# Patient Record
Sex: MALE | Race: WHITE | NOT HISPANIC OR LATINO | Employment: OTHER | URBAN - NONMETROPOLITAN AREA
[De-identification: names, ages, dates, MRNs, and addresses within clinical notes are randomized per-mention and may not be internally consistent; named-entity substitution may affect disease eponyms.]

---

## 2017-07-17 ENCOUNTER — TRANSCRIBE ORDERS (OUTPATIENT)
Dept: LAB | Facility: HOSPITAL | Age: 67
End: 2017-07-17

## 2017-07-17 ENCOUNTER — HOSPITAL ENCOUNTER (OUTPATIENT)
Dept: CT IMAGING | Facility: HOSPITAL | Age: 67
Discharge: HOME | End: 2017-07-17
Attending: INTERNAL MEDICINE | Admitting: INTERNAL MEDICINE

## 2017-07-17 DIAGNOSIS — R31.9 ESSENTIAL HEMATURIA: ICD-10-CM

## 2017-07-17 DIAGNOSIS — R31.9 HEMATURIA: Primary | ICD-10-CM

## 2017-07-17 DIAGNOSIS — R31.9 HEMATURIA: ICD-10-CM

## 2017-07-17 LAB — CREAT BLDA-MCNC: 1 MG/DL (ref 0.6–1.3)

## 2017-07-17 PROCEDURE — 74178 CT ABD&PLV WO CNTR FLWD CNTR: CPT

## 2017-07-17 PROCEDURE — 82565 ASSAY OF CREATININE: CPT

## 2017-07-17 PROCEDURE — 0 IOPAMIDOL 61 % SOLUTION: Performed by: INTERNAL MEDICINE

## 2017-07-17 RX ADMIN — IOPAMIDOL 100 ML: 612 INJECTION, SOLUTION INTRAVENOUS at 11:45

## 2017-07-20 ENCOUNTER — PREP FOR SURGERY (OUTPATIENT)
Dept: OTHER | Facility: HOSPITAL | Age: 67
End: 2017-07-20

## 2017-07-20 ENCOUNTER — OFFICE VISIT (OUTPATIENT)
Dept: UROLOGY | Facility: CLINIC | Age: 67
End: 2017-07-20

## 2017-07-20 VITALS — TEMPERATURE: 97.8 F | WEIGHT: 245 LBS | BODY MASS INDEX: 35.07 KG/M2 | HEIGHT: 70 IN

## 2017-07-20 DIAGNOSIS — D41.4 BLADDER NEOPLASM OF UNCERTAIN MALIGNANT POTENTIAL: Primary | ICD-10-CM

## 2017-07-20 DIAGNOSIS — N32.89 BLADDER MASS: Primary | ICD-10-CM

## 2017-07-20 DIAGNOSIS — N13.30 HYDRONEPHROSIS, RIGHT: ICD-10-CM

## 2017-07-20 DIAGNOSIS — R31.0 GROSS HEMATURIA: ICD-10-CM

## 2017-07-20 LAB
BILIRUB BLD-MCNC: NEGATIVE MG/DL
CLARITY, POC: CLEAR
COLOR UR: YELLOW
GLUCOSE UR STRIP-MCNC: ABNORMAL MG/DL
KETONES UR QL: NEGATIVE
LEUKOCYTE EST, POC: NEGATIVE
NITRITE UR-MCNC: NEGATIVE MG/ML
PH UR: 6 [PH] (ref 5–8)
PROT UR STRIP-MCNC: NEGATIVE MG/DL
RBC # UR STRIP: ABNORMAL /UL
SP GR UR: 1.01 (ref 1–1.03)
UROBILINOGEN UR QL: NORMAL

## 2017-07-20 PROCEDURE — 52000 CYSTOURETHROSCOPY: CPT | Performed by: UROLOGY

## 2017-07-20 PROCEDURE — 81003 URINALYSIS AUTO W/O SCOPE: CPT | Performed by: UROLOGY

## 2017-07-20 PROCEDURE — 99205 OFFICE O/P NEW HI 60 MIN: CPT | Performed by: UROLOGY

## 2017-07-20 NOTE — PROGRESS NOTES
Mr. Bradley is 66 y.o. male    CHIEF COMPLAINT: I am here because I have a bladder mass. I had a lot of blood in my urine last week.     HPI  5 days ago developed acute onset of gross hematuria.  Severity was dark red with clots.  He had no evidence of slowing of urinary stream but was bothered by urgency and frequency.  He  had improvement with observation/time.  Onset is unknown.  He underwent a CT scan for further evaluation which has revealed a bladder mass.    The following portions of the patient's history were reviewed and updated as appropriate: allergies, current medications, past family history, past medical history, past social history, past surgical history and problem list.    Review of Systems   Constitutional: Negative for appetite change and fever.   HENT: Negative for hearing loss and sore throat.    Eyes: Negative for pain and redness.   Respiratory: Negative for cough and shortness of breath.    Cardiovascular: Negative for chest pain and leg swelling.   Gastrointestinal: Negative for anal bleeding, nausea and vomiting.   Endocrine: Negative for cold intolerance and heat intolerance.   Genitourinary: Negative for dysuria, flank pain and hematuria.   Musculoskeletal: Negative for joint swelling and myalgias.   Skin: Negative for color change and rash.   Allergic/Immunologic: Negative for immunocompromised state.   Neurological: Negative for dizziness and speech difficulty.   Hematological: Negative for adenopathy. Does not bruise/bleed easily.   Psychiatric/Behavioral: Negative for dysphoric mood and suicidal ideas.         Current Outpatient Prescriptions:   •  allopurinol (ZYLOPRIM) 300 MG tablet, Take 300 mg by mouth daily., Disp: , Rfl:   •  aspirin 81 MG tablet, Take 81 mg by mouth daily., Disp: , Rfl:   •  beclomethasone (BECONASE AQ) 42 MCG/SPRAY nasal spray, 2 sprays by Nasal route 2 times daily. Dose is for each nostril., Disp: , Rfl:   •  Cholecalciferol 1000 UNITS capsule, Take  by  "mouth., Disp: , Rfl:   •  fenofibrate (TRICOR) 145 MG tablet, Take 145 mg by mouth daily., Disp: , Rfl:   •  furosemide (LASIX) 20 MG tablet, Take  by mouth., Disp: , Rfl:   •  GABAPENTIN PO, Take  by mouth., Disp: , Rfl:   •  LISINOPRIL PO, Take  by mouth., Disp: , Rfl:   •  LOVASTATIN PO, Take  by mouth., Disp: , Rfl:   •  magnesium oxide (MAG-OX) 400 MG tablet, Take 400 mg by mouth 2 times daily., Disp: , Rfl:   •  METFORMIN HCL PO, Take  by mouth., Disp: , Rfl:   •  OMEPRAZOLE PO, Take  by mouth., Disp: , Rfl:   •  vitamin B-12 (CYANOCOBALAMIN) 1000 MCG tablet, Take 1,000 mcg by mouth daily., Disp: , Rfl:   •  Ipratropium Bromide powder, by Does not apply route., Disp: , Rfl:     Past Medical History:   Diagnosis Date   • Diabetes mellitus    • GERD (gastroesophageal reflux disease)    • Hypertension    • Neuropathy        Past Surgical History:   Procedure Laterality Date   • CHOLECYSTECTOMY     • LEG SURGERY         Social History     Social History   • Marital status:      Spouse name: N/A   • Number of children: N/A   • Years of education: N/A     Social History Main Topics   • Smoking status: Former Smoker     Quit date: 1997   • Smokeless tobacco: None   • Alcohol use No   • Drug use: No   • Sexual activity: Not Asked     Other Topics Concern   • None     Social History Narrative       Family History   Problem Relation Age of Onset   • No Known Problems Father    • No Known Problems Mother        Temp 97.8 °F (36.6 °C)  Ht 70\" (177.8 cm)  Wt 245 lb (111 kg)  BMI 35.15 kg/m2    Physical Exam  Constitutional: He is oriented to person, place, and time. He appears well-developed and well-nourished. No distress.   HENT:   Head: Normocephalic and atraumatic.   Right Ear: External ear and ear canal normal.   Left Ear: External ear and ear canal normal.   Nose: No nasal deformity. No epistaxis.   Mouth/Throat: Oropharynx is clear and moist. Mucous membranes are not pale, not dry and not cyanotic. Normal " dentition. No oropharyngeal exudate.   Neck: Trachea normal. No tracheal tenderness present. No tracheal deviation present. No thyroid mass and no thyromegaly present.   Pulmonary/Chest: Effort normal. No accessory muscle usage. No respiratory distress. Chest wall is not dull to percussion (No flatness or hyperresonance). He exhibits no mass and no tenderness.   On palpation, no tactile fremitus. All movements are symmetric. No intercostal retraction noted.    Abdominal: Soft. Normal appearance. He exhibits no distension and no mass. There is no hepatosplenomegaly. There is no tenderness. No hernia.   Rectal examination or stool specimen is not indicated.    Musculoskeletal:   Normal gait and station. The spine, ribs, and pelvis are examined. No obvious misalignment or asymmetry. ROM is reasonable for age. No instability. No obvious atrophy, flaccidity or spasticity.    Lymphadenopathy:     He has no cervical adenopathy.        Right: No inguinal adenopathy present.        Left: No inguinal adenopathy present.   Neurological: He is alert and oriented to person, place, and time.   Skin: Skin is warm, dry and intact. No lesion and no rash noted. He is not diaphoretic. No cyanosis. No pallor. Nails show no clubbing.   On palpation, there were no induration, subcutaneous nodules, or tightening   Psychiatric: His speech is normal and behavior is normal. Judgment and thought content normal. His mood appears not anxious. His affect is not labile. He does not exhibit a depressed mood.   Vitals reviewed.    Results for orders placed or performed in visit on 07/20/17   POC Urinalysis Dipstick, Automated   Result Value Ref Range    Color Yellow Yellow, Straw, Dark Yellow, Nicolasa    Clarity, UA Clear Clear    Glucose,  mg/dL (A) Negative, 1000 mg/dL (3+) mg/dL    Bilirubin Negative Negative    Ketones, UA Negative Negative    Specific Gravity  1.015 1.005 - 1.030    Blood, UA Large (A) Negative    pH, Urine 6.0 5.0 - 8.0     Protein, POC Negative Negative mg/dL    Urobilinogen, UA Normal Normal    Leukocytes Negative Negative    Nitrite, UA Negative Negative     Independent review of the CT scan of abdomen and pelvis with and without contrast  The CT scan of the abdomen/pelvis done with and without contrast is available for me to review.  Treatment recommendations require an independent review.  First I scanned the liver, spleen, and bowel pattern.  The retroperitoneum including the major vessels and lymphatic packages are briefly reviewed.  This film as been reviewed by the radiologist to determine any non urologic abnormalities that are present.  The kidneys are closely inspected for size, symmetry, contour, parenchymal thickness, perinephric reaction, presence of calcifications, and intrarenal dilation of the collecting system.  The ureters are inspected for their course, caliber, and any calcifications.  The bladder is inspected for its thickness, size, and presence of any calcifications.  This scan shows right-sided hydronephrosis to level the ureterovesical junction.  There is a 2 cm mass in the bladder that is concerning for papillary lesion.  Assessment and Plan  Diagnoses and all orders for this visit:    Bladder mass  -     POC Urinalysis Dipstick, Automated    Gross hematuria    Hydronephrosis, right    Further evaluation is needed for cystoscopy.  His urine does not appear infected today. Cystoscopy as the definitive lower urinary tract study is discussed . The risks of pain and discomfort, infection, and urethral stricture are discussed with the patient including the technique used in the office setting.  All patient questions were answered.     A CXR will be needed to rule out pulmonary metastasis.     Cystoscopy procedure note  Pre- operative diagnosis:  Hematuria and suspected bladder mass on CT scan    Post operative diagnosis:  Bladder Tumor    Procedure:  The patient was prepped and draped in a normal sterile  fashion.  The urethra was anesthetized with 2% lidocaine jelly.  A flexible cystoscope was introduced per urethra.      Urethra:  No urethral stricture    Bladder:  Bladder tumor 5 cm near right ureteral orifice and No bladder neck contracture    Ureteral orifices:  Cant identify ureteral orifice    Prostate:  lateral lobe hypertrophy    Patient tolerated the procedure well    Complications: none    Blood loss: minimal    Diagnoses and all orders for this visit:    Bladder mass  -     POC Urinalysis Dipstick, Automated    Gross hematuria    Hydronephrosis, right        Follow up:    Schedule for OR  TURBT  Today cystoscopy has revealed a new diagnosis,a bladder tumor.  I explained to the patient that most tumors in the bladder are cancerous.  I also explained the outcome and ultimate treatment for bladder cancer depends upon the depth of invasion of the tumor, its size, and histologic grade of the tumor.  I then recommended a transurethral resection of the bladder tumor.  Alternative options would include watchful waiting and percutaneous biopsy but TURBT is the standard of care in this situation.  The risks of the procedure including hematuria, possible urinary retention from clots, urinary tract infection, ureteral obstruction, bladder perforation and need for open surgery and even removal of a portion of the bladder is explained.  Open surgery is described as a rare event.  The patient does understand that the TURBT serves as a diagnostic procedure to determine the depth of invasion and grade but can be adequate treatment.  This will allow us to formulate a plan for definitive therapy which may require repeat bladder biopsy, surveillance cystoscopy (described to the patient as part of long term follow-up due to the recurrence rate ranged from 20-45% based upon histologic grade), partial or radical cystectomy, intravesical chemotherapy or immunotherapy.  The patient understands this, acknowledges informed consent,  and will be scheduled.    Chris Esparza MD  07/20/17  8:41 AM    Cc: Dr. Fidel Yousif

## 2017-07-21 ENCOUNTER — TELEPHONE (OUTPATIENT)
Dept: UROLOGY | Facility: CLINIC | Age: 67
End: 2017-07-21

## 2017-07-21 ENCOUNTER — APPOINTMENT (OUTPATIENT)
Dept: PREADMISSION TESTING | Facility: HOSPITAL | Age: 67
End: 2017-07-21

## 2017-07-21 VITALS
OXYGEN SATURATION: 94 % | WEIGHT: 243 LBS | SYSTOLIC BLOOD PRESSURE: 122 MMHG | BODY MASS INDEX: 36.83 KG/M2 | RESPIRATION RATE: 18 BRPM | HEIGHT: 68 IN | DIASTOLIC BLOOD PRESSURE: 72 MMHG | HEART RATE: 66 BPM

## 2017-07-21 DIAGNOSIS — D41.4 BLADDER NEOPLASM OF UNCERTAIN MALIGNANT POTENTIAL: ICD-10-CM

## 2017-07-21 LAB
ALBUMIN SERPL-MCNC: 4.6 G/DL (ref 3.5–5)
ALBUMIN/GLOB SERPL: 1.6 G/DL (ref 1.1–2.5)
ALP SERPL-CCNC: 53 U/L (ref 24–120)
ALT SERPL W P-5'-P-CCNC: 40 U/L (ref 0–54)
ANION GAP SERPL CALCULATED.3IONS-SCNC: 11 MMOL/L (ref 4–13)
AST SERPL-CCNC: 39 U/L (ref 7–45)
BACTERIA UR QL AUTO: ABNORMAL /HPF
BILIRUB SERPL-MCNC: 0.7 MG/DL (ref 0.1–1)
BILIRUB UR QL STRIP: NEGATIVE
BUN BLD-MCNC: 23 MG/DL (ref 5–21)
BUN/CREAT SERPL: 20 (ref 7–25)
CALCIUM SPEC-SCNC: 9.7 MG/DL (ref 8.4–10.4)
CHLORIDE SERPL-SCNC: 102 MMOL/L (ref 98–110)
CLARITY UR: CLEAR
CO2 SERPL-SCNC: 29 MMOL/L (ref 24–31)
COLOR UR: YELLOW
CREAT BLD-MCNC: 1.15 MG/DL (ref 0.5–1.4)
DEPRECATED RDW RBC AUTO: 47.7 FL (ref 40–54)
ERYTHROCYTE [DISTWIDTH] IN BLOOD BY AUTOMATED COUNT: 14.9 % (ref 12–15)
GFR SERPL CREATININE-BSD FRML MDRD: 63 ML/MIN/1.73
GLOBULIN UR ELPH-MCNC: 2.9 GM/DL
GLUCOSE BLD-MCNC: 110 MG/DL (ref 70–100)
GLUCOSE UR STRIP-MCNC: NEGATIVE MG/DL
HCT VFR BLD AUTO: 39.4 % (ref 40–52)
HGB BLD-MCNC: 12.9 G/DL (ref 14–18)
HGB UR QL STRIP.AUTO: NEGATIVE
HYALINE CASTS UR QL AUTO: ABNORMAL /LPF
KETONES UR QL STRIP: NEGATIVE
LEUKOCYTE ESTERASE UR QL STRIP.AUTO: ABNORMAL
MCH RBC QN AUTO: 28.9 PG (ref 28–32)
MCHC RBC AUTO-ENTMCNC: 32.7 G/DL (ref 33–36)
MCV RBC AUTO: 88.3 FL (ref 82–95)
NITRITE UR QL STRIP: NEGATIVE
PH UR STRIP.AUTO: <=5 [PH] (ref 5–8)
PLATELET # BLD AUTO: 197 10*3/MM3 (ref 130–400)
PMV BLD AUTO: 9.5 FL (ref 6–12)
POTASSIUM BLD-SCNC: 3.6 MMOL/L (ref 3.5–5.3)
PROT SERPL-MCNC: 7.5 G/DL (ref 6.3–8.7)
PROT UR QL STRIP: NEGATIVE
RBC # BLD AUTO: 4.46 10*6/MM3 (ref 4.8–5.9)
RBC # UR: ABNORMAL /HPF
REF LAB TEST METHOD: ABNORMAL
SODIUM BLD-SCNC: 142 MMOL/L (ref 135–145)
SP GR UR STRIP: 1.01 (ref 1–1.03)
SQUAMOUS #/AREA URNS HPF: ABNORMAL /HPF
UROBILINOGEN UR QL STRIP: ABNORMAL
WBC NRBC COR # BLD: 7.53 10*3/MM3 (ref 4.8–10.8)
WBC UR QL AUTO: ABNORMAL /HPF

## 2017-07-21 PROCEDURE — 87081 CULTURE SCREEN ONLY: CPT | Performed by: UROLOGY

## 2017-07-21 PROCEDURE — 87086 URINE CULTURE/COLONY COUNT: CPT | Performed by: UROLOGY

## 2017-07-21 PROCEDURE — 81001 URINALYSIS AUTO W/SCOPE: CPT | Performed by: UROLOGY

## 2017-07-21 PROCEDURE — 80053 COMPREHEN METABOLIC PANEL: CPT | Performed by: UROLOGY

## 2017-07-21 PROCEDURE — 36415 COLL VENOUS BLD VENIPUNCTURE: CPT

## 2017-07-21 PROCEDURE — 93010 ELECTROCARDIOGRAM REPORT: CPT | Performed by: INTERNAL MEDICINE

## 2017-07-21 PROCEDURE — 85027 COMPLETE CBC AUTOMATED: CPT | Performed by: UROLOGY

## 2017-07-21 PROCEDURE — 93005 ELECTROCARDIOGRAM TRACING: CPT

## 2017-07-21 RX ORDER — FUROSEMIDE 40 MG/1
40 TABLET ORAL 2 TIMES DAILY
COMMUNITY
End: 2019-02-02 | Stop reason: HOSPADM

## 2017-07-21 NOTE — DISCHARGE INSTRUCTIONS
DAY OF SURGERY INSTRUCTIONS        YOUR SURGEON: BOBBY TOVAR    PROCEDURE: CYSTOSCOPY TRANSURETHERAL RESECTION OF BLADDER TUMOR    DATE OF SURGERY: July 25TH 2017    ARRIVAL TIME: AS DIRECTED BY OFFICE    DAY OF SURGERY TAKE ONLY THESE MEDICATIONS: 0            BEFORE YOU COME TO THE HOSPITAL  (Pre-op instructions)  • Do not eat, drink, smoke or chew gum after midnight the night before surgery.  This also includes no mints.  • Morning of surgery take only the medicines you have been instructed with a sip of water unless otherwise instructed  by your physician.  • Do not shave, wear makeup or dark nail polish.  • Remove all jewelry including rings.  • Leave anything you consider valuable at home.  • Leave your suitcase in the car until after your surgery.  • Bring the following with you if applicable:  o Picture ID and insurance, Medicare or Medicaid cards  o Co-pay/deductible required by insurance (cash, check, credit card)  o Copy of advance directive, living will or power-of- documents if not brought to PAT  o CPAP or BIPAP mask and tubing  o Relaxation aids (MP3 player, book, magazine)  • On the day of surgery check in at registration located at the main entrance of the hospital.       Outpatient Surgery Guidelines, Adult  Outpatient procedures are those for which the person having the procedure is allowed to go home the same day as the procedure. Various procedures are done on an outpatient basis. You should follow some general guidelines if you will be having an outpatient procedure.  LET YOUR HEALTH CARE PROVIDER KNOW ABOUT:  · Any allergies you have.  · All medicines you are taking, including vitamins, herbs, eye drops, creams, and over-the-counter medicines.  · Previous problems you or members of your family have had with the use of anesthetics.  · Any blood disorders you have.  · Previous surgeries you have had.  · Medical conditions you have.  RISKS AND COMPLICATIONS  Your health care provider  will discuss possible risks and complications with you before surgery. Common risks and complications include:    · Problems due to the use of anesthetics.  · Blood loss and replacement (does not apply to minor surgical procedures).  · Temporary increase in pain due to surgery.  · Uncorrected pain or problems that the surgery was meant to correct.  · Infection.  · New damage.  BEFORE THE PROCEDURE  · Ask your health care provider about changing or stopping your regular medicines. You may need to stop taking certain medicines in the days or weeks before the procedure.  · Stop smoking at least 2 weeks before surgery. This lowers your risk for complications during and after surgery. Ask your health care provider for help with this if needed.  · Eat your usual meals and a light supper the day before surgery. Continue fluid intake. Do not drink alcohol.  · Do not eat or drink after midnight the night before your surgery.   · Arrange for someone to take you home and to stay with you for 24 hours after the procedure. Medicine given for your procedure may affect your ability to drive or to care for yourself.  · Call your health care provider's office if you develop an illness or problem that may prevent you from safely having your procedure.  AFTER THE PROCEDURE  After surgery, you will be taken to a recovery area, where your progress will be monitored. If there are no complications, you will be allowed to go home when you are awake, stable, and taking fluids well. You may have numbness around the surgical site. Healing will take some time. You will have tenderness at the surgical site and may have some swelling and bruising. You may also have some nausea.  HOME CARE INSTRUCTIONS  · Do not drive for 24 hours, or as directed by your health care provider. Do not drive while taking prescription pain medicines.  · Do not drink alcohol for 24 hours.  · Do not make important decisions or sign legal documents for 24 hours.  · You  may resume a normal diet and activities as directed.  · Do not lift anything heavier than 10 pounds (4.5 kg) or play contact sports until your health care provider says it is okay.  · Change your bandages (dressings) as directed.  · Only take over-the-counter or prescription medicines as directed by your health care provider.  · Follow up with your health care provider as directed.  SEEK MEDICAL CARE IF:  · You have increased bleeding (more than a small spot) from the surgical site.  · You have redness, swelling, or increasing pain in the wound.  · You see pus coming from the wound.  · You have a fever.  · You notice a bad smell coming from the wound or dressing.  · You feel lightheaded or faint.  · You develop a rash.  · You have trouble breathing.  · You develop allergies.  MAKE SURE YOU:  · Understand these instructions.  · Will watch your condition.  · Will get help right away if you are not doing well or get worse.     This information is not intended to replace advice given to you by your health care provider. Make sure you discuss any questions you have with your health care provider.     Document Released: 09/12/2002 Document Revised: 05/03/2016 Document Reviewed: 05/22/2014  [employer] Interactive Patient Education ©2016 [employer]       Fall Prevention in Hospitals, Adult  As a hospital patient, your condition and the treatments you receive can increase your risk for falls. Some additional risk factors for falls in a hospital include:  · Being in an unfamiliar environment.  · Being on bed rest.  · Your surgery.  · Taking certain medicines.  · Your tubing requirements, such as intravenous (IV) therapy or catheters.  It is important that you learn how to decrease fall risks while at the hospital. Below are important tips that can help prevent falls.  SAFETY TIPS FOR PREVENTING FALLS  Talk about your risk of falling.  · Ask your health care provider why you are at risk for falling. Is it your medicine,  illness, tubing placement, or something else?  · Make a plan with your health care provider to keep you safe from falls.  · Ask your health care provider or pharmacist about side effects of your medicines. Some medicines can make you dizzy or affect your coordination.  Ask for help.  · Ask for help before getting out of bed. You may need to press your call button.  · Ask for assistance in getting safely to the toilet.  · Ask for a walker or cane to be put at your bedside. Ask that most of the side rails on your bed be placed up before your health care provider leaves the room.  · Ask family or friends to sit with you.  · Ask for things that are out of your reach, such as your glasses, hearing aids, telephone, bedside table, or call button.  Follow these tips to avoid falling:  · Stay lying or seated, rather than standing, while waiting for help.  · Wear rubber-soled slippers or shoes whenever you walk in the hospital.  · Avoid quick, sudden movements.  ¨ Change positions slowly.  ¨ Sit on the side of your bed before standing.  ¨ Stand up slowly and wait before you start to walk.  · Let your health care provider know if there is a spill on the floor.  · Pay careful attention to the medical equipment, electrical cords, and tubes around you.  · When you need help, use your call button by your bed or in the bathroom. Wait for one of your health care providers to help you.  · If you feel dizzy or unsure of your footing, return to bed and wait for assistance.  · Avoid being distracted by the TV, telephone, or another person in your room.  · Do not lean or support yourself on rolling objects, such as IV poles or bedside tables.     This information is not intended to replace advice given to you by your health care provider. Make sure you discuss any questions you have with your health care provider.     Document Released: 12/15/2001 Document Revised: 01/08/2016 Document Reviewed: 08/25/2013  [employer] Patient  Education ©2016 [employer]       Surgical Site Infections FAQs  What is a Surgical Site Infection (SSI)?  A surgical site infection is an infection that occurs after surgery in the part of the body where the surgery took place. Most patients who have surgery do not develop an infection. However, infections develop in about 1 to 3 out of every 100 patients who have surgery.  Some of the common symptoms of a surgical site infection are:  · Redness and pain around the area where you had surgery  · Drainage of cloudy fluid from your surgical wound  · Fever  Can SSIs be treated?  Yes. Most surgical site infections can be treated with antibiotics. The antibiotic given to you depends on the bacteria (germs) causing the infection. Sometimes patients with SSIs also need another surgery to treat the infection.  What are some of the things that hospitals are doing to prevent SSIs?  To prevent SSIs, doctors, nurses, and other healthcare providers:  · Clean their hands and arms up to their elbows with an antiseptic agent just before the surgery.  · Clean their hands with soap and water or an alcohol-based hand rub before and after caring for each patient.  · May remove some of your hair immediately before your surgery using electric clippers if the hair is in the same area where the procedure will occur. They should not shave you with a razor.  · Wear special hair covers, masks, gowns, and gloves during surgery to keep the surgery area clean.  · Give you antibiotics before your surgery starts. In most cases, you should get antibiotics within 60 minutes before the surgery starts and the antibiotics should be stopped within 24 hours after surgery.  · Clean the skin at the site of your surgery with a special soap that kills germs.  What can I do to help prevent SSIs?  Before your surgery:  · Tell your doctor about other medical problems you may have. Health problems such as allergies, diabetes, and obesity could affect your  surgery and your treatment.  · Quit smoking. Patients who smoke get more infections. Talk to your doctor about how you can quit before your surgery.  · Do not shave near where you will have surgery. Shaving with a razor can irritate your skin and make it easier to develop an infection.  At the time of your surgery:  · Speak up if someone tries to shave you with a razor before surgery. Ask why you need to be shaved and talk with your surgeon if you have any concerns.  · Ask if you will get antibiotics before surgery.  After your surgery:  · Make sure that your healthcare providers clean their hands before examining you, either with soap and water or an alcohol-based hand rub.  · If you do not see your providers clean their hands, please ask them to do so.  · Family and friends who visit you should not touch the surgical wound or dressings.  · Family and friends should clean their hands with soap and water or an alcohol-based hand rub before and after visiting you. If you do not see them clean their hands, ask them to clean their hands.  What do I need to do when I go home from the hospital?  · Before you go home, your doctor or nurse should explain everything you need to know about taking care of your wound. Make sure you understand how to care for your wound before you leave the hospital.  · Always clean your hands before and after caring for your wound.  · Before you go home, make sure you know who to contact if you have questions or problems after you get home.  · If you have any symptoms of an infection, such as redness and pain at the surgery site, drainage, or fever, call your doctor immediately.  If you have additional questions, please ask your doctor or nurse.  Developed and co-sponsored by The Society for Healthcare Epidemiology of [place] (SHEA); Infectious Diseases Society of [place] (IDSA); American Hospital Association; Association for Professionals in Infection Control and Epidemiology (APIC); [facility]  for Disease Control and Prevention (CDC); and The Joint Commission.     This information is not intended to replace advice given to you by your health care provider. Make sure you discuss any questions you have with your health care provider.     Document Released: 12/23/2014 Document Revised: 01/08/2016 Document Reviewed: 03/02/2016  [employer] Interactive Patient Education ©2016 [employer]     PATIENT/FAMILY/RESPONSIBLE PARTY VERBALIZES UNDERSTANDING OF ABOVE EDUCATION.  COPY OF PAIN SCALE GIVEN AND REVIEWED WITH VERBALIZED UNDERSTANDING.

## 2017-07-21 NOTE — TELEPHONE ENCOUNTER
----- Message from Rafia Meadows sent at 7/21/2017  9:33 AM CDT -----  Contact: wife  Pt is wanting to know how many days he will be in the hosp for his surgery and approx how long it takes for him to resume normal activity.  I can call her back if you know.

## 2017-07-23 LAB
BACTERIA SPEC AEROBE CULT: NORMAL
MRSA SPEC QL CULT: NORMAL

## 2017-07-24 NOTE — TELEPHONE ENCOUNTER
Spoke with pt wife regarding her questions and gave her the information from Dr. Esparza. She voiced understanding.

## 2017-07-25 ENCOUNTER — ANESTHESIA EVENT (OUTPATIENT)
Dept: PERIOP | Facility: HOSPITAL | Age: 67
End: 2017-07-25

## 2017-07-25 ENCOUNTER — HOSPITAL ENCOUNTER (OUTPATIENT)
Facility: HOSPITAL | Age: 67
Discharge: HOME | End: 2017-07-26
Attending: UROLOGY | Admitting: UROLOGY

## 2017-07-25 ENCOUNTER — ANESTHESIA (OUTPATIENT)
Dept: PERIOP | Facility: HOSPITAL | Age: 67
End: 2017-07-25

## 2017-07-25 DIAGNOSIS — D41.4 BLADDER NEOPLASM OF UNCERTAIN MALIGNANT POTENTIAL: ICD-10-CM

## 2017-07-25 LAB
GLUCOSE BLDC GLUCOMTR-MCNC: 140 MG/DL (ref 70–130)
GLUCOSE BLDC GLUCOMTR-MCNC: 160 MG/DL (ref 70–130)
GLUCOSE BLDC GLUCOMTR-MCNC: 173 MG/DL (ref 70–130)

## 2017-07-25 PROCEDURE — 88307 TISSUE EXAM BY PATHOLOGIST: CPT | Performed by: UROLOGY

## 2017-07-25 PROCEDURE — C1758 CATHETER, URETERAL: HCPCS | Performed by: UROLOGY

## 2017-07-25 PROCEDURE — 52240 CYSTOSCOPY AND TREATMENT: CPT | Performed by: UROLOGY

## 2017-07-25 PROCEDURE — 82962 GLUCOSE BLOOD TEST: CPT

## 2017-07-25 PROCEDURE — 25010000003 CEFAZOLIN PER 500 MG: Performed by: UROLOGY

## 2017-07-25 PROCEDURE — 25010000002 FENTANYL CITRATE (PF) 100 MCG/2ML SOLUTION: Performed by: NURSE ANESTHETIST, CERTIFIED REGISTERED

## 2017-07-25 PROCEDURE — 25010000002 NEOSTIGMINE PER 0.5 MG: Performed by: NURSE ANESTHETIST, CERTIFIED REGISTERED

## 2017-07-25 PROCEDURE — 25010000002 PROPOFOL 10 MG/ML EMULSION: Performed by: NURSE ANESTHETIST, CERTIFIED REGISTERED

## 2017-07-25 PROCEDURE — 25010000002 MIDAZOLAM PER 1 MG: Performed by: ANESTHESIOLOGY

## 2017-07-25 PROCEDURE — 25010000002 ONDANSETRON PER 1 MG: Performed by: NURSE ANESTHETIST, CERTIFIED REGISTERED

## 2017-07-25 PROCEDURE — 25010000002 DEXAMETHASONE PER 1 MG: Performed by: NURSE ANESTHETIST, CERTIFIED REGISTERED

## 2017-07-25 PROCEDURE — 94799 UNLISTED PULMONARY SVC/PX: CPT

## 2017-07-25 RX ORDER — ONDANSETRON 4 MG/1
4 TABLET, ORALLY DISINTEGRATING ORAL EVERY 6 HOURS PRN
Status: DISCONTINUED | OUTPATIENT
Start: 2017-07-25 | End: 2017-07-26 | Stop reason: HOSPADM

## 2017-07-25 RX ORDER — ONDANSETRON 2 MG/ML
4 INJECTION INTRAMUSCULAR; INTRAVENOUS EVERY 6 HOURS PRN
Status: DISCONTINUED | OUTPATIENT
Start: 2017-07-25 | End: 2017-07-26 | Stop reason: HOSPADM

## 2017-07-25 RX ORDER — PROPOFOL 10 MG/ML
VIAL (ML) INTRAVENOUS AS NEEDED
Status: DISCONTINUED | OUTPATIENT
Start: 2017-07-25 | End: 2017-07-25 | Stop reason: SURG

## 2017-07-25 RX ORDER — ROCURONIUM BROMIDE 10 MG/ML
INJECTION, SOLUTION INTRAVENOUS AS NEEDED
Status: DISCONTINUED | OUTPATIENT
Start: 2017-07-25 | End: 2017-07-25 | Stop reason: SURG

## 2017-07-25 RX ORDER — NALOXONE HCL 0.4 MG/ML
0.04 VIAL (ML) INJECTION AS NEEDED
Status: DISCONTINUED | OUTPATIENT
Start: 2017-07-25 | End: 2017-07-25 | Stop reason: HOSPADM

## 2017-07-25 RX ORDER — MEPERIDINE HYDROCHLORIDE 25 MG/ML
12.5 INJECTION INTRAMUSCULAR; INTRAVENOUS; SUBCUTANEOUS
Status: DISCONTINUED | OUTPATIENT
Start: 2017-07-25 | End: 2017-07-25 | Stop reason: HOSPADM

## 2017-07-25 RX ORDER — IPRATROPIUM BROMIDE AND ALBUTEROL SULFATE 2.5; .5 MG/3ML; MG/3ML
3 SOLUTION RESPIRATORY (INHALATION) ONCE
Status: COMPLETED | OUTPATIENT
Start: 2017-07-25 | End: 2017-07-25

## 2017-07-25 RX ORDER — LABETALOL HYDROCHLORIDE 5 MG/ML
5 INJECTION, SOLUTION INTRAVENOUS
Status: DISCONTINUED | OUTPATIENT
Start: 2017-07-25 | End: 2017-07-25 | Stop reason: HOSPADM

## 2017-07-25 RX ORDER — FENTANYL CITRATE 50 UG/ML
INJECTION, SOLUTION INTRAMUSCULAR; INTRAVENOUS AS NEEDED
Status: DISCONTINUED | OUTPATIENT
Start: 2017-07-25 | End: 2017-07-25 | Stop reason: SURG

## 2017-07-25 RX ORDER — ACETAMINOPHEN 325 MG/1
650 TABLET ORAL EVERY 4 HOURS PRN
Status: DISCONTINUED | OUTPATIENT
Start: 2017-07-25 | End: 2017-07-26 | Stop reason: HOSPADM

## 2017-07-25 RX ORDER — ALLOPURINOL 100 MG/1
100 TABLET ORAL DAILY
Status: DISCONTINUED | OUTPATIENT
Start: 2017-07-25 | End: 2017-07-26 | Stop reason: HOSPADM

## 2017-07-25 RX ORDER — HYDROCODONE BITARTRATE AND ACETAMINOPHEN 7.5; 325 MG/1; MG/1
2 TABLET ORAL EVERY 4 HOURS PRN
Status: DISCONTINUED | OUTPATIENT
Start: 2017-07-25 | End: 2017-07-26 | Stop reason: HOSPADM

## 2017-07-25 RX ORDER — DEXAMETHASONE SODIUM PHOSPHATE 4 MG/ML
INJECTION, SOLUTION INTRA-ARTICULAR; INTRALESIONAL; INTRAMUSCULAR; INTRAVENOUS; SOFT TISSUE AS NEEDED
Status: DISCONTINUED | OUTPATIENT
Start: 2017-07-25 | End: 2017-07-25 | Stop reason: SURG

## 2017-07-25 RX ORDER — FAMOTIDINE 10 MG/ML
20 INJECTION, SOLUTION INTRAVENOUS
Status: DISCONTINUED | OUTPATIENT
Start: 2017-07-25 | End: 2017-07-25 | Stop reason: HOSPADM

## 2017-07-25 RX ORDER — ONDANSETRON 4 MG/1
4 TABLET, FILM COATED ORAL EVERY 6 HOURS PRN
Status: DISCONTINUED | OUTPATIENT
Start: 2017-07-25 | End: 2017-07-26 | Stop reason: HOSPADM

## 2017-07-25 RX ORDER — LISINOPRIL 5 MG/1
5 TABLET ORAL
Status: DISCONTINUED | OUTPATIENT
Start: 2017-07-25 | End: 2017-07-26 | Stop reason: HOSPADM

## 2017-07-25 RX ORDER — MIDAZOLAM HYDROCHLORIDE 1 MG/ML
1 INJECTION INTRAMUSCULAR; INTRAVENOUS
Status: DISCONTINUED | OUTPATIENT
Start: 2017-07-25 | End: 2017-07-25 | Stop reason: HOSPADM

## 2017-07-25 RX ORDER — NALOXONE HCL 0.4 MG/ML
0.4 VIAL (ML) INJECTION
Status: DISCONTINUED | OUTPATIENT
Start: 2017-07-25 | End: 2017-07-26 | Stop reason: HOSPADM

## 2017-07-25 RX ORDER — GABAPENTIN 300 MG/1
300 CAPSULE ORAL EVERY 12 HOURS SCHEDULED
Status: DISCONTINUED | OUTPATIENT
Start: 2017-07-25 | End: 2017-07-26 | Stop reason: HOSPADM

## 2017-07-25 RX ORDER — GLYCOPYRROLATE 0.2 MG/ML
INJECTION INTRAMUSCULAR; INTRAVENOUS AS NEEDED
Status: DISCONTINUED | OUTPATIENT
Start: 2017-07-25 | End: 2017-07-25 | Stop reason: SURG

## 2017-07-25 RX ORDER — ONDANSETRON 2 MG/ML
4 INJECTION INTRAMUSCULAR; INTRAVENOUS AS NEEDED
Status: DISCONTINUED | OUTPATIENT
Start: 2017-07-25 | End: 2017-07-25 | Stop reason: HOSPADM

## 2017-07-25 RX ORDER — MAGNESIUM HYDROXIDE 1200 MG/15ML
LIQUID ORAL AS NEEDED
Status: DISCONTINUED | OUTPATIENT
Start: 2017-07-25 | End: 2017-07-25 | Stop reason: HOSPADM

## 2017-07-25 RX ORDER — HYDROCODONE BITARTRATE AND ACETAMINOPHEN 7.5; 325 MG/1; MG/1
1 TABLET ORAL EVERY 4 HOURS PRN
Status: DISCONTINUED | OUTPATIENT
Start: 2017-07-25 | End: 2017-07-26 | Stop reason: HOSPADM

## 2017-07-25 RX ORDER — FUROSEMIDE 40 MG/1
40 TABLET ORAL 2 TIMES DAILY
Status: DISCONTINUED | OUTPATIENT
Start: 2017-07-25 | End: 2017-07-26 | Stop reason: HOSPADM

## 2017-07-25 RX ORDER — ONDANSETRON 2 MG/ML
INJECTION INTRAMUSCULAR; INTRAVENOUS AS NEEDED
Status: DISCONTINUED | OUTPATIENT
Start: 2017-07-25 | End: 2017-07-25 | Stop reason: SURG

## 2017-07-25 RX ORDER — FLUMAZENIL 0.1 MG/ML
0.2 INJECTION INTRAVENOUS AS NEEDED
Status: DISCONTINUED | OUTPATIENT
Start: 2017-07-25 | End: 2017-07-25 | Stop reason: HOSPADM

## 2017-07-25 RX ORDER — SODIUM CHLORIDE 0.9 % (FLUSH) 0.9 %
1-10 SYRINGE (ML) INJECTION AS NEEDED
Status: DISCONTINUED | OUTPATIENT
Start: 2017-07-25 | End: 2017-07-25 | Stop reason: HOSPADM

## 2017-07-25 RX ORDER — METOCLOPRAMIDE HYDROCHLORIDE 5 MG/ML
5 INJECTION INTRAMUSCULAR; INTRAVENOUS
Status: DISCONTINUED | OUTPATIENT
Start: 2017-07-25 | End: 2017-07-25 | Stop reason: HOSPADM

## 2017-07-25 RX ORDER — BISACODYL 10 MG
10 SUPPOSITORY, RECTAL RECTAL DAILY PRN
Status: DISCONTINUED | OUTPATIENT
Start: 2017-07-25 | End: 2017-07-26 | Stop reason: HOSPADM

## 2017-07-25 RX ORDER — HYDRALAZINE HYDROCHLORIDE 20 MG/ML
5 INJECTION INTRAMUSCULAR; INTRAVENOUS
Status: DISCONTINUED | OUTPATIENT
Start: 2017-07-25 | End: 2017-07-25 | Stop reason: HOSPADM

## 2017-07-25 RX ORDER — SODIUM CHLORIDE 0.9 % (FLUSH) 0.9 %
3 SYRINGE (ML) INJECTION AS NEEDED
Status: DISCONTINUED | OUTPATIENT
Start: 2017-07-25 | End: 2017-07-25 | Stop reason: HOSPADM

## 2017-07-25 RX ORDER — SODIUM CHLORIDE, SODIUM LACTATE, POTASSIUM CHLORIDE, CALCIUM CHLORIDE 600; 310; 30; 20 MG/100ML; MG/100ML; MG/100ML; MG/100ML
100 INJECTION, SOLUTION INTRAVENOUS CONTINUOUS
Status: DISCONTINUED | OUTPATIENT
Start: 2017-07-25 | End: 2017-07-25

## 2017-07-25 RX ORDER — MORPHINE SULFATE 4 MG/ML
4 INJECTION, SOLUTION INTRAMUSCULAR; INTRAVENOUS
Status: DISCONTINUED | OUTPATIENT
Start: 2017-07-25 | End: 2017-07-26 | Stop reason: HOSPADM

## 2017-07-25 RX ORDER — SODIUM CHLORIDE 9 MG/ML
25 INJECTION, SOLUTION INTRAVENOUS CONTINUOUS
Status: DISCONTINUED | OUTPATIENT
Start: 2017-07-25 | End: 2017-07-26 | Stop reason: HOSPADM

## 2017-07-25 RX ORDER — CHLORAL HYDRATE 500 MG
1000 CAPSULE ORAL
COMMUNITY
End: 2020-10-26

## 2017-07-25 RX ORDER — IPRATROPIUM BROMIDE AND ALBUTEROL SULFATE 2.5; .5 MG/3ML; MG/3ML
3 SOLUTION RESPIRATORY (INHALATION) ONCE AS NEEDED
Status: DISCONTINUED | OUTPATIENT
Start: 2017-07-25 | End: 2017-07-25 | Stop reason: HOSPADM

## 2017-07-25 RX ORDER — LIDOCAINE HYDROCHLORIDE 10 MG/ML
0.5 INJECTION, SOLUTION INFILTRATION; PERINEURAL ONCE AS NEEDED
Status: COMPLETED | OUTPATIENT
Start: 2017-07-25 | End: 2017-07-25

## 2017-07-25 RX ORDER — ATORVASTATIN CALCIUM 10 MG/1
10 TABLET, FILM COATED ORAL NIGHTLY
Status: DISCONTINUED | OUTPATIENT
Start: 2017-07-25 | End: 2017-07-26 | Stop reason: HOSPADM

## 2017-07-25 RX ORDER — FLUTICASONE PROPIONATE 50 MCG
1 SPRAY, SUSPENSION (ML) NASAL DAILY
Status: DISCONTINUED | OUTPATIENT
Start: 2017-07-25 | End: 2017-07-26 | Stop reason: HOSPADM

## 2017-07-25 RX ORDER — MORPHINE SULFATE 2 MG/ML
2 INJECTION, SOLUTION INTRAMUSCULAR; INTRAVENOUS AS NEEDED
Status: DISCONTINUED | OUTPATIENT
Start: 2017-07-25 | End: 2017-07-25 | Stop reason: HOSPADM

## 2017-07-25 RX ORDER — SODIUM CHLORIDE, SODIUM LACTATE, POTASSIUM CHLORIDE, CALCIUM CHLORIDE 600; 310; 30; 20 MG/100ML; MG/100ML; MG/100ML; MG/100ML
1000 INJECTION, SOLUTION INTRAVENOUS CONTINUOUS PRN
Status: DISCONTINUED | OUTPATIENT
Start: 2017-07-25 | End: 2017-07-25 | Stop reason: HOSPADM

## 2017-07-25 RX ORDER — MIDAZOLAM HYDROCHLORIDE 1 MG/ML
2 INJECTION INTRAMUSCULAR; INTRAVENOUS
Status: DISCONTINUED | OUTPATIENT
Start: 2017-07-25 | End: 2017-07-25 | Stop reason: HOSPADM

## 2017-07-25 RX ORDER — DOCUSATE SODIUM 100 MG/1
100 CAPSULE, LIQUID FILLED ORAL 2 TIMES DAILY PRN
Status: DISCONTINUED | OUTPATIENT
Start: 2017-07-25 | End: 2017-07-26 | Stop reason: HOSPADM

## 2017-07-25 RX ORDER — SORBITOL 30 G/1000ML
IRRIGANT IRRIGATION AS NEEDED
Status: DISCONTINUED | OUTPATIENT
Start: 2017-07-25 | End: 2017-07-25 | Stop reason: HOSPADM

## 2017-07-25 RX ADMIN — SODIUM CHLORIDE, POTASSIUM CHLORIDE, SODIUM LACTATE AND CALCIUM CHLORIDE: 600; 310; 30; 20 INJECTION, SOLUTION INTRAVENOUS at 12:30

## 2017-07-25 RX ADMIN — FENTANYL CITRATE 100 MCG: 50 INJECTION, SOLUTION INTRAMUSCULAR; INTRAVENOUS at 11:50

## 2017-07-25 RX ADMIN — FUROSEMIDE 40 MG: 40 TABLET ORAL at 17:38

## 2017-07-25 RX ADMIN — FENTANYL CITRATE 100 MCG: 50 INJECTION, SOLUTION INTRAMUSCULAR; INTRAVENOUS at 12:05

## 2017-07-25 RX ADMIN — HYDROCODONE BITARTRATE AND ACETAMINOPHEN 1 TABLET: 7.5; 325 TABLET ORAL at 15:45

## 2017-07-25 RX ADMIN — SODIUM CHLORIDE, POTASSIUM CHLORIDE, SODIUM LACTATE AND CALCIUM CHLORIDE 1000 ML: 600; 310; 30; 20 INJECTION, SOLUTION INTRAVENOUS at 09:57

## 2017-07-25 RX ADMIN — SODIUM CHLORIDE 25 ML/HR: 9 INJECTION, SOLUTION INTRAVENOUS at 15:20

## 2017-07-25 RX ADMIN — DEXAMETHASONE SODIUM PHOSPHATE 4 MG: 4 INJECTION, SOLUTION INTRAMUSCULAR; INTRAVENOUS at 12:31

## 2017-07-25 RX ADMIN — CEFAZOLIN SODIUM 2 G: 2 SOLUTION INTRAVENOUS at 11:50

## 2017-07-25 RX ADMIN — Medication 3 MG: at 12:31

## 2017-07-25 RX ADMIN — FAMOTIDINE 20 MG: 10 INJECTION, SOLUTION INTRAVENOUS at 10:51

## 2017-07-25 RX ADMIN — HYDROCODONE BITARTRATE AND ACETAMINOPHEN 1 TABLET: 7.5; 325 TABLET ORAL at 22:12

## 2017-07-25 RX ADMIN — IPRATROPIUM BROMIDE AND ALBUTEROL SULFATE 3 ML: .5; 3 SOLUTION RESPIRATORY (INHALATION) at 10:51

## 2017-07-25 RX ADMIN — GLYCOPYRROLATE 0.4 MG: 0.2 INJECTION, SOLUTION INTRAMUSCULAR; INTRAVENOUS at 12:31

## 2017-07-25 RX ADMIN — LISINOPRIL 5 MG: 5 TABLET ORAL at 17:38

## 2017-07-25 RX ADMIN — CEFAZOLIN SODIUM 2 G: 2 SOLUTION INTRAVENOUS at 20:44

## 2017-07-25 RX ADMIN — GABAPENTIN 300 MG: 300 CAPSULE ORAL at 15:46

## 2017-07-25 RX ADMIN — PROPOFOL 150 MG: 10 INJECTION, EMULSION INTRAVENOUS at 11:50

## 2017-07-25 RX ADMIN — ROCURONIUM BROMIDE 30 MG: 10 INJECTION INTRAVENOUS at 12:08

## 2017-07-25 RX ADMIN — LIDOCAINE HYDROCHLORIDE 0.5 ML: 10 INJECTION, SOLUTION INFILTRATION; PERINEURAL at 09:58

## 2017-07-25 RX ADMIN — ONDANSETRON HYDROCHLORIDE 4 MG: 2 SOLUTION INTRAMUSCULAR; INTRAVENOUS at 12:31

## 2017-07-25 RX ADMIN — SODIUM CHLORIDE, POTASSIUM CHLORIDE, SODIUM LACTATE AND CALCIUM CHLORIDE: 600; 310; 30; 20 INJECTION, SOLUTION INTRAVENOUS at 11:48

## 2017-07-25 RX ADMIN — MIDAZOLAM HYDROCHLORIDE 2 MG: 1 INJECTION, SOLUTION INTRAMUSCULAR; INTRAVENOUS at 10:51

## 2017-07-25 RX ADMIN — ROCURONIUM BROMIDE 5 MG: 10 INJECTION INTRAVENOUS at 11:50

## 2017-07-25 RX ADMIN — ATORVASTATIN CALCIUM 10 MG: 10 TABLET, FILM COATED ORAL at 22:13

## 2017-07-25 RX ADMIN — SODIUM CHLORIDE 25 ML/HR: 9 INJECTION, SOLUTION INTRAVENOUS at 22:21

## 2017-07-25 RX ADMIN — ALLOPURINOL 100 MG: 100 TABLET ORAL at 15:44

## 2017-07-25 NOTE — ANESTHESIA PREPROCEDURE EVALUATION
Anesthesia Evaluation     Patient summary reviewed and Nursing notes reviewed   no history of anesthetic complications:  NPO Solid Status: > 8 hours  NPO Liquid Status: > 8 hours     Airway   Mallampati: II  TM distance: >3 FB  Neck ROM: full  possible difficult intubation and no difficulty expected  Dental      Pulmonary     breath sounds clear to auscultation  (+) sleep apnea on CPAP,   Cardiovascular   Exercise tolerance: good (4-7 METS)    ECG reviewed  Rhythm: regular  Rate: normal    (+) hypertension,       Neuro/Psych- negative ROS  (-) seizures, TIA, CVA  GI/Hepatic/Renal/Endo    (+) obesity,  GERD, renal disease (bladder tumor), diabetes mellitus,     Musculoskeletal (-) negative ROS    Abdominal    Substance History - negative use     OB/GYN negative ob/gyn ROS         Other      history of cancer active                                    Anesthesia Plan    ASA 3     general     intravenous induction   Anesthetic plan and risks discussed with patient.

## 2017-07-25 NOTE — ANESTHESIA POSTPROCEDURE EVALUATION
Patient: Chung Bradley    Procedure Summary     Date Anesthesia Start Anesthesia Stop Room / Location    07/25/17 1148 1245  [facility] OR 01 / [facility] OR       Procedure Diagnosis Surgeon Provider    CYSTOSCOPY TRANSURETHRAL RESECTION OF BLADDER TUMOR & POSSIBLE BILATERAL RETROGRADE URETEROPYELOGRAMS (Bilateral Bladder) Bladder neoplasm of uncertain malignant potential  (Bladder neoplasm of uncertain malignant potential [D41.4]) MD Daquan Ball CRNA          Anesthesia Type: general  Last vitals  BP   151/73 (07/25/17 1413)    Temp   98 °F (36.7 °C) (07/25/17 1413)    Pulse   71 (07/25/17 1413)   Resp   14 (07/25/17 1413)    SpO2   93 % (07/25/17 1413)      Post Anesthesia Care and Evaluation    Patient location during evaluation: PACU  Patient participation: complete - patient participated  Level of consciousness: awake and alert  Pain management: adequate  Airway patency: patent  Anesthetic complications: No anesthetic complications    Cardiovascular status: acceptable and hemodynamically stable  Respiratory status: acceptable  Hydration status: acceptable

## 2017-07-25 NOTE — ANESTHESIA PROCEDURE NOTES
Airway  Urgency: elective    Airway not difficult    General Information and Staff    Patient location during procedure: OR  CRNA: MANJINDER LUNA    Indications and Patient Condition  Indications for airway management: airway protection    Preoxygenated: yes  Mask difficulty assessment: 1 - vent by mask    Final Airway Details  Final airway type: endotracheal airway      Successful airway: ETT    Successful intubation technique: direct laryngoscopy  Endotracheal tube insertion site: oral  Blade: Pavan  Blade size: 3.5.  ETT size: 7.5 mm  Cormack-Lehane Classification: grade IIa - partial view of glottis  Placement verified by: chest auscultation and capnometry   Measured from: lips  ETT to lips (cm): 22

## 2017-07-26 VITALS
SYSTOLIC BLOOD PRESSURE: 134 MMHG | HEART RATE: 62 BPM | HEIGHT: 68 IN | BODY MASS INDEX: 36.83 KG/M2 | RESPIRATION RATE: 16 BRPM | OXYGEN SATURATION: 94 % | TEMPERATURE: 98.5 F | WEIGHT: 243 LBS | DIASTOLIC BLOOD PRESSURE: 77 MMHG

## 2017-07-26 LAB
ANION GAP SERPL CALCULATED.3IONS-SCNC: 11 MMOL/L (ref 4–13)
BUN BLD-MCNC: 21 MG/DL (ref 5–21)
BUN/CREAT SERPL: 14.7 (ref 7–25)
CALCIUM SPEC-SCNC: 8.8 MG/DL (ref 8.4–10.4)
CHLORIDE SERPL-SCNC: 104 MMOL/L (ref 98–110)
CO2 SERPL-SCNC: 26 MMOL/L (ref 24–31)
CREAT BLD-MCNC: 1.43 MG/DL (ref 0.5–1.4)
DEPRECATED RDW RBC AUTO: 46.6 FL (ref 40–54)
ERYTHROCYTE [DISTWIDTH] IN BLOOD BY AUTOMATED COUNT: 14.4 % (ref 12–15)
GFR SERPL CREATININE-BSD FRML MDRD: 49 ML/MIN/1.73
GLUCOSE BLD-MCNC: 138 MG/DL (ref 70–100)
HCT VFR BLD AUTO: 36.5 % (ref 40–52)
HGB BLD-MCNC: 11.8 G/DL (ref 14–18)
MCH RBC QN AUTO: 28.6 PG (ref 28–32)
MCHC RBC AUTO-ENTMCNC: 32.3 G/DL (ref 33–36)
MCV RBC AUTO: 88.4 FL (ref 82–95)
PLATELET # BLD AUTO: 172 10*3/MM3 (ref 130–400)
PMV BLD AUTO: 9.8 FL (ref 6–12)
POTASSIUM BLD-SCNC: 4.1 MMOL/L (ref 3.5–5.3)
RBC # BLD AUTO: 4.13 10*6/MM3 (ref 4.8–5.9)
SODIUM BLD-SCNC: 141 MMOL/L (ref 135–145)
WBC NRBC COR # BLD: 8.34 10*3/MM3 (ref 4.8–10.8)

## 2017-07-26 PROCEDURE — 85027 COMPLETE CBC AUTOMATED: CPT | Performed by: UROLOGY

## 2017-07-26 PROCEDURE — 80048 BASIC METABOLIC PNL TOTAL CA: CPT | Performed by: UROLOGY

## 2017-07-26 PROCEDURE — 99217 PR OBSERVATION CARE DISCHARGE MANAGEMENT: CPT | Performed by: UROLOGY

## 2017-07-26 PROCEDURE — 25010000003 CEFAZOLIN PER 500 MG: Performed by: UROLOGY

## 2017-07-26 RX ORDER — HYDROCODONE BITARTRATE AND ACETAMINOPHEN 5; 325 MG/1; MG/1
1 TABLET ORAL EVERY 8 HOURS PRN
Qty: 12 TABLET | Refills: 0 | Status: SHIPPED | OUTPATIENT
Start: 2017-07-26 | End: 2017-07-31

## 2017-07-26 RX ORDER — CEPHALEXIN 500 MG/1
500 CAPSULE ORAL ONCE
Qty: 1 CAPSULE | Refills: 0 | Status: SHIPPED | OUTPATIENT
Start: 2017-07-26 | End: 2017-07-26

## 2017-07-26 RX ADMIN — GABAPENTIN 300 MG: 300 CAPSULE ORAL at 08:48

## 2017-07-26 RX ADMIN — LISINOPRIL 5 MG: 5 TABLET ORAL at 08:46

## 2017-07-26 RX ADMIN — HYDROCODONE BITARTRATE AND ACETAMINOPHEN 1 TABLET: 7.5; 325 TABLET ORAL at 08:46

## 2017-07-26 RX ADMIN — HYDROCODONE BITARTRATE AND ACETAMINOPHEN 1 TABLET: 7.5; 325 TABLET ORAL at 04:46

## 2017-07-26 RX ADMIN — ALLOPURINOL 100 MG: 100 TABLET ORAL at 08:46

## 2017-07-26 RX ADMIN — CEFAZOLIN SODIUM 2 G: 2 SOLUTION INTRAVENOUS at 04:39

## 2017-07-26 RX ADMIN — FUROSEMIDE 40 MG: 40 TABLET ORAL at 08:46

## 2017-07-28 LAB
CYTO UR: NORMAL
LAB AP CASE REPORT: NORMAL
LAB AP INTRADEPARTMENTAL CONSULT: NORMAL
LAB AP SYNOPTIC CHECKLIST: NORMAL
Lab: NORMAL
PATH REPORT.FINAL DX SPEC: NORMAL
PATH REPORT.GROSS SPEC: NORMAL

## 2017-07-31 ENCOUNTER — OFFICE VISIT (OUTPATIENT)
Dept: UROLOGY | Facility: CLINIC | Age: 67
End: 2017-07-31

## 2017-07-31 VITALS — TEMPERATURE: 96.8 F | HEIGHT: 69 IN | WEIGHT: 237 LBS | BODY MASS INDEX: 35.1 KG/M2

## 2017-07-31 DIAGNOSIS — C67.8 MALIGNANT NEOPLASM OF OVERLAPPING SITES OF BLADDER (HCC): Primary | ICD-10-CM

## 2017-07-31 PROCEDURE — 99213 OFFICE O/P EST LOW 20 MIN: CPT | Performed by: UROLOGY

## 2017-07-31 NOTE — PROGRESS NOTES
Mr. Bradley is 66 y.o. male    CHIEF COMPLAINT: Follow up TURBT    HPI  Bladder Cancer  The patient presents today with urothelial cancer of the bladder. This is a new diagnois diagnosis.. The patient was initially diagnosed 2 week(s) ago. Severity is best is explained as low grade superficial disease. Previous management includes TURBT. Symptoms include gross hematuria.  Last upper tract imaging was retrograde ureteropyelogram done approximately  1  week(s) ago.       The following portions of the patient's history were reviewed and updated as appropriate: allergies, current medications, past family history, past medical history, past social history, past surgical history and problem list.    Review of Systems   Constitutional: Negative for chills and fever.   Gastrointestinal: Negative for abdominal pain, anal bleeding and blood in stool.   Genitourinary: Positive for hematuria. Negative for flank pain.         Current Outpatient Prescriptions:   •  allopurinol (ZYLOPRIM) 300 MG tablet, Take 300 mg by mouth daily., Disp: , Rfl:   •  aspirin 81 MG tablet, Take 81 mg by mouth daily., Disp: , Rfl:   •  beclomethasone (BECONASE AQ) 42 MCG/SPRAY nasal spray, 2 sprays by Nasal route 2 times daily. Dose is for each nostril., Disp: , Rfl:   •  Cholecalciferol 1000 UNITS capsule, Take  by mouth., Disp: , Rfl:   •  fenofibrate (TRICOR) 145 MG tablet, Take 145 mg by mouth daily., Disp: , Rfl:   •  furosemide (LASIX) 40 MG tablet, Take 40 mg by mouth 2 (Two) Times a Day., Disp: , Rfl:   •  GABAPENTIN PO, 400mg 6 times a day, Disp: , Rfl:   •  Ipratropium Bromide powder, by Does not apply route., Disp: , Rfl:   •  LISINOPRIL PO, 40mg 1 bid, Disp: , Rfl:   •  LOVASTATIN PO, Take  by mouth., Disp: , Rfl:   •  magnesium oxide (MAG-OX) 400 MG tablet, Take 400 mg 3 tabs 2 times daily, Disp: , Rfl:   •  METFORMIN HCL PO, Take  by mouth.1000mg bid, Disp: , Rfl:   •  Omega-3 Fatty Acids (FISH OIL) 1000 MG capsule capsule, Take 1,000  "mg by mouth Daily With Breakfast., Disp: , Rfl:   •  OMEPRAZOLE PO, Take  by mouth., Disp: , Rfl:   •  vitamin B-12 (CYANOCOBALAMIN) 1000 MCG tablet, Take 1,000 mcg by mouth daily., Disp: , Rfl:     Past Medical History:   Diagnosis Date   • Bladder neoplasm of uncertain malignant potential 7/20/2017   • Diabetes mellitus    • GERD (gastroesophageal reflux disease)    • Hearing aid worn    • Hypertension    • Impaired hearing     without hearing aids can barely hear anything   • Neuropathy        Past Surgical History:   Procedure Laterality Date   • CATARACT EXTRACTION W/ INTRAOCULAR LENS IMPLANT     • CHOLECYSTECTOMY     • EXPLORATORY LAPAROTOMY     • LEG SURGERY     • TRANSURETHRAL RESECTION OF BLADDER TUMOR Bilateral 7/25/2017    Procedure: CYSTOSCOPY TRANSURETHRAL RESECTION OF BLADDER TUMOR & POSSIBLE BILATERAL RETROGRADE URETEROPYELOGRAMS;  Surgeon: Chris Esparza MD;  Location: [facility] OR;  Service:        Social History     Social History   • Marital status:      Spouse name: N/A   • Number of children: N/A   • Years of education: N/A     Social History Main Topics   • Smoking status: Former Smoker     Quit date: 1997   • Smokeless tobacco: Not on file   • Alcohol use No   • Drug use: No   • Sexual activity: Not on file     Other Topics Concern   • Not on file     Social History Narrative       Family History   Problem Relation Age of Onset   • No Known Problems Father    • No Known Problems Mother        Temp 96.8 °F (36 °C)  Ht 69\" (175.3 cm)  Wt 237 lb (108 kg)  BMI 35 kg/m2    Physical Exam   Constitutional: He is oriented to person, place, and time. He appears well-developed and well-nourished. No distress.   Pulmonary/Chest: Effort normal.   Abdominal: Soft. He exhibits no distension and no mass. There is no tenderness. There is no rebound and no guarding. No hernia.   Neurological: He is alert and oriented to person, place, and time.   Skin: Skin is warm and dry. He is not diaphoretic. "   Psychiatric: He has a normal mood and affect.   Vitals reviewed.        Results for orders placed or performed during the hospital encounter of 07/25/17   CBC (No Diff)   Result Value Ref Range    WBC 8.34 4.80 - 10.80 10*3/mm3    RBC 4.13 (L) 4.80 - 5.90 10*6/mm3    Hemoglobin 11.8 (L) 14.0 - 18.0 g/dL    Hematocrit 36.5 (L) 40.0 - 52.0 %    MCV 88.4 82.0 - 95.0 fL    MCH 28.6 28.0 - 32.0 pg    MCHC 32.3 (L) 33.0 - 36.0 g/dL    RDW 14.4 12.0 - 15.0 %    RDW-SD 46.6 40.0 - 54.0 fl    MPV 9.8 6.0 - 12.0 fL    Platelets 172 130 - 400 10*3/mm3   Basic Metabolic Panel   Result Value Ref Range    Glucose 138 (H) 70 - 100 mg/dL    BUN 21 5 - 21 mg/dL    Creatinine 1.43 (H) 0.50 - 1.40 mg/dL    Sodium 141 135 - 145 mmol/L    Potassium 4.1 3.5 - 5.3 mmol/L    Chloride 104 98 - 110 mmol/L    CO2 26.0 24.0 - 31.0 mmol/L    Calcium 8.8 8.4 - 10.4 mg/dL    eGFR Non African Amer 49 (L) >60 mL/min/1.73    BUN/Creatinine Ratio 14.7 7.0 - 25.0    Anion Gap 11.0 4.0 - 13.0 mmol/L   POC Glucose Fingerstick   Result Value Ref Range    Glucose 160 (H) 70 - 130 mg/dL   POC Glucose Fingerstick   Result Value Ref Range    Glucose 140 (H) 70 - 130 mg/dL   POC Glucose Fingerstick   Result Value Ref Range    Glucose 173 (H) 70 - 130 mg/dL   Tissue Pathology Exam   Result Value Ref Range    Case Report       Surgical Pathology Report                         Case: [id number]                                  Authorizing Provider:  Chris Esparza MD        Collected:           07/25/2017 12:20 PM          Ordering Location:     [facility] OR  Received:            07/25/2017 03:55 PM          Pathologist:           Olga Dahl MD                                                           Specimens:   1) - Urinary Bladder, BLADDER TUMOR NEAR URTERAL ORRIFICE                                           2) - Urinary Bladder, BLADDER TUMOR TISSUE                                                 Final Diagnosis       1.  Urinary  "bladder, tumor near ureteral orifice, transurethral resection:   Noninvasive, low-grade papillary urothelial carcinoma  Focal muscularis propria (detrusor muscle) present    2.  Urinary bladder, tumor, transurethral resection  Noninvasive, high-grade papillary urothelial carcinoma  Muscularis propria (detrusor muscle) present      Synoptic Checklist       URINARY BLADDER: Biopsy and Transurethral Resection of Bladder Tumor (TURBT)  (Bladder Bx - All Specimens)            Specimen Site:    Urinary bladder structure      Tumor Site:    Urinary bladder structure      SPECIMEN      Procedure:    TURBT      TUMOR      Tumor Type:    Non-invasive (papillary) urothelial carcinoma        Non-invasive Histologic Type:    Non-invasive urothelial (transitional cell) carcinoma      Histologic Grade  (select appropriate histologic category and grade):    Urothelial carcinoma        Urothelial Carcinoma Grade:    High-grade      EXTENT      Microscopic Tumor Extension:    Noninvasive papillary carcinoma      ACCESSORY FINDINGS      Adequacy of Material for Determining Muscularis Propria Invasion:    Muscularis propria (detrusor muscle) present      Lymph-Vascular Invasion:    Not identified      ADDITIONAL NON-TUMOR      Associated Epithelial Lesions:    None identified      Intradepartmental Consult       Dr. Brian Castellon has reviewed this case and agrees with the above diagnosis.      Gross Description       1.  Urinary bladder, tumor near ureteral orifice:  CD2 a formalin filled container labeled patient's name, date of birth and further identified as urinary bladder, bladder tumor near ureteral orifice are multiple tan papillary tissue fragments (2.5 x 1.9 x 0.6 cm in aggregate), entirely submitted in block 1A through 1C.    2.  Urinary bladder, bladder tumor tissue:  Received in a formalin filled container labeled patient's name, date of birth and further identified as \"urinary bladder, bladder tumor tissue\" are multiple " fragments of tan brown tissue (3 x 3 x 0.7 cm in aggregate), entirely submitted in block 2A through 2H.         Microscopic Description       1.  Histologic sections demonstrate papillary urothelium having low-grade cytologic atypia.  Negative for invasion.  Muscularis propria is present    2.  Histologic sections demonstrate papillary urothelium having extensive high-grade cytologic atypia including atypical mitotic figures.  Negative for invasion.  Muscularis propria is present.  Multiple step sections are examined.    EMR Dragon/transcription disclaimer: All of this report is electronic transcription/translation of spoken language to printed text. The electronic translation of spoken language may be erroneous, or at times, nonsensical words or phrases may be inadvertently transcribed. Although I have reviewed the report for such errors, some may still exist.      Embedded Images         Imaging Results (last 7 days)     ** No results found for the last 168 hours. **          Assessment and Plan  Diagnoses and all orders for this visit:    Malignant neoplasm of overlapping sites of bladder    Gutierrez catheter removed. Path showed low grade superficial disease. Discussed path report, follow up requirements including surveillance cystoscopy, Dietary recommendations, work limitations for follow-up, and importance of keeping follow-up.    Chris Esparza MD  07/31/17  10:03 AM      Cc:

## 2017-08-13 ENCOUNTER — HOSPITAL ENCOUNTER (EMERGENCY)
Facility: HOSPITAL | Age: 67
Discharge: HOME | End: 2017-08-13
Attending: FAMILY MEDICINE | Admitting: FAMILY MEDICINE

## 2017-08-13 ENCOUNTER — APPOINTMENT (OUTPATIENT)
Dept: GENERAL RADIOLOGY | Facility: HOSPITAL | Age: 67
End: 2017-08-13

## 2017-08-13 VITALS
SYSTOLIC BLOOD PRESSURE: 131 MMHG | RESPIRATION RATE: 16 BRPM | OXYGEN SATURATION: 99 % | HEART RATE: 74 BPM | HEIGHT: 68 IN | DIASTOLIC BLOOD PRESSURE: 85 MMHG | TEMPERATURE: 97.8 F | BODY MASS INDEX: 34.86 KG/M2 | WEIGHT: 230 LBS

## 2017-08-13 DIAGNOSIS — M54.12 CERVICAL RADICULAR PAIN: Primary | ICD-10-CM

## 2017-08-13 PROCEDURE — 96372 THER/PROPH/DIAG INJ SC/IM: CPT

## 2017-08-13 PROCEDURE — 99284 EMERGENCY DEPT VISIT MOD MDM: CPT

## 2017-08-13 PROCEDURE — 25010000002 ORPHENADRINE CITRATE PER 60 MG: Performed by: FAMILY MEDICINE

## 2017-08-13 PROCEDURE — 25010000002 KETOROLAC TROMETHAMINE PER 15 MG: Performed by: FAMILY MEDICINE

## 2017-08-13 PROCEDURE — 72050 X-RAY EXAM NECK SPINE 4/5VWS: CPT

## 2017-08-13 RX ORDER — HYDROCODONE BITARTRATE AND ACETAMINOPHEN 10; 325 MG/1; MG/1
1 TABLET ORAL ONCE
Status: COMPLETED | OUTPATIENT
Start: 2017-08-13 | End: 2017-08-13

## 2017-08-13 RX ORDER — CYCLOBENZAPRINE HCL 10 MG
10 TABLET ORAL 3 TIMES DAILY PRN
Qty: 25 TABLET | Refills: 0 | Status: ON HOLD | OUTPATIENT
Start: 2017-08-13 | End: 2017-11-03

## 2017-08-13 RX ORDER — KETOROLAC TROMETHAMINE 30 MG/ML
30 INJECTION, SOLUTION INTRAMUSCULAR; INTRAVENOUS ONCE
Status: COMPLETED | OUTPATIENT
Start: 2017-08-13 | End: 2017-08-13

## 2017-08-13 RX ORDER — ORPHENADRINE CITRATE 30 MG/ML
60 INJECTION INTRAMUSCULAR; INTRAVENOUS ONCE
Status: COMPLETED | OUTPATIENT
Start: 2017-08-13 | End: 2017-08-13

## 2017-08-13 RX ADMIN — HYDROCODONE BITARTRATE AND ACETAMINOPHEN 1 TABLET: 10; 325 TABLET ORAL at 07:34

## 2017-08-13 RX ADMIN — KETOROLAC TROMETHAMINE 30 MG: 30 INJECTION, SOLUTION INTRAMUSCULAR at 07:35

## 2017-08-13 RX ADMIN — ORPHENADRINE CITRATE 60 MG: 30 INJECTION INTRAMUSCULAR; INTRAVENOUS at 07:38

## 2017-08-13 NOTE — ED PROVIDER NOTES
Subjective   Patient is a 67 y.o. male presenting with neck pain.   Neck Pain   Pain location:  R side  Quality:  Aching  Pain radiates to:  R hand  Pain severity:  Moderate  Pain is:  Same all the time  Onset quality:  Gradual  Duration:  4 days  Timing:  Constant  Progression:  Waxing and waning  Chronicity:  Chronic  Context: not fall, not jumping from heights, not lifting a heavy object, not MCA, not MVC, not pedestrian accident and not recent injury    Relieved by:  Nothing  Worsened by:  Nothing  Ineffective treatments:  None tried  Associated symptoms: tingling    Associated symptoms: no bladder incontinence, no bowel incontinence, no chest pain, no fever, no headaches, no leg pain, no photophobia, no syncope, no visual change, no weakness and no weight loss  Numbness: rt hand radiculopathy.    Risk factors: no hx of head and neck radiation, no hx of osteoporosis, no hx of spinal trauma, no recent epidural, no recent head injury and no recurrent falls    Risk factors comment:  Previous radiculopathy with cervical spinal spurs      Review of Systems   Constitutional: Negative for activity change, appetite change, chills, diaphoresis, fatigue, fever, unexpected weight change and weight loss.   HENT: Negative for congestion, dental problem, ear pain, facial swelling, hearing loss, mouth sores, nosebleeds, sneezing, sore throat and tinnitus.    Eyes: Negative for photophobia, discharge, redness and itching.   Respiratory: Negative for apnea, cough, choking, chest tightness, shortness of breath and wheezing.    Cardiovascular: Negative for chest pain, palpitations, leg swelling and syncope.   Gastrointestinal: Negative for abdominal pain, anal bleeding, bowel incontinence, diarrhea, nausea, rectal pain and vomiting.   Endocrine: Negative for cold intolerance, heat intolerance, polydipsia, polyphagia and polyuria.   Genitourinary: Negative.  Negative for bladder incontinence, decreased urine volume, difficulty  urinating, dysuria, enuresis, hematuria and urgency.   Musculoskeletal: Positive for neck pain. Back pain: neck pain.        Pain to rt elbow from neck pain   Skin: Negative for color change, pallor, rash and wound.   Neurological: Positive for tingling. Negative for seizures, syncope, speech difficulty, weakness, light-headedness and headaches. Numbness: rt hand radiculopathy.   Hematological: Negative.    Psychiatric/Behavioral: Negative.        Past Medical History:   Diagnosis Date   • Bladder neoplasm of uncertain malignant potential 7/20/2017   • Diabetes mellitus    • GERD (gastroesophageal reflux disease)    • Hearing aid worn    • Hypertension    • Impaired hearing     without hearing aids can barely hear anything   • Neuropathy        Allergies   Allergen Reactions   • Penicillins Rash       Past Surgical History:   Procedure Laterality Date   • CATARACT EXTRACTION W/ INTRAOCULAR LENS IMPLANT     • CHOLECYSTECTOMY     • EXPLORATORY LAPAROTOMY     • LEG SURGERY     • TRANSURETHRAL RESECTION OF BLADDER TUMOR Bilateral 7/25/2017    Procedure: CYSTOSCOPY TRANSURETHRAL RESECTION OF BLADDER TUMOR & POSSIBLE BILATERAL RETROGRADE URETEROPYELOGRAMS;  Surgeon: Chris Esparza MD;  Location: [facility] OR;  Service:        Family History   Problem Relation Age of Onset   • No Known Problems Father    • No Known Problems Mother        Social History     Social History   • Marital status:      Spouse name: N/A   • Number of children: N/A   • Years of education: N/A     Social History Main Topics   • Smoking status: Former Smoker     Quit date: 1997   • Smokeless tobacco: None   • Alcohol use No   • Drug use: No   • Sexual activity: Not Asked     Other Topics Concern   • None     Social History Narrative           Objective   Physical Exam   Constitutional: He is oriented to person, place, and time. He appears well-developed and well-nourished. No distress.   HENT:   Head: Normocephalic and atraumatic.   Right Ear:  External ear normal.   Left Ear: External ear normal.   Nose: Nose normal.   Mouth/Throat: Oropharynx is clear and moist.   Eyes: Conjunctivae and EOM are normal. Pupils are equal, round, and reactive to light. No scleral icterus.   Neck: Normal range of motion. Neck supple.   Cardiovascular: Normal rate, regular rhythm, normal heart sounds and intact distal pulses.  Exam reveals no gallop and no friction rub.    No murmur heard.  Pulmonary/Chest: Effort normal and breath sounds normal. No respiratory distress. He has no wheezes. He has no rales. He exhibits no tenderness.   Abdominal: Soft. Bowel sounds are normal. He exhibits no distension. There is no tenderness.   Musculoskeletal: Normal range of motion.   Trapezius muscle spasm b/l r>L   Neurological: He is alert and oriented to person, place, and time. He has normal reflexes. He displays normal reflexes. He exhibits normal muscle tone. Coordination normal.   NO GROSS MOTOR OR SENSORY DEFICITS    Skin: Skin is warm and dry. He is not diaphoretic.   Psychiatric: He has a normal mood and affect. His behavior is normal. Judgment and thought content normal.   Nursing note and vitals reviewed.      Procedures         ED Course  ED Course   Value Comment By Time   XR Spine Cervical Complete 4 or 5 View (Reviewed) Naila Chi MD 08/13 0873   XR Spine Cervical Complete 4 or 5 View (Reviewed) Naila Chi MD 08/13 0805    Pt feels better and ready to go home Naila Chi MD 08/13 0820                  MDM  Number of Diagnoses or Management Options  Cervical radicular pain:   Diagnosis management comments: radiculopathy       Amount and/or Complexity of Data Reviewed  Tests in the radiology section of CPT®: ordered    Risk of Complications, Morbidity, and/or Mortality  Presenting problems: minimal  Diagnostic procedures: minimal  Management options: minimal    Patient Progress  Patient progress: improved      Final diagnoses:   Cervical radicular pain             Naila Chi MD  08/13/17 0867

## 2017-08-16 NOTE — ED NOTES
"ED Call Back Questions    1. How are you doing since leaving the Emergency Department?    Doing ok  2. Do you have any questions about your discharge instructions? No     3. Have you filled your new prescriptions yet? N/A  a. Do you have any questions about those medications? N/A    4. Were you able to make a follow-up appointment with the physician? Yes   5. With dr. Tolentino you have a primary care physician? Yes   a. If No, would you like for me to set you up with one? No   i. If Yes, “I will have our ED  give you a call right back at this number to work with you on the best time for an appointment.”    6. We are always looking to get better at what we do. Do you have any suggestions for what we can do to be even better? No   a. If Yes, \"Thank you for sharing your concerns. I apologize. I will follow up with our manager and patient . Would you like someone to call you back?\" No     7. Is there anything else I can do for you? No   Visit heather Rouse  08/16/17 1054    "

## 2017-08-18 ENCOUNTER — TRANSCRIBE ORDERS (OUTPATIENT)
Dept: ADMINISTRATIVE | Facility: HOSPITAL | Age: 67
End: 2017-08-18

## 2017-08-18 DIAGNOSIS — M54.12 CERVICAL RADICULOPATHY: Primary | ICD-10-CM

## 2017-08-21 ENCOUNTER — HOSPITAL ENCOUNTER (OUTPATIENT)
Dept: MRI IMAGING | Facility: HOSPITAL | Age: 67
Discharge: HOME | End: 2017-08-21
Attending: INTERNAL MEDICINE | Admitting: INTERNAL MEDICINE

## 2017-08-21 DIAGNOSIS — M54.12 CERVICAL RADICULOPATHY: ICD-10-CM

## 2017-08-21 PROCEDURE — 72141 MRI NECK SPINE W/O DYE: CPT

## 2017-08-22 ENCOUNTER — OFFICE VISIT (OUTPATIENT)
Dept: NEUROSURGERY | Facility: CLINIC | Age: 67
End: 2017-08-22

## 2017-08-22 VITALS
BODY MASS INDEX: 35.31 KG/M2 | HEIGHT: 68 IN | SYSTOLIC BLOOD PRESSURE: 158 MMHG | WEIGHT: 233 LBS | DIASTOLIC BLOOD PRESSURE: 80 MMHG

## 2017-08-22 DIAGNOSIS — Z87.891 FORMER SMOKER: ICD-10-CM

## 2017-08-22 DIAGNOSIS — M48.02 SPINAL STENOSIS IN CERVICAL REGION: Primary | ICD-10-CM

## 2017-08-22 PROCEDURE — 99214 OFFICE O/P EST MOD 30 MIN: CPT | Performed by: NURSE PRACTITIONER

## 2017-08-22 RX ORDER — CYCLOBENZAPRINE HCL 10 MG
10 TABLET ORAL 3 TIMES DAILY PRN
Qty: 90 TABLET | Refills: 2 | Status: SHIPPED | OUTPATIENT
Start: 2017-08-22 | End: 2017-11-02 | Stop reason: SDUPTHER

## 2017-08-22 NOTE — PROGRESS NOTES
Chief complaint:   Chief Complaint   Patient presents with   • Neck Pain     Chung returns today with an increase in his neck pain, he was seen last in 2015, but he is having severe right shoulder pain and runs down the right arm down into his hand,  He has not had any physical therapy in a couple of years, he has tried massage therapy. He does not do pain management.        Subjective     HPI: This is a 67-year-old male gentleman who we have seen in the past for neck pain and right arm pain.  He said for reevaluation today.  The last was here we did some the patient to a dedicated course of physical therapy and he did have an improvement in his pain.  However he says that over the last week he has been having significant amount of pain in his neck and right arm    Review of Systems   Constitutional:        Night sweats   HENT: Positive for hearing loss.    Eyes:        Corrective lenses   Musculoskeletal: Positive for arthralgias and neck pain.   Neurological: Positive for numbness.   All other systems reviewed and are negative.       Past Medical History:   Diagnosis Date   • Bladder neoplasm of uncertain malignant potential 7/20/2017   • Diabetes mellitus    • GERD (gastroesophageal reflux disease)    • Hearing aid worn    • Hypertension    • Impaired hearing     without hearing aids can barely hear anything   • Neuropathy      Past Surgical History:   Procedure Laterality Date   • CATARACT EXTRACTION W/ INTRAOCULAR LENS IMPLANT     • CHOLECYSTECTOMY     • EXPLORATORY LAPAROTOMY     • LEG SURGERY     • TRANSURETHRAL RESECTION OF BLADDER TUMOR Bilateral 7/25/2017    Procedure: CYSTOSCOPY TRANSURETHRAL RESECTION OF BLADDER TUMOR & POSSIBLE BILATERAL RETROGRADE URETEROPYELOGRAMS;  Surgeon: Chris Esparza MD;  Location: [facility];  Service:      Family History   Problem Relation Age of Onset   • Cancer Father    • Hypertension Father    • Heart disease Mother    • Arthritis Mother    • Hypertension Mother    •  "Cancer Sister    • Cancer Brother    • Heart disease Sister      Social History   Substance Use Topics   • Smoking status: Former Smoker     Quit date: 1997   • Smokeless tobacco: None   • Alcohol use No       (Not in a hospital admission)  Allergies:  Penicillins    Objective      Vital Signs  /80 (BP Location: Right arm, Patient Position: Sitting)  Ht 68\" (172.7 cm)  Wt 233 lb (106 kg)  BMI 35.43 kg/m2    Physical Exam   Constitutional: He is oriented to person, place, and time. He appears well-developed and well-nourished.   HENT:   Head: Normocephalic.   Eyes: Conjunctivae, EOM and lids are normal. Pupils are equal, round, and reactive to light.   Neck: Normal range of motion.   Cardiovascular: Normal rate, regular rhythm and normal heart sounds.    Pulmonary/Chest: Effort normal and breath sounds normal.   Abdominal: Normal appearance.   Musculoskeletal: Normal range of motion.        Cervical back: He exhibits pain.   Neurological: He is alert and oriented to person, place, and time. He has normal strength and normal reflexes. He displays normal reflexes. No cranial nerve deficit or sensory deficit. GCS eye subscore is 4. GCS verbal subscore is 5. GCS motor subscore is 6.   Skin: Skin is warm.   Psychiatric: He has a normal mood and affect. His speech is normal and behavior is normal. Thought content normal. Cognition and memory are normal.       Results Review: MRI of cervical spine shows the patient does have cervical stenosis present from C4 to C7.  The worst level is at C4-5 and which there is severe cord compression and cord signal change.  There is bilateral neural foraminal narrowing at this level as well.  No foraminal stenosis present from C5 to C7.  There is also a disc herniation present at C7-T1.  It appears centrally located however.  No fracture visualized.  No malalignment visualized.          Assessment/Plan: Dr. Scales did review the imaging of the patient.  At this point we are going " to send the patient for a dedicated course of physical therapy consisting of 2-3 times a week for 4-6 weeks with cervical traction.  Should the patient not have any relief from that we will look into some other options for him.  We will follow-up again with him in 6-8 weeks.  BMI shows that he is overweight.  BMI chart was given the patient.  He is a nonsmoker.      Chung was seen today for neck pain.    Diagnoses and all orders for this visit:    Spinal stenosis in cervical region  -     Ambulatory Referral to Physical Therapy Evaluate and treat    BMI 35.0-35.9,adult    Former smoker    Other orders  -     SCANNED - IMAGING  -     SCANNED - IMAGING        I discussed the patients findings and my recommendations with patient    Héctor Yanez, HANNAH  08/22/17  1:38 PM

## 2017-10-03 ENCOUNTER — OFFICE VISIT (OUTPATIENT)
Dept: NEUROSURGERY | Facility: CLINIC | Age: 67
End: 2017-10-03

## 2017-10-03 VITALS
SYSTOLIC BLOOD PRESSURE: 128 MMHG | BODY MASS INDEX: 35.31 KG/M2 | WEIGHT: 233 LBS | HEIGHT: 68 IN | DIASTOLIC BLOOD PRESSURE: 78 MMHG

## 2017-10-03 DIAGNOSIS — Z87.891 FORMER SMOKER: ICD-10-CM

## 2017-10-03 DIAGNOSIS — M48.02 SPINAL STENOSIS IN CERVICAL REGION: Primary | ICD-10-CM

## 2017-10-03 DIAGNOSIS — M54.12 CERVICAL RADICULOPATHY: ICD-10-CM

## 2017-10-03 PROCEDURE — 99213 OFFICE O/P EST LOW 20 MIN: CPT | Performed by: NEUROLOGICAL SURGERY

## 2017-10-03 RX ORDER — OXYCODONE AND ACETAMINOPHEN 7.5; 325 MG/1; MG/1
TABLET ORAL
COMMUNITY
Start: 2017-08-23 | End: 2017-11-02

## 2017-10-03 NOTE — PATIENT INSTRUCTIONS

## 2017-10-03 NOTE — PROGRESS NOTES
SUBJECTIVE:  Patient ID: Chung Bradley is a 67 y.o. male is here today for follow-up.    Chief Complaint: Neck pain  Chief Complaint   Patient presents with   • neck & arm pain     patient has participated in PT (unsure of how many) but says he did get some improvement.  Is willing to do some more but wants to go somewhere else.  He states he is 75% improved.       HPI  67-year-old gentleman we have been following for neck pain and right upper extremity radicular pain.  He did a dedicated course of physical therapy for 8 sessions.  With manual traction he is 75% better.  He had a very similar episode 2 years ago and improved with conservative care.  He says the neck and the arm pain are both significantly improved he is very satisfied with the results of this treatment right now.    The following portions of the patient's history were reviewed and updated as appropriate: allergies, current medications, past family history, past medical history, past social history, past surgical history and problem list.    OBJECTIVE:    Review of Systems   Musculoskeletal: Positive for neck pain.   All other systems reviewed and are negative.         Physical Exam   Constitutional: He is oriented to person, place, and time. He appears well-developed and well-nourished.   HENT:   Head: Normocephalic and atraumatic.   Right Ear: Hearing normal.   Left Ear: Hearing normal.   Eyes: EOM are normal. Pupils are equal, round, and reactive to light.   Neck: Normal range of motion.   Neurological: He is alert and oriented to person, place, and time. He has normal strength and normal reflexes. No cranial nerve deficit or sensory deficit. He displays a negative Romberg sign. GCS eye subscore is 4. GCS verbal subscore is 5. GCS motor subscore is 6. He displays no Babinski's sign on the right side. He displays no Babinski's sign on the left side.   Psychiatric: His speech is normal. Judgment normal. Cognition and memory are normal.        Neurologic Exam     Mental Status   Oriented to person, place, and time.   Speech: speech is normal     Cranial Nerves     CN III, IV, VI   Pupils are equal, round, and reactive to light.  Extraocular motions are normal.     Motor Exam     Strength   Strength 5/5 throughout.       Independent Review of Radiographic Studies:       ASSESSMENT/PLAN:  The patient is done very well with conservative care.  He does have significant degenerative disc disease at C5 6 off to the right and C4 5 bilaterally.  There is also some cord signal change at those levels.  However he is not myelopathic on exam and he is improving with conservative care.  We will reorder his physical therapy and see him in follow-up in about 2 months      1. Spinal stenosis in cervical region    2. Cervical radiculopathy    3. BMI 35.0-35.9,adult    4. Former smoker            Return in about 2 months (around 12/3/2017) for follow up w/HERACLIO.      Nicanor Scales MD

## 2017-11-02 ENCOUNTER — PROCEDURE VISIT (OUTPATIENT)
Dept: UROLOGY | Facility: CLINIC | Age: 67
End: 2017-11-02

## 2017-11-02 ENCOUNTER — APPOINTMENT (OUTPATIENT)
Dept: PREADMISSION TESTING | Facility: HOSPITAL | Age: 67
End: 2017-11-02

## 2017-11-02 VITALS
HEART RATE: 66 BPM | HEIGHT: 69 IN | RESPIRATION RATE: 20 BRPM | WEIGHT: 246 LBS | BODY MASS INDEX: 36.43 KG/M2 | OXYGEN SATURATION: 96 % | DIASTOLIC BLOOD PRESSURE: 69 MMHG | SYSTOLIC BLOOD PRESSURE: 115 MMHG

## 2017-11-02 DIAGNOSIS — C67.8 MALIGNANT NEOPLASM OF OVERLAPPING SITES OF BLADDER (HCC): Primary | ICD-10-CM

## 2017-11-02 LAB
ANION GAP SERPL CALCULATED.3IONS-SCNC: 16 MMOL/L (ref 4–13)
BACTERIA UR QL AUTO: ABNORMAL /HPF
BILIRUB BLD-MCNC: NEGATIVE MG/DL
BILIRUB UR QL STRIP: NEGATIVE
BUN BLD-MCNC: 24 MG/DL (ref 5–21)
BUN/CREAT SERPL: 21.1 (ref 7–25)
CALCIUM SPEC-SCNC: 9.6 MG/DL (ref 8.4–10.4)
CHLORIDE SERPL-SCNC: 104 MMOL/L (ref 98–110)
CLARITY UR: CLEAR
CLARITY, POC: CLEAR
CO2 SERPL-SCNC: 24 MMOL/L (ref 24–31)
COLOR UR: YELLOW
COLOR UR: YELLOW
CREAT BLD-MCNC: 1.14 MG/DL (ref 0.5–1.4)
DEPRECATED RDW RBC AUTO: 51.4 FL (ref 40–54)
ERYTHROCYTE [DISTWIDTH] IN BLOOD BY AUTOMATED COUNT: 16 % (ref 12–15)
GFR SERPL CREATININE-BSD FRML MDRD: 64 ML/MIN/1.73
GLUCOSE BLD-MCNC: 142 MG/DL (ref 70–100)
GLUCOSE UR STRIP-MCNC: NEGATIVE MG/DL
GLUCOSE UR STRIP-MCNC: NEGATIVE MG/DL
HCT VFR BLD AUTO: 39.1 % (ref 40–52)
HGB BLD-MCNC: 12.8 G/DL (ref 14–18)
HGB UR QL STRIP.AUTO: NEGATIVE
HYALINE CASTS UR QL AUTO: ABNORMAL /LPF
KETONES UR QL STRIP: NEGATIVE
KETONES UR QL: NEGATIVE
LEUKOCYTE EST, POC: NEGATIVE
LEUKOCYTE ESTERASE UR QL STRIP.AUTO: ABNORMAL
MCH RBC QN AUTO: 29 PG (ref 28–32)
MCHC RBC AUTO-ENTMCNC: 32.7 G/DL (ref 33–36)
MCV RBC AUTO: 88.5 FL (ref 82–95)
NITRITE UR QL STRIP: NEGATIVE
NITRITE UR-MCNC: NEGATIVE MG/ML
PH UR STRIP.AUTO: 8 [PH] (ref 5–8)
PH UR: 6.5 [PH] (ref 5–8)
PLATELET # BLD AUTO: 170 10*3/MM3 (ref 130–400)
PMV BLD AUTO: 10.2 FL (ref 6–12)
POTASSIUM BLD-SCNC: 4.2 MMOL/L (ref 3.5–5.3)
PROT UR QL STRIP: NEGATIVE
PROT UR STRIP-MCNC: NEGATIVE MG/DL
RBC # BLD AUTO: 4.42 10*6/MM3 (ref 4.8–5.9)
RBC # UR STRIP: ABNORMAL /UL
RBC # UR: ABNORMAL /HPF
REF LAB TEST METHOD: ABNORMAL
SODIUM BLD-SCNC: 144 MMOL/L (ref 135–145)
SP GR UR STRIP: 1.02 (ref 1–1.03)
SP GR UR: 1.02 (ref 1–1.03)
SQUAMOUS #/AREA URNS HPF: ABNORMAL /HPF
UROBILINOGEN UR QL STRIP: ABNORMAL
UROBILINOGEN UR QL: NORMAL
WBC NRBC COR # BLD: 5.39 10*3/MM3 (ref 4.8–10.8)
WBC UR QL AUTO: ABNORMAL /HPF

## 2017-11-02 PROCEDURE — 52000 CYSTOURETHROSCOPY: CPT | Performed by: UROLOGY

## 2017-11-02 PROCEDURE — 80048 BASIC METABOLIC PNL TOTAL CA: CPT | Performed by: UROLOGY

## 2017-11-02 PROCEDURE — 99214 OFFICE O/P EST MOD 30 MIN: CPT | Performed by: UROLOGY

## 2017-11-02 PROCEDURE — 81003 URINALYSIS AUTO W/O SCOPE: CPT | Performed by: UROLOGY

## 2017-11-02 PROCEDURE — 85027 COMPLETE CBC AUTOMATED: CPT | Performed by: UROLOGY

## 2017-11-02 PROCEDURE — 87086 URINE CULTURE/COLONY COUNT: CPT | Performed by: UROLOGY

## 2017-11-02 PROCEDURE — 81001 URINALYSIS AUTO W/SCOPE: CPT | Performed by: UROLOGY

## 2017-11-02 NOTE — PROGRESS NOTES
CC: I am here for the doctor to look at my bladder    Cystoscopy procedure note  Pre- operative diagnosis:  Bladder cancer surveillance    Post operative diagnosis:  Bladder Tumor    Procedure:  The patient was prepped and draped in a normal sterile fashion.  The urethra was anesthetized with 2% lidocaine jelly.  A flexible cystoscope was introduced per urethra.      Urethra:  No urethral stricture    Bladder:  Bladder tumor 2 x 2 centimeter near the right ureteral orifice and mild trabeculation    Ureteral orifices:  Normal position bilaterally and Clear efflux bilaterally    Prostate:  lateral lobe hypertrophy    Patient tolerated the procedure well    Complications: none    Blood loss: minimal    Diagnoses and all orders for this visit:    Malignant neoplasm of overlapping sites of bladder  -     POC Urinalysis Dipstick, Automated  -     Case Request; Standing  -     ceFAZolin (ANCEF) 2 g in sodium chloride 0.9 % 100 mL IVPB; Infuse 2 g into a venous catheter 1 (One) Time.  -     Case Request    Other orders  -     Follow Anesthesia Guidelines / Standing Orders; Future  -     Follow Anesthesia Guidelines / Standing Orders; Standing  -     Verify NPO Status; Standing  -     Obtain Informed Consent; Standing        Follow up:  Given these findings, I had to evaluate the patient to assess fitness for surgery, formulate and discussa plan, that included alternatives, risks and benefits of management.. This went well beyond the typical description of procedural findings and therefore an additional evaluation and management was required.    HPI  He was initially diagnosed bladder cancer 3 months ago.  At that time he was found to have 2 lesions one of which was low-grade and the other high-grade but both for superficial disease with regard to severity.  He has no gross hematuria, dysuria, urgency, or frequency of significance.  The onset of this was sudden.  Today's masses found in in the context of surveillance  cystoscopy    The following portions of the patient's history were reviewed and updated as appropriate: allergies, current medications, past family history, past medical history, past social history, past surgical history and problem list.    Review of Systems  Constitutional: Negative for chills and fever.   Gastrointestinal: Negative for abdominal pain, anal bleeding and blood in stool.   Genitourinary: Negative for flank pain and hematuria.      Current Outpatient Prescriptions:   •  allopurinol (ZYLOPRIM) 300 MG tablet, Take 300 mg by mouth daily., Disp: , Rfl:   •  aspirin 81 MG tablet, Take 81 mg by mouth daily., Disp: , Rfl:   •  beclomethasone (BECONASE AQ) 42 MCG/SPRAY nasal spray, 2 sprays by Nasal route 2 times daily. Dose is for each nostril., Disp: , Rfl:   •  Cholecalciferol 1000 UNITS capsule, Take  by mouth., Disp: , Rfl:   •  cyclobenzaprine (FLEXERIL) 10 MG tablet, Take 1 tablet by mouth 3 (Three) Times a Day As Needed for Muscle Spasms., Disp: 25 tablet, Rfl: 0  •  cyclobenzaprine (FLEXERIL) 10 MG tablet, Take 1 tablet by mouth 3 (Three) Times a Day As Needed for Muscle Spasms., Disp: 90 tablet, Rfl: 2  •  fenofibrate (TRICOR) 145 MG tablet, Take 145 mg by mouth daily., Disp: , Rfl:   •  furosemide (LASIX) 40 MG tablet, Take 40 mg by mouth 2 (Two) Times a Day., Disp: , Rfl:   •  GABAPENTIN PO, 400mg 6 times a day, Disp: , Rfl:   •  Ipratropium Bromide powder, by Does not apply route., Disp: , Rfl:   •  LISINOPRIL PO, 40mg 1 bid, Disp: , Rfl:   •  LOVASTATIN PO, Take  by mouth., Disp: , Rfl:   •  magnesium oxide (MAG-OX) 400 MG tablet, Take 400 mg 3 tabs 2 times daily, Disp: , Rfl:   •  METFORMIN HCL PO, Take  by mouth.1000mg bid, Disp: , Rfl:   •  Omega-3 Fatty Acids (FISH OIL) 1000 MG capsule capsule, Take 1,000 mg by mouth Daily With Breakfast., Disp: , Rfl:   •  OMEPRAZOLE PO, Take  by mouth., Disp: , Rfl:   •  oxyCODONE-acetaminophen (PERCOCET) 7.5-325 MG per tablet, , Disp: , Rfl:   •   vitamin B-12 (CYANOCOBALAMIN) 1000 MCG tablet, Take 1,000 mcg by mouth daily., Disp: , Rfl:     Past Medical History:   Diagnosis Date   • Bladder neoplasm of uncertain malignant potential 7/20/2017   • Diabetes mellitus    • GERD (gastroesophageal reflux disease)    • Hearing aid worn    • Hypertension    • Impaired hearing     without hearing aids can barely hear anything   • Neuropathy        Past Surgical History:   Procedure Laterality Date   • CATARACT EXTRACTION W/ INTRAOCULAR LENS IMPLANT     • CHOLECYSTECTOMY     • EXPLORATORY LAPAROTOMY     • LEG SURGERY     • TRANSURETHRAL RESECTION OF BLADDER TUMOR Bilateral 7/25/2017    Procedure: CYSTOSCOPY TRANSURETHRAL RESECTION OF BLADDER TUMOR & POSSIBLE BILATERAL RETROGRADE URETEROPYELOGRAMS;  Surgeon: Chris Esparza MD;  Location: [facility];  Service:        Social History     Social History   • Marital status:      Spouse name: N/A   • Number of children: N/A   • Years of education: N/A     Social History Main Topics   • Smoking status: Former Smoker     Quit date: 1997   • Smokeless tobacco: Not on file   • Alcohol use No   • Drug use: No   • Sexual activity: Not on file     Other Topics Concern   • Not on file     Social History Narrative       Family History   Problem Relation Age of Onset   • Cancer Father    • Hypertension Father    • Heart disease Mother    • Arthritis Mother    • Hypertension Mother    • Cancer Sister    • Cancer Brother    • Heart disease Sister        There were no vitals taken for this visit.    Physical Exam  Alert and oriented ×3  Not agitated or distressed  No obvious deformities  No respiratory distress  Skin without pallor or diaphoresis      Results for orders placed or performed in visit on 11/02/17   POC Urinalysis Dipstick, Automated   Result Value Ref Range    Color Yellow Yellow, Straw, Dark Yellow, Nicolasa    Clarity, UA Clear Clear    Glucose, UA Negative Negative, 1000 mg/dL (3+) mg/dL    Bilirubin Negative  Negative    Ketones, UA Negative Negative    Specific Gravity  1.020 1.005 - 1.030    Blood, UA Trace (A) Negative    pH, Urine 6.5 5.0 - 8.0    Protein, POC Negative Negative mg/dL    Urobilinogen, UA Normal Normal    Leukocytes Negative Negative    Nitrite, UA Negative Negative       Imaging Results (last 7 days)     ** No results found for the last 168 hours. **          Assessment and Plan  Diagnoses and all orders for this visit:    Malignant neoplasm of overlapping sites of bladder  -     POC Urinalysis Dipstick, Automated  -     Case Request; Standing  -     ceFAZolin (ANCEF) 2 g in sodium chloride 0.9 % 100 mL IVPB; Infuse 2 g into a venous catheter 1 (One) Time.  -     Case Request    Other orders  -     Follow Anesthesia Guidelines / Standing Orders; Future  -     Follow Anesthesia Guidelines / Standing Orders; Standing  -     Verify NPO Status; Standing  -     Obtain Informed Consent; Standing    He has recurrent disease.  At last visit he had multifocal disease and we originally were not going to give him BCG but I think that we will following this transurethral resection.  His for that reason I am not going to give him mitomycin.  We discussed the used to this but he was concerned about postoperative irritative symptoms.  I would not want to delay induction BCG therapy.  Risk of perforation and recurrence are discussed.        Chris Esparza MD  11/02/17  8:58 AM      Cc: Dr. Yousif

## 2017-11-02 NOTE — DISCHARGE INSTRUCTIONS
DAY OF SURGERY INSTRUCTIONS        YOUR SURGEON: ***    PROCEDURE: ***    DATE OF SURGERY: ***    ARRIVAL TIME: AS DIRECTED BY OFFICE    DAY OF SURGERY TAKE ONLY THESE MEDICATIONS: ***            BEFORE YOU COME TO THE HOSPITAL  (Pre-op instructions)  • Do not eat, drink, smoke or chew gum after midnight the night before surgery.  This also includes no mints.  • Morning of surgery take only the medicines you have been instructed with a sip of water unless otherwise instructed  by your physician.  • Do not shave, wear makeup or dark nail polish.  • Remove all jewelry including rings.  • Leave anything you consider valuable at home.  • Leave your suitcase in the car until after your surgery.  • Bring the following with you if applicable:  o Picture ID and insurance, Medicare or Medicaid cards  o Co-pay/deductible required by insurance (cash, check, credit card)  o Copy of advance directive, living will or power-of- documents if not brought to PAT  o CPAP or BIPAP mask and tubing  o Relaxation aids (MP3 player, book, magazine)  • On the day of surgery check in at registration located at the main entrance of the hospital.       Outpatient Surgery Guidelines, Adult  Outpatient procedures are those for which the person having the procedure is allowed to go home the same day as the procedure. Various procedures are done on an outpatient basis. You should follow some general guidelines if you will be having an outpatient procedure.  LET YOUR HEALTH CARE PROVIDER KNOW ABOUT:  · Any allergies you have.  · All medicines you are taking, including vitamins, herbs, eye drops, creams, and over-the-counter medicines.  · Previous problems you or members of your family have had with the use of anesthetics.  · Any blood disorders you have.  · Previous surgeries you have had.  · Medical conditions you have.  RISKS AND COMPLICATIONS  Your health care provider will discuss possible risks and complications with you before surgery.  Common risks and complications include:    · Problems due to the use of anesthetics.  · Blood loss and replacement (does not apply to minor surgical procedures).  · Temporary increase in pain due to surgery.  · Uncorrected pain or problems that the surgery was meant to correct.  · Infection.  · New damage.  BEFORE THE PROCEDURE  · Ask your health care provider about changing or stopping your regular medicines. You may need to stop taking certain medicines in the days or weeks before the procedure.  · Stop smoking at least 2 weeks before surgery. This lowers your risk for complications during and after surgery. Ask your health care provider for help with this if needed.  · Eat your usual meals and a light supper the day before surgery. Continue fluid intake. Do not drink alcohol.  · Do not eat or drink after midnight the night before your surgery.   · Arrange for someone to take you home and to stay with you for 24 hours after the procedure. Medicine given for your procedure may affect your ability to drive or to care for yourself.  · Call your health care provider's office if you develop an illness or problem that may prevent you from safely having your procedure.  AFTER THE PROCEDURE  After surgery, you will be taken to a recovery area, where your progress will be monitored. If there are no complications, you will be allowed to go home when you are awake, stable, and taking fluids well. You may have numbness around the surgical site. Healing will take some time. You will have tenderness at the surgical site and may have some swelling and bruising. You may also have some nausea.  HOME CARE INSTRUCTIONS  · Do not drive for 24 hours, or as directed by your health care provider. Do not drive while taking prescription pain medicines.  · Do not drink alcohol for 24 hours.  · Do not make important decisions or sign legal documents for 24 hours.  · You may resume a normal diet and activities as directed.  · Do not lift  anything heavier than 10 pounds (4.5 kg) or play contact sports until your health care provider says it is okay.  · Change your bandages (dressings) as directed.  · Only take over-the-counter or prescription medicines as directed by your health care provider.  · Follow up with your health care provider as directed.  SEEK MEDICAL CARE IF:  · You have increased bleeding (more than a small spot) from the surgical site.  · You have redness, swelling, or increasing pain in the wound.  · You see pus coming from the wound.  · You have a fever.  · You notice a bad smell coming from the wound or dressing.  · You feel lightheaded or faint.  · You develop a rash.  · You have trouble breathing.  · You develop allergies.  MAKE SURE YOU:  · Understand these instructions.  · Will watch your condition.  · Will get help right away if you are not doing well or get worse.     This information is not intended to replace advice given to you by your health care provider. Make sure you discuss any questions you have with your health care provider.     Document Released: 09/12/2002 Document Revised: 05/03/2016 Document Reviewed: 05/22/2014  [employer] Interactive Patient Education ©2016 [employer]       Fall Prevention in Hospitals, Adult  As a hospital patient, your condition and the treatments you receive can increase your risk for falls. Some additional risk factors for falls in a hospital include:  · Being in an unfamiliar environment.  · Being on bed rest.  · Your surgery.  · Taking certain medicines.  · Your tubing requirements, such as intravenous (IV) therapy or catheters.  It is important that you learn how to decrease fall risks while at the hospital. Below are important tips that can help prevent falls.  SAFETY TIPS FOR PREVENTING FALLS  Talk about your risk of falling.  · Ask your health care provider why you are at risk for falling. Is it your medicine, illness, tubing placement, or something else?  · Make a plan with your  health care provider to keep you safe from falls.  · Ask your health care provider or pharmacist about side effects of your medicines. Some medicines can make you dizzy or affect your coordination.  Ask for help.  · Ask for help before getting out of bed. You may need to press your call button.  · Ask for assistance in getting safely to the toilet.  · Ask for a walker or cane to be put at your bedside. Ask that most of the side rails on your bed be placed up before your health care provider leaves the room.  · Ask family or friends to sit with you.  · Ask for things that are out of your reach, such as your glasses, hearing aids, telephone, bedside table, or call button.  Follow these tips to avoid falling:  · Stay lying or seated, rather than standing, while waiting for help.  · Wear rubber-soled slippers or shoes whenever you walk in the hospital.  · Avoid quick, sudden movements.  ¨ Change positions slowly.  ¨ Sit on the side of your bed before standing.  ¨ Stand up slowly and wait before you start to walk.  · Let your health care provider know if there is a spill on the floor.  · Pay careful attention to the medical equipment, electrical cords, and tubes around you.  · When you need help, use your call button by your bed or in the bathroom. Wait for one of your health care providers to help you.  · If you feel dizzy or unsure of your footing, return to bed and wait for assistance.  · Avoid being distracted by the TV, telephone, or another person in your room.  · Do not lean or support yourself on rolling objects, such as IV poles or bedside tables.     This information is not intended to replace advice given to you by your health care provider. Make sure you discuss any questions you have with your health care provider.     Document Released: 12/15/2001 Document Revised: 01/08/2016 Document Reviewed: 08/25/2013  [employer] Interactive Patient Education ©2016 [employer]       Surgical Site Infections FAQs  What  is a Surgical Site Infection (SSI)?  A surgical site infection is an infection that occurs after surgery in the part of the body where the surgery took place. Most patients who have surgery do not develop an infection. However, infections develop in about 1 to 3 out of every 100 patients who have surgery.  Some of the common symptoms of a surgical site infection are:  · Redness and pain around the area where you had surgery  · Drainage of cloudy fluid from your surgical wound  · Fever  Can SSIs be treated?  Yes. Most surgical site infections can be treated with antibiotics. The antibiotic given to you depends on the bacteria (germs) causing the infection. Sometimes patients with SSIs also need another surgery to treat the infection.  What are some of the things that hospitals are doing to prevent SSIs?  To prevent SSIs, doctors, nurses, and other healthcare providers:  · Clean their hands and arms up to their elbows with an antiseptic agent just before the surgery.  · Clean their hands with soap and water or an alcohol-based hand rub before and after caring for each patient.  · May remove some of your hair immediately before your surgery using electric clippers if the hair is in the same area where the procedure will occur. They should not shave you with a razor.  · Wear special hair covers, masks, gowns, and gloves during surgery to keep the surgery area clean.  · Give you antibiotics before your surgery starts. In most cases, you should get antibiotics within 60 minutes before the surgery starts and the antibiotics should be stopped within 24 hours after surgery.  · Clean the skin at the site of your surgery with a special soap that kills germs.  What can I do to help prevent SSIs?  Before your surgery:  · Tell your doctor about other medical problems you may have. Health problems such as allergies, diabetes, and obesity could affect your surgery and your treatment.  · Quit smoking. Patients who smoke get more  infections. Talk to your doctor about how you can quit before your surgery.  · Do not shave near where you will have surgery. Shaving with a razor can irritate your skin and make it easier to develop an infection.  At the time of your surgery:  · Speak up if someone tries to shave you with a razor before surgery. Ask why you need to be shaved and talk with your surgeon if you have any concerns.  · Ask if you will get antibiotics before surgery.  After your surgery:  · Make sure that your healthcare providers clean their hands before examining you, either with soap and water or an alcohol-based hand rub.  · If you do not see your providers clean their hands, please ask them to do so.  · Family and friends who visit you should not touch the surgical wound or dressings.  · Family and friends should clean their hands with soap and water or an alcohol-based hand rub before and after visiting you. If you do not see them clean their hands, ask them to clean their hands.  What do I need to do when I go home from the hospital?  · Before you go home, your doctor or nurse should explain everything you need to know about taking care of your wound. Make sure you understand how to care for your wound before you leave the hospital.  · Always clean your hands before and after caring for your wound.  · Before you go home, make sure you know who to contact if you have questions or problems after you get home.  · If you have any symptoms of an infection, such as redness and pain at the surgery site, drainage, or fever, call your doctor immediately.  If you have additional questions, please ask your doctor or nurse.  Developed and co-sponsored by The Society for Healthcare Epidemiology of [place] (SHEA); Infectious Diseases Society of [place] (IDSA); American Hospital Association; Association for Professionals in Infection Control and Epidemiology (APIC); Centers for Disease Control and Prevention (CDC); and The Joint  Commission.     This information is not intended to replace advice given to you by your health care provider. Make sure you discuss any questions you have with your health care provider.     Document Released: 12/23/2014 Document Revised: 01/08/2016 Document Reviewed: 03/02/2016  [employer] Interactive Patient Education ©2016 [employer]     PATIENT/FAMILY/RESPONSIBLE PARTY VERBALIZES UNDERSTANDING OF ABOVE EDUCATION.  COPY OF PAIN SCALE GIVEN AND REVIEWED WITH VERBALIZED UNDERSTANDING.

## 2017-11-03 ENCOUNTER — HOSPITAL ENCOUNTER (OUTPATIENT)
Facility: HOSPITAL | Age: 67
Setting detail: HOSPITAL OUTPATIENT SURGERY
Discharge: HOME | End: 2017-11-03
Attending: UROLOGY | Admitting: UROLOGY

## 2017-11-03 ENCOUNTER — ANESTHESIA (OUTPATIENT)
Dept: PERIOP | Facility: HOSPITAL | Age: 67
End: 2017-11-03

## 2017-11-03 ENCOUNTER — ANESTHESIA EVENT (OUTPATIENT)
Dept: PERIOP | Facility: HOSPITAL | Age: 67
End: 2017-11-03

## 2017-11-03 VITALS
TEMPERATURE: 98 F | DIASTOLIC BLOOD PRESSURE: 76 MMHG | OXYGEN SATURATION: 96 % | SYSTOLIC BLOOD PRESSURE: 159 MMHG | RESPIRATION RATE: 18 BRPM | HEART RATE: 62 BPM

## 2017-11-03 DIAGNOSIS — C67.8 MALIGNANT NEOPLASM OF OVERLAPPING SITES OF BLADDER (HCC): ICD-10-CM

## 2017-11-03 LAB
GLUCOSE BLDC GLUCOMTR-MCNC: 135 MG/DL (ref 70–130)
GLUCOSE BLDC GLUCOMTR-MCNC: 140 MG/DL (ref 70–130)

## 2017-11-03 PROCEDURE — 25010000002 SUCCINYLCHOLINE PER 20 MG: Performed by: NURSE ANESTHETIST, CERTIFIED REGISTERED

## 2017-11-03 PROCEDURE — 52235 CYSTOSCOPY AND TREATMENT: CPT | Performed by: UROLOGY

## 2017-11-03 PROCEDURE — 25010000003 CEFAZOLIN PER 500 MG: Performed by: UROLOGY

## 2017-11-03 PROCEDURE — 25010000002 PROPOFOL 10 MG/ML EMULSION: Performed by: NURSE ANESTHETIST, CERTIFIED REGISTERED

## 2017-11-03 PROCEDURE — 25010000002 MIDAZOLAM PER 1 MG: Performed by: ANESTHESIOLOGY

## 2017-11-03 PROCEDURE — 88307 TISSUE EXAM BY PATHOLOGIST: CPT | Performed by: UROLOGY

## 2017-11-03 PROCEDURE — 25010000002 ONDANSETRON PER 1 MG: Performed by: NURSE ANESTHETIST, CERTIFIED REGISTERED

## 2017-11-03 PROCEDURE — 82962 GLUCOSE BLOOD TEST: CPT

## 2017-11-03 PROCEDURE — 25010000002 FENTANYL CITRATE (PF) 250 MCG/5ML SOLUTION: Performed by: NURSE ANESTHETIST, CERTIFIED REGISTERED

## 2017-11-03 RX ORDER — SODIUM CHLORIDE 0.9 % (FLUSH) 0.9 %
3 SYRINGE (ML) INJECTION AS NEEDED
Status: DISCONTINUED | OUTPATIENT
Start: 2017-11-03 | End: 2017-11-03 | Stop reason: HOSPADM

## 2017-11-03 RX ORDER — ROCURONIUM BROMIDE 10 MG/ML
INJECTION, SOLUTION INTRAVENOUS AS NEEDED
Status: DISCONTINUED | OUTPATIENT
Start: 2017-11-03 | End: 2017-11-03 | Stop reason: SURG

## 2017-11-03 RX ORDER — FLUMAZENIL 0.1 MG/ML
0.2 INJECTION INTRAVENOUS AS NEEDED
Status: DISCONTINUED | OUTPATIENT
Start: 2017-11-03 | End: 2017-11-03 | Stop reason: HOSPADM

## 2017-11-03 RX ORDER — ONDANSETRON 2 MG/ML
INJECTION INTRAMUSCULAR; INTRAVENOUS AS NEEDED
Status: DISCONTINUED | OUTPATIENT
Start: 2017-11-03 | End: 2017-11-03 | Stop reason: SURG

## 2017-11-03 RX ORDER — PROPOFOL 10 MG/ML
VIAL (ML) INTRAVENOUS AS NEEDED
Status: DISCONTINUED | OUTPATIENT
Start: 2017-11-03 | End: 2017-11-03 | Stop reason: SURG

## 2017-11-03 RX ORDER — MIDAZOLAM HYDROCHLORIDE 1 MG/ML
2 INJECTION INTRAMUSCULAR; INTRAVENOUS
Status: DISCONTINUED | OUTPATIENT
Start: 2017-11-03 | End: 2017-11-03 | Stop reason: HOSPADM

## 2017-11-03 RX ORDER — MIDAZOLAM HYDROCHLORIDE 1 MG/ML
1 INJECTION INTRAMUSCULAR; INTRAVENOUS
Status: DISCONTINUED | OUTPATIENT
Start: 2017-11-03 | End: 2017-11-03 | Stop reason: HOSPADM

## 2017-11-03 RX ORDER — HYDRALAZINE HYDROCHLORIDE 20 MG/ML
5 INJECTION INTRAMUSCULAR; INTRAVENOUS
Status: DISCONTINUED | OUTPATIENT
Start: 2017-11-03 | End: 2017-11-03 | Stop reason: HOSPADM

## 2017-11-03 RX ORDER — METOCLOPRAMIDE HYDROCHLORIDE 5 MG/ML
5 INJECTION INTRAMUSCULAR; INTRAVENOUS
Status: DISCONTINUED | OUTPATIENT
Start: 2017-11-03 | End: 2017-11-03 | Stop reason: HOSPADM

## 2017-11-03 RX ORDER — FAMOTIDINE 10 MG/ML
20 INJECTION, SOLUTION INTRAVENOUS
Status: DISCONTINUED | OUTPATIENT
Start: 2017-11-03 | End: 2017-11-03 | Stop reason: HOSPADM

## 2017-11-03 RX ORDER — SUCCINYLCHOLINE CHLORIDE 20 MG/ML
INJECTION INTRAMUSCULAR; INTRAVENOUS AS NEEDED
Status: DISCONTINUED | OUTPATIENT
Start: 2017-11-03 | End: 2017-11-03 | Stop reason: SURG

## 2017-11-03 RX ORDER — NALOXONE HCL 0.4 MG/ML
0.04 VIAL (ML) INJECTION AS NEEDED
Status: DISCONTINUED | OUTPATIENT
Start: 2017-11-03 | End: 2017-11-03 | Stop reason: HOSPADM

## 2017-11-03 RX ORDER — ONDANSETRON 2 MG/ML
4 INJECTION INTRAMUSCULAR; INTRAVENOUS AS NEEDED
Status: DISCONTINUED | OUTPATIENT
Start: 2017-11-03 | End: 2017-11-03 | Stop reason: HOSPADM

## 2017-11-03 RX ORDER — SODIUM CHLORIDE, SODIUM LACTATE, POTASSIUM CHLORIDE, CALCIUM CHLORIDE 600; 310; 30; 20 MG/100ML; MG/100ML; MG/100ML; MG/100ML
1000 INJECTION, SOLUTION INTRAVENOUS CONTINUOUS
Status: DISCONTINUED | OUTPATIENT
Start: 2017-11-03 | End: 2017-11-03 | Stop reason: HOSPADM

## 2017-11-03 RX ORDER — FENTANYL CITRATE 50 UG/ML
INJECTION, SOLUTION INTRAMUSCULAR; INTRAVENOUS AS NEEDED
Status: DISCONTINUED | OUTPATIENT
Start: 2017-11-03 | End: 2017-11-03 | Stop reason: SURG

## 2017-11-03 RX ORDER — IPRATROPIUM BROMIDE AND ALBUTEROL SULFATE 2.5; .5 MG/3ML; MG/3ML
3 SOLUTION RESPIRATORY (INHALATION) ONCE AS NEEDED
Status: DISCONTINUED | OUTPATIENT
Start: 2017-11-03 | End: 2017-11-03 | Stop reason: HOSPADM

## 2017-11-03 RX ORDER — MEPERIDINE HYDROCHLORIDE 25 MG/ML
12.5 INJECTION INTRAMUSCULAR; INTRAVENOUS; SUBCUTANEOUS
Status: DISCONTINUED | OUTPATIENT
Start: 2017-11-03 | End: 2017-11-03 | Stop reason: HOSPADM

## 2017-11-03 RX ORDER — SORBITOL 30 G/1000ML
IRRIGANT IRRIGATION AS NEEDED
Status: DISCONTINUED | OUTPATIENT
Start: 2017-11-03 | End: 2017-11-03 | Stop reason: HOSPADM

## 2017-11-03 RX ORDER — SODIUM CHLORIDE, SODIUM LACTATE, POTASSIUM CHLORIDE, CALCIUM CHLORIDE 600; 310; 30; 20 MG/100ML; MG/100ML; MG/100ML; MG/100ML
100 INJECTION, SOLUTION INTRAVENOUS CONTINUOUS
Status: DISCONTINUED | OUTPATIENT
Start: 2017-11-03 | End: 2017-11-03 | Stop reason: HOSPADM

## 2017-11-03 RX ORDER — MORPHINE SULFATE 2 MG/ML
2 INJECTION, SOLUTION INTRAMUSCULAR; INTRAVENOUS AS NEEDED
Status: DISCONTINUED | OUTPATIENT
Start: 2017-11-03 | End: 2017-11-03 | Stop reason: HOSPADM

## 2017-11-03 RX ORDER — LABETALOL HYDROCHLORIDE 5 MG/ML
5 INJECTION, SOLUTION INTRAVENOUS
Status: DISCONTINUED | OUTPATIENT
Start: 2017-11-03 | End: 2017-11-03 | Stop reason: HOSPADM

## 2017-11-03 RX ORDER — LIDOCAINE HYDROCHLORIDE 20 MG/ML
INJECTION, SOLUTION INFILTRATION; PERINEURAL AS NEEDED
Status: DISCONTINUED | OUTPATIENT
Start: 2017-11-03 | End: 2017-11-03 | Stop reason: SURG

## 2017-11-03 RX ORDER — SODIUM CHLORIDE 0.9 % (FLUSH) 0.9 %
1-10 SYRINGE (ML) INJECTION AS NEEDED
Status: DISCONTINUED | OUTPATIENT
Start: 2017-11-03 | End: 2017-11-03 | Stop reason: HOSPADM

## 2017-11-03 RX ADMIN — ONDANSETRON HYDROCHLORIDE 4 MG: 2 SOLUTION INTRAMUSCULAR; INTRAVENOUS at 15:37

## 2017-11-03 RX ADMIN — CEFAZOLIN SODIUM 2 G: 2 SOLUTION INTRAVENOUS at 15:18

## 2017-11-03 RX ADMIN — MIDAZOLAM HYDROCHLORIDE 2 MG: 1 INJECTION, SOLUTION INTRAMUSCULAR; INTRAVENOUS at 14:44

## 2017-11-03 RX ADMIN — LIDOCAINE HYDROCHLORIDE 0.5 ML: 10 INJECTION, SOLUTION EPIDURAL; INFILTRATION; INTRACAUDAL; PERINEURAL at 13:26

## 2017-11-03 RX ADMIN — SUCCINYLCHOLINE CHLORIDE 100 MG: 20 INJECTION, SOLUTION INTRAMUSCULAR; INTRAVENOUS at 15:15

## 2017-11-03 RX ADMIN — FENTANYL CITRATE 100 MCG: 50 INJECTION INTRAMUSCULAR; INTRAVENOUS at 15:15

## 2017-11-03 RX ADMIN — LIDOCAINE HYDROCHLORIDE 80 MG: 20 INJECTION, SOLUTION INFILTRATION; PERINEURAL at 15:15

## 2017-11-03 RX ADMIN — ROCURONIUM BROMIDE 5 MG: 10 INJECTION INTRAVENOUS at 15:15

## 2017-11-03 RX ADMIN — FENTANYL CITRATE 50 MCG: 50 INJECTION INTRAMUSCULAR; INTRAVENOUS at 15:30

## 2017-11-03 RX ADMIN — FAMOTIDINE 20 MG: 10 INJECTION, SOLUTION INTRAVENOUS at 14:44

## 2017-11-03 RX ADMIN — PROPOFOL 200 MG: 10 INJECTION, EMULSION INTRAVENOUS at 15:15

## 2017-11-03 RX ADMIN — SODIUM CHLORIDE, POTASSIUM CHLORIDE, SODIUM LACTATE AND CALCIUM CHLORIDE 1000 ML: 600; 310; 30; 20 INJECTION, SOLUTION INTRAVENOUS at 13:26

## 2017-11-03 NOTE — ANESTHESIA PROCEDURE NOTES
Airway  Urgency: elective    Airway not difficult    General Information and Staff    Patient location during procedure: OR  CRNA: MANJINDER LUNA    Indications and Patient Condition  Indications for airway management: airway protection    Preoxygenated: yes  Mask difficulty assessment: 1 - vent by mask    Final Airway Details  Final airway type: endotracheal airway      Successful airway: ETT    Successful intubation technique: direct laryngoscopy  Endotracheal tube insertion site: oral  Blade: Pavan  Blade size: 3.5.  ETT size: 7.5 mm  Cormack-Lehane Classification: grade IIb - view of arytenoids or posterior of glottis only  Placement verified by: chest auscultation and capnometry   Measured from: gums  ETT to gums (cm): 22

## 2017-11-03 NOTE — H&P (VIEW-ONLY)
CC: I am here for the doctor to look at my bladder    Cystoscopy procedure note  Pre- operative diagnosis:  Bladder cancer surveillance    Post operative diagnosis:  Bladder Tumor    Procedure:  The patient was prepped and draped in a normal sterile fashion.  The urethra was anesthetized with 2% lidocaine jelly.  A flexible cystoscope was introduced per urethra.      Urethra:  No urethral stricture    Bladder:  Bladder tumor 2 x 2 centimeter near the right ureteral orifice and mild trabeculation    Ureteral orifices:  Normal position bilaterally and Clear efflux bilaterally    Prostate:  lateral lobe hypertrophy    Patient tolerated the procedure well    Complications: none    Blood loss: minimal    Diagnoses and all orders for this visit:    Malignant neoplasm of overlapping sites of bladder  -     POC Urinalysis Dipstick, Automated  -     Case Request; Standing  -     ceFAZolin (ANCEF) 2 g in sodium chloride 0.9 % 100 mL IVPB; Infuse 2 g into a venous catheter 1 (One) Time.  -     Case Request    Other orders  -     Follow Anesthesia Guidelines / Standing Orders; Future  -     Follow Anesthesia Guidelines / Standing Orders; Standing  -     Verify NPO Status; Standing  -     Obtain Informed Consent; Standing        Follow up:  Given these findings, I had to evaluate the patient to assess fitness for surgery, formulate and discussa plan, that included alternatives, risks and benefits of management.. This went well beyond the typical description of procedural findings and therefore an additional evaluation and management was required.    HPI  He was initially diagnosed bladder cancer 3 months ago.  At that time he was found to have 2 lesions one of which was low-grade and the other high-grade but both for superficial disease with regard to severity.  He has no gross hematuria, dysuria, urgency, or frequency of significance.  The onset of this was sudden.  Today's masses found in in the context of surveillance  cystoscopy    The following portions of the patient's history were reviewed and updated as appropriate: allergies, current medications, past family history, past medical history, past social history, past surgical history and problem list.    Review of Systems  Constitutional: Negative for chills and fever.   Gastrointestinal: Negative for abdominal pain, anal bleeding and blood in stool.   Genitourinary: Negative for flank pain and hematuria.      Current Outpatient Prescriptions:   •  allopurinol (ZYLOPRIM) 300 MG tablet, Take 300 mg by mouth daily., Disp: , Rfl:   •  aspirin 81 MG tablet, Take 81 mg by mouth daily., Disp: , Rfl:   •  beclomethasone (BECONASE AQ) 42 MCG/SPRAY nasal spray, 2 sprays by Nasal route 2 times daily. Dose is for each nostril., Disp: , Rfl:   •  Cholecalciferol 1000 UNITS capsule, Take  by mouth., Disp: , Rfl:   •  cyclobenzaprine (FLEXERIL) 10 MG tablet, Take 1 tablet by mouth 3 (Three) Times a Day As Needed for Muscle Spasms., Disp: 25 tablet, Rfl: 0  •  cyclobenzaprine (FLEXERIL) 10 MG tablet, Take 1 tablet by mouth 3 (Three) Times a Day As Needed for Muscle Spasms., Disp: 90 tablet, Rfl: 2  •  fenofibrate (TRICOR) 145 MG tablet, Take 145 mg by mouth daily., Disp: , Rfl:   •  furosemide (LASIX) 40 MG tablet, Take 40 mg by mouth 2 (Two) Times a Day., Disp: , Rfl:   •  GABAPENTIN PO, 400mg 6 times a day, Disp: , Rfl:   •  Ipratropium Bromide powder, by Does not apply route., Disp: , Rfl:   •  LISINOPRIL PO, 40mg 1 bid, Disp: , Rfl:   •  LOVASTATIN PO, Take  by mouth., Disp: , Rfl:   •  magnesium oxide (MAG-OX) 400 MG tablet, Take 400 mg 3 tabs 2 times daily, Disp: , Rfl:   •  METFORMIN HCL PO, Take  by mouth.1000mg bid, Disp: , Rfl:   •  Omega-3 Fatty Acids (FISH OIL) 1000 MG capsule capsule, Take 1,000 mg by mouth Daily With Breakfast., Disp: , Rfl:   •  OMEPRAZOLE PO, Take  by mouth., Disp: , Rfl:   •  oxyCODONE-acetaminophen (PERCOCET) 7.5-325 MG per tablet, , Disp: , Rfl:   •   vitamin B-12 (CYANOCOBALAMIN) 1000 MCG tablet, Take 1,000 mcg by mouth daily., Disp: , Rfl:     Past Medical History:   Diagnosis Date   • Bladder neoplasm of uncertain malignant potential 7/20/2017   • Diabetes mellitus    • GERD (gastroesophageal reflux disease)    • Hearing aid worn    • Hypertension    • Impaired hearing     without hearing aids can barely hear anything   • Neuropathy        Past Surgical History:   Procedure Laterality Date   • CATARACT EXTRACTION W/ INTRAOCULAR LENS IMPLANT     • CHOLECYSTECTOMY     • EXPLORATORY LAPAROTOMY     • LEG SURGERY     • TRANSURETHRAL RESECTION OF BLADDER TUMOR Bilateral 7/25/2017    Procedure: CYSTOSCOPY TRANSURETHRAL RESECTION OF BLADDER TUMOR & POSSIBLE BILATERAL RETROGRADE URETEROPYELOGRAMS;  Surgeon: Chris Esparza MD;  Location: [facility];  Service:        Social History     Social History   • Marital status:      Spouse name: N/A   • Number of children: N/A   • Years of education: N/A     Social History Main Topics   • Smoking status: Former Smoker     Quit date: 1997   • Smokeless tobacco: Not on file   • Alcohol use No   • Drug use: No   • Sexual activity: Not on file     Other Topics Concern   • Not on file     Social History Narrative       Family History   Problem Relation Age of Onset   • Cancer Father    • Hypertension Father    • Heart disease Mother    • Arthritis Mother    • Hypertension Mother    • Cancer Sister    • Cancer Brother    • Heart disease Sister        There were no vitals taken for this visit.    Physical Exam  Alert and oriented ×3  Not agitated or distressed  No obvious deformities  No respiratory distress  Skin without pallor or diaphoresis      Results for orders placed or performed in visit on 11/02/17   POC Urinalysis Dipstick, Automated   Result Value Ref Range    Color Yellow Yellow, Straw, Dark Yellow, Nicolasa    Clarity, UA Clear Clear    Glucose, UA Negative Negative, 1000 mg/dL (3+) mg/dL    Bilirubin Negative  Negative    Ketones, UA Negative Negative    Specific Gravity  1.020 1.005 - 1.030    Blood, UA Trace (A) Negative    pH, Urine 6.5 5.0 - 8.0    Protein, POC Negative Negative mg/dL    Urobilinogen, UA Normal Normal    Leukocytes Negative Negative    Nitrite, UA Negative Negative       Imaging Results (last 7 days)     ** No results found for the last 168 hours. **          Assessment and Plan  Diagnoses and all orders for this visit:    Malignant neoplasm of overlapping sites of bladder  -     POC Urinalysis Dipstick, Automated  -     Case Request; Standing  -     ceFAZolin (ANCEF) 2 g in sodium chloride 0.9 % 100 mL IVPB; Infuse 2 g into a venous catheter 1 (One) Time.  -     Case Request    Other orders  -     Follow Anesthesia Guidelines / Standing Orders; Future  -     Follow Anesthesia Guidelines / Standing Orders; Standing  -     Verify NPO Status; Standing  -     Obtain Informed Consent; Standing    He has recurrent disease.  At last visit he had multifocal disease and we originally were not going to give him BCG but I think that we will following this transurethral resection.  His for that reason I am not going to give him mitomycin.  We discussed the used to this but he was concerned about postoperative irritative symptoms.  I would not want to delay induction BCG therapy.  Risk of perforation and recurrence are discussed.        Chris Esparza MD  11/02/17  8:58 AM      Cc: Dr. Yousif

## 2017-11-03 NOTE — PLAN OF CARE
Problem: Patient Care Overview (Adult)  Goal: Plan of Care Review  Outcome: Ongoing (interventions implemented as appropriate)    11/03/17 1433   Coping/Psychosocial Response Interventions   Plan Of Care Reviewed With patient   Patient Care Overview   Progress no change         Problem: Perioperative Period (Adult)  Goal: Signs and Symptoms of Listed Potential Problems Will be Absent or Manageable (Perioperative Period)  Outcome: Ongoing (interventions implemented as appropriate)

## 2017-11-03 NOTE — ANESTHESIA PREPROCEDURE EVALUATION
Anesthesia Evaluation     Patient summary reviewed and Nursing notes reviewed   no history of anesthetic complications:  NPO Solid Status: > 8 hours  NPO Liquid Status: > 8 hours     Airway   Mallampati: I  TM distance: >3 FB  Neck ROM: full  no difficulty expected  Dental      Pulmonary    (+) sleep apnea on CPAP,   (-) COPD, asthma, not a smoker  Cardiovascular   Exercise tolerance: good (4-7 METS)    ECG reviewed  Rhythm: regular  Rate: normal    (+) hypertension,   (-) past MI, CAD, cardiac stents      Neuro/Psych  (-) seizures, TIA, CVA  GI/Hepatic/Renal/Endo    (+)  GERD, diabetes mellitus type 2,   (-) liver disease, no renal disease    Musculoskeletal     Abdominal    Substance History      OB/GYN          Other   (+) arthritis   history of cancer (bladder tumor)        Phys Exam Other: Permanent bridge upper front teeth                                Anesthesia Plan    ASA 2     general     intravenous induction   Anesthetic plan and risks discussed with patient.

## 2017-11-03 NOTE — DISCHARGE INSTRUCTIONS

## 2017-11-03 NOTE — OP NOTE
Operative Summary    Chung Bradley  Date of Procedure: 11/3/2017    Pre-op Diagnosis:   Malignant neoplasm of overlapping sites of bladder [C67.8]    Post-op Diagnosis:     Post-Op Diagnosis Codes:     * Malignant neoplasm of overlapping sites of bladder [C67.8]    Procedure/CPT® Codes:      Procedure(s):  CYSTOSCOPY TRANSURETHRAL RESECTION OF BLADDER TUMOR     Surgeon(s):  Chris Esparza MD    Anesthesia: General    Staff:   Circulator: Becky Chavarria RN  Scrub Person: Jonathan Yousif; Gem Gruber    Indications for procedure:  Patient's recurrent mass near the right ureteral orifice.  Has history of urothelial carcinoma bladder.    Findings:   Bladder tumor near right ureteral orifice is resected without difficulty.  The tumor measured 2.2 x 2 cm.    Procedure details:  Patient is identified in preoperative holding area.  He voiced no additional questions.  He is taken operating room suite where effective general anesthesia is given.    [place] sounds were used to dilate the urethra to 28 Tajik.  A 26.5 Tajik sec scope sheath with the operators inserted into the bladder.  Using the cutting loop I was able to resect the mass at the right orifice using a cephalad to caudad swipe moving medial to laterally on the tumor.  Care was taken to resect the orifice completely but I was able to see urine coming through the orifice nicely the end of the procedure.    After hemostasis is achieved with electrocautery, I placed a Tajik three-way Gutierrez catheter is placed for continuous bladder irrigation in the recovery room.  This will be removed prior to discharge.    Estimated Blood Loss: <30 mL    Specimens:                  ID Type Source Tests Collected by Time Destination   A : bladder tumor right trigone Tissue Urinary Bladder TISSUE EXAM Chris Esparza MD 11/3/2017 1538          Drains:   Urethral Catheter 11/03/17 1536 100% silicone 18 5 5 (Active)   Daily Indications < 24 hr post op 11/3/2017  4:02 PM    Irrigation Return clear 11/3/2017  4:02 PM           Complications: none    Plan: Office follow-up in one week to discuss pathology report and plan surveillance.    Chris Esparza MD     Date: 11/3/2017  Time: 4:49 PM

## 2017-11-03 NOTE — PLAN OF CARE
Problem: Patient Care Overview (Adult)  Goal: Plan of Care Review  Outcome: Outcome(s) achieved Date Met:  11/03/17 11/03/17 6176   Coping/Psychosocial Response Interventions   Plan Of Care Reviewed With patient;spouse   Patient Care Overview   Progress improving   Outcome Evaluation   Outcome Summary/Follow up Plan Patient meets discharge criteria. Patient was complaining of pain after catheter was removed , but pain became tolerbale after urinating. Patient and spouse verbalize understanding of discharge instructions.          Problem: Perioperative Period (Adult)  Goal: Signs and Symptoms of Listed Potential Problems Will be Absent or Manageable (Perioperative Period)  Outcome: Outcome(s) achieved Date Met:  11/03/17

## 2017-11-04 LAB — BACTERIA SPEC AEROBE CULT: NORMAL

## 2017-11-04 NOTE — ANESTHESIA POSTPROCEDURE EVALUATION
Patient: Chung Bradley    Procedure Summary     Date Anesthesia Start Anesthesia Stop Room / Location    11/03/17 1518 1544  [facility] OR 01 / [facility]       Procedure Diagnosis Surgeon Provider    CYSTOSCOPY TRANSURETHRAL RESECTION OF BLADDER TUMOR  (N/A Bladder) Malignant neoplasm of overlapping sites of bladder  (Malignant neoplasm of overlapping sites of bladder [C67.8]) MD Daquan Ball CRNA          Anesthesia Type: general  Last vitals  BP   159/76 (11/03/17 1715)   Temp   98 °F (36.7 °C) (11/03/17 1637)   Pulse   62 (11/03/17 1724)   Resp   18 (11/03/17 1724)     SpO2   96 % (11/03/17 1724)     Post Anesthesia Care and Evaluation    Patient location during evaluation: PACU  Patient participation: complete - patient participated  Level of consciousness: awake and alert  Pain management: adequate  Airway patency: patent  Anesthetic complications: No anesthetic complications  PONV Status: none  Cardiovascular status: acceptable and hemodynamically stable  Respiratory status: acceptable  Hydration status: acceptable    Comments: Blood pressure 159/76, pulse 62, temperature 98 °F (36.7 °C), temperature source Temporal Artery , resp. rate 18, SpO2 96 %.    Patient discharged from PACU based upon Janeth score. Please see RN notes for further details

## 2017-11-07 LAB
CYTO UR: NORMAL
LAB AP CASE REPORT: NORMAL
LAB AP SYNOPTIC CHECKLIST: NORMAL
Lab: NORMAL
PATH REPORT.FINAL DX SPEC: NORMAL
PATH REPORT.GROSS SPEC: NORMAL

## 2017-11-13 ENCOUNTER — OFFICE VISIT (OUTPATIENT)
Dept: UROLOGY | Facility: CLINIC | Age: 67
End: 2017-11-13

## 2017-11-13 VITALS — TEMPERATURE: 97.8 F | HEIGHT: 69 IN | BODY MASS INDEX: 36.14 KG/M2 | WEIGHT: 244 LBS

## 2017-11-13 DIAGNOSIS — C67.8 MALIGNANT NEOPLASM OF OVERLAPPING SITES OF BLADDER (HCC): Primary | ICD-10-CM

## 2017-11-13 LAB
BILIRUB BLD-MCNC: NEGATIVE MG/DL
CLARITY, POC: CLEAR
COLOR UR: YELLOW
GLUCOSE UR STRIP-MCNC: NEGATIVE MG/DL
KETONES UR QL: NEGATIVE
LEUKOCYTE EST, POC: ABNORMAL
NITRITE UR-MCNC: NEGATIVE MG/ML
PH UR: 6 [PH] (ref 5–8)
PROT UR STRIP-MCNC: NEGATIVE MG/DL
RBC # UR STRIP: ABNORMAL /UL
SP GR UR: 1.01 (ref 1–1.03)
UROBILINOGEN UR QL: NORMAL

## 2017-11-13 PROCEDURE — 81003 URINALYSIS AUTO W/O SCOPE: CPT | Performed by: UROLOGY

## 2017-11-13 PROCEDURE — 99213 OFFICE O/P EST LOW 20 MIN: CPT | Performed by: UROLOGY

## 2017-11-13 NOTE — PATIENT INSTRUCTIONS
Patient Instructions for BCG Immunotherapy  BCG stands for Bacillus Calmette-Armando , a living but weakened tuberculosis vaccine developed by Annette Andrews in [place] in 1921.  It has been and continues to be used as a vaccine to protect against tuberculosis and has been given to over 1 billion infants.  Based on studies directed by Dr. Chris Bryan, BCG was first approved by the FDA in  to treat bladder cancer.  Is currently the most effective treatment for CIS (carcinoma in situ) and high-grade, non-muscle invasive cancer.    Risks of BCG therapy are significant, but the benefits of treatment far outweigh the risks for most patients.  Like aspirin in almost every medicine, BCG treatment can be fatal.  (Restated:  many more people have  from aspirin and BCG.)  The living bacteria can cause serious infection and allergic reaction if it is taken up in the bloodstream.  Serious reactions can include infection of the liver, lungs, kidneys, testis, bone, and blood vessels.  BCG is sensitive to antibiotics, but only certain antibiotics such as Cipro, Levaquin, isoniazid, riifampin, and ethambutol.  The most serious BCG reaction is sepsis-bloodstream infection associated with hypotension (low blood pressure or shock).  When this occurs steroid treatment may be required also.    What to expect with BCG: You should not expect to have any serious reaction-these are quite rare and can be avoided by reducing the dose of BCG in some patients.  With the first and second of the 6 initial weekly BCG installations most people have no symptoms other than the irritation associated with passing the catheter.  Beginning with the second or third installation you may have some mild burning and increase frequency of urination, and occasionally some bleeding occurs.  The symptoms often increase with later treatments, but if they are bothersome the dose of BCG can be altered.  Burning and urinary frequency generally last  "for 1-3 days and may be associated with malaise or mild \"flulike \"symptoms typical of many vaccinations.  Sometimes nausea may occur, along with low-grade fever or mild chilling sensation.  The physician may need to give you a prescription for antibiotics to take should shaking chills, temperature above 101, or severe, prolonged burning and frequency occur.  These antibiotics would be specific for treating the BCG.    The BCG Schedule generally uses my 3 week maintenance program that was found in the [place] [facility] study to markedly reduce not only tumor recurrence but also disease progression when compared to 6 week induction BCG alone.  The treatment as been modified as follows:     1.  Induction BCG is the initial 6 treatments that will be given once weekly for 6 consecutive weeks.  Sometimes the physician will need  to do a biopsy to prove the BCG has worked.    2.  Maintenance BCG (once a week for 3 weeks) is begun at 3 months.  The dose may be reduced to 1/3 if side effects were severe during the initial induction BCG treatment.  3 week maintenance cycles will be repeated at 6, 12, 18 , 24 and 36  Months.  Sometimes extended maintenance cycles are used on a yearly basis beyond 3 years but this is rare.    Call for high fever, shaking chills, flank pain, or other serious side effects.  Rarely reaction to BCG may occur many months after BCG is been given.  Night sweats, weight loss, or chronic illness can occur but is very unusual.    "

## 2017-11-13 NOTE — PROGRESS NOTES
Mr. Bradley is 67 y.o. male    CHIEF COMPLAINT: I am here to follow up on my bladder cancer.     HPI  He is here to follow-up urothelial carcinoma.  He underwent a transurethral resection of prostate 10 days ago.  This shows a high-grade noninvasive urothelial carcinoma stage Ta is the first time he has had a high-grade lesion.  He has had a little bit of dysuria but no hematuria.    The following portions of the patient's history were reviewed and updated as appropriate: allergies, current medications, past family history, past medical history, past social history, past surgical history and problem list.    Review of Systems   Constitutional: Negative for chills and fever.   Gastrointestinal: Negative for abdominal pain, anal bleeding and blood in stool.   Genitourinary: Negative for flank pain and hematuria.         Current Outpatient Prescriptions:   •  allopurinol (ZYLOPRIM) 300 MG tablet, Take 300 mg by mouth daily., Disp: , Rfl:   •  aspirin 81 MG tablet, Take 81 mg by mouth daily., Disp: , Rfl:   •  Cholecalciferol 1000 UNITS capsule, Take  by mouth. daily, Disp: , Rfl:   •  fenofibrate (TRICOR) 145 MG tablet, Take 145 mg by mouth daily., Disp: , Rfl:   •  furosemide (LASIX) 40 MG tablet, Take 40 mg by mouth 2 (Two) Times a Day., Disp: , Rfl:   •  GABAPENTIN PO, 400mg takes 6 pills a day.takes 2 tabs am noon pm, Disp: , Rfl:   •  LISINOPRIL PO, 40mg 1 bid, Disp: , Rfl:   •  LOVASTATIN PO, Take  by mouth. 40mg daily, Disp: , Rfl:   •  magnesium oxide (MAG-OX) 400 MG tablet, Take 400 mg 3 tabs 2 times daily, Disp: , Rfl:   •  METFORMIN HCL PO, Take  by mouth.1000mg bid, Disp: , Rfl:   •  Omega-3 Fatty Acids (FISH OIL) 1000 MG capsule capsule, Take 1,000 mg by mouth Daily With Breakfast., Disp: , Rfl:   •  OMEPRAZOLE PO, Take  by mouth. 20 mg daily, Disp: , Rfl:   •  vitamin B-12 (CYANOCOBALAMIN) 1000 MCG tablet, Take 1,000 mcg by mouth daily., Disp: , Rfl:     Past Medical History:   Diagnosis Date   • Bladder  "neoplasm of uncertain malignant potential 7/20/2017   • Degenerative arthritis of cervical spine with nerve compression     per patient report   • Diabetes mellitus    • GERD (gastroesophageal reflux disease)    • Hearing aid worn    • Hypertension    • Impaired hearing     without hearing aids can barely hear anything   • Neuropathy    • Sleep apnea     wears cpap       Past Surgical History:   Procedure Laterality Date   • CATARACT EXTRACTION W/ INTRAOCULAR LENS IMPLANT     • CHOLECYSTECTOMY     • EXPLORATORY LAPAROTOMY     • LEG SURGERY     • TRANSURETHRAL RESECTION OF BLADDER TUMOR Bilateral 7/25/2017    Procedure: CYSTOSCOPY TRANSURETHRAL RESECTION OF BLADDER TUMOR & POSSIBLE BILATERAL RETROGRADE URETEROPYELOGRAMS;  Surgeon: Chris Esparza MD;  Location: [facility] OR;  Service:    • TRANSURETHRAL RESECTION OF BLADDER TUMOR N/A 11/3/2017    Procedure: CYSTOSCOPY TRANSURETHRAL RESECTION OF BLADDER TUMOR ;  Surgeon: Chris Esparza MD;  Location: [facility] OR;  Service:        Social History     Social History   • Marital status:      Spouse name: N/A   • Number of children: N/A   • Years of education: N/A     Social History Main Topics   • Smoking status: Former Smoker     Quit date: 1997   • Smokeless tobacco: Never Used   • Alcohol use No   • Drug use: No   • Sexual activity: Defer     Other Topics Concern   • Not on file     Social History Narrative       Family History   Problem Relation Age of Onset   • Cancer Father    • Hypertension Father    • Heart disease Mother    • Arthritis Mother    • Hypertension Mother    • Cancer Sister    • Cancer Brother    • Heart disease Sister        Temp 97.8 °F (36.6 °C)  Ht 69\" (175.3 cm)  Wt 244 lb (111 kg)  BMI 36.03 kg/m2    Physical Exam  Alert and oriented ×3  Not agitated or distressed  No obvious deformities  No respiratory distress  Skin without pallor or diaphoresis        Results for orders placed or performed in visit on 11/13/17   POC Urinalysis " Dipstick, Automated   Result Value Ref Range    Color Yellow Yellow, Straw, Dark Yellow, Nicolasa    Clarity, UA Clear Clear    Glucose, UA Negative Negative, 1000 mg/dL (3+) mg/dL    Bilirubin Negative Negative    Ketones, UA Negative Negative    Specific Gravity  1.010 1.005 - 1.030    Blood, UA Large (A) Negative    pH, Urine 6.0 5.0 - 8.0    Protein, POC Negative Negative mg/dL    Urobilinogen, UA Normal Normal    Leukocytes Small (1+) (A) Negative    Nitrite, UA Negative Negative       Assessment and Plan  Diagnoses and all orders for this visit:    Malignant neoplasm of overlapping sites of bladder  -     POC Urinalysis Dipstick, Automated  Since he has high grade superficial disease, I would recommend BCG therapy for this. We discussed the pros and cons of BCG therapy. Risks of infection, progression, recurrence, irritative voiding symptoms that can be severe, and the schedule for BCG.       Chris Esparza MD  11/13/17  1:19 PM      Cc: Fidel Yousif MD

## 2017-12-04 ENCOUNTER — OFFICE VISIT (OUTPATIENT)
Dept: NEUROSURGERY | Facility: CLINIC | Age: 67
End: 2017-12-04

## 2017-12-04 VITALS
BODY MASS INDEX: 34.07 KG/M2 | WEIGHT: 230 LBS | SYSTOLIC BLOOD PRESSURE: 112 MMHG | HEIGHT: 69 IN | DIASTOLIC BLOOD PRESSURE: 78 MMHG

## 2017-12-04 DIAGNOSIS — M48.02 SPINAL STENOSIS IN CERVICAL REGION: Primary | ICD-10-CM

## 2017-12-04 DIAGNOSIS — Z87.891 FORMER SMOKER: ICD-10-CM

## 2017-12-04 PROCEDURE — 99213 OFFICE O/P EST LOW 20 MIN: CPT | Performed by: NURSE PRACTITIONER

## 2017-12-04 NOTE — PROGRESS NOTES
"    Chief complaint:   Chief Complaint   Patient presents with   • Neck Pain     Chung returns after some physcial therapy for his cervical pain, he states the therapy has really helped him.         Subjective     HPI: This is a 67-year-old male gentleman who been following for neck pain and right arm pain with known degenerative disc disease at C5 6 off to the right and C4 5 bilaterally.  He is here in follow-up today after doing physical therapy.  He states that since physical therapy feels like he is about 100% better at this time.  He says that he no longer has the pain in his neck.  He says it will occasionally get tight in his neck.  Denies any arm pain.  He does still have some numbness and tingling in his right pinky finger.  Denies any bowel or bladder issues.  Denies any falls.  Denies any difficulty controlling his arms or his legs.  Overall is happy and satisfied with results of physical therapy and is currently pain-free at this time.    Review of Systems   Musculoskeletal: Positive for neck pain.   Neurological: Positive for numbness.   All other systems reviewed and are negative.        Objective      Vital Signs  /78 (BP Location: Right arm, Patient Position: Sitting)  Ht 69\" (175.3 cm)  Wt 230 lb (104 kg)  BMI 33.97 kg/m2    Physical Exam   Constitutional: He is oriented to person, place, and time. He appears well-developed and well-nourished.   HENT:   Head: Normocephalic.   Eyes: EOM are normal. Pupils are equal, round, and reactive to light.   Neck: Normal range of motion.   Pulmonary/Chest: Effort normal.   Musculoskeletal: Normal range of motion.        Cervical back: He exhibits pain.   Neurological: He is alert and oriented to person, place, and time. He has normal strength and normal reflexes. No cranial nerve deficit or sensory deficit. Gait normal. GCS eye subscore is 4. GCS verbal subscore is 5. GCS motor subscore is 6.   Skin: Skin is warm.   Psychiatric: He has a normal mood " and affect. His speech is normal and behavior is normal. Thought content normal.       Results Review: No new imaging          Assessment/Plan: At this point the patient is doing very well.  I am pleased to see that is done so well physical therapy.  At this time we do not need to see the patient back once he has recurrence of his symptoms.  We will follow-up following an as-needed basis.  BMI shows that is overweight.  BMI chart was given the patient.  He is a nonsmoker.         Chung was seen today for neck pain.    Diagnoses and all orders for this visit:    Spinal stenosis in cervical region    Former smoker        I discussed the patients findings and my recommendations with patient  Héctor Yanez, HANNAH  12/04/17  9:40 AM

## 2017-12-07 ENCOUNTER — OFFICE VISIT (OUTPATIENT)
Dept: UROLOGY | Facility: CLINIC | Age: 67
End: 2017-12-07

## 2017-12-07 DIAGNOSIS — C67.8 MALIGNANT NEOPLASM OF OVERLAPPING SITES OF BLADDER (HCC): Primary | ICD-10-CM

## 2017-12-07 LAB
BILIRUB BLD-MCNC: NEGATIVE MG/DL
CLARITY, POC: CLEAR
COLOR UR: YELLOW
GLUCOSE UR STRIP-MCNC: NEGATIVE MG/DL
KETONES UR QL: NEGATIVE
LEUKOCYTE EST, POC: NEGATIVE
NITRITE UR-MCNC: NEGATIVE MG/ML
PH UR: 8.5 [PH] (ref 5–8)
PROT UR STRIP-MCNC: NEGATIVE MG/DL
RBC # UR STRIP: ABNORMAL /UL
SP GR UR: 1.02 (ref 1–1.03)
UROBILINOGEN UR QL: NORMAL

## 2017-12-07 PROCEDURE — 81003 URINALYSIS AUTO W/O SCOPE: CPT | Performed by: UROLOGY

## 2017-12-07 PROCEDURE — 99212 OFFICE O/P EST SF 10 MIN: CPT | Performed by: UROLOGY

## 2017-12-07 PROCEDURE — 51720 TREATMENT OF BLADDER LESION: CPT | Performed by: UROLOGY

## 2017-12-07 NOTE — PROGRESS NOTES
CC: I am here for BCG Treatment    BCG procedure note    Indication: High-grade superficial urothelial carcinoma    Type of treatment: Induction    Treatment number: 1 / 6    Procedure note: A well lubricated 16 [place] Red rubber is placed through the external urethral meatus and eventually manipulated into the bladder.  A small amount of clear urine returned.  One vial of [place] strain BCG with a lot number [id number] and expiration date 10/4/18 is slowly instilled into the urinary bladder. The patient tolerated this well.     Post procedural instructions have been given to the patient.   This procedure was performed by Dr. Esparza.

## 2017-12-15 ENCOUNTER — PROCEDURE VISIT (OUTPATIENT)
Dept: UROLOGY | Facility: CLINIC | Age: 67
End: 2017-12-15

## 2017-12-15 VITALS — WEIGHT: 243 LBS | HEIGHT: 69 IN | TEMPERATURE: 97.6 F | BODY MASS INDEX: 35.99 KG/M2

## 2017-12-15 DIAGNOSIS — C67.8 MALIGNANT NEOPLASM OF OVERLAPPING SITES OF BLADDER (HCC): Primary | ICD-10-CM

## 2017-12-15 LAB
BILIRUB BLD-MCNC: NEGATIVE MG/DL
CLARITY, POC: CLEAR
COLOR UR: YELLOW
GLUCOSE UR STRIP-MCNC: NEGATIVE MG/DL
KETONES UR QL: NEGATIVE
LEUKOCYTE EST, POC: NEGATIVE
NITRITE UR-MCNC: NEGATIVE MG/ML
PH UR: 7 [PH] (ref 5–8)
PROT UR STRIP-MCNC: NEGATIVE MG/DL
RBC # UR STRIP: ABNORMAL /UL
SP GR UR: 1.02 (ref 1–1.03)
UROBILINOGEN UR QL: NORMAL

## 2017-12-15 PROCEDURE — 51720 TREATMENT OF BLADDER LESION: CPT | Performed by: UROLOGY

## 2017-12-15 PROCEDURE — 99212 OFFICE O/P EST SF 10 MIN: CPT | Performed by: UROLOGY

## 2017-12-15 PROCEDURE — 81003 URINALYSIS AUTO W/O SCOPE: CPT | Performed by: UROLOGY

## 2017-12-15 NOTE — PROGRESS NOTES
CC: I am here for BCG Treatment    BCG procedure note    Indication: High-grade superficial urothelial carcinoma    Type of treatment: Induction    Treatment number: 2 / 6    Procedure note: A well lubricated 16 [place] Red rubber is placed through the external urethral meatus and eventually manipulated into the bladder.  A small amount of clear urine returned.  One vial of [place] strain BCG with a lot number [id number] and expiration date 10/4/18 is slowly instilled into the urinary bladder. The patient tolerated this well.     Post procedural instructions have been given to the patient.   This procedure was performed by Dr. Esparza.

## 2017-12-20 ENCOUNTER — PROCEDURE VISIT (OUTPATIENT)
Dept: UROLOGY | Facility: CLINIC | Age: 67
End: 2017-12-20

## 2017-12-20 DIAGNOSIS — C67.8 MALIGNANT NEOPLASM OF OVERLAPPING SITES OF BLADDER (HCC): Primary | ICD-10-CM

## 2017-12-20 LAB
BILIRUB BLD-MCNC: NEGATIVE MG/DL
CLARITY, POC: CLEAR
COLOR UR: YELLOW
GLUCOSE UR STRIP-MCNC: NEGATIVE MG/DL
KETONES UR QL: NEGATIVE
LEUKOCYTE EST, POC: NEGATIVE
NITRITE UR-MCNC: NEGATIVE MG/ML
PH UR: 1 [PH] (ref 5–8)
PROT UR STRIP-MCNC: NEGATIVE MG/DL
RBC # UR STRIP: NEGATIVE /UL
SP GR UR: 1.02 (ref 1–1.03)
UROBILINOGEN UR QL: NORMAL

## 2017-12-20 PROCEDURE — 81003 URINALYSIS AUTO W/O SCOPE: CPT | Performed by: UROLOGY

## 2017-12-20 PROCEDURE — 51720 TREATMENT OF BLADDER LESION: CPT | Performed by: UROLOGY

## 2017-12-20 PROCEDURE — 99212 OFFICE O/P EST SF 10 MIN: CPT | Performed by: UROLOGY

## 2017-12-20 NOTE — PROGRESS NOTES
CC: I am here for BCG Treatment    BCG procedure note    Indication: High-grade superficial urothelial carcinoma    Type of treatment: Induction    Treatment number: 3 / 6    Procedure note: A well lubricated 16 [place] Red rubber is placed through the external urethral meatus and eventually manipulated into the bladder.  A small amount of clear urine returned.  One vial of [place] strain BCG with a lot number [id number] and expiration date 10/23/18 is slowly instilled into the urinary bladder. The patient tolerated this well.     Post procedural instructions have been given to the patient.   This procedure was performed by Dr. Esparza.    Tissue Pathology Exam - Tissue, Urinary Bladder   Order: [id number]   Status:  Final result   Visible to patient:  No (Not Released) Dx:  Malignant neoplasm of overlapping sit...      1mo ago     Final Diagnosis   Urinary bladder, right trigone, transurethral resection:  A.  Noninvasive papillary urothelial carcinoma, high-grade.  B.  Acute and chronic inflammation.  C.  Muscularis propria smooth muscle present, negative for malignancy.     AJCC stage:T1a pNX                                                                                     1   Electronically signed by Mariel Waterman MD on 11/7/2017 at 1830

## 2017-12-28 ENCOUNTER — PROCEDURE VISIT (OUTPATIENT)
Dept: UROLOGY | Facility: CLINIC | Age: 67
End: 2017-12-28

## 2017-12-28 DIAGNOSIS — C67.8 MALIGNANT NEOPLASM OF OVERLAPPING SITES OF BLADDER (HCC): Primary | ICD-10-CM

## 2017-12-28 LAB
BILIRUB BLD-MCNC: NEGATIVE MG/DL
CLARITY, POC: CLEAR
COLOR UR: YELLOW
GLUCOSE UR STRIP-MCNC: NEGATIVE MG/DL
KETONES UR QL: NEGATIVE
LEUKOCYTE EST, POC: ABNORMAL
NITRITE UR-MCNC: NEGATIVE MG/ML
PH UR: 7 [PH] (ref 5–8)
PROT UR STRIP-MCNC: NEGATIVE MG/DL
RBC # UR STRIP: ABNORMAL /UL
SP GR UR: 1.01 (ref 1–1.03)
UROBILINOGEN UR QL: NORMAL

## 2017-12-28 PROCEDURE — 51720 TREATMENT OF BLADDER LESION: CPT | Performed by: UROLOGY

## 2017-12-28 PROCEDURE — 99212 OFFICE O/P EST SF 10 MIN: CPT | Performed by: UROLOGY

## 2017-12-28 PROCEDURE — 81003 URINALYSIS AUTO W/O SCOPE: CPT | Performed by: UROLOGY

## 2017-12-28 NOTE — PROGRESS NOTES
CC: I am here for BCG Treatment     BCG procedure note     Indication: High-grade superficial urothelial carcinoma     Type of treatment: Induction     Treatment number: 4 / 6     Procedure note: A well lubricated 16 [place] Red rubber is placed through the external urethral meatus and eventually manipulated into the bladder.  A small amount of clear urine returned.  One vial of [place] strain BCG with a lot number [id number] and expiration date 10/23/18 is slowly instilled into the urinary bladder. The patient tolerated this well.      Post procedural instructions have been given to the patient.   This procedure was performed by Dr. Esparza.

## 2018-01-04 ENCOUNTER — PROCEDURE VISIT (OUTPATIENT)
Dept: UROLOGY | Facility: CLINIC | Age: 68
End: 2018-01-04

## 2018-01-04 DIAGNOSIS — C67.8 MALIGNANT NEOPLASM OF OVERLAPPING SITES OF BLADDER (HCC): Primary | ICD-10-CM

## 2018-01-04 LAB
BILIRUB BLD-MCNC: NEGATIVE MG/DL
CLARITY, POC: CLEAR
COLOR UR: YELLOW
GLUCOSE UR STRIP-MCNC: NEGATIVE MG/DL
KETONES UR QL: NEGATIVE
LEUKOCYTE EST, POC: NEGATIVE
NITRITE UR-MCNC: NEGATIVE MG/ML
PH UR: 5.5 [PH] (ref 5–8)
PROT UR STRIP-MCNC: NEGATIVE MG/DL
RBC # UR STRIP: ABNORMAL /UL
SP GR UR: 1.01 (ref 1–1.03)
UROBILINOGEN UR QL: NORMAL

## 2018-01-04 PROCEDURE — 81003 URINALYSIS AUTO W/O SCOPE: CPT | Performed by: UROLOGY

## 2018-01-04 PROCEDURE — 99212 OFFICE O/P EST SF 10 MIN: CPT | Performed by: UROLOGY

## 2018-01-04 PROCEDURE — 51720 TREATMENT OF BLADDER LESION: CPT | Performed by: UROLOGY

## 2018-01-04 NOTE — PROGRESS NOTES
CC: I am here for BCG Treatment      BCG procedure note      Indication: High-grade superficial urothelial carcinoma      Type of treatment: Induction      Treatment number: 5 / 6      Procedure note: A well lubricated 16 [place] Red rubber is placed through the external urethral meatus and eventually manipulated into the bladder.  A small amount of clear urine returned.  One vial of [place] strain BCG with a lot number [id number] and expiration date 10/23/18 is slowly instilled into the urinary bladder. The patient tolerated this well.       Post procedural instructions have been given to the patient.   This procedure was performed by Dr. Esparza.

## 2018-01-11 ENCOUNTER — PROCEDURE VISIT (OUTPATIENT)
Dept: UROLOGY | Facility: CLINIC | Age: 68
End: 2018-01-11

## 2018-01-11 DIAGNOSIS — C67.8 MALIGNANT NEOPLASM OF OVERLAPPING SITES OF BLADDER (HCC): Primary | ICD-10-CM

## 2018-01-11 PROCEDURE — 51720 TREATMENT OF BLADDER LESION: CPT | Performed by: UROLOGY

## 2018-01-11 PROCEDURE — 81003 URINALYSIS AUTO W/O SCOPE: CPT | Performed by: UROLOGY

## 2018-01-11 PROCEDURE — 99212 OFFICE O/P EST SF 10 MIN: CPT | Performed by: UROLOGY

## 2018-01-11 NOTE — PROGRESS NOTES
CC: I am here for BCG Treatment      BCG procedure note      Indication: High-grade superficial urothelial carcinoma      Type of treatment: Induction      Treatment number: 6 / 6      Procedure note: A well lubricated 16 [place] Red rubber is placed through the external urethral meatus and eventually manipulated into the bladder.  A small amount of clear urine returned.  One vial of [place] strain BCG with a lot number [id number] and expiration date 11/1/18 is slowly instilled into the urinary bladder. The patient tolerated this well.       Post procedural instructions have been given to the patient.   This procedure was performed by Dr. Esparza.    He will need cystoscopy in 6 weeks.  We will start maintenance BCG therapy in 2-3 months.

## 2018-01-23 ENCOUNTER — OFFICE VISIT (OUTPATIENT)
Dept: GASTROENTEROLOGY | Age: 68
End: 2018-01-23
Payer: MEDICARE

## 2018-01-23 VITALS
WEIGHT: 245.4 LBS | HEIGHT: 70 IN | DIASTOLIC BLOOD PRESSURE: 62 MMHG | BODY MASS INDEX: 35.13 KG/M2 | OXYGEN SATURATION: 98 % | HEART RATE: 97 BPM | SYSTOLIC BLOOD PRESSURE: 120 MMHG

## 2018-01-23 DIAGNOSIS — K22.70 BARRETT'S ESOPHAGUS WITHOUT DYSPLASIA: Primary | ICD-10-CM

## 2018-01-23 DIAGNOSIS — R12 CHRONIC HEARTBURN: ICD-10-CM

## 2018-01-23 PROCEDURE — G8482 FLU IMMUNIZE ORDER/ADMIN: HCPCS | Performed by: NURSE PRACTITIONER

## 2018-01-23 PROCEDURE — 99214 OFFICE O/P EST MOD 30 MIN: CPT | Performed by: NURSE PRACTITIONER

## 2018-01-23 PROCEDURE — 1036F TOBACCO NON-USER: CPT | Performed by: NURSE PRACTITIONER

## 2018-01-23 PROCEDURE — G8427 DOCREV CUR MEDS BY ELIG CLIN: HCPCS | Performed by: NURSE PRACTITIONER

## 2018-01-23 PROCEDURE — 1123F ACP DISCUSS/DSCN MKR DOCD: CPT | Performed by: NURSE PRACTITIONER

## 2018-01-23 PROCEDURE — 3017F COLORECTAL CA SCREEN DOC REV: CPT | Performed by: NURSE PRACTITIONER

## 2018-01-23 PROCEDURE — G8417 CALC BMI ABV UP PARAM F/U: HCPCS | Performed by: NURSE PRACTITIONER

## 2018-01-23 PROCEDURE — 4040F PNEUMOC VAC/ADMIN/RCVD: CPT | Performed by: NURSE PRACTITIONER

## 2018-01-23 RX ORDER — COLCHICINE 0.6 MG/1
0.6 TABLET ORAL DAILY
COMMUNITY

## 2018-01-23 ASSESSMENT — ENCOUNTER SYMPTOMS
BACK PAIN: 0
RECTAL PAIN: 0
DIARRHEA: 0
ABDOMINAL PAIN: 0
CONSTIPATION: 0
BLOOD IN STOOL: 0
VOICE CHANGE: 0
ABDOMINAL DISTENTION: 0
ANAL BLEEDING: 0
TROUBLE SWALLOWING: 0
COUGH: 0
VOMITING: 0
SHORTNESS OF BREATH: 0
SORE THROAT: 0
NAUSEA: 0

## 2018-01-23 NOTE — PROGRESS NOTES
Subjective:      Patient ID: Wang Elliott is a 69 y.o. male. Chief Complaint   Patient presents with    Endoscopy     3yr recall       HPI  PCP: Eugenio Ayon  Pt is here to schedule an EGD, is due for recall, he has Russell's esophagus. Chronic heartburn is well controlled with omeprazole BID, this is prescribed by his PCP. No further UGI complaints. Also denies any lower GI complaints.     EGD 11/2014 ([facility])- known hx of Russell's; this exam revealed the same; 3 year recall  (1) dewey negative  (2) DISTAL ESOPHAGUS, BIOPSY:      --- MODERATE CHRONIC ESOPHAGOGASTRITIS.     ---  NO INTESTINAL METAPLASIA.     ---   NO DYSPLASIA.     EGD 3/2011 ([facility])- known hx Barretts; 3 year recall  (1) dewey negative  (2) GEJ bx negative for intestinal metaplasia and dysplasia    Colonoscopy 11/2015 ([facility])- normal; 5 yr recall     Colonoscopy 2/2010 Ya [facility])- polyp; 5 year recall  (1) rectal polyp-hyperplastic     Family HX: Father had esophageal/gastric CA  Pt denies family hx of colon polyps, colon CA, & inflammatory bowel dx. Past Medical History:   Diagnosis Date    [facility] rest [facility])     Russell esophagus     Cancer ([facility] [address]     Bladder    Constipation     CVD (cardiovascular disease)     Diabetes ([facility] [address]     ED (erectile dysfunction)     Gout     Hemorrhoid     HLD (hyperlipidemia)     HTN (hypertension)     Neuropathy (HCC)     Sleep apnea     Thyroid disease     Thyroid Fever     Past Surgical History:   Procedure Laterality Date    ABDOMINAL EXPLORATION SURGERY      APPENDECTOMY      BLADDER SURGERY      2 tumors removed    CARDIAC CATHETERIZATION      CATARACT REMOVAL      Bilateral w/ lens implants.     CHOLECYSTECTOMY      COLONOSCOPY  2/2010   [facility] FEMUR SURGERY      UPPER GASTROINTESTINAL ENDOSCOPY  2/2010 3/2011    UPPER GASTROINTESTINAL ENDOSCOPY  11/13/2014    Dr. Alphonso Miner Marital status:      Spouse name: N/A    Number of

## 2018-01-25 ENCOUNTER — ANESTHESIA (OUTPATIENT)
Dept: ENDOSCOPY | Age: 68
End: 2018-01-25
Payer: MEDICARE

## 2018-01-25 ENCOUNTER — ANESTHESIA EVENT (OUTPATIENT)
Dept: ENDOSCOPY | Age: 68
End: 2018-01-25
Payer: MEDICARE

## 2018-01-25 ENCOUNTER — HOSPITAL ENCOUNTER (OUTPATIENT)
Age: 68
Setting detail: OUTPATIENT SURGERY
Discharge: HOME | End: 2018-01-25
Attending: INTERNAL MEDICINE | Admitting: INTERNAL MEDICINE
Payer: MEDICARE

## 2018-01-25 VITALS
SYSTOLIC BLOOD PRESSURE: 149 MMHG | OXYGEN SATURATION: 95 % | DIASTOLIC BLOOD PRESSURE: 79 MMHG | RESPIRATION RATE: 10 BRPM

## 2018-01-25 VITALS
HEIGHT: 70 IN | HEART RATE: 71 BPM | WEIGHT: 244 LBS | TEMPERATURE: 97.6 F | SYSTOLIC BLOOD PRESSURE: 118 MMHG | RESPIRATION RATE: 18 BRPM | DIASTOLIC BLOOD PRESSURE: 70 MMHG | BODY MASS INDEX: 34.93 KG/M2 | OXYGEN SATURATION: 96 %

## 2018-01-25 LAB
GLUCOSE BLD-MCNC: 145 MG/DL (ref 70–99)
PERFORMED ON: ABNORMAL

## 2018-01-25 PROCEDURE — 6360000002 HC RX W HCPCS: Performed by: NURSE ANESTHETIST, CERTIFIED REGISTERED

## 2018-01-25 PROCEDURE — 3700000000 HC ANESTHESIA ATTENDED CARE: Performed by: INTERNAL MEDICINE

## 2018-01-25 PROCEDURE — 88305 TISSUE EXAM BY PATHOLOGIST: CPT

## 2018-01-25 PROCEDURE — 7100000010 HC PHASE II RECOVERY - FIRST 15 MIN: Performed by: INTERNAL MEDICINE

## 2018-01-25 PROCEDURE — 2500000003 HC RX 250 WO HCPCS: Performed by: INTERNAL MEDICINE

## 2018-01-25 PROCEDURE — 82948 REAGENT STRIP/BLOOD GLUCOSE: CPT

## 2018-01-25 PROCEDURE — 3609012400 HC EGD TRANSORAL BIOPSY SINGLE/MULTIPLE: Performed by: INTERNAL MEDICINE

## 2018-01-25 PROCEDURE — 7100000011 HC PHASE II RECOVERY - ADDTL 15 MIN: Performed by: INTERNAL MEDICINE

## 2018-01-25 PROCEDURE — 43239 EGD BIOPSY SINGLE/MULTIPLE: CPT | Performed by: INTERNAL MEDICINE

## 2018-01-25 RX ORDER — SODIUM CHLORIDE, SODIUM LACTATE, POTASSIUM CHLORIDE, CALCIUM CHLORIDE 600; 310; 30; 20 MG/100ML; MG/100ML; MG/100ML; MG/100ML
INJECTION, SOLUTION INTRAVENOUS CONTINUOUS
Status: DISCONTINUED | OUTPATIENT
Start: 2018-01-25 | End: 2018-01-25 | Stop reason: HOSPADM

## 2018-01-25 RX ORDER — LIDOCAINE HYDROCHLORIDE 10 MG/ML
1 INJECTION, SOLUTION INFILTRATION; PERINEURAL ONCE
Status: COMPLETED | OUTPATIENT
Start: 2018-01-25 | End: 2018-01-25

## 2018-01-25 RX ORDER — PROPOFOL 10 MG/ML
INJECTION, EMULSION INTRAVENOUS PRN
Status: DISCONTINUED | OUTPATIENT
Start: 2018-01-25 | End: 2018-01-25 | Stop reason: SDUPTHER

## 2018-01-25 RX ADMIN — LIDOCAINE HYDROCHLORIDE 5 ML: 10 INJECTION, SOLUTION INFILTRATION; PERINEURAL at 09:38

## 2018-01-25 RX ADMIN — PROPOFOL 150 MG: 10 INJECTION, EMULSION INTRAVENOUS at 09:38

## 2018-01-25 ASSESSMENT — PAIN - FUNCTIONAL ASSESSMENT: PAIN_FUNCTIONAL_ASSESSMENT: 0-10

## 2018-01-25 NOTE — OP NOTE
Post Procedure Note    Name of surgeon / : Naomi Harris DO    Date of Service: 01/25/18    Pre-operative Diagnosis:   Active Hospital Problems    Diagnosis Date Noted    Chronic heartburn [R12] 01/23/2018    Macedo's esophagus [K22.70] 08/21/2014       Post-operative Diagnosis/Findings: macedo's    Procedure: Procedure(s):  EGD BIOPSY     Anesthesia: Monitor Anesthesia Care    Surgeons/Assistants: Naomi Harris DO    Referring Physician: Janak Dickinson DO    Procedure Note:  After informed consent was obtained, the patient was placed in the left lateral decubitus position and sedated per MAC. The endoscope was advanced down the oropharynx, down the esophagus, through the gastric lumen, into the duodenum. The small bowel appeared normal.  The gastric antrum was normal. The gastric body was normal.  Retroflexion was done which showed a normal fundus. The scope was withdrawn. The GE junction was at 44 cm. There was evidence of C1M2 macedo's. Biopsies were taken. The remaining esophagus was normal.  The scope was withdrawn. Estimated Blood Loss: Minimal    Complications: None    Specimens:   ID Type Source Tests Collected by Time Destination   A : Distal Esophagus bx, hx of Macedo's Tissue Stomach SURGICAL PATHOLOGY Naomi Harris DO 1/25/2018 2054        Discussion: The patient had a normal EGD with evidence of macedo's. If there is no dysplasia, we will likely repeat egd in 3 years.     Naomi Harris DO  01/25/18  9:46 AM

## 2018-01-25 NOTE — ANESTHESIA PRE PROCEDURE
Medical History:        Diagnosis Date    [facility]ina [facility] rest [facility])     Russell esophagus     Cancer ([facility] [address]     Bladder    Constipation     CVD (cardiovascular disease)     Diabetes ([facility] [address]     ED (erectile dysfunction)     Gout     Hemorrhoid     HLD (hyperlipidemia)     HTN (hypertension)     Neuropathy (HCC)     Sleep apnea     Thyroid disease     Thyroid Fever       Past Surgical History:        Procedure Laterality Date    ABDOMINAL EXPLORATION SURGERY      APPENDECTOMY      BLADDER SURGERY      2 tumors removed    CARDIAC CATHETERIZATION      CATARACT REMOVAL      Bilateral w/ lens implants.  CHOLECYSTECTOMY      COLONOSCOPY  2/2010   [facility] FEMUR SURGERY      UPPER GASTROINTESTINAL ENDOSCOPY  2/2010 3/2011    UPPER GASTROINTESTINAL ENDOSCOPY  11/13/2014    Dr. Lily Rodas       Social History:    Social History   Substance Use Topics    Smoking status: Former Smoker     Start date: 8/21/1996    Smokeless tobacco: Never Used    Alcohol use No                                Counseling given: Not Answered      Vital Signs (Current): There were no vitals filed for this visit.                                            BP Readings from Last 3 Encounters:   01/23/18 120/62   08/25/15 122/74   08/21/14 126/74       NPO Status:                                                                                 BMI:   Wt Readings from Last 3 Encounters:   01/23/18 245 lb 6.4 oz (111.3 kg)   08/25/15 245 lb 12.8 oz (111.5 kg)   08/21/14 252 lb (114.3 kg)     There is no height or weight on file to calculate BMI.    CBC:   Lab Results   Component Value Date    WBC 6.00 03/21/2013    RBC 4.77 03/21/2013    HGB 13.9 03/21/2013    HCT 41.3 03/21/2013    MCV 86.6 03/21/2013    RDW 15.0 03/21/2013     03/21/2013       CMP:   Lab Results   Component Value Date     03/21/2013     03/21/2013    CO2 31 03/21/2013    BUN 15 03/21/2013    CREATININE 1.0 03/21/2013    LABGLOM > 60 03/21/2013    GLUCOSE 95

## 2018-02-22 ENCOUNTER — PROCEDURE VISIT (OUTPATIENT)
Dept: UROLOGY | Facility: CLINIC | Age: 68
End: 2018-02-22

## 2018-02-22 DIAGNOSIS — C67.8 MALIGNANT NEOPLASM OF OVERLAPPING SITES OF BLADDER (HCC): Primary | ICD-10-CM

## 2018-02-22 LAB
BILIRUB BLD-MCNC: NEGATIVE MG/DL
CLARITY, POC: CLEAR
COLOR UR: YELLOW
GLUCOSE UR STRIP-MCNC: NEGATIVE MG/DL
KETONES UR QL: NEGATIVE
LEUKOCYTE EST, POC: ABNORMAL
NITRITE UR-MCNC: NEGATIVE MG/ML
PH UR: 6 [PH] (ref 5–8)
PROT UR STRIP-MCNC: NEGATIVE MG/DL
RBC # UR STRIP: ABNORMAL /UL
SP GR UR: 1.02 (ref 1–1.03)
UROBILINOGEN UR QL: NORMAL

## 2018-02-22 PROCEDURE — 81003 URINALYSIS AUTO W/O SCOPE: CPT | Performed by: UROLOGY

## 2018-02-22 PROCEDURE — 52000 CYSTOURETHROSCOPY: CPT | Performed by: UROLOGY

## 2018-02-22 NOTE — PROGRESS NOTES
CC: I am here for the doctor to look at my bladder    Cystoscopy procedure note  Pre- operative diagnosis:  High-grade superficial urothelial cancer resected in December 2017    Post operative diagnosis:  No recurrent bladder tumor    Procedure:  The patient was prepped and draped in a normal sterile fashion.  The urethra was anesthetized with 2% lidocaine jelly.  A flexible cystoscope was introduced per urethra.      Urethra:  No urethral stricture    Bladder:  There is no evidence of a stone, foreign body or mass within the bladder.  The bladder is minimally trabeculated.  The bladder neck is without contracture.    Ureteral orifices:  Normal position bilaterally and Clear efflux bilaterally    Prostate:  lateral lobe hypertrophy    Patient tolerated the procedure well    Complications: none    Blood loss: minimal    Tissue Pathology Exam - Tissue, Urinary Bladder   Order: [id number]   Status:  Final result   Visible to patient:  No (Not Released) Dx:  Malignant neoplasm of overlapping sit...      1mo ago    Final Diagnosis   Urinary bladder, right trigone, transurethral resection:  A.  Noninvasive papillary urothelial carcinoma, high-grade.  B.  Acute and chronic inflammation.  C.  Muscularis propria smooth muscle present, negative for malignancy.      AJCC stage:T1a pNX                                                                                      1   Electronically signed by Mariel Waterman MD on 11/7/2017 at 1830             Diagnoses and all orders for this visit:    Malignant neoplasm of overlapping sites of bladder  -     POC Urinalysis Dipstick, Automated        Follow up:    Needs maintenance cycle BCG in mid april

## 2018-04-17 ENCOUNTER — PROCEDURE VISIT (OUTPATIENT)
Dept: UROLOGY | Facility: CLINIC | Age: 68
End: 2018-04-17

## 2018-04-17 DIAGNOSIS — C67.8 MALIGNANT NEOPLASM OF OVERLAPPING SITES OF BLADDER (HCC): Primary | ICD-10-CM

## 2018-04-17 LAB
BILIRUB BLD-MCNC: NEGATIVE MG/DL
CLARITY, POC: CLEAR
COLOR UR: YELLOW
GLUCOSE UR STRIP-MCNC: NEGATIVE MG/DL
KETONES UR QL: NEGATIVE
LEUKOCYTE EST, POC: NEGATIVE
NITRITE UR-MCNC: NEGATIVE MG/ML
PH UR: 6.5 [PH] (ref 5–8)
PROT UR STRIP-MCNC: NEGATIVE MG/DL
RBC # UR STRIP: NEGATIVE /UL
SP GR UR: 1.03 (ref 1–1.03)
UROBILINOGEN UR QL: NORMAL

## 2018-04-17 PROCEDURE — 81003 URINALYSIS AUTO W/O SCOPE: CPT | Performed by: UROLOGY

## 2018-04-17 PROCEDURE — 51720 TREATMENT OF BLADDER LESION: CPT | Performed by: UROLOGY

## 2018-04-17 NOTE — PROGRESS NOTES
Patient of Dr. Ford states he is here for his 1 BCG treatment.     Treatment 1 of 3            Procedure note: Using sterile technique a well lubricated 16 [place] Red rubber is placed through the external urethral meatus and eventually manipulated into the bladder.  A small amount of clear urine returned.  One vial of [place] strain BCG is slowly instilled into the urinary bladder. Patient tolerated the treatment well with no complications. Patient was advised to call the office if he has any problems, questions or concerns. Patient verbalized understanding    Post procedural instructions have been given to the patient.     This procedure was performed by Douglas. Dr. Mackey was here in the office at the time of treatment.

## 2018-04-19 NOTE — PROGRESS NOTES
Subjective    Mr. Bradley is 67 y.o. male    Chief Complaint: 2nd BCG Treatment    History of Present Illness       History of Present Illness     Patient of Dr. Ford states he is here for his 2 BCG treatment for malignant neoplasm of lateral wall of urinary bladder.     Treatment 2    The patient denies any fever, chills, hematuria, N&V or suprapubic abdominal pain.        The following portions of the patient's history were reviewed and updated as appropriate: allergies, current medications, past family history, past medical history, past social history, past surgical history and problem list.    Review of Systems   Constitutional: Negative for chills and fever.   Gastrointestinal: Negative for abdominal pain, anal bleeding and blood in stool.   Genitourinary: Negative for difficulty urinating, flank pain, frequency, hematuria and urgency.         Current Outpatient Prescriptions:   •  allopurinol (ZYLOPRIM) 300 MG tablet, Take 300 mg by mouth daily., Disp: , Rfl:   •  aspirin 81 MG tablet, Take 81 mg by mouth daily., Disp: , Rfl:   •  Cholecalciferol 1000 UNITS capsule, Take  by mouth. daily, Disp: , Rfl:   •  fenofibrate (TRICOR) 145 MG tablet, Take 145 mg by mouth daily., Disp: , Rfl:   •  furosemide (LASIX) 40 MG tablet, Take 40 mg by mouth 2 (Two) Times a Day., Disp: , Rfl:   •  GABAPENTIN PO, 400mg takes 6 pills a day.takes 2 tabs am noon pm, Disp: , Rfl:   •  LISINOPRIL PO, 40mg 1 bid, Disp: , Rfl:   •  LOVASTATIN PO, Take  by mouth. 40mg daily, Disp: , Rfl:   •  magnesium oxide (MAG-OX) 400 MG tablet, Take 400 mg 3 tabs 2 times daily, Disp: , Rfl:   •  METFORMIN HCL PO, Take  by mouth.1000mg bid, Disp: , Rfl:   •  Omega-3 Fatty Acids (FISH OIL) 1000 MG capsule capsule, Take 1,000 mg by mouth Daily With Breakfast., Disp: , Rfl:   •  OMEPRAZOLE PO, Take  by mouth. 20 mg daily, Disp: , Rfl:   •  vitamin B-12 (CYANOCOBALAMIN) 1000 MCG tablet, Take 1,000 mcg by mouth daily., Disp: , Rfl:     Past Medical  History:   Diagnosis Date   • Bladder neoplasm of uncertain malignant potential 7/20/2017   • Degenerative arthritis of cervical spine with nerve compression     per patient report   • Diabetes mellitus    • GERD (gastroesophageal reflux disease)    • Hearing aid worn    • Hypertension    • Impaired hearing     without hearing aids can barely hear anything   • Neuropathy    • Sleep apnea     wears cpap       Past Surgical History:   Procedure Laterality Date   • CATARACT EXTRACTION W/ INTRAOCULAR LENS IMPLANT     • CHOLECYSTECTOMY     • EXPLORATORY LAPAROTOMY     • LEG SURGERY     • TRANSURETHRAL RESECTION OF BLADDER TUMOR Bilateral 7/25/2017    Procedure: CYSTOSCOPY TRANSURETHRAL RESECTION OF BLADDER TUMOR & POSSIBLE BILATERAL RETROGRADE URETEROPYELOGRAMS;  Surgeon: Chris Esparza MD;  Location: [facility] OR;  Service:    • TRANSURETHRAL RESECTION OF BLADDER TUMOR N/A 11/3/2017    Procedure: CYSTOSCOPY TRANSURETHRAL RESECTION OF BLADDER TUMOR ;  Surgeon: Chris Esparza MD;  Location: [facility] OR;  Service:        Social History     Social History   • Marital status:      Social History Main Topics   • Smoking status: Former Smoker     Quit date: 1997   • Smokeless tobacco: Never Used   • Alcohol use No   • Drug use: No   • Sexual activity: Defer     Other Topics Concern   • Not on file       Family History   Problem Relation Age of Onset   • Cancer Father    • Hypertension Father    • Heart disease Mother    • Arthritis Mother    • Hypertension Mother    • Cancer Sister    • Cancer Brother    • Heart disease Sister        Objective    There were no vitals taken for this visit.    Physical Exam   Constitutional: He is oriented to person, place, and time. He appears well-developed and well-nourished.   Pulmonary/Chest: Effort normal.   Abdominal: Soft. He exhibits no distension and no mass. There is no tenderness. There is no rebound and no guarding. No hernia.   Neurological: He is alert and oriented to  person, place, and time.   Skin: Skin is warm and dry.   Psychiatric: He has a normal mood and affect. His behavior is normal.   Vitals reviewed.          Results for orders placed or performed in visit on 04/23/18   POC Urinalysis Dipstick, Automated   Result Value Ref Range    Color Yellow Yellow, Straw, Dark Yellow, Nicolasa    Clarity, UA Clear Clear    Glucose, UA Negative Negative, 1000 mg/dL (3+) mg/dL    Bilirubin Negative Negative    Ketones, UA Negative Negative    Specific Gravity  1.020 1.005 - 1.030    Blood, UA Large (A) Negative    pH, Urine 7.0 5.0 - 8.0    Protein, POC Negative Negative mg/dL    Urobilinogen, UA Normal Normal    Leukocytes Small (1+) (A) Negative    Nitrite, UA Negative Negative     Assessment and Plan    Diagnoses and all orders for this visit:    Malignant neoplasm of overlapping sites of bladder  -     POC Urinalysis Dipstick, Automated        Procedure note: Using sterile technique a well lubricated 16 [place] Red rubber is placed through the external urethral meatus and eventually manipulated into the bladder.  A small amount of clear urine returned.  One vial of [place] strain BCG is slowly instilled into the urinary bladder. Patient tolerated the treatment well with no complications. Patient was advised to call the office if he has any problems, questions or concerns. Patient verbalized understanding    Post procedural instructions have been given to the patient.           Installation of BCG into the Bladder  Before Instillation    *Please be on tome for you instillation treatment.    *Do Not drink for at least four (4) hours before you receive your treatment.     *Inform you doctor/MA if you have felt feverish, tired or had chills since your last treatment or if you have been urinating any bright red blood before your instillation.    *Empty your bladder just before instillation.    During instillation    *The medication will be instilled into your bladder through the catheter.      *In most cases, the catheter will be removed from the bladder after the instillation is completed.    *The medication should be retained in your bladder for 2 hours to obtain the best results.    *You may be positioned from side to side and back to front every 15 minutes while the medication is in your bladder.     After instillation    Now that you have retained the medication in your bladder for the past two (2) hours, there are several things you must know:    *Sit down on the toilet to urinate and fully empty your bladder.    *After urinating, pour two (2) cups of household bleach (Clorox or equivalent) into the toilet.    * Let the medication and Clorox mixture stay in the toilet for 15-20 minutes before flushing.    *Repeat the above process each time you urinate for six (6) hours after each treatment.     *Wash your hands and genital areas thoroughly after you urinate.    *Drink plenty of fluids after your instillation to flush your bladder.     Until Your Next Instillation    *You may experience some burning with the first void after your treatment. If this occurs, you need to increase your fluid intake.     *If you experience continued pain or burning on urination or you experience:  *Urgency  *Frequency of Urination  *Bright red blood or blood clots in the urine  *Joint pain  *Coughing  *Skin rash    Call Your Doctor!    If you experience:  *Fever  *Chills  *Malaise (feeling of discomfort)  *Increased fatigue  *Flu-like symptoms

## 2018-04-23 ENCOUNTER — PROCEDURE VISIT (OUTPATIENT)
Dept: UROLOGY | Facility: CLINIC | Age: 68
End: 2018-04-23

## 2018-04-23 DIAGNOSIS — C67.8 MALIGNANT NEOPLASM OF OVERLAPPING SITES OF BLADDER (HCC): Primary | ICD-10-CM

## 2018-04-23 LAB
BILIRUB BLD-MCNC: NEGATIVE MG/DL
CLARITY, POC: CLEAR
COLOR UR: YELLOW
GLUCOSE UR STRIP-MCNC: NEGATIVE MG/DL
KETONES UR QL: NEGATIVE
LEUKOCYTE EST, POC: ABNORMAL
NITRITE UR-MCNC: NEGATIVE MG/ML
PH UR: 7 [PH] (ref 5–8)
PROT UR STRIP-MCNC: NEGATIVE MG/DL
RBC # UR STRIP: ABNORMAL /UL
SP GR UR: 1.02 (ref 1–1.03)
UROBILINOGEN UR QL: NORMAL

## 2018-04-23 PROCEDURE — 81003 URINALYSIS AUTO W/O SCOPE: CPT | Performed by: NURSE PRACTITIONER

## 2018-04-23 PROCEDURE — 99212 OFFICE O/P EST SF 10 MIN: CPT | Performed by: NURSE PRACTITIONER

## 2018-04-23 PROCEDURE — 51720 TREATMENT OF BLADDER LESION: CPT | Performed by: NURSE PRACTITIONER

## 2018-04-25 NOTE — PROGRESS NOTES
Mr. Bradley is 67 y.o. male    CHIEF COMPLAINT: Blood per urethra    HPI  F/u hematuria  Location: Blood per urethra  Severity: Moderate  Duration: Started four days ago  Timing: Sudden onset  Context: Occurred after BCG treatments  Associated signs or symptoms: Dysuria, Urgency    The following portions of the patient's history were reviewed and updated as appropriate: allergies, current medications, past family history, past medical history, past social history, past surgical history and problem list.      Review of Systems   Constitutional: Negative for chills and fever.   Gastrointestinal: Negative for abdominal pain, anal bleeding and blood in stool.   Genitourinary: Negative for flank pain, frequency, hematuria and urgency.           Current Outpatient Prescriptions:   •  allopurinol (ZYLOPRIM) 300 MG tablet, Take 300 mg by mouth daily., Disp: , Rfl:   •  aspirin 81 MG tablet, Take 81 mg by mouth daily., Disp: , Rfl:   •  Cholecalciferol 1000 UNITS capsule, Take  by mouth. daily, Disp: , Rfl:   •  fenofibrate (TRICOR) 145 MG tablet, Take 145 mg by mouth daily., Disp: , Rfl:   •  furosemide (LASIX) 40 MG tablet, Take 40 mg by mouth 2 (Two) Times a Day., Disp: , Rfl:   •  GABAPENTIN PO, 400mg takes 6 pills a day.takes 2 tabs am noon pm, Disp: , Rfl:   •  LISINOPRIL PO, 40mg 1 bid, Disp: , Rfl:   •  LOVASTATIN PO, Take  by mouth. 40mg daily, Disp: , Rfl:   •  magnesium oxide (MAG-OX) 400 MG tablet, Take 400 mg 3 tabs 2 times daily, Disp: , Rfl:   •  METFORMIN HCL PO, Take  by mouth.1000mg bid, Disp: , Rfl:   •  Omega-3 Fatty Acids (FISH OIL) 1000 MG capsule capsule, Take 1,000 mg by mouth Daily With Breakfast., Disp: , Rfl:   •  OMEPRAZOLE PO, Take  by mouth. 20 mg daily, Disp: , Rfl:   •  vitamin B-12 (CYANOCOBALAMIN) 1000 MCG tablet, Take 1,000 mcg by mouth daily., Disp: , Rfl:     Past Medical History:   Diagnosis Date   • Bladder neoplasm of uncertain malignant potential 7/20/2017   • Degenerative arthritis  "of cervical spine with nerve compression     per patient report   • Diabetes mellitus    • GERD (gastroesophageal reflux disease)    • Hearing aid worn    • Hypertension    • Impaired hearing     without hearing aids can barely hear anything   • Neuropathy    • Sleep apnea     wears cpap       Past Surgical History:   Procedure Laterality Date   • CATARACT EXTRACTION W/ INTRAOCULAR LENS IMPLANT     • CHOLECYSTECTOMY     • EXPLORATORY LAPAROTOMY     • LEG SURGERY     • TRANSURETHRAL RESECTION OF BLADDER TUMOR Bilateral 7/25/2017    Procedure: CYSTOSCOPY TRANSURETHRAL RESECTION OF BLADDER TUMOR & POSSIBLE BILATERAL RETROGRADE URETEROPYELOGRAMS;  Surgeon: Chris Esparza MD;  Location:  [facility];  Service:    • TRANSURETHRAL RESECTION OF BLADDER TUMOR N/A 11/3/2017    Procedure: CYSTOSCOPY TRANSURETHRAL RESECTION OF BLADDER TUMOR ;  Surgeon: Chris Esparza MD;  Location:  [facility];  Service:        Social History     Social History   • Marital status:      Social History Main Topics   • Smoking status: Former Smoker     Quit date: 1997   • Smokeless tobacco: Never Used   • Alcohol use No   • Drug use: No   • Sexual activity: Defer     Other Topics Concern   • Not on file       Family History   Problem Relation Age of Onset   • Cancer Father    • Hypertension Father    • Heart disease Mother    • Arthritis Mother    • Hypertension Mother    • Cancer Sister    • Cancer Brother    • Heart disease Sister          /78   Temp 97.1 °F (36.2 °C)   Ht 175.3 cm (69\")   Wt 109 kg (239 lb 3.2 oz)   BMI 35.32 kg/m²       Physical Exam  Constitutional: The patient  is oriented to person, place, and time. They  appear well-developed and well-nourished. No distress.   Pulmonary/Chest: Effort normal.   Abdominal: Soft. The patient exhibits no distension and no mass. There is no tenderness. There is no rebound and no guarding. No hernia.   Neurological: Patient is alert and oriented to person, place, and time. "   Skin: Skin is warm and dry. Not diaphoretic.   Psychiatric:  normal mood and affect.   Vitals reviewed.      Data  Results for orders placed or performed in visit on 04/26/18   POC Urinalysis Dipstick, Automated   Result Value Ref Range    Color Yellow Yellow, Straw, Dark Yellow, Nicolasa    Clarity, UA Clear Clear    Glucose, UA Negative Negative, 1000 mg/dL (3+) mg/dL    Bilirubin Negative Negative    Ketones, UA Negative Negative    Specific Gravity  1.015 1.005 - 1.030    Blood, UA Large (A) Negative    pH, Urine 5.0 5.0 - 8.0    Protein, POC Negative Negative mg/dL    Urobilinogen, UA Normal Normal    Leukocytes Moderate (2+) (A) Negative    Nitrite, UA Negative Negative     Microscopic Urinalysis  I inspected the urine myself based on the clinical situation including the dipstick urine. The urine is spun in a centrifuge for three minutes. The spun urine shows 7-12 rbc/hpf, none wbc/hpf, none epi/hpf, negative bacteria, negative crystals, and negative casts.       Assessment and Plan  Diagnoses and all orders for this visit:    Malignant neoplasm of overlapping sites of bladder  -     POC Urinalysis Dipstick, Automated    Gross hematuria after BCG treatment    #1. He is currently on maintance BCG with quarterly surveillance cystoscopy.   #2. Hematuria does not appear to be related to an infection. Probably from the BCG. Since it has resolved I think it is reasonable to continue his BCG treatment next week.  I will scope him next month.     Chris Esparza MD  04/27/18  1:07 PM      Cc: Fidel Yousif MD

## 2018-04-26 ENCOUNTER — OFFICE VISIT (OUTPATIENT)
Dept: UROLOGY | Facility: CLINIC | Age: 68
End: 2018-04-26

## 2018-04-26 VITALS
BODY MASS INDEX: 35.43 KG/M2 | SYSTOLIC BLOOD PRESSURE: 150 MMHG | TEMPERATURE: 97.1 F | HEIGHT: 69 IN | DIASTOLIC BLOOD PRESSURE: 78 MMHG | WEIGHT: 239.2 LBS

## 2018-04-26 DIAGNOSIS — R31.0 GROSS HEMATURIA: ICD-10-CM

## 2018-04-26 DIAGNOSIS — C67.8 MALIGNANT NEOPLASM OF OVERLAPPING SITES OF BLADDER (HCC): Primary | ICD-10-CM

## 2018-04-26 LAB
BILIRUB BLD-MCNC: NEGATIVE MG/DL
CLARITY, POC: CLEAR
COLOR UR: YELLOW
GLUCOSE UR STRIP-MCNC: NEGATIVE MG/DL
KETONES UR QL: NEGATIVE
LEUKOCYTE EST, POC: ABNORMAL
NITRITE UR-MCNC: NEGATIVE MG/ML
PH UR: 5 [PH] (ref 5–8)
PROT UR STRIP-MCNC: NEGATIVE MG/DL
RBC # UR STRIP: ABNORMAL /UL
SP GR UR: 1.01 (ref 1–1.03)
UROBILINOGEN UR QL: NORMAL

## 2018-04-26 PROCEDURE — 99213 OFFICE O/P EST LOW 20 MIN: CPT | Performed by: UROLOGY

## 2018-04-26 PROCEDURE — 81003 URINALYSIS AUTO W/O SCOPE: CPT | Performed by: UROLOGY

## 2018-04-26 NOTE — PATIENT INSTRUCTIONS

## 2018-04-30 ENCOUNTER — PROCEDURE VISIT (OUTPATIENT)
Dept: UROLOGY | Facility: CLINIC | Age: 68
End: 2018-04-30

## 2018-04-30 VITALS — BODY MASS INDEX: 34.96 KG/M2 | TEMPERATURE: 98.4 F | WEIGHT: 236 LBS | HEIGHT: 69 IN

## 2018-04-30 DIAGNOSIS — C67.8 MALIGNANT NEOPLASM OF OVERLAPPING SITES OF BLADDER (HCC): Primary | ICD-10-CM

## 2018-04-30 PROCEDURE — 51720 TREATMENT OF BLADDER LESION: CPT | Performed by: NURSE PRACTITIONER

## 2018-04-30 PROCEDURE — 99212 OFFICE O/P EST SF 10 MIN: CPT | Performed by: NURSE PRACTITIONER

## 2018-04-30 PROCEDURE — 81001 URINALYSIS AUTO W/SCOPE: CPT | Performed by: NURSE PRACTITIONER

## 2018-04-30 NOTE — PROGRESS NOTES
Subjective    Mr. Bradley is 67 y.o. male    Chief Complaint: BCG Treatment     History of Present Illness     Patient of Dr. Ford states he is here for his 3 BCG treatment for malignant neoplasm of overlapping sites urinary bladder.      Treatment 3 of 3     The patient denies any fever, chills, hematuria, N&V or suprapubic abdominal pain.       The following portions of the patient's history were reviewed and updated as appropriate: allergies, current medications, past family history, past medical history, past social history, past surgical history and problem list.    Review of Systems   Constitutional: Negative for chills and fever.   Gastrointestinal: Negative for abdominal pain, anal bleeding and blood in stool.   Genitourinary: Negative for flank pain and hematuria.         Current Outpatient Prescriptions:   •  allopurinol (ZYLOPRIM) 300 MG tablet, Take 300 mg by mouth daily., Disp: , Rfl:   •  aspirin 81 MG tablet, Take 81 mg by mouth daily., Disp: , Rfl:   •  Cholecalciferol 1000 UNITS capsule, Take  by mouth. daily, Disp: , Rfl:   •  fenofibrate (TRICOR) 145 MG tablet, Take 145 mg by mouth daily., Disp: , Rfl:   •  furosemide (LASIX) 40 MG tablet, Take 40 mg by mouth 2 (Two) Times a Day., Disp: , Rfl:   •  GABAPENTIN PO, 400mg takes 6 pills a day.takes 2 tabs am noon pm, Disp: , Rfl:   •  LISINOPRIL PO, 40mg 1 bid, Disp: , Rfl:   •  LOVASTATIN PO, Take  by mouth. 40mg daily, Disp: , Rfl:   •  magnesium oxide (MAG-OX) 400 MG tablet, Take 400 mg 3 tabs 2 times daily, Disp: , Rfl:   •  METFORMIN HCL PO, Take  by mouth.1000mg bid, Disp: , Rfl:   •  Omega-3 Fatty Acids (FISH OIL) 1000 MG capsule capsule, Take 1,000 mg by mouth Daily With Breakfast., Disp: , Rfl:   •  OMEPRAZOLE PO, Take  by mouth. 20 mg daily, Disp: , Rfl:   •  vitamin B-12 (CYANOCOBALAMIN) 1000 MCG tablet, Take 1,000 mcg by mouth daily., Disp: , Rfl:     Current Facility-Administered Medications:   •  bcg vaccine 50 mg in sodium chloride  "0.9 % 50 mL bladder instillation, 50 mg, Intravesical, Once, Eliz Silva, [facility]    Past Medical History:   Diagnosis Date   • Bladder neoplasm of uncertain malignant potential 7/20/2017   • Degenerative arthritis of cervical spine with nerve compression     per patient report   • Diabetes mellitus    • GERD (gastroesophageal reflux disease)    • Hearing aid worn    • Hypertension    • Impaired hearing     without hearing aids can barely hear anything   • Neuropathy    • Sleep apnea     wears cpap       Past Surgical History:   Procedure Laterality Date   • CATARACT EXTRACTION W/ INTRAOCULAR LENS IMPLANT     • CHOLECYSTECTOMY     • EXPLORATORY LAPAROTOMY     • LEG SURGERY     • TRANSURETHRAL RESECTION OF BLADDER TUMOR Bilateral 7/25/2017    Procedure: CYSTOSCOPY TRANSURETHRAL RESECTION OF BLADDER TUMOR & POSSIBLE BILATERAL RETROGRADE URETEROPYELOGRAMS;  Surgeon: Chris Esparza MD;  Location:  [facility];  Service:    • TRANSURETHRAL RESECTION OF BLADDER TUMOR N/A 11/3/2017    Procedure: CYSTOSCOPY TRANSURETHRAL RESECTION OF BLADDER TUMOR ;  Surgeon: Chris Esaprza MD;  Location:  [facility];  Service:        Social History     Social History   • Marital status:      Social History Main Topics   • Smoking status: Former Smoker     Quit date: 1997   • Smokeless tobacco: Never Used   • Alcohol use No   • Drug use: No   • Sexual activity: Defer     Other Topics Concern   • Not on file       Family History   Problem Relation Age of Onset   • Cancer Father    • Hypertension Father    • Heart disease Mother    • Arthritis Mother    • Hypertension Mother    • Cancer Sister    • Cancer Brother    • Heart disease Sister        Objective    Temp 98.4 °F (36.9 °C)   Ht 175.3 cm (69\")   Wt 107 kg (236 lb)   BMI 34.85 kg/m²     Physical Exam   Constitutional: He is oriented to person, place, and time. He appears well-developed and well-nourished.   Pulmonary/Chest: Effort normal.   Abdominal: Soft. He exhibits no " distension and no mass. There is no tenderness. There is no rebound and no guarding. No hernia.   Neurological: He is alert and oriented to person, place, and time.   Skin: Skin is warm and dry.   Psychiatric: He has a normal mood and affect. His behavior is normal.   Vitals reviewed.          Results for orders placed or performed in visit on 04/30/18   POC Urinalysis Dipstick, Automated   Result Value Ref Range    Color Yellow Yellow, Straw, Dark Yellow, Nicolasa    Clarity, UA Clear Clear    Glucose, UA Negative Negative, 1000 mg/dL (3+) mg/dL    Bilirubin Negative Negative    Ketones, UA Negative Negative    Specific Gravity  1.020 1.005 - 1.030    Blood, UA Trace (A) Negative    pH, Urine 7.0 5.0 - 8.0    Protein, POC Negative Negative mg/dL    Urobilinogen, UA Normal Normal    Leukocytes Trace (A) Negative    Nitrite, UA Negative Negative     Assessment and Plan    Diagnoses and all orders for this visit:    Malignant neoplasm of overlapping sites of bladder  -     POC Urinalysis Dipstick, Automated  -     bcg vaccine 50 mg in sodium chloride 0.9 % 50 mL bladder instillation; Instill 50 mg into the bladder 1 (One) Time.          Procedure note: Using sterile technique a well lubricated 16 [place] Red rubber is placed through the external urethral meatus and eventually manipulated into the bladder.  A small amount of clear urine returned.  One vial of Julianne strain BCG is slowly instilled into the urinary bladder. Patient tolerated the treatment well with no complications. Patient was advised to call the office if he has any problems, questions or concerns. Patient verbalized understanding     Post procedural instructions have been given to the patient.               Installation of BCG into the Bladder  Before Instillation     *Please be on tome for you instillation treatment.     *Do Not drink for at least four (4) hours before you receive your treatment.      *Inform you doctor/MA if you have felt feverish, tired  or had chills since your last treatment or if you have been urinating any bright red blood before your instillation.     *Empty your bladder just before instillation.     During instillation     *The medication will be instilled into your bladder through the catheter.      *In most cases, the catheter will be removed from the bladder after the instillation is completed.     *The medication should be retained in your bladder for 2 hours to obtain the best results.     *You may be positioned from side to side and back to front every 15 minutes while the medication is in your bladder.      After instillation     Now that you have retained the medication in your bladder for the past two (2) hours, there are several things you must know:     *Sit down on the toilet to urinate and fully empty your bladder.     *After urinating, pour two (2) cups of household bleach (Clorox or equivalent) into the toilet.     * Let the medication and Clorox mixture stay in the toilet for 15-20 minutes before flushing.     *Repeat the above process each time you urinate for six (6) hours after each treatment.      *Wash your hands and genital areas thoroughly after you urinate.     *Drink plenty of fluids after your instillation to flush your bladder.      Until Your Next Instillation     *You may experience some burning with the first void after your treatment. If this occurs, you need to increase your fluid intake.      *If you experience continued pain or burning on urination or you experience:  *Urgency  *Frequency of Urination  *Bright red blood or blood clots in the urine  *Joint pain  *Coughing  *Skin rash     Call Your Doctor!     If you experience:  *Fever  *Chills  *Malaise (feeling of discomfort)  *Increased fatigue  *Flu-like symptoms

## 2018-05-23 ENCOUNTER — PROCEDURE VISIT (OUTPATIENT)
Dept: UROLOGY | Facility: CLINIC | Age: 68
End: 2018-05-23

## 2018-05-23 DIAGNOSIS — C67.8 MALIGNANT NEOPLASM OF OVERLAPPING SITES OF BLADDER (HCC): Primary | ICD-10-CM

## 2018-05-23 LAB
BILIRUB BLD-MCNC: NEGATIVE MG/DL
CLARITY, POC: CLEAR
COLOR UR: YELLOW
GLUCOSE UR STRIP-MCNC: NEGATIVE MG/DL
KETONES UR QL: NEGATIVE
LEUKOCYTE EST, POC: ABNORMAL
NITRITE UR-MCNC: NEGATIVE MG/ML
PH UR: 5.5 [PH] (ref 5–8)
PROT UR STRIP-MCNC: NEGATIVE MG/DL
RBC # UR STRIP: ABNORMAL /UL
SP GR UR: 1.01 (ref 1–1.03)
UROBILINOGEN UR QL: NORMAL

## 2018-05-23 PROCEDURE — 81003 URINALYSIS AUTO W/O SCOPE: CPT | Performed by: UROLOGY

## 2018-05-23 PROCEDURE — 52000 CYSTOURETHROSCOPY: CPT | Performed by: UROLOGY

## 2018-05-23 NOTE — PROGRESS NOTES
CC: I am here for the doctor to look at my bladder    Cystoscopy procedure note  Pre- operative diagnosis:  Bladder cancer surveillance    Post operative diagnosis:  No recurrent bladder tumor    Procedure:  The patient was prepped and draped in a normal sterile fashion.  The urethra was anesthetized with 2% lidocaine jelly.  A flexible cystoscope was introduced per urethra.      Urethra:  No urethral stricture    Bladder:  There is no evidence of a stone, foreign body or mass within the bladder.  The bladder is minimally trabeculated.  The bladder neck is without contracture.    Ureteral orifices:  Normal position bilaterally and Clear efflux bilaterally    Prostate:  lateral lobe hypertrophy    Patient tolerated the procedure well    Complications: none    Blood loss: minimal    Diagnoses and all orders for this visit:    Malignant neoplasm of overlapping sites of bladder  -     POC Urinalysis Dipstick, Automated        Follow up:    He will undergo his next cycle of maintenance BCG therapy in July and then switch to every 6 months.  Quarterly surveillance cystoscopy.

## 2018-05-31 NOTE — TELEPHONE ENCOUNTER
Per Dr Esparza,  It depends as far as overnight stay. I generally try to do as an outpatient. They can't work for two weeks.    N/A

## 2018-07-16 ENCOUNTER — PROCEDURE VISIT (OUTPATIENT)
Dept: UROLOGY | Facility: CLINIC | Age: 68
End: 2018-07-16

## 2018-07-16 DIAGNOSIS — C67.8 MALIGNANT NEOPLASM OF OVERLAPPING SITES OF BLADDER (HCC): Primary | ICD-10-CM

## 2018-07-16 PROCEDURE — 81003 URINALYSIS AUTO W/O SCOPE: CPT | Performed by: UROLOGY

## 2018-07-16 PROCEDURE — 51720 TREATMENT OF BLADDER LESION: CPT | Performed by: UROLOGY

## 2018-07-16 NOTE — PROGRESS NOTES
Patient of Dr. Esparza states he is here for his 1st BCG treatments.     Treatment 1 of 3      UA was obtained and micro slide plated. Dr. Esparza was consulted and after reviewing UA and slide authorized to go ahead with the BCG treatment. The patient denies any fever, chills, hematuria, N&V or suprapubic abdominal pain.      Procedure note: Using sterile technique a well lubricated 16 [place] Red rubber is placed through the external urethral meatus and eventually manipulated into the bladder.  A small amount of clear urine returned.  One vial of Julianne strain BCG is slowly instilled into the urinary bladder. Patient tolerated the treatment well with no complications. Patient was advised to call the office if he has any problems, questions or concerns. Patient verbalized understanding    Post procedural instructions have been given to the patient.     This procedure was performed by Oscar Oswald MA. Dr. Esparza was here in the office at the time of treatment.    Medical assistant documentation is reviewed.  Agree with management as above  Chris Esparza MD  7/16/2018  6:12 PM

## 2018-07-23 ENCOUNTER — PROCEDURE VISIT (OUTPATIENT)
Dept: UROLOGY | Facility: CLINIC | Age: 68
End: 2018-07-23

## 2018-07-23 DIAGNOSIS — C67.8 MALIGNANT NEOPLASM OF OVERLAPPING SITES OF BLADDER (HCC): Primary | ICD-10-CM

## 2018-07-23 LAB
BILIRUB BLD-MCNC: NEGATIVE MG/DL
CLARITY, POC: CLEAR
COLOR UR: YELLOW
GLUCOSE UR STRIP-MCNC: NEGATIVE MG/DL
KETONES UR QL: NEGATIVE
LEUKOCYTE EST, POC: ABNORMAL
NITRITE UR-MCNC: NEGATIVE MG/ML
PH UR: 5.5 [PH] (ref 5–8)
PROT UR STRIP-MCNC: ABNORMAL MG/DL
RBC # UR STRIP: ABNORMAL /UL
SP GR UR: 1.02 (ref 1–1.03)
UROBILINOGEN UR QL: NORMAL

## 2018-07-23 PROCEDURE — 81003 URINALYSIS AUTO W/O SCOPE: CPT | Performed by: UROLOGY

## 2018-07-23 PROCEDURE — 51720 TREATMENT OF BLADDER LESION: CPT | Performed by: UROLOGY

## 2018-07-23 NOTE — PROGRESS NOTES
Patient of Dr. Esparza states he is here for his/her 2nd BCG treatment.      Treatment 2 of 3        UA was obtained.  Dr. Monte was consulted and I was authorized to go ahead with the BCG treatment. The patient denies any fever, chills, hematuria, N&V or suprapubic abdominal pain.        Procedure note: Using sterile technique a well lubricated 16fr [place]ian Red rubber is placed through the external urethral meatus and eventually manipulated into the bladder.  A small amount of clear urine returned.  One vial of [place] strain BCG is slowly instilled into the urinary bladder. Patient tolerated the treatment well with no complications. Patient was advised to call the office if he has any problems, questions or concerns. Patient verbalized understanding     Post procedural instructions have been given to the patient.      This procedure was performed by ROSS Mitchell. Dr. Monte was here in the office at the time of treatment.        Medical assistant documentation is reviewed.  Agree with management as above.

## 2018-07-31 ENCOUNTER — PROCEDURE VISIT (OUTPATIENT)
Dept: UROLOGY | Facility: CLINIC | Age: 68
End: 2018-07-31

## 2018-07-31 DIAGNOSIS — C67.8 MALIGNANT NEOPLASM OF OVERLAPPING SITES OF BLADDER (HCC): Primary | ICD-10-CM

## 2018-07-31 LAB
BILIRUB BLD-MCNC: NEGATIVE MG/DL
CLARITY, POC: CLEAR
COLOR UR: YELLOW
GLUCOSE UR STRIP-MCNC: NEGATIVE MG/DL
KETONES UR QL: NEGATIVE
LEUKOCYTE EST, POC: ABNORMAL
NITRITE UR-MCNC: NEGATIVE MG/ML
PH UR: 6 [PH] (ref 5–8)
PROT UR STRIP-MCNC: ABNORMAL MG/DL
RBC # UR STRIP: ABNORMAL /UL
SP GR UR: 1.01 (ref 1–1.03)
UROBILINOGEN UR QL: NORMAL

## 2018-07-31 PROCEDURE — 81003 URINALYSIS AUTO W/O SCOPE: CPT | Performed by: UROLOGY

## 2018-07-31 PROCEDURE — 51720 TREATMENT OF BLADDER LESION: CPT | Performed by: UROLOGY

## 2018-07-31 NOTE — PROGRESS NOTES
Patient of Isaias states he is here for his/her 3rd BCG treatment.      Treatment 3 of 3        UA was obtained.  Dr. Monte was consulted and I was authorized to go ahead with the BCG treatment. The patient denies any fever, chills, hematuria, N&V or suprapubic abdominal pain.        Procedure note: Using sterile technique a well lubricated 16fr [place] Red rubber is placed through the external urethral meatus and eventually manipulated into the bladder.  A small amount of clear urine returned.  One vial of Julianne strain BCG is slowly instilled into the urinary bladder. Patient tolerated the treatment well with no complications. Patient was advised to call the office if he has any problems, questions or concerns. Patient verbalized understanding     Post procedural instructions have been given to the patient.      This procedure was performed by Brissa Gibbons MA. Dr. Monte was here in the office at the time of treatment.        Medical assistant documentation is reviewed.  Agree with management as above    Medical assistant documentation is reviewed.  Agree with management as above  Chris Esparza MD  7/31/2018  7:10 PM

## 2018-08-13 ENCOUNTER — TELEPHONE (OUTPATIENT)
Dept: UROLOGY | Facility: CLINIC | Age: 68
End: 2018-08-13

## 2018-08-13 NOTE — TELEPHONE ENCOUNTER
Patient calling today stating he is having mild to moderate pain in the bladder area hematuria. He is sched for a cysto 8/27/18 and states he does not want to move that appt up but he would like someone to call him and talk about these issues.

## 2018-08-27 ENCOUNTER — PROCEDURE VISIT (OUTPATIENT)
Dept: UROLOGY | Facility: CLINIC | Age: 68
End: 2018-08-27

## 2018-08-27 DIAGNOSIS — C67.4 MALIGNANT NEOPLASM OF POSTERIOR WALL OF URINARY BLADDER (HCC): Primary | ICD-10-CM

## 2018-08-27 DIAGNOSIS — R10.2 SUPRAPUBIC PAIN: ICD-10-CM

## 2018-08-27 LAB
BILIRUB BLD-MCNC: NEGATIVE MG/DL
CLARITY, POC: CLEAR
COLOR UR: YELLOW
GLUCOSE UR STRIP-MCNC: NEGATIVE MG/DL
KETONES UR QL: NEGATIVE
LEUKOCYTE EST, POC: ABNORMAL
NITRITE UR-MCNC: NEGATIVE MG/ML
PH UR: 6.5 [PH] (ref 5–8)
PROT UR STRIP-MCNC: ABNORMAL MG/DL
RBC # UR STRIP: ABNORMAL /UL
SP GR UR: 1.02 (ref 1–1.03)
UROBILINOGEN UR QL: NORMAL

## 2018-08-27 PROCEDURE — 52000 CYSTOURETHROSCOPY: CPT | Performed by: UROLOGY

## 2018-08-27 PROCEDURE — 99214 OFFICE O/P EST MOD 30 MIN: CPT | Performed by: UROLOGY

## 2018-08-27 PROCEDURE — 81001 URINALYSIS AUTO W/SCOPE: CPT | Performed by: UROLOGY

## 2018-08-27 NOTE — PATIENT INSTRUCTIONS

## 2018-08-27 NOTE — PROGRESS NOTES
CC: I am here for the doctor to look at my bladder    Cystoscopy procedure note  Pre- operative diagnosis:  Bladder cancer surveillance    Post operative diagnosis:  Bladder Tumor    Procedure:  The patient was prepped and draped in a normal sterile fashion.  The urethra was anesthetized with 2% lidocaine jelly.  A flexible cystoscope was introduced per urethra.      Urethra:  No urethral stricture    Bladder:  He has a surprisingly large bladder cancer this located at the bladder neck and right posterior lateral portion.  I cannot see the right ureteral orifice.  This tumor probably measures at least 4 cm maybe larger.  Heavily calcified which is probably related to getting BCG.  I cannot really see the base of the tumor were its attached because it obstructs the bladder neck.    Ureteral orifices:  Normal position bilaterally and Clear efflux bilaterally    Prostate:  lateral lobe hypertrophy    Patient tolerated the procedure well    Complications: none    Blood loss: minimal    Given these findings, I had to evaluate the patient to assess fitness for surgery, formulate and discussa plan, that included alternatives, risks and benefits of management.. This went well beyond the typical description of procedural findings and therefore an additional evaluation and management was required.      Mr. Bradley is 68 y.o. male    HPI  Patient has a new bladder tumors been identified today severity is probably superficial disease but this is more concerning for aggressive appearing lesion by its appearance since it is quite solid.  He has not had any hematuria or dysuria other than feeling some suprapubic pressure which appears to been permit having had BCG.  This is occurred over the last 3 months since he had a normal surveillance cystoscopy earlier this year.  This is located at the posterior lateral aspect of the bladder just inside the bladder neck.        The following portions of the patient's history were reviewed  and updated as appropriate: allergies, current medications, past family history, past medical history, past social history, past surgical history and problem list.      Review of Systems   Constitutional: Negative for chills and fever.   Gastrointestinal: Positive for abdominal pain (suprapbuic discomfort). Negative for anal bleeding and blood in stool.   Genitourinary: Negative for flank pain and hematuria.           Current Outpatient Prescriptions:   •  allopurinol (ZYLOPRIM) 300 MG tablet, Take 300 mg by mouth daily., Disp: , Rfl:   •  aspirin 81 MG tablet, Take 81 mg by mouth daily., Disp: , Rfl:   •  Cholecalciferol 1000 UNITS capsule, Take  by mouth. daily, Disp: , Rfl:   •  fenofibrate (TRICOR) 145 MG tablet, Take 145 mg by mouth daily., Disp: , Rfl:   •  furosemide (LASIX) 40 MG tablet, Take 40 mg by mouth 2 (Two) Times a Day., Disp: , Rfl:   •  GABAPENTIN PO, 400mg takes 6 pills a day.takes 2 tabs am noon pm, Disp: , Rfl:   •  LISINOPRIL PO, 40mg 1 bid, Disp: , Rfl:   •  LOVASTATIN PO, Take  by mouth. 40mg daily, Disp: , Rfl:   •  magnesium oxide (MAG-OX) 400 MG tablet, Take 400 mg 3 tabs 2 times daily, Disp: , Rfl:   •  METFORMIN HCL PO, Take  by mouth.1000mg bid, Disp: , Rfl:   •  Omega-3 Fatty Acids (FISH OIL) 1000 MG capsule capsule, Take 1,000 mg by mouth Daily With Breakfast., Disp: , Rfl:   •  OMEPRAZOLE PO, Take  by mouth. 20 mg daily, Disp: , Rfl:   •  vitamin B-12 (CYANOCOBALAMIN) 1000 MCG tablet, Take 1,000 mcg by mouth daily., Disp: , Rfl:     Past Medical History:   Diagnosis Date   • Bladder neoplasm of uncertain malignant potential 7/20/2017   • Degenerative arthritis of cervical spine with nerve compression     per patient report   • Diabetes mellitus (CMS/HCC)    • GERD (gastroesophageal reflux disease)    • Hearing aid worn    • Hypertension    • Impaired hearing     without hearing aids can barely hear anything   • Neuropathy    • Sleep apnea     wears cpap       Past Surgical History:    Procedure Laterality Date   • CATARACT EXTRACTION W/ INTRAOCULAR LENS IMPLANT     • CHOLECYSTECTOMY     • EXPLORATORY LAPAROTOMY     • LEG SURGERY     • TRANSURETHRAL RESECTION OF BLADDER TUMOR Bilateral 7/25/2017    Procedure: CYSTOSCOPY TRANSURETHRAL RESECTION OF BLADDER TUMOR & POSSIBLE BILATERAL RETROGRADE URETEROPYELOGRAMS;  Surgeon: Chris Esparza MD;  Location: [facility] OR;  Service:    • TRANSURETHRAL RESECTION OF BLADDER TUMOR N/A 11/3/2017    Procedure: CYSTOSCOPY TRANSURETHRAL RESECTION OF BLADDER TUMOR ;  Surgeon: Chris Esparza MD;  Location: [facility] OR;  Service:        Social History     Social History   • Marital status:      Social History Main Topics   • Smoking status: Former Smoker     Quit date: 1997   • Smokeless tobacco: Never Used   • Alcohol use No   • Drug use: No   • Sexual activity: Defer     Other Topics Concern   • Not on file       Family History   Problem Relation Age of Onset   • Cancer Father    • Hypertension Father    • Heart disease Mother    • Arthritis Mother    • Hypertension Mother    • Cancer Sister    • Cancer Brother    • Heart disease Sister          There were no vitals taken for this visit.      Physical Exam  Constitutional: Well nourished, Well developed; No apparent distress  Psychiatric: Appropriate affect; Alert and oriented  Eyes: Unremarkable  Musculoskeletal: Normal gait and station  GI: Abdomen is soft, non-tender  Respiratory: No distress; Unlabored movement; No accessory musculature needed with symmetric movements  Skin: No pallor or diaphoresis  ; Penis and testicles are normal;         Data  Results for orders placed or performed in visit on 08/27/18   POC Urinalysis Dipstick   Result Value Ref Range    Color Yellow Yellow, Straw, Dark Yellow, Nicolasa    Clarity, UA Clear Clear    Glucose, UA Negative Negative, 1000 mg/dL (3+) mg/dL    Bilirubin Negative Negative    Ketones, UA Negative Negative    Specific Gravity  1.020 1.005 - 1.030    Blood,  UA Large (A) Negative    pH, Urine 6.5 5.0 - 8.0    Protein,  mg/dL (A) Negative mg/dL    Urobilinogen, UA Normal Normal    Leukocytes Small (1+) (A) Negative    Nitrite, UA Negative Negative         Imaging Results (last 7 days)     ** No results found for the last 168 hours. **            Assessment and Plan  Diagnoses and all orders for this visit:    Malignant neoplasm of posterior wall of urinary bladder (CMS/HCC)  -     POC Urinalysis Dipstick  -     Case Request; Standing  -     ceFAZolin (ANCEF) 2 g in sodium chloride 0.9 % 100 mL IVPB; Infuse 2 g into a venous catheter 1 (One) Time.  -     Case Request    Suprapubic pain    Other orders  -     Follow Anesthesia Guidelines / Standing Orders; Future  -     Follow Anesthesia Guidelines / Standing Orders; Standing  -     Verify NPO Status; Standing  -     Obtain Informed Consent; Standing    -he has almost certain recurrence urothelial carcinoma on BCG therapy for superficial high-grade disease.  Severity considered worsening of his established disease.  Her evaluation will be done with a retrograde ureteropyelogram.  It is concerning that he has developed this recurrence and I think we do need to do a biopsy that will involve rather deep muscular biopsies so I will not give him mitomycin.  May require induction BCG again.  Certainly there is evidence of muscle invasion we may have to talk about more aggressive therapy such as chemotherapy  with cystectomy.  -The suprapubic discomfort is new.  I doubt that this is from the bladder tumor.  I will need to do bilateral retrograde ureteropyelograms to further evaluate this new issue.    (Please note that portions of this note were completed with a voice recognition program.)  Chris Esparza MD  08/29/18  8:28 AM      Cc:

## 2018-09-10 ENCOUNTER — ANESTHESIA (OUTPATIENT)
Dept: PERIOP | Facility: HOSPITAL | Age: 68
End: 2018-09-10

## 2018-09-10 ENCOUNTER — HOSPITAL ENCOUNTER (OUTPATIENT)
Facility: HOSPITAL | Age: 68
Discharge: HOME | End: 2018-09-11
Attending: UROLOGY | Admitting: UROLOGY

## 2018-09-10 ENCOUNTER — OFFICE VISIT (OUTPATIENT)
Dept: UROLOGY | Facility: CLINIC | Age: 68
End: 2018-09-10

## 2018-09-10 ENCOUNTER — ANESTHESIA EVENT (OUTPATIENT)
Dept: PERIOP | Facility: HOSPITAL | Age: 68
End: 2018-09-10

## 2018-09-10 VITALS
WEIGHT: 227 LBS | BODY MASS INDEX: 32.5 KG/M2 | SYSTOLIC BLOOD PRESSURE: 140 MMHG | TEMPERATURE: 97.8 F | DIASTOLIC BLOOD PRESSURE: 70 MMHG | HEIGHT: 70 IN

## 2018-09-10 DIAGNOSIS — C67.4 MALIGNANT NEOPLASM OF POSTERIOR WALL OF URINARY BLADDER (HCC): Primary | ICD-10-CM

## 2018-09-10 DIAGNOSIS — C67.8 MALIGNANT NEOPLASM OF OVERLAPPING SITES OF BLADDER (HCC): ICD-10-CM

## 2018-09-10 DIAGNOSIS — R31.0 GROSS HEMATURIA: ICD-10-CM

## 2018-09-10 DIAGNOSIS — D41.4 BLADDER NEOPLASM OF UNCERTAIN MALIGNANT POTENTIAL: Primary | ICD-10-CM

## 2018-09-10 DIAGNOSIS — C67.9 MALIGNANT NEOPLASM OF URINARY BLADDER, UNSPECIFIED SITE (HCC): ICD-10-CM

## 2018-09-10 LAB
ANION GAP SERPL CALCULATED.3IONS-SCNC: 10 MMOL/L (ref 4–13)
BASOPHILS # BLD AUTO: 0.05 10*3/MM3 (ref 0–0.2)
BASOPHILS NFR BLD AUTO: 0.6 % (ref 0–2)
BILIRUB BLD-MCNC: ABNORMAL MG/DL
BUN BLD-MCNC: 26 MG/DL (ref 5–21)
BUN/CREAT SERPL: 19 (ref 7–25)
CALCIUM SPEC-SCNC: 10.2 MG/DL (ref 8.4–10.4)
CHLORIDE SERPL-SCNC: 104 MMOL/L (ref 98–110)
CLARITY, POC: ABNORMAL
CO2 SERPL-SCNC: 27 MMOL/L (ref 24–31)
COLOR UR: ABNORMAL
CREAT BLD-MCNC: 1.37 MG/DL (ref 0.5–1.4)
DEPRECATED RDW RBC AUTO: 46.7 FL (ref 40–54)
EOSINOPHIL # BLD AUTO: 0.11 10*3/MM3 (ref 0–0.7)
EOSINOPHIL NFR BLD AUTO: 1.4 % (ref 0–4)
ERYTHROCYTE [DISTWIDTH] IN BLOOD BY AUTOMATED COUNT: 15.2 % (ref 12–15)
GFR SERPL CREATININE-BSD FRML MDRD: 51 ML/MIN/1.73
GLUCOSE BLD-MCNC: 118 MG/DL (ref 70–100)
GLUCOSE BLDC GLUCOMTR-MCNC: 107 MG/DL (ref 70–130)
GLUCOSE BLDC GLUCOMTR-MCNC: 143 MG/DL (ref 70–130)
GLUCOSE BLDC GLUCOMTR-MCNC: 148 MG/DL (ref 70–130)
GLUCOSE UR STRIP-MCNC: ABNORMAL MG/DL
HCT VFR BLD AUTO: 35.8 % (ref 40–52)
HGB BLD-MCNC: 11.7 G/DL (ref 14–18)
IMM GRANULOCYTES # BLD: 0.04 10*3/MM3 (ref 0–0.03)
IMM GRANULOCYTES NFR BLD: 0.5 % (ref 0–5)
KETONES UR QL: ABNORMAL
LEUKOCYTE EST, POC: ABNORMAL
LYMPHOCYTES # BLD AUTO: 1.19 10*3/MM3 (ref 0.72–4.86)
LYMPHOCYTES NFR BLD AUTO: 15 % (ref 15–45)
MCH RBC QN AUTO: 27.9 PG (ref 28–32)
MCHC RBC AUTO-ENTMCNC: 32.7 G/DL (ref 33–36)
MCV RBC AUTO: 85.2 FL (ref 82–95)
MONOCYTES # BLD AUTO: 0.47 10*3/MM3 (ref 0.19–1.3)
MONOCYTES NFR BLD AUTO: 5.9 % (ref 4–12)
NEUTROPHILS # BLD AUTO: 6.06 10*3/MM3 (ref 1.87–8.4)
NEUTROPHILS NFR BLD AUTO: 76.6 % (ref 39–78)
NITRITE UR-MCNC: POSITIVE MG/ML
NRBC BLD MANUAL-RTO: 0 /100 WBC (ref 0–0)
PH UR: 9 [PH] (ref 5–8)
PLATELET # BLD AUTO: 204 10*3/MM3 (ref 130–400)
PMV BLD AUTO: 8.7 FL (ref 6–12)
POTASSIUM BLD-SCNC: 4.5 MMOL/L (ref 3.5–5.3)
PROT UR STRIP-MCNC: ABNORMAL MG/DL
RBC # BLD AUTO: 4.2 10*6/MM3 (ref 4.8–5.9)
RBC # UR STRIP: ABNORMAL /UL
SODIUM BLD-SCNC: 141 MMOL/L (ref 135–145)
SP GR UR: 1 (ref 1–1.03)
UROBILINOGEN UR QL: NORMAL
WBC NRBC COR # BLD: 7.92 10*3/MM3 (ref 4.8–10.8)

## 2018-09-10 PROCEDURE — 94799 UNLISTED PULMONARY SVC/PX: CPT

## 2018-09-10 PROCEDURE — G0378 HOSPITAL OBSERVATION PER HR: HCPCS

## 2018-09-10 PROCEDURE — 80048 BASIC METABOLIC PNL TOTAL CA: CPT | Performed by: UROLOGY

## 2018-09-10 PROCEDURE — 52240 CYSTOSCOPY AND TREATMENT: CPT | Performed by: UROLOGY

## 2018-09-10 PROCEDURE — 25010000002 ONDANSETRON PER 1 MG: Performed by: NURSE ANESTHETIST, CERTIFIED REGISTERED

## 2018-09-10 PROCEDURE — 25010000002 DEXAMETHASONE PER 1 MG: Performed by: ANESTHESIOLOGY

## 2018-09-10 PROCEDURE — 99024 POSTOP FOLLOW-UP VISIT: CPT | Performed by: UROLOGY

## 2018-09-10 PROCEDURE — 94760 N-INVAS EAR/PLS OXIMETRY 1: CPT

## 2018-09-10 PROCEDURE — 85025 COMPLETE CBC W/AUTO DIFF WBC: CPT | Performed by: UROLOGY

## 2018-09-10 PROCEDURE — 99219 PR INITIAL OBSERVATION CARE/DAY 50 MINUTES: CPT | Performed by: UROLOGY

## 2018-09-10 PROCEDURE — 88307 TISSUE EXAM BY PATHOLOGIST: CPT | Performed by: UROLOGY

## 2018-09-10 PROCEDURE — 52001 CYSTO W/IRRG&EVAC MLT CLOTS: CPT | Performed by: UROLOGY

## 2018-09-10 PROCEDURE — 81003 URINALYSIS AUTO W/O SCOPE: CPT | Performed by: UROLOGY

## 2018-09-10 PROCEDURE — 25010000002 FENTANYL CITRATE (PF) 250 MCG/5ML SOLUTION: Performed by: NURSE ANESTHETIST, CERTIFIED REGISTERED

## 2018-09-10 PROCEDURE — 25010000002 PROPOFOL 10 MG/ML EMULSION: Performed by: NURSE ANESTHETIST, CERTIFIED REGISTERED

## 2018-09-10 PROCEDURE — 88304 TISSUE EXAM BY PATHOLOGIST: CPT | Performed by: UROLOGY

## 2018-09-10 PROCEDURE — 82962 GLUCOSE BLOOD TEST: CPT

## 2018-09-10 RX ORDER — LANSOPRAZOLE
30 KIT
Status: DISCONTINUED | OUTPATIENT
Start: 2018-09-10 | End: 2018-09-11 | Stop reason: HOSPADM

## 2018-09-10 RX ORDER — OXYCODONE AND ACETAMINOPHEN 10; 325 MG/1; MG/1
1 TABLET ORAL ONCE AS NEEDED
Status: DISCONTINUED | OUTPATIENT
Start: 2018-09-10 | End: 2018-09-10 | Stop reason: HOSPADM

## 2018-09-10 RX ORDER — FENTANYL CITRATE 50 UG/ML
INJECTION, SOLUTION INTRAMUSCULAR; INTRAVENOUS AS NEEDED
Status: DISCONTINUED | OUTPATIENT
Start: 2018-09-10 | End: 2018-09-10 | Stop reason: SURG

## 2018-09-10 RX ORDER — HYDROCODONE BITARTRATE AND ACETAMINOPHEN 7.5; 325 MG/1; MG/1
1 TABLET ORAL EVERY 4 HOURS PRN
Status: DISCONTINUED | OUTPATIENT
Start: 2018-09-10 | End: 2018-09-11 | Stop reason: HOSPADM

## 2018-09-10 RX ORDER — SORBITOL 30 G/1000ML
IRRIGANT IRRIGATION AS NEEDED
Status: DISCONTINUED | OUTPATIENT
Start: 2018-09-10 | End: 2018-09-10 | Stop reason: HOSPADM

## 2018-09-10 RX ORDER — SODIUM CHLORIDE 9 MG/ML
25 INJECTION, SOLUTION INTRAVENOUS CONTINUOUS
Status: DISCONTINUED | OUTPATIENT
Start: 2018-09-10 | End: 2018-09-11 | Stop reason: HOSPADM

## 2018-09-10 RX ORDER — FENTANYL CITRATE 50 UG/ML
25 INJECTION, SOLUTION INTRAMUSCULAR; INTRAVENOUS AS NEEDED
Status: DISCONTINUED | OUTPATIENT
Start: 2018-09-10 | End: 2018-09-10 | Stop reason: HOSPADM

## 2018-09-10 RX ORDER — FUROSEMIDE 40 MG/1
40 TABLET ORAL DAILY
Status: DISCONTINUED | OUTPATIENT
Start: 2018-09-10 | End: 2018-09-11 | Stop reason: HOSPADM

## 2018-09-10 RX ORDER — GABAPENTIN 400 MG/1
400 CAPSULE ORAL EVERY 12 HOURS SCHEDULED
Status: DISCONTINUED | OUTPATIENT
Start: 2018-09-10 | End: 2018-09-11 | Stop reason: HOSPADM

## 2018-09-10 RX ORDER — NALOXONE HCL 0.4 MG/ML
0.4 VIAL (ML) INJECTION AS NEEDED
Status: DISCONTINUED | OUTPATIENT
Start: 2018-09-10 | End: 2018-09-10 | Stop reason: HOSPADM

## 2018-09-10 RX ORDER — FAMOTIDINE 10 MG/ML
20 INJECTION, SOLUTION INTRAVENOUS
Status: DISCONTINUED | OUTPATIENT
Start: 2018-09-10 | End: 2018-09-10 | Stop reason: HOSPADM

## 2018-09-10 RX ORDER — LIDOCAINE HYDROCHLORIDE 20 MG/ML
INJECTION, SOLUTION INFILTRATION; PERINEURAL AS NEEDED
Status: DISCONTINUED | OUTPATIENT
Start: 2018-09-10 | End: 2018-09-10 | Stop reason: SURG

## 2018-09-10 RX ORDER — ONDANSETRON 4 MG/1
4 TABLET, FILM COATED ORAL EVERY 6 HOURS PRN
Status: DISCONTINUED | OUTPATIENT
Start: 2018-09-10 | End: 2018-09-11 | Stop reason: HOSPADM

## 2018-09-10 RX ORDER — NICOTINE POLACRILEX 4 MG
15 LOZENGE BUCCAL
Status: DISCONTINUED | OUTPATIENT
Start: 2018-09-10 | End: 2018-09-11 | Stop reason: HOSPADM

## 2018-09-10 RX ORDER — ONDANSETRON 2 MG/ML
4 INJECTION INTRAMUSCULAR; INTRAVENOUS EVERY 6 HOURS PRN
Status: DISCONTINUED | OUTPATIENT
Start: 2018-09-10 | End: 2018-09-11 | Stop reason: HOSPADM

## 2018-09-10 RX ORDER — DEXTROSE MONOHYDRATE 25 G/50ML
25 INJECTION, SOLUTION INTRAVENOUS
Status: DISCONTINUED | OUTPATIENT
Start: 2018-09-10 | End: 2018-09-11 | Stop reason: HOSPADM

## 2018-09-10 RX ORDER — FENOFIBRATE 145 MG/1
145 TABLET, COATED ORAL DAILY
Status: DISCONTINUED | OUTPATIENT
Start: 2018-09-10 | End: 2018-09-11 | Stop reason: HOSPADM

## 2018-09-10 RX ORDER — ALLOPURINOL 300 MG/1
300 TABLET ORAL DAILY
Status: DISCONTINUED | OUTPATIENT
Start: 2018-09-10 | End: 2018-09-11 | Stop reason: HOSPADM

## 2018-09-10 RX ORDER — DOCUSATE SODIUM 100 MG/1
100 CAPSULE, LIQUID FILLED ORAL 2 TIMES DAILY PRN
Status: DISCONTINUED | OUTPATIENT
Start: 2018-09-10 | End: 2018-09-11 | Stop reason: HOSPADM

## 2018-09-10 RX ORDER — BISACODYL 10 MG
10 SUPPOSITORY, RECTAL RECTAL DAILY PRN
Status: DISCONTINUED | OUTPATIENT
Start: 2018-09-10 | End: 2018-09-10 | Stop reason: SDUPTHER

## 2018-09-10 RX ORDER — ROCURONIUM BROMIDE 10 MG/ML
INJECTION, SOLUTION INTRAVENOUS AS NEEDED
Status: DISCONTINUED | OUTPATIENT
Start: 2018-09-10 | End: 2018-09-10 | Stop reason: SURG

## 2018-09-10 RX ORDER — SODIUM CHLORIDE 9 MG/ML
100 INJECTION, SOLUTION INTRAVENOUS CONTINUOUS
Status: DISCONTINUED | OUTPATIENT
Start: 2018-09-10 | End: 2018-09-10 | Stop reason: SDUPTHER

## 2018-09-10 RX ORDER — LISINOPRIL 20 MG/1
40 TABLET ORAL EVERY 12 HOURS SCHEDULED
Status: DISCONTINUED | OUTPATIENT
Start: 2018-09-10 | End: 2018-09-11 | Stop reason: HOSPADM

## 2018-09-10 RX ORDER — OXYCODONE AND ACETAMINOPHEN 7.5; 325 MG/1; MG/1
2 TABLET ORAL ONCE AS NEEDED
Status: DISCONTINUED | OUTPATIENT
Start: 2018-09-10 | End: 2018-09-10 | Stop reason: HOSPADM

## 2018-09-10 RX ORDER — MEPERIDINE HYDROCHLORIDE 25 MG/ML
12.5 INJECTION INTRAMUSCULAR; INTRAVENOUS; SUBCUTANEOUS
Status: DISCONTINUED | OUTPATIENT
Start: 2018-09-10 | End: 2018-09-10 | Stop reason: HOSPADM

## 2018-09-10 RX ORDER — IPRATROPIUM BROMIDE AND ALBUTEROL SULFATE 2.5; .5 MG/3ML; MG/3ML
3 SOLUTION RESPIRATORY (INHALATION) ONCE AS NEEDED
Status: DISCONTINUED | OUTPATIENT
Start: 2018-09-10 | End: 2018-09-10 | Stop reason: HOSPADM

## 2018-09-10 RX ORDER — SODIUM CHLORIDE 0.9 % (FLUSH) 0.9 %
1-10 SYRINGE (ML) INJECTION AS NEEDED
Status: DISCONTINUED | OUTPATIENT
Start: 2018-09-10 | End: 2018-09-10 | Stop reason: HOSPADM

## 2018-09-10 RX ORDER — DOCUSATE SODIUM 100 MG/1
100 CAPSULE, LIQUID FILLED ORAL 2 TIMES DAILY
Status: DISCONTINUED | OUTPATIENT
Start: 2018-09-10 | End: 2018-09-11 | Stop reason: HOSPADM

## 2018-09-10 RX ORDER — DEXAMETHASONE SODIUM PHOSPHATE 4 MG/ML
4 INJECTION, SOLUTION INTRA-ARTICULAR; INTRALESIONAL; INTRAMUSCULAR; INTRAVENOUS; SOFT TISSUE ONCE AS NEEDED
Status: COMPLETED | OUTPATIENT
Start: 2018-09-10 | End: 2018-09-10

## 2018-09-10 RX ORDER — ONDANSETRON 4 MG/1
4 TABLET, ORALLY DISINTEGRATING ORAL EVERY 6 HOURS PRN
Status: DISCONTINUED | OUTPATIENT
Start: 2018-09-10 | End: 2018-09-11 | Stop reason: HOSPADM

## 2018-09-10 RX ORDER — METOCLOPRAMIDE HYDROCHLORIDE 5 MG/ML
5 INJECTION INTRAMUSCULAR; INTRAVENOUS
Status: DISCONTINUED | OUTPATIENT
Start: 2018-09-10 | End: 2018-09-10 | Stop reason: HOSPADM

## 2018-09-10 RX ORDER — BISACODYL 10 MG
10 SUPPOSITORY, RECTAL RECTAL DAILY PRN
Status: DISCONTINUED | OUTPATIENT
Start: 2018-09-10 | End: 2018-09-11 | Stop reason: HOSPADM

## 2018-09-10 RX ORDER — SODIUM CHLORIDE, SODIUM LACTATE, POTASSIUM CHLORIDE, CALCIUM CHLORIDE 600; 310; 30; 20 MG/100ML; MG/100ML; MG/100ML; MG/100ML
100 INJECTION, SOLUTION INTRAVENOUS CONTINUOUS
Status: DISCONTINUED | OUTPATIENT
Start: 2018-09-10 | End: 2018-09-10

## 2018-09-10 RX ORDER — LABETALOL HYDROCHLORIDE 5 MG/ML
5 INJECTION, SOLUTION INTRAVENOUS
Status: DISCONTINUED | OUTPATIENT
Start: 2018-09-10 | End: 2018-09-10 | Stop reason: HOSPADM

## 2018-09-10 RX ORDER — ATROPA BELLADONNA AND OPIUM 16.2; 6 MG/1; MG/1
60 SUPPOSITORY RECTAL EVERY 6 HOURS PRN
Status: DISCONTINUED | OUTPATIENT
Start: 2018-09-10 | End: 2018-09-11 | Stop reason: HOSPADM

## 2018-09-10 RX ORDER — ACETAMINOPHEN 500 MG
1000 TABLET ORAL ONCE
Status: COMPLETED | OUTPATIENT
Start: 2018-09-10 | End: 2018-09-10

## 2018-09-10 RX ORDER — PROPOFOL 10 MG/ML
VIAL (ML) INTRAVENOUS AS NEEDED
Status: DISCONTINUED | OUTPATIENT
Start: 2018-09-10 | End: 2018-09-10 | Stop reason: SURG

## 2018-09-10 RX ORDER — MAGNESIUM HYDROXIDE 1200 MG/15ML
LIQUID ORAL AS NEEDED
Status: DISCONTINUED | OUTPATIENT
Start: 2018-09-10 | End: 2018-09-10 | Stop reason: HOSPADM

## 2018-09-10 RX ORDER — ONDANSETRON 2 MG/ML
INJECTION INTRAMUSCULAR; INTRAVENOUS AS NEEDED
Status: DISCONTINUED | OUTPATIENT
Start: 2018-09-10 | End: 2018-09-10 | Stop reason: SURG

## 2018-09-10 RX ORDER — HYDROCODONE BITARTRATE AND ACETAMINOPHEN 7.5; 325 MG/1; MG/1
2 TABLET ORAL EVERY 4 HOURS PRN
Status: DISCONTINUED | OUTPATIENT
Start: 2018-09-10 | End: 2018-09-11 | Stop reason: HOSPADM

## 2018-09-10 RX ORDER — SODIUM CHLORIDE 0.9 % (FLUSH) 0.9 %
1-10 SYRINGE (ML) INJECTION AS NEEDED
Status: DISCONTINUED | OUTPATIENT
Start: 2018-09-10 | End: 2018-09-11 | Stop reason: HOSPADM

## 2018-09-10 RX ORDER — ACETAMINOPHEN 325 MG/1
650 TABLET ORAL EVERY 4 HOURS PRN
Status: DISCONTINUED | OUTPATIENT
Start: 2018-09-10 | End: 2018-09-11 | Stop reason: HOSPADM

## 2018-09-10 RX ORDER — IBUPROFEN 600 MG/1
600 TABLET ORAL ONCE AS NEEDED
Status: DISCONTINUED | OUTPATIENT
Start: 2018-09-10 | End: 2018-09-10 | Stop reason: HOSPADM

## 2018-09-10 RX ORDER — ONDANSETRON 2 MG/ML
4 INJECTION INTRAMUSCULAR; INTRAVENOUS ONCE AS NEEDED
Status: DISCONTINUED | OUTPATIENT
Start: 2018-09-10 | End: 2018-09-10 | Stop reason: HOSPADM

## 2018-09-10 RX ORDER — OXYCODONE AND ACETAMINOPHEN 7.5; 325 MG/1; MG/1
2 TABLET ORAL EVERY 4 HOURS PRN
Status: DISCONTINUED | OUTPATIENT
Start: 2018-09-10 | End: 2018-09-10 | Stop reason: SDUPTHER

## 2018-09-10 RX ORDER — LIDOCAINE HYDROCHLORIDE 40 MG/ML
SOLUTION TOPICAL AS NEEDED
Status: DISCONTINUED | OUTPATIENT
Start: 2018-09-10 | End: 2018-09-10 | Stop reason: SURG

## 2018-09-10 RX ORDER — ATORVASTATIN CALCIUM 10 MG/1
10 TABLET, FILM COATED ORAL NIGHTLY
Status: DISCONTINUED | OUTPATIENT
Start: 2018-09-10 | End: 2018-09-11 | Stop reason: HOSPADM

## 2018-09-10 RX ADMIN — SUGAMMADEX 200 MG: 100 INJECTION, SOLUTION INTRAVENOUS at 18:29

## 2018-09-10 RX ADMIN — ROCURONIUM BROMIDE 10 MG: 10 INJECTION INTRAVENOUS at 18:14

## 2018-09-10 RX ADMIN — LIDOCAINE HYDROCHLORIDE 0.5 ML: 10 INJECTION, SOLUTION EPIDURAL; INFILTRATION; INTRACAUDAL; PERINEURAL at 16:44

## 2018-09-10 RX ADMIN — FENTANYL CITRATE 250 MCG: 50 INJECTION INTRAMUSCULAR; INTRAVENOUS at 17:30

## 2018-09-10 RX ADMIN — FAMOTIDINE 20 MG: 10 INJECTION INTRAVENOUS at 16:56

## 2018-09-10 RX ADMIN — ONDANSETRON HYDROCHLORIDE 4 MG: 2 SOLUTION INTRAMUSCULAR; INTRAVENOUS at 18:10

## 2018-09-10 RX ADMIN — ACETAMINOPHEN 1000 MG: 500 TABLET, FILM COATED ORAL at 16:56

## 2018-09-10 RX ADMIN — LIDOCAINE HYDROCHLORIDE 80 MG: 20 INJECTION, SOLUTION INFILTRATION; PERINEURAL at 17:09

## 2018-09-10 RX ADMIN — SODIUM CHLORIDE 25 ML/HR: 9 INJECTION, SOLUTION INTRAVENOUS at 20:35

## 2018-09-10 RX ADMIN — LIDOCAINE HYDROCHLORIDE 1 EACH: 40 SOLUTION TOPICAL at 17:09

## 2018-09-10 RX ADMIN — ROCURONIUM BROMIDE 35 MG: 10 INJECTION INTRAVENOUS at 17:09

## 2018-09-10 RX ADMIN — SODIUM CHLORIDE, POTASSIUM CHLORIDE, SODIUM LACTATE AND CALCIUM CHLORIDE 100 ML/HR: 600; 310; 30; 20 INJECTION, SOLUTION INTRAVENOUS at 16:45

## 2018-09-10 RX ADMIN — LISINOPRIL 40 MG: 20 TABLET ORAL at 21:10

## 2018-09-10 RX ADMIN — PROPOFOL 200 MG: 10 INJECTION, EMULSION INTRAVENOUS at 17:09

## 2018-09-10 RX ADMIN — DEXAMETHASONE SODIUM PHOSPHATE 4 MG: 4 INJECTION, SOLUTION INTRA-ARTICULAR; INTRALESIONAL; INTRAMUSCULAR; INTRAVENOUS; SOFT TISSUE at 16:56

## 2018-09-10 RX ADMIN — DOCUSATE SODIUM 100 MG: 100 CAPSULE ORAL at 21:10

## 2018-09-10 RX ADMIN — ROCURONIUM BROMIDE 15 MG: 10 INJECTION INTRAVENOUS at 17:50

## 2018-09-10 RX ADMIN — FENTANYL CITRATE 250 MCG: 50 INJECTION INTRAMUSCULAR; INTRAVENOUS at 17:09

## 2018-09-10 RX ADMIN — GABAPENTIN 400 MG: 400 CAPSULE ORAL at 21:19

## 2018-09-10 RX ADMIN — ATORVASTATIN CALCIUM 10 MG: 10 TABLET, FILM COATED ORAL at 21:11

## 2018-09-10 RX ADMIN — CEFAZOLIN 2 G: 330 INJECTION, POWDER, FOR SOLUTION INTRAMUSCULAR; INTRAVENOUS at 17:10

## 2018-09-10 NOTE — PATIENT INSTRUCTIONS

## 2018-09-10 NOTE — OP NOTE
Operative Summary    Chung Bradley  Date of Procedure: 9/10/2018    Pre-op Diagnosis:   Bladder neoplasm of uncertain malignant potential [D41.4]    Post-op Diagnosis:     Post-Op Diagnosis Codes:     * Bladder neoplasm of uncertain malignant potential [D41.4]    Procedure/CPT® Codes:      Procedure(s):  CYSTOSCOPY TRANSURETHRAL RESECTION OF LARGE BLADDER TUMOR, CLOT EVACUATION, AND FULGURATION    Surgeon(s):  Chris Esparza MD    Anesthesia: General    Staff:   Circulator: Ricarda Guillaume RN  Scrub Person: Savannah oJnes; Belkis Banuelos; Jonathan Yousif    Indications for procedure:  This is a gentleman with urothelial carcinoma of the bladder.  Initially he was diagnosed with high-grade noninvasive papillary urothelial carcinoma.  He is undergone a course of induction BCG therapy which was followed by 2 cycles of 3 maintenance BCG treatments.  He also received cystoscopy in 3 month intervals.  He developed gross hematuria 3 weeks ago.  His last cystoscopy in May 2018 was normal.  He underwent cystoscopy which confirmed the presence of a very large bladder tumor.  This was consistent with urothelial carcinoma of the bladder and he was set up for transurethral resection of bladder tumor.    Findings:   #1.  Approximate 7 cm bladder tumor is noted at the right posterior lateral and an neck of the bladder.  This mass extended onto the lateral wall and covered the right hemitrigone as well as extending on to the left side.  I really could not identify either ureteral orifice.  This is a very difficult mass to resect and resemble specimen consistent with a very large TURP.    #2.  I was unable to do retrograde ureteropyelograms because the orifices could not be identified.  #3.  His bimanual exam was negative.  However I question how valid this would be due to the patient's obesity.  He will be difficult to feel this mass to the abdominal wall    Procedure details:  After appropriate anesthesia, positioning, prep  and drape, timeout protocol was observed.     A bimanual exam was done in his obesity would make it challenging to feel this tumor on the anterior abdominal wall.  I could not feel it.  Nor could I feel it by rectal exam.    22 Uzbek cystoscope sheath with 30° lens is inserted.  The anterior urethra and prostatic urethra without evidence of mass.  On introduction though the bladder neck shows a large tumor which is actually moderately obstructive.  This mass appears to be about 5 x 7 mm with the largest diameter being from the wall of the bladder out into the lumen.    I am unable to identify the ureteral orifices at all.  At this point I used Nita sounds to calibrate the urethra and actually dilate the meatus to 28 Uzbek so that a 26.5 Uzbek resectoscope sheath with obturator could be inserted.  I was able to do a transurethral resection on this gentleman and it was a very difficult endeavor.  Jaquez debulking the tumor were extended out into the lumen.  Continuous bladder irrigation was used to keep the bladder full and the bladder wall away.  I was able to palpate the bladder when it was very full.  Care was taken not to over distend the bladder and multiple times I had to stop irrigate out clot and specimen.  The resection is then carried out from the posterior medial aspect of the tumor out laterally from the most cephalad portion out to the bladder neck.  Clearly I was getting through the entire surface of the tumor and this appears solid lesion all the way into the bladder.  I think this is likely muscle invasive.    Once the tumor was finally completely resected I was able to get all the chips out of the lytic evacuator.  Again I inspected the ureteral orifices but could not identify them.  I then fulgurated the base of the tumor as best I could there was no arterial bleeding.    A 24 Uzbek 5 mL balloon was placed in the bladder with 15 mL of sterile water.  In the balloon.  He was then transferred  recovery room on continuous bladder irrigation using normal saline.    Estimated Blood Loss: 100 mL    Specimens:                ID Type Source Tests Collected by Time   A : BLADDER CLOT Tissue Urinary Bladder TISSUE PATHOLOGY EXAM Chris Esparza MD 9/10/2018 0265   B : BLADDER TUMOR Tissue Urinary Bladder TISSUE PATHOLOGY EXAM Chris Esparza MD 9/10/2018 1846         Drains:   Urethral Catheter Latex 24 Fr. (Active)   Daily Indications Urologist on the case and cannot remove without order 9/10/2018  6:41 PM   Collection Container Standard drainage bag 9/10/2018  6:41 PM   Securement Method Securing device 9/10/2018  6:41 PM   Irrigation/Instillation Indication patency maintenance 9/10/2018  6:41 PM   Irrigation Ease no resistance met 9/10/2018  6:41 PM       Continuous Bladder Irrigation Triple-lumen 24 Fr (Active)       Complications: none    Plan: We will await pathology.  He will likely need a cystectomy.  We will await pathology.  A static evaluation will also be needed..      (Please note that portions of this note were completed with a voice recognition program.)  Chris Esparza MD     Date: 9/10/2018  Time: 6:59 PM

## 2018-09-10 NOTE — ANESTHESIA PREPROCEDURE EVALUATION
Anesthesia Evaluation     Patient summary reviewed and Nursing notes reviewed   no history of anesthetic complications:  NPO Solid Status: > 8 hours  NPO Liquid Status: > 8 hours           Airway   Mallampati: I  TM distance: >3 FB  Neck ROM: full  No difficulty expected  Dental      Pulmonary     breath sounds clear to auscultation  (+) sleep apnea on CPAP,   (-) not a smoker  Cardiovascular   Exercise tolerance: poor (<4 METS)    ECG reviewed  Rhythm: regular  Rate: normal    (+) hypertension,       Neuro/Psych  (+) numbness,     (-) seizures, TIA, CVA    ROS Comment: Diabetic neuropathy  GI/Hepatic/Renal/Endo    (+)  GERD,  renal disease (h/o bladdder tumor resection 1 year ago, now with gross hematuria), diabetes mellitus type 2,     Musculoskeletal     Abdominal    Substance History      OB/GYN          Other   (+) arthritis   history of cancer (bladder cancer) active                    Anesthesia Plan    ASA 3     general     intravenous induction   Anesthetic plan, all risks, benefits, and alternatives have been provided, discussed and informed consent has been obtained with: patient.

## 2018-09-10 NOTE — PROGRESS NOTES
Mr. Bradley is 68 y.o. male    CHIEF COMPLAINT: Blood in the urine    HPI  Hematuria  Patient complains of gross hematuria. Onset of hematuria was about  2 days ago and was sudden in onset. He was just cystoscoped and found to have a bladder cancer recurrence. The course has been worsening. Possible contributing factors to consider include Antiplatelet therapy with ASA but he hasn't taken in one week. Management of the hematuria include(s) observation which has not  improved it. Associated symptoms include  Poor stream, urgency and frequency.      The following portions of the patient's history were reviewed and updated as appropriate: allergies, current medications, past family history, past medical history, past social history, past surgical history and problem list.      Review of Systems   Constitutional: Negative for appetite change, chills and fever.   HENT: Negative for hearing loss and sore throat.    Eyes: Negative for pain and redness.   Respiratory: Negative for cough and shortness of breath.    Cardiovascular: Negative for chest pain and leg swelling.   Gastrointestinal: Negative for abdominal pain, anal bleeding, blood in stool, nausea and vomiting.   Endocrine: Negative for cold intolerance and heat intolerance.   Genitourinary: Positive for difficulty urinating and hematuria. Negative for dysuria, flank pain, frequency and urgency.   Musculoskeletal: Negative for joint swelling and myalgias.   Skin: Negative for color change and rash.   Allergic/Immunologic: Negative for immunocompromised state.   Neurological: Negative for dizziness and speech difficulty.   Hematological: Negative for adenopathy. Does not bruise/bleed easily.   Psychiatric/Behavioral: Negative for dysphoric mood and suicidal ideas.           Current Outpatient Prescriptions:   •  allopurinol (ZYLOPRIM) 300 MG tablet, Take 300 mg by mouth daily., Disp: , Rfl:   •  Cholecalciferol 1000 UNITS capsule, Take  by mouth. daily, Disp: ,  Rfl:   •  fenofibrate (TRICOR) 145 MG tablet, Take 145 mg by mouth daily., Disp: , Rfl:   •  GABAPENTIN PO, 400mg takes 6 pills a day.takes 2 tabs am noon pm, Disp: , Rfl:   •  LISINOPRIL PO, 40mg 1 bid, Disp: , Rfl:   •  LOVASTATIN PO, Take  by mouth. 40mg daily, Disp: , Rfl:   •  magnesium oxide (MAG-OX) 400 MG tablet, Take 400 mg 3 tabs 2 times daily, Disp: , Rfl:   •  METFORMIN HCL PO, Take  by mouth.1000mg bid, Disp: , Rfl:   •  OMEPRAZOLE PO, Take  by mouth. 20 mg daily, Disp: , Rfl:   •  aspirin 81 MG tablet, Take 81 mg by mouth daily., Disp: , Rfl:   •  furosemide (LASIX) 40 MG tablet, Take 40 mg by mouth 2 (Two) Times a Day., Disp: , Rfl:   •  Omega-3 Fatty Acids (FISH OIL) 1000 MG capsule capsule, Take 1,000 mg by mouth Daily With Breakfast., Disp: , Rfl:   •  vitamin B-12 (CYANOCOBALAMIN) 1000 MCG tablet, Take 1,000 mcg by mouth daily., Disp: , Rfl:     Past Medical History:   Diagnosis Date   • Bladder neoplasm of uncertain malignant potential 7/20/2017   • Degenerative arthritis of cervical spine with nerve compression     per patient report   • Diabetes mellitus (CMS/HCC)    • GERD (gastroesophageal reflux disease)    • Hearing aid worn    • Hypertension    • Impaired hearing     without hearing aids can barely hear anything   • Neuropathy    • Sleep apnea     wears cpap       Past Surgical History:   Procedure Laterality Date   • CATARACT EXTRACTION W/ INTRAOCULAR LENS IMPLANT     • CHOLECYSTECTOMY     • EXPLORATORY LAPAROTOMY     • LEG SURGERY     • TRANSURETHRAL RESECTION OF BLADDER TUMOR Bilateral 7/25/2017    Procedure: CYSTOSCOPY TRANSURETHRAL RESECTION OF BLADDER TUMOR & POSSIBLE BILATERAL RETROGRADE URETEROPYELOGRAMS;  Surgeon: Chris Esparza MD;  Location: [facility] OR;  Service:    • TRANSURETHRAL RESECTION OF BLADDER TUMOR N/A 11/3/2017    Procedure: CYSTOSCOPY TRANSURETHRAL RESECTION OF BLADDER TUMOR ;  Surgeon: Chris Esparza MD;  Location: [facility] OR;  Service:        Social History  "    Social History   • Marital status:      Social History Main Topics   • Smoking status: Former Smoker     Quit date: 1997   • Smokeless tobacco: Never Used   • Alcohol use No   • Drug use: No   • Sexual activity: Defer     Other Topics Concern   • Not on file       Family History   Problem Relation Age of Onset   • Cancer Father    • Hypertension Father    • Heart disease Mother    • Arthritis Mother    • Hypertension Mother    • Cancer Sister    • Cancer Brother    • Heart disease Sister          /68   Temp 97.8 °F (36.6 °C)   Ht 177.8 cm (70\")   Wt 103 kg (227 lb)   BMI 32.57 kg/m²       Physical Exam  Constitutional:  They  appear well-developed and well-nourished. There are no obvious deformities. No distress.   Pulmonary/Chest: Effort normal.   GI: Soft. The patient exhibits no distension and no mass. There is no tenderness. There is no rebound and no guarding. No hernia.   Neurological: Patient is alert and oriented to person, place, and time.   Skin: Skin is warm and dry. Not diaphoretic.   Psychiatric:  normal mood and affect. Not agitated.         Data  Results for orders placed or performed in visit on 09/10/18   POC Urinalysis Dipstick, Multipro   Result Value Ref Range    Color Red (A) Yellow, Straw, Dark Yellow, Nicolasa    Clarity, UA Cloudy (A) Clear    Glucose,  mg/dL (A) Negative, 1000 mg/dL (3+) mg/dL    Bilirubin Large (3+) (A) Negative    Ketones, UA 2+ (A) Negative    Specific Gravity  1.005 1.005 - 1.030    Blood, UA Large (A) Negative    pH, Urine 9.0 (A) 5.0 - 8.0    Protein,  mg/dL (A) Negative mg/dL    Urobilinogen, UA Normal Normal    Nitrite, UA Positive (A) Negative    Leukocytes Large (3+) (A) Negative     Microscopic Urinalysis  I inspected the urine myself based on the clinical situation including the dipstick urine. The urine is spun in a centrifuge for three minutes. The spun urine shows Too numerous to count rbc/hpf, none wbc/hpf, none epi/hpf, " negative bacteria, negative crystals, and negative casts.     Assessment and Plan  Diagnoses and all orders for this visit:    Malignant neoplasm of posterior wall of urinary bladder (CMS/HCC)  -     POC Urinalysis Dipstick, Multipro    Gross hematuria      - He needs to be admitted for emergent transurethral resection of bladder tumor. Significant worsening of his gross hematuria. Risk of bladder perforation is discussed.   - I will have to admit him to the hospital today.  He will need to stay overnight for continuous bladder irrigation likely go home with Gutierrez catheter after transurethral resection.  -We will check hemoglobin and transfuse if needed.  -His clots are pretty large and I do not think we can get these out with a catheter. I am not going to place one unless he goes into retention.    (Please note that portions of this note were completed with a voice recognition program.)  Chris Esparza MD  09/10/18  9:38 AM      Cc:Dr. Yousif

## 2018-09-10 NOTE — H&P
CHIEF COMPLAINT: Blood in the urine    HPI  Hematuria  Patient complains of gross hematuria. Onset of hematuria was about  2 days ago and was sudden in onset. He was just cystoscoped and found to have a bladder cancer recurrence. The course has been worsening. Possible contributing factors to consider include Antiplatelet therapy with ASA but he hasn't taken in one week. Management of the hematuria include(s) observation which has not  improved it. Associated symptoms include  Poor stream, urgency and frequency.      The following portions of the patient's history were reviewed and updated as appropriate: allergies, current medications, past family history, past medical history, past social history, past surgical history and problem list.      Review of Systems   Constitutional: Negative for appetite change, chills and fever.   HENT: Negative for hearing loss and sore throat.    Eyes: Negative for pain and redness.   Respiratory: Negative for cough and shortness of breath.    Cardiovascular: Negative for chest pain and leg swelling.   Gastrointestinal: Negative for abdominal pain, anal bleeding, blood in stool, nausea and vomiting.   Endocrine: Negative for cold intolerance and heat intolerance.   Genitourinary: Positive for difficulty urinating and hematuria. Negative for dysuria, flank pain, frequency and urgency.   Musculoskeletal: Negative for joint swelling and myalgias.   Skin: Negative for color change and rash.   Allergic/Immunologic: Negative for immunocompromised state.   Neurological: Negative for dizziness and speech difficulty.   Hematological: Negative for adenopathy. Does not bruise/bleed easily.   Psychiatric/Behavioral: Negative for dysphoric mood and suicidal ideas.           Current Outpatient Prescriptions:   •  allopurinol (ZYLOPRIM) 300 MG tablet, Take 300 mg by mouth daily., Disp: , Rfl:   •  Cholecalciferol 1000 UNITS capsule, Take  by mouth. daily, Disp: , Rfl:   •  fenofibrate (TRICOR) 145  MG tablet, Take 145 mg by mouth daily., Disp: , Rfl:   •  GABAPENTIN PO, 400mg takes 6 pills a day.takes 2 tabs am noon pm, Disp: , Rfl:   •  LISINOPRIL PO, 40mg 1 bid, Disp: , Rfl:   •  LOVASTATIN PO, Take  by mouth. 40mg daily, Disp: , Rfl:   •  magnesium oxide (MAG-OX) 400 MG tablet, Take 400 mg 3 tabs 2 times daily, Disp: , Rfl:   •  METFORMIN HCL PO, Take  by mouth.1000mg bid, Disp: , Rfl:   •  OMEPRAZOLE PO, Take  by mouth. 20 mg daily, Disp: , Rfl:   •  aspirin 81 MG tablet, Take 81 mg by mouth daily., Disp: , Rfl:   •  furosemide (LASIX) 40 MG tablet, Take 40 mg by mouth 2 (Two) Times a Day., Disp: , Rfl:   •  Omega-3 Fatty Acids (FISH OIL) 1000 MG capsule capsule, Take 1,000 mg by mouth Daily With Breakfast., Disp: , Rfl:   •  vitamin B-12 (CYANOCOBALAMIN) 1000 MCG tablet, Take 1,000 mcg by mouth daily., Disp: , Rfl:     Past Medical History:   Diagnosis Date   • Bladder neoplasm of uncertain malignant potential 7/20/2017   • Degenerative arthritis of cervical spine with nerve compression     per patient report   • Diabetes mellitus (CMS/HCC)    • GERD (gastroesophageal reflux disease)    • Hearing aid worn    • Hypertension    • Impaired hearing     without hearing aids can barely hear anything   • Neuropathy    • Sleep apnea     wears cpap       Past Surgical History:   Procedure Laterality Date   • CATARACT EXTRACTION W/ INTRAOCULAR LENS IMPLANT     • CHOLECYSTECTOMY     • EXPLORATORY LAPAROTOMY     • LEG SURGERY     • TRANSURETHRAL RESECTION OF BLADDER TUMOR Bilateral 7/25/2017    Procedure: CYSTOSCOPY TRANSURETHRAL RESECTION OF BLADDER TUMOR & POSSIBLE BILATERAL RETROGRADE URETEROPYELOGRAMS;  Surgeon: Chris Esparza MD;  Location: [facility] OR;  Service:    • TRANSURETHRAL RESECTION OF BLADDER TUMOR N/A 11/3/2017    Procedure: CYSTOSCOPY TRANSURETHRAL RESECTION OF BLADDER TUMOR ;  Surgeon: Chris Esparza MD;  Location: [facility] OR;  Service:        Social History     Social History   • Marital status:  "     Social History Main Topics   • Smoking status: Former Smoker     Quit date: 1997   • Smokeless tobacco: Never Used   • Alcohol use No   • Drug use: No   • Sexual activity: Defer     Other Topics Concern   • Not on file       Family History   Problem Relation Age of Onset   • Cancer Father    • Hypertension Father    • Heart disease Mother    • Arthritis Mother    • Hypertension Mother    • Cancer Sister    • Cancer Brother    • Heart disease Sister          /68   Temp 97.8 °F (36.6 °C)   Ht 177.8 cm (70\")   Wt 103 kg (227 lb)   BMI 32.57 kg/m²       Physical Exam  Constitutional:  They  appear well-developed and well-nourished. There are no obvious deformities. No distress.   Pulmonary/Chest: Effort normal.   GI: Soft. The patient exhibits no distension and no mass. There is no tenderness. There is no rebound and no guarding. No hernia.   Neurological: Patient is alert and oriented to person, place, and time.   Skin: Skin is warm and dry. Not diaphoretic.   Psychiatric:  normal mood and affect. Not agitated.         Data  Results for orders placed or performed in visit on 09/10/18   POC Urinalysis Dipstick, Multipro   Result Value Ref Range    Color Red (A) Yellow, Straw, Dark Yellow, Nicolasa    Clarity, UA Cloudy (A) Clear    Glucose,  mg/dL (A) Negative, 1000 mg/dL (3+) mg/dL    Bilirubin Large (3+) (A) Negative    Ketones, UA 2+ (A) Negative    Specific Gravity  1.005 1.005 - 1.030    Blood, UA Large (A) Negative    pH, Urine 9.0 (A) 5.0 - 8.0    Protein,  mg/dL (A) Negative mg/dL    Urobilinogen, UA Normal Normal    Nitrite, UA Positive (A) Negative    Leukocytes Large (3+) (A) Negative     Microscopic Urinalysis  I inspected the urine myself based on the clinical situation including the dipstick urine. The urine is spun in a centrifuge for three minutes. The spun urine shows Too numerous to count rbc/hpf, none wbc/hpf, none epi/hpf, negative bacteria, negative crystals, and " negative casts.     Assessment and Plan  Diagnoses and all orders for this visit:    Malignant neoplasm of posterior wall of urinary bladder (CMS/HCC)  -     POC Urinalysis Dipstick, Multipro    Gross hematuria      - He needs to be admitted for emergent transurethral resection of bladder tumor. Significant worsening of his gross hematuria. Risk of bladder perforation is discussed.   - I will have to admit him to the hospital today.  He will need to stay overnight for continuous bladder irrigation likely go home with Gutierrez catheter after transurethral resection.  -We will check hemoglobin and transfuse if needed.  -His clots are pretty large and I do not think we can get these out with a catheter. I am not going to place one unless he goes into retention.    (Please note that portions of this note were completed with a voice recognition program.)  Chris Esparza MD  9/10/2018  11:10 AM

## 2018-09-10 NOTE — PLAN OF CARE
"Problem: Patient Care Overview  Goal: Plan of Care Review  Outcome: Ongoing (interventions implemented as appropriate)   09/10/18 1530   Coping/Psychosocial   Plan of Care Reviewed With patient;spouse   Plan of Care Review   Progress no change   OTHER   Outcome Summary Patient direct admit to [facility] from home, voiding difficulties with clots since Saturday (9/8/18); plan for TURBT today, c/o mild pain, no c/o nausea/vomiting, wife at bedside, up ab maria elena, refuses IV insertion, awaiting \"numbing cream\" available in Holding. Will continue to monitor.     Goal: Individualization and Mutuality  Outcome: Ongoing (interventions implemented as appropriate)   09/10/18 1530   Individualization   Patient Specific Preferences Keep door closed   Patient Specific Goals (Include Timeframe) Go home   Patient Specific Interventions Wet wipes in bedroom   Mutuality/Individual Preferences   What Anxieties, Fears, Concerns, or Questions Do You Have About Your Care? Use numbing cream for IV insertion   What Information Would Help Us Give You More Personalized Care? Educate   How Would You and/or Your Support Person Like to Participate in Your Care? Educate   Mutuality/Individual Preferences   How to Address Anxieties/Fears Educate     Goal: Discharge Needs Assessment  Outcome: Ongoing (interventions implemented as appropriate)   09/10/18 1404 09/10/18 1530   Discharge Needs Assessment   Readmission Within the Last 30 Days --  no previous admission in last 30 days   Concerns to be Addressed --  no discharge needs identified   Patient/Family Anticipates Transition to --  home with family   Patient/Family Anticipated Services at Transition --  none   Transportation Concerns car, none --    Transportation Anticipated car, drives self;family or friend will provide --    Anticipated Changes Related to Illness --  none   Equipment Needed After Discharge --  none   Disability   Equipment Currently Used at Home none --      Goal: Interprofessional " Rounds/Family Conf  Outcome: Ongoing (interventions implemented as appropriate)   09/10/18 6470   Interdisciplinary Rounds/Family Conf   Participants family;patient;physician;nursing       Problem: Surgery Nonspecified (Adult)  Goal: Signs and Symptoms of Listed Potential Problems Will be Absent, Minimized or Managed (Surgery Nonspecified)  Outcome: Ongoing (interventions implemented as appropriate)    Goal: Anesthesia/Sedation Recovery  Outcome: Ongoing (interventions implemented as appropriate)

## 2018-09-10 NOTE — ANESTHESIA PROCEDURE NOTES
Airway  Urgency: elective    Airway not difficult    General Information and Staff    Patient location during procedure: OR  CRNA: MANJINDER TOBIN    Indications and Patient Condition  Indications for airway management: airway protection    Preoxygenated: yes  MILS maintained throughout  Mask difficulty assessment: 1 - vent by mask    Final Airway Details  Final airway type: endotracheal airway      Successful airway: ETT  Cuffed: yes   Successful intubation technique: direct laryngoscopy  Facilitating devices/methods: intubating stylet  Endotracheal tube insertion site: oral  Blade: Pavan  Blade size: 4  ETT size: 8.0 mm  Cormack-Lehane Classification: grade IIa - partial view of glottis  Placement verified by: chest auscultation and capnometry   Cuff volume (mL): 10  Measured from: lips  ETT to lips (cm): 22

## 2018-09-11 VITALS
HEIGHT: 67 IN | RESPIRATION RATE: 16 BRPM | WEIGHT: 223.4 LBS | OXYGEN SATURATION: 93 % | DIASTOLIC BLOOD PRESSURE: 50 MMHG | BODY MASS INDEX: 35.06 KG/M2 | HEART RATE: 82 BPM | SYSTOLIC BLOOD PRESSURE: 114 MMHG | TEMPERATURE: 99 F

## 2018-09-11 LAB
ANION GAP SERPL CALCULATED.3IONS-SCNC: 11 MMOL/L (ref 4–13)
BASOPHILS # BLD AUTO: 0.03 10*3/MM3 (ref 0–0.2)
BASOPHILS NFR BLD AUTO: 0.3 % (ref 0–2)
BUN BLD-MCNC: 27 MG/DL (ref 5–21)
BUN/CREAT SERPL: 18 (ref 7–25)
CALCIUM SPEC-SCNC: 9.4 MG/DL (ref 8.4–10.4)
CHLORIDE SERPL-SCNC: 104 MMOL/L (ref 98–110)
CO2 SERPL-SCNC: 25 MMOL/L (ref 24–31)
CREAT BLD-MCNC: 1.5 MG/DL (ref 0.5–1.4)
DEPRECATED RDW RBC AUTO: 47.8 FL (ref 40–54)
EOSINOPHIL # BLD AUTO: 0.01 10*3/MM3 (ref 0–0.7)
EOSINOPHIL NFR BLD AUTO: 0.1 % (ref 0–4)
ERYTHROCYTE [DISTWIDTH] IN BLOOD BY AUTOMATED COUNT: 15.1 % (ref 12–15)
GFR SERPL CREATININE-BSD FRML MDRD: 46 ML/MIN/1.73
GLUCOSE BLD-MCNC: 112 MG/DL (ref 70–100)
GLUCOSE BLDC GLUCOMTR-MCNC: 109 MG/DL (ref 70–130)
GLUCOSE BLDC GLUCOMTR-MCNC: 182 MG/DL (ref 70–130)
HCT VFR BLD AUTO: 32.7 % (ref 40–52)
HGB BLD-MCNC: 10.7 G/DL (ref 14–18)
IMM GRANULOCYTES # BLD: 0.04 10*3/MM3 (ref 0–0.03)
IMM GRANULOCYTES NFR BLD: 0.4 % (ref 0–5)
LYMPHOCYTES # BLD AUTO: 1.17 10*3/MM3 (ref 0.72–4.86)
LYMPHOCYTES NFR BLD AUTO: 12.4 % (ref 15–45)
MCH RBC QN AUTO: 28.2 PG (ref 28–32)
MCHC RBC AUTO-ENTMCNC: 32.7 G/DL (ref 33–36)
MCV RBC AUTO: 86.3 FL (ref 82–95)
MONOCYTES # BLD AUTO: 0.58 10*3/MM3 (ref 0.19–1.3)
MONOCYTES NFR BLD AUTO: 6.2 % (ref 4–12)
NEUTROPHILS # BLD AUTO: 7.59 10*3/MM3 (ref 1.87–8.4)
NEUTROPHILS NFR BLD AUTO: 80.6 % (ref 39–78)
NRBC BLD MANUAL-RTO: 0 /100 WBC (ref 0–0)
PLATELET # BLD AUTO: 208 10*3/MM3 (ref 130–400)
PMV BLD AUTO: 8.7 FL (ref 6–12)
POTASSIUM BLD-SCNC: 4.8 MMOL/L (ref 3.5–5.3)
RBC # BLD AUTO: 3.79 10*6/MM3 (ref 4.8–5.9)
SODIUM BLD-SCNC: 140 MMOL/L (ref 135–145)
WBC NRBC COR # BLD: 9.42 10*3/MM3 (ref 4.8–10.8)

## 2018-09-11 PROCEDURE — 99217 PR OBSERVATION CARE DISCHARGE MANAGEMENT: CPT | Performed by: UROLOGY

## 2018-09-11 PROCEDURE — 85025 COMPLETE CBC W/AUTO DIFF WBC: CPT | Performed by: UROLOGY

## 2018-09-11 PROCEDURE — 63710000001 INSULIN LISPRO (HUMAN) PER 5 UNITS: Performed by: UROLOGY

## 2018-09-11 PROCEDURE — 25010000003 CEFAZOLIN PER 500 MG: Performed by: UROLOGY

## 2018-09-11 PROCEDURE — 94799 UNLISTED PULMONARY SVC/PX: CPT

## 2018-09-11 PROCEDURE — 80048 BASIC METABOLIC PNL TOTAL CA: CPT | Performed by: UROLOGY

## 2018-09-11 PROCEDURE — 94760 N-INVAS EAR/PLS OXIMETRY 1: CPT

## 2018-09-11 PROCEDURE — G0378 HOSPITAL OBSERVATION PER HR: HCPCS

## 2018-09-11 PROCEDURE — 82962 GLUCOSE BLOOD TEST: CPT

## 2018-09-11 RX ORDER — ALLOPURINOL 300 MG/1
300 TABLET ORAL DAILY
COMMUNITY

## 2018-09-11 RX ORDER — FENOFIBRATE 145 MG/1
145 TABLET, COATED ORAL NIGHTLY
COMMUNITY
End: 2021-11-03 | Stop reason: ALTCHOICE

## 2018-09-11 RX ORDER — LISINOPRIL 40 MG/1
40 TABLET ORAL 2 TIMES DAILY
COMMUNITY
End: 2020-01-28

## 2018-09-11 RX ORDER — GABAPENTIN 400 MG/1
800 CAPSULE ORAL EVERY 8 HOURS SCHEDULED
COMMUNITY

## 2018-09-11 RX ORDER — LOVASTATIN 40 MG/1
20 TABLET ORAL DAILY
COMMUNITY
End: 2021-11-03 | Stop reason: ALTCHOICE

## 2018-09-11 RX ORDER — MELATONIN
1000 DAILY
COMMUNITY
End: 2020-10-26

## 2018-09-11 RX ORDER — MAGNESIUM OXIDE 400 MG/1
1200 TABLET ORAL DAILY
COMMUNITY
End: 2020-10-26

## 2018-09-11 RX ORDER — HYDROCODONE BITARTRATE AND ACETAMINOPHEN 5; 325 MG/1; MG/1
1 TABLET ORAL EVERY 6 HOURS PRN
Qty: 12 TABLET | Refills: 0 | Status: SHIPPED | OUTPATIENT
Start: 2018-09-11 | End: 2018-09-14

## 2018-09-11 RX ORDER — LANOLIN ALCOHOL/MO/W.PET/CERES
1000 CREAM (GRAM) TOPICAL DAILY
COMMUNITY
End: 2020-10-26

## 2018-09-11 RX ORDER — OMEPRAZOLE 20 MG/1
20 CAPSULE, DELAYED RELEASE ORAL 2 TIMES DAILY
COMMUNITY

## 2018-09-11 RX ADMIN — GABAPENTIN 400 MG: 400 CAPSULE ORAL at 08:32

## 2018-09-11 RX ADMIN — DOCUSATE SODIUM 100 MG: 100 CAPSULE ORAL at 08:32

## 2018-09-11 RX ADMIN — LANSOPRAZOLE 30 MG: KIT at 06:17

## 2018-09-11 RX ADMIN — METFORMIN HYDROCHLORIDE 1000 MG: 500 TABLET ORAL at 08:32

## 2018-09-11 RX ADMIN — INSULIN LISPRO 2 UNITS: 100 INJECTION, SOLUTION INTRAVENOUS; SUBCUTANEOUS at 12:50

## 2018-09-11 RX ADMIN — CEFAZOLIN 2 G: 330 INJECTION, POWDER, FOR SOLUTION INTRAMUSCULAR; INTRAVENOUS at 08:32

## 2018-09-11 RX ADMIN — ALLOPURINOL 300 MG: 300 TABLET ORAL at 08:32

## 2018-09-11 RX ADMIN — CEFAZOLIN 2 G: 330 INJECTION, POWDER, FOR SOLUTION INTRAMUSCULAR; INTRAVENOUS at 00:40

## 2018-09-11 RX ADMIN — LISINOPRIL 40 MG: 20 TABLET ORAL at 08:32

## 2018-09-11 RX ADMIN — FUROSEMIDE 40 MG: 40 TABLET ORAL at 08:32

## 2018-09-11 RX ADMIN — FENOFIBRATE 145 MG: 145 TABLET ORAL at 08:32

## 2018-09-11 NOTE — ANESTHESIA POSTPROCEDURE EVALUATION
Patient: Chung Bradley    Procedure Summary     Date:  09/10/18 Room / Location:   [facility] OR  /  [facility] OR    Anesthesia Start:  1705 Anesthesia Stop:  1837    Procedure:  CYSTOSCOPY TRANSURETHRAL RESECTION OF LARGE BLADDER TUMOR, CLOT EVACUATION, AND FULGURATION (N/A Bladder) Diagnosis:       Bladder neoplasm of uncertain malignant potential      (Bladder neoplasm of uncertain malignant potential [D41.4])    Surgeon:  Chris Esparza MD Provider:  Daquan Ram CRNA    Anesthesia Type:  general ASA Status:  3          Anesthesia Type: general  Last vitals  BP   137/60 (09/10/18 2000)   Temp   97.4 °F (36.3 °C) (09/10/18 2000)   Pulse   85 (09/10/18 2000)   Resp   16 (09/10/18 2000)     SpO2   94 % (09/10/18 2000)     Post Anesthesia Care and Evaluation    Patient location during evaluation: PACU  Patient participation: complete - patient participated  Level of consciousness: awake and alert  Pain score: 0  Pain management: adequate  Airway patency: patent  Anesthetic complications: No anesthetic complications  PONV Status: none  Cardiovascular status: acceptable and stable  Respiratory status: acceptable  Hydration status: acceptable

## 2018-09-11 NOTE — PLAN OF CARE
Problem: Patient Care Overview  Goal: Plan of Care Review  Outcome: Outcome(s) achieved Date Met: 09/11/18 09/11/18 1446   Coping/Psychosocial   Plan of Care Reviewed With patient;spouse   Plan of Care Review   Progress improving   OTHER   Outcome Summary Patient d/c home to the care of spouse. Verbalizes and return demonstrates cath care and education. Denies pain/nausea. Ambulating well. Urine draining clear w/ terrell cath and CBI and d/c'd this AM.      Goal: Individualization and Mutuality  Outcome: Outcome(s) achieved Date Met: 09/11/18    Goal: Discharge Needs Assessment  Outcome: Outcome(s) achieved Date Met: 09/11/18    Goal: Interprofessional Rounds/Family Conf  Outcome: Outcome(s) achieved Date Met: 09/11/18      Problem: Surgery Nonspecified (Adult)  Goal: Signs and Symptoms of Listed Potential Problems Will be Absent, Minimized or Managed (Surgery Nonspecified)  Outcome: Outcome(s) achieved Date Met: 09/11/18 09/11/18 1446   Goal/Outcome Evaluation   Problems Assessed (Surgery) all   Problems Present (Surgery) none     Goal: Anesthesia/Sedation Recovery  Outcome: Outcome(s) achieved Date Met: 09/11/18

## 2018-09-11 NOTE — PLAN OF CARE
Problem: Patient Care Overview  Goal: Plan of Care Review  Outcome: Ongoing (interventions implemented as appropriate)   09/11/18 0235   Coping/Psychosocial   Plan of Care Reviewed With patient;spouse   Plan of Care Review   Progress improving   OTHER   Outcome Summary after Sx, CBI was red-pink then pink then sl. pink now; mod rate of flow;     Goal: Individualization and Mutuality  Outcome: Ongoing (interventions implemented as appropriate)   09/10/18 1530   Individualization   Patient Specific Preferences Keep door closed   Patient Specific Goals (Include Timeframe) Go home

## 2018-09-11 NOTE — DISCHARGE SUMMARY
Date of Discharge:  9/11/2018    Discharge Diagnosis:   #1.  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  #2.  Essential hypertension    Presenting Problem/History of Present Illness  Bladder cancer (CMS/HCC) [C67.9]       Hospital Course  Patient is a 68 y.o. male presented with large bladder tumor.  He underwent transurethral resection of bladder tumor.  Postoperatively had a three-way Gutierrez catheter in place was kept on his bladder irrigation.  On the first postoperative day years was reasonably clearly wean the continuous bladder irrigation off by around the noon hour.  Following that the urine remained reasonably clear and was discharged home with follow-up in the office for cath removal in 6 days.  Pathology will be discussed at that visit for future plans..      Procedures Performed  Procedure(s):  CYSTOSCOPY TRANSURETHRAL RESECTION OF LARGE BLADDER TUMOR, CLOT EVACUATION, AND FULGURATION       Consults:   Consults     No orders found for last 30 day(s).          Condition on Discharge:  Good    Vital Signs  Temp:  [97.4 °F (36.3 °C)-99 °F (37.2 °C)] 99 °F (37.2 °C)  Heart Rate:  [59-85] 82  Resp:  [12-16] 16  BP: (112-147)/(50-74) 114/50      Discharge Disposition  Home or Self Care    Discharge Medications     Discharge Medications      Continue These Medications      Instructions Start Date   allopurinol 300 MG tablet  Commonly known as:  ZYLOPRIM   300 mg, Oral, Daily      aspirin 81 MG tablet   81 mg, Oral, Daily      cholecalciferol 1000 units tablet  Commonly known as:  VITAMIN D3   1,000 Units, Oral, Daily      fenofibrate 145 MG tablet  Commonly known as:  TRICOR   145 mg, Oral, Daily      fish oil 1000 MG capsule capsule   1,000 mg, Oral, Daily With Breakfast      furosemide 40 MG tablet  Commonly known as:  LASIX   40 mg, Oral, 2 Times Daily      gabapentin 400 MG capsule  Commonly known as:  NEURONTIN   Take 2 capsules by mouth in the am, two  capsules by mouth in the afternoon, and 2 capsules by mouth in the pm.       lisinopril 40 MG tablet  Commonly known as:  PRINIVIL,ZESTRIL   40 mg, Oral, 2 Times Daily      lovastatin 40 MG tablet  Commonly known as:  MEVACOR   40 mg, Oral, Daily      magnesium oxide 400 MG tablet  Commonly known as:  MAG-OX   Take 3 tablets by mouth two times daily.       metFORMIN 1000 MG tablet  Commonly known as:  GLUCOPHAGE   1,000 mg, Oral, 2 Times Daily With Meals      omeprazole 20 MG capsule  Commonly known as:  priLOSEC   20 mg, Oral, Daily      vitamin B-12 1000 MCG tablet  Commonly known as:  CYANOCOBALAMIN   1,000 mcg, Oral, Daily             Discharge Diet:   Diet Instructions     Diet: Regular       Discharge Diet:  Regular          Activity at Discharge:   Activity Instructions     Discharge Activity Restrictions       1) No driving until catheter is removed  2) Return to school / work 24 hours after catheter is removed.  3) May shower / sponge bathe starting tomorrow  4) Do not lift / push / pull more then 10 lbs, exercise, or ride ATV, Bicycle, Lawn equipment while catheter is in place.          Follow-up Appointments  Future Appointments  Date Time Provider Department Center   9/17/2018 9:30 AM [facility] PAT 30 RM 1 [facility]     Additional Instructions for the Follow-ups that You Need to Schedule     Call MD With Problems / Concerns    As directed      Instructions: If catheter becomes obstructed try to gently irrigate as instructed by nursing. If catheter will not drain after attempt at irrigation. Contact office between hours of [phone] or go to Emergency room at [facility]    Order Comments:  Instructions: If catheter becomes obstructed try to gently irrigate as instructed by nursing. If catheter will not drain after attempt at irrigation. Contact office between hours of [phone] or go to Emergency room at [facility]          Discharge Follow-up with Specified Provider: Dr. Esparza     As directed      To:  Dr. Esparza    Follow Up Details:  Monday 9/17/18 @0730 for cather removal and discuss pathology               Test Results Pending at Discharge   Order Current Status    Tissue Pathology Exam In process           Chris Esparza MD  09/11/18  12:49 PM    Time: Discharge 15 min

## 2018-09-17 ENCOUNTER — OFFICE VISIT (OUTPATIENT)
Dept: UROLOGY | Facility: CLINIC | Age: 68
End: 2018-09-17

## 2018-09-17 ENCOUNTER — APPOINTMENT (OUTPATIENT)
Dept: PREADMISSION TESTING | Facility: HOSPITAL | Age: 68
End: 2018-09-17

## 2018-09-17 VITALS — BODY MASS INDEX: 31.5 KG/M2 | WEIGHT: 220 LBS | HEIGHT: 70 IN

## 2018-09-17 DIAGNOSIS — C67.8 MALIGNANT NEOPLASM OF OVERLAPPING SITES OF BLADDER (HCC): Primary | ICD-10-CM

## 2018-09-17 LAB
ALBUMIN SERPL-MCNC: 4.4 G/DL (ref 3.5–5)
ALBUMIN/GLOB SERPL: 1.8 G/DL (ref 1.1–2.5)
ALP SERPL-CCNC: 54 U/L (ref 24–120)
ALT SERPL-CCNC: 22 U/L (ref 0–54)
AST SERPL-CCNC: 19 U/L (ref 7–45)
BILIRUB SERPL-MCNC: 0.4 MG/DL (ref 0.1–1)
BUN SERPL-MCNC: 52 MG/DL (ref 5–21)
BUN/CREAT SERPL: 25.1 (ref 7–25)
CALCIUM SERPL-MCNC: 9.7 MG/DL (ref 8.4–10.4)
CHLORIDE SERPL-SCNC: 96 MMOL/L (ref 98–110)
CO2 SERPL-SCNC: 28 MMOL/L (ref 24–31)
CREAT SERPL-MCNC: 2.07 MG/DL (ref 0.5–1.4)
GLOBULIN SER CALC-MCNC: 2.4 GM/DL
GLUCOSE SERPL-MCNC: 116 MG/DL (ref 70–100)
POTASSIUM SERPL-SCNC: 4.8 MMOL/L (ref 3.5–5.3)
PROT SERPL-MCNC: 6.8 G/DL (ref 6.3–8.7)
SODIUM SERPL-SCNC: 140 MMOL/L (ref 135–145)

## 2018-09-17 PROCEDURE — 99213 OFFICE O/P EST LOW 20 MIN: CPT | Performed by: UROLOGY

## 2018-09-17 NOTE — PROGRESS NOTES
Mr. Bradley is 68 y.o. male    CHIEF COMPLAINT: I am here for hospital follow up and to have catheter removed.    HPI  He is here to follow up TURBT. Catheter has just been removed. He had some hematuria.   The following portions of the patient's history were reviewed and updated as appropriate: allergies, current medications, past family history, past medical history, past social history, past surgical history and problem list.      Review of Systems   Constitutional: Negative for chills and fever.   Gastrointestinal: Negative for abdominal pain, anal bleeding and blood in stool.   Genitourinary: Negative for dysuria, frequency, hematuria and urgency.           Current Outpatient Prescriptions:   •  allopurinol (ZYLOPRIM) 300 MG tablet, Take 300 mg by mouth Daily., Disp: , Rfl:   •  aspirin 81 MG tablet, Take 81 mg by mouth Daily., Disp: , Rfl:   •  cholecalciferol (VITAMIN D3) 1000 units tablet, Take 1,000 Units by mouth Daily., Disp: , Rfl:   •  fenofibrate (TRICOR) 145 MG tablet, Take 145 mg by mouth Daily., Disp: , Rfl:   •  furosemide (LASIX) 40 MG tablet, Take 40 mg by mouth 2 (Two) Times a Day., Disp: , Rfl:   •  gabapentin (NEURONTIN) 400 MG capsule, Take 800 mg by mouth Every 8 (Eight) Hours. Take 2 capsules by mouth in the am, two capsules by mouth in the afternoon, and 2 capsules by mouth in the pm. , Disp: , Rfl:   •  lisinopril (PRINIVIL,ZESTRIL) 40 MG tablet, Take 40 mg by mouth 2 (Two) Times a Day., Disp: , Rfl:   •  lovastatin (MEVACOR) 40 MG tablet, Take 40 mg by mouth Daily., Disp: , Rfl:   •  magnesium oxide (MAG-OX) 400 MG tablet, Take 1,200 mg by mouth Daily. Take 3 tablets by mouth two times daily. , Disp: , Rfl:   •  metFORMIN (GLUCOPHAGE) 1000 MG tablet, Take 1,000 mg by mouth 2 (Two) Times a Day With Meals., Disp: , Rfl:   •  Omega-3 Fatty Acids (FISH OIL) 1000 MG capsule capsule, Take 1,000 mg by mouth Daily With Breakfast., Disp: , Rfl:   •  omeprazole (priLOSEC) 20 MG capsule, Take 20  mg by mouth Daily., Disp: , Rfl:   •  vitamin B-12 (CYANOCOBALAMIN) 1000 MCG tablet, Take 1,000 mcg by mouth Daily., Disp: , Rfl:     Past Medical History:   Diagnosis Date   • Bladder neoplasm of uncertain malignant potential 7/20/2017   • Degenerative arthritis of cervical spine with nerve compression     per patient report   • Diabetes mellitus (CMS/HCC)    • GERD (gastroesophageal reflux disease)    • Hearing aid worn    • Hypertension    • Impaired hearing     without hearing aids can barely hear anything   • Neuropathy    • Sleep apnea     wears cpap       Past Surgical History:   Procedure Laterality Date   • CATARACT EXTRACTION W/ INTRAOCULAR LENS IMPLANT     • CHOLECYSTECTOMY     • EXPLORATORY LAPAROTOMY     • LEG SURGERY     • TRANSURETHRAL RESECTION OF BLADDER TUMOR Bilateral 7/25/2017    Procedure: CYSTOSCOPY TRANSURETHRAL RESECTION OF BLADDER TUMOR & POSSIBLE BILATERAL RETROGRADE URETEROPYELOGRAMS;  Surgeon: Chris Esparza MD;  Location:  [facility] OR;  Service:    • TRANSURETHRAL RESECTION OF BLADDER TUMOR N/A 11/3/2017    Procedure: CYSTOSCOPY TRANSURETHRAL RESECTION OF BLADDER TUMOR ;  Surgeon: Chris Esparza MD;  Location:  [facility] OR;  Service:    • TRANSURETHRAL RESECTION OF BLADDER TUMOR N/A 9/10/2018    Procedure: CYSTOSCOPY TRANSURETHRAL RESECTION OF LARGE BLADDER TUMOR, CLOT EVACUATION, AND FULGURATION;  Surgeon: Chris Esparza MD;  Location:  [facility] OR;  Service: Urology       Social History     Social History   • Marital status:      Social History Main Topics   • Smoking status: Former Smoker     Quit date: 1997   • Smokeless tobacco: Never Used   • Alcohol use No   • Drug use: No   • Sexual activity: Defer     Other Topics Concern   • Not on file       Family History   Problem Relation Age of Onset   • Cancer Father    • Hypertension Father    • Heart disease Mother    • Arthritis Mother    • Hypertension Mother    • Cancer Sister    • Cancer Brother    • Heart disease Sister   "        Ht 177.8 cm (70\")   Wt 99.8 kg (220 lb)   BMI 31.57 kg/m²       Physical Exam  Constitutional:  They  appear well-developed and well-nourished. There are no obvious deformities. No distress.   Pulmonary/Chest: Effort normal.   GI: Soft. The patient exhibits no distension and no mass. There is no tenderness. There is no rebound and no guarding. No hernia.   Neurological: Patient is alert and oriented to person, place, and time.   Skin: Skin is warm and dry. Not diaphoretic.   Psychiatric:  normal mood and affect. Not agitated.         Data  Results for orders placed or performed during the hospital encounter of 09/10/18   Basic Metabolic Panel   Result Value Ref Range    Glucose 118 (H) 70 - 100 mg/dL    BUN 26 (H) 5 - 21 mg/dL    Creatinine 1.37 0.50 - 1.40 mg/dL    Sodium 141 135 - 145 mmol/L    Potassium 4.5 3.5 - 5.3 mmol/L    Chloride 104 98 - 110 mmol/L    CO2 27.0 24.0 - 31.0 mmol/L    Calcium 10.2 8.4 - 10.4 mg/dL    eGFR Non African Amer 51 (L) >60 mL/min/1.73    BUN/Creatinine Ratio 19.0 7.0 - 25.0    Anion Gap 10.0 4.0 - 13.0 mmol/L   CBC Auto Differential   Result Value Ref Range    WBC 7.92 4.80 - 10.80 10*3/mm3    RBC 4.20 (L) 4.80 - 5.90 10*6/mm3    Hemoglobin 11.7 (L) 14.0 - 18.0 g/dL    Hematocrit 35.8 (L) 40.0 - 52.0 %    MCV 85.2 82.0 - 95.0 fL    MCH 27.9 (L) 28.0 - 32.0 pg    MCHC 32.7 (L) 33.0 - 36.0 g/dL    RDW 15.2 (H) 12.0 - 15.0 %    RDW-SD 46.7 40.0 - 54.0 fl    MPV 8.7 6.0 - 12.0 fL    Platelets 204 130 - 400 10*3/mm3    Neutrophil % 76.6 39.0 - 78.0 %    Lymphocyte % 15.0 15.0 - 45.0 %    Monocyte % 5.9 4.0 - 12.0 %    Eosinophil % 1.4 0.0 - 4.0 %    Basophil % 0.6 0.0 - 2.0 %    Immature Grans % 0.5 0.0 - 5.0 %    Neutrophils, Absolute 6.06 1.87 - 8.40 10*3/mm3    Lymphocytes, Absolute 1.19 0.72 - 4.86 10*3/mm3    Monocytes, Absolute 0.47 0.19 - 1.30 10*3/mm3    Eosinophils, Absolute 0.11 0.00 - 0.70 10*3/mm3    Basophils, Absolute 0.05 0.00 - 0.20 10*3/mm3    Immature Grans, " Absolute 0.04 (H) 0.00 - 0.03 10*3/mm3    nRBC 0.0 0.0 - 0.0 /100 WBC   Basic Metabolic Panel   Result Value Ref Range    Glucose 112 (H) 70 - 100 mg/dL    BUN 27 (H) 5 - 21 mg/dL    Creatinine 1.50 (H) 0.50 - 1.40 mg/dL    Sodium 140 135 - 145 mmol/L    Potassium 4.8 3.5 - 5.3 mmol/L    Chloride 104 98 - 110 mmol/L    CO2 25.0 24.0 - 31.0 mmol/L    Calcium 9.4 8.4 - 10.4 mg/dL    eGFR Non African Amer 46 (L) >60 mL/min/1.73    BUN/Creatinine Ratio 18.0 7.0 - 25.0    Anion Gap 11.0 4.0 - 13.0 mmol/L   CBC Auto Differential   Result Value Ref Range    WBC 9.42 4.80 - 10.80 10*3/mm3    RBC 3.79 (L) 4.80 - 5.90 10*6/mm3    Hemoglobin 10.7 (L) 14.0 - 18.0 g/dL    Hematocrit 32.7 (L) 40.0 - 52.0 %    MCV 86.3 82.0 - 95.0 fL    MCH 28.2 28.0 - 32.0 pg    MCHC 32.7 (L) 33.0 - 36.0 g/dL    RDW 15.1 (H) 12.0 - 15.0 %    RDW-SD 47.8 40.0 - 54.0 fl    MPV 8.7 6.0 - 12.0 fL    Platelets 208 130 - 400 10*3/mm3    Neutrophil % 80.6 (H) 39.0 - 78.0 %    Lymphocyte % 12.4 (L) 15.0 - 45.0 %    Monocyte % 6.2 4.0 - 12.0 %    Eosinophil % 0.1 0.0 - 4.0 %    Basophil % 0.3 0.0 - 2.0 %    Immature Grans % 0.4 0.0 - 5.0 %    Neutrophils, Absolute 7.59 1.87 - 8.40 10*3/mm3    Lymphocytes, Absolute 1.17 0.72 - 4.86 10*3/mm3    Monocytes, Absolute 0.58 0.19 - 1.30 10*3/mm3    Eosinophils, Absolute 0.01 0.00 - 0.70 10*3/mm3    Basophils, Absolute 0.03 0.00 - 0.20 10*3/mm3    Immature Grans, Absolute 0.04 (H) 0.00 - 0.03 10*3/mm3    nRBC 0.0 0.0 - 0.0 /100 WBC   POC Glucose Once   Result Value Ref Range    Glucose 107 70 - 130 mg/dL   POC Glucose Once   Result Value Ref Range    Glucose 143 (H) 70 - 130 mg/dL   POC Glucose Once   Result Value Ref Range    Glucose 148 (H) 70 - 130 mg/dL   POC Glucose Once   Result Value Ref Range    Glucose 109 70 - 130 mg/dL   POC Glucose Once   Result Value Ref Range    Glucose 182 (H) 70 - 130 mg/dL   Tissue Pathology Exam   Result Value Ref Range    Case Report       Surgical Pathology Report                "          Case: [id number]                                  Authorizing Provider:  Chris Esparza MD       Collected:           09/10/2018 05:45 PM          Ordering Location:     [facility] OR  Received:            09/11/2018 08:36 AM          Pathologist:           Brian Castellon MD                                                        Specimens:   1) - Urinary Bladder, BLADDER CLOT                                                                  2) - Urinary Bladder, BLADDER TUMOR                                                        Final Diagnosis       1.  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  Tumor invades into muscularis propria.    2.  Bladder tumor, transurethral resection:  Invasive high-grade urothelial carcinoma with 95% squamous differentiation.  Tumor invades into muscularis propria.    AJCC pathologic stage:  pT2 Nx        Synoptic Checklist       URINARY BLADDER: Biopsy and Transurethral Resection of Bladder Tumor (TURBT)  (Bladder Bx - All Specimens)         SPECIMEN      Procedure:    TURBT       TUMOR      Tumor Site:    Not specified       Histologic Type:    Urothelial carcinoma with squamous differentiation         Percentage of Squamous Differentiation:    95 %      Histologic Grade:    High-grade       Tumor Extent:            Tumor Extension:    Tumor invades muscularis propria       Accessory Findings:            Muscularis Propria Presence:    Muscularis propria (detrusor muscle) present         Lymphovascular Invasion:    Not identified       ADDITIONAL FINDINGS      Additional Pathologic Findings:    Therapy-related changes       Additional Pathologic Findings:    Cautery artifact       Additional Pathologic Findings:    Keratinizing squamous metaplasia       Gross Description       Specimen #1 is received in a formalin filled container, labeled with the patient's name, date of birth, and \"bladder clot\".  The " "specimen consists of multiple fragments of red-brown material consistent with blood clot aggregating to 5.8 x 4.5 x 1.9 cm.  Dissection through the material reveals multiple tan-pink fragments of possible soft tissue aggregating to 1.5 x 1.3 x 0.4 cm.  Sectioning through the remaining material reveals diffuse blood clot.  The possible soft tissue fragments are submitted in block 1A.    Specimen #2 is received in a formalin filled container, labeled with the patient's name, date of birth, and \"bladder tumor\".  The specimen consists of multiple tan-pink, mucosa covered tissue fragments admixed with a large amount of blood clot.  The soft tissue fragments aggregate to 10.4 x 8.8 x 2.5 cm.  The accompanying fragments of blood clot aggregate to 5.5 x 4.4 x 1.7 cm.  Embedded within the blood clot are multiple tan-pink soft tissue fragments.  The mucosal surfaces appear raised, slightly velvety and friable.  Much of the tissue has a dusky appearance.  The soft tissue fragments are totally submitted in blocks 2A through 2GG.                 Microscopic Description       1.  Histologic sections show clot material and fragments of mostly necrotic keratinizing squamous cell carcinoma which is arising from a high-grade urothelial carcinoma.  2.  Histologic sections show a large invasive tumor consisting mostly of keratinizing squamous cells with a small 5% component of high-grade urothelial carcinoma.  Tumor invades into the muscularis propria.           Imaging Results (last 7 days)     ** No results found for the last 168 hours. **            Assessment and Plan  Diagnoses and all orders for this visit:    Malignant neoplasm of overlapping sites of bladder (CMS/HCC)  -     Comprehensive Metabolic Panel; Future  -     CT Abdomen Pelvis With Contrast; Future  -     CT Chest With & Without Contrast; Future  -     Ambulatory Referral to Urology  -     Tissue Pathology Exam    - his pathology shows significant squamous " differentiation which is a concerning finding.  It is also concerning that this gentleman had a normal cystoscopy 3 weeks ago and is on maintenance BCG but developed a significantly large tumor between flexible surveillance cystoscopies.  He will likely need to undergo radical cystoprostatectomy.  I did explain to him that I do not offer this procedure locally and that I would need to refer him to a tertiary care center.  I recommended that he see Dr. Shankar at [place].      (Please note that portions of this note were completed with a voice recognition program.)  Chris Esparza MD  09/17/18  8:08 AM      Cc: Fidel Yousif MD

## 2018-09-18 ENCOUNTER — HOSPITAL ENCOUNTER (OUTPATIENT)
Dept: CT IMAGING | Facility: HOSPITAL | Age: 68
Discharge: HOME | End: 2018-09-18
Attending: UROLOGY | Admitting: UROLOGY

## 2018-09-18 DIAGNOSIS — C67.8 MALIGNANT NEOPLASM OF OVERLAPPING SITES OF BLADDER (HCC): ICD-10-CM

## 2018-09-18 DIAGNOSIS — C67.8 MALIGNANT NEOPLASM OF OVERLAPPING SITES OF BLADDER (HCC): Primary | ICD-10-CM

## 2018-09-18 LAB — CREAT BLDA-MCNC: 2.9 MG/DL (ref 0.6–1.3)

## 2018-09-18 PROCEDURE — 0 IOHEXOL 300 MG/ML SOLUTION: Performed by: UROLOGY

## 2018-09-18 PROCEDURE — 25010000002 IOPAMIDOL 61 % SOLUTION: Performed by: UROLOGY

## 2018-09-18 PROCEDURE — 71260 CT THORAX DX C+: CPT

## 2018-09-18 PROCEDURE — 82565 ASSAY OF CREATININE: CPT

## 2018-09-18 PROCEDURE — 74177 CT ABD & PELVIS W/CONTRAST: CPT

## 2018-09-18 RX ADMIN — IOHEXOL 50 ML: 300 INJECTION, SOLUTION INTRAVENOUS at 09:38

## 2018-09-18 RX ADMIN — IOPAMIDOL 75 ML: 612 INJECTION, SOLUTION INTRAVENOUS at 09:38

## 2018-09-19 ENCOUNTER — TELEPHONE (OUTPATIENT)
Dept: UROLOGY | Facility: CLINIC | Age: 68
End: 2018-09-19

## 2018-09-19 NOTE — TELEPHONE ENCOUNTER
Patient calling to get results of his scans. Dr Esparza did not make follow up because he is going to refer him to [place].

## 2018-09-20 ENCOUNTER — TELEPHONE (OUTPATIENT)
Dept: UROLOGY | Facility: CLINIC | Age: 68
End: 2018-09-20

## 2018-09-20 DIAGNOSIS — C67.8 MALIGNANT NEOPLASM OF OVERLAPPING SITES OF BLADDER (HCC): Primary | ICD-10-CM

## 2018-09-20 LAB
BUN SERPL-MCNC: 38 MG/DL (ref 5–21)
CREAT SERPL-MCNC: 2.05 MG/DL (ref 0.5–1.4)

## 2018-09-20 NOTE — TELEPHONE ENCOUNTER
Patient is concerned about the wait time for his appt at [place] which is not scheduled until 10/23/18. He wants to know if Dr Esparza feels this is detrimental to his situation since it is so long?

## 2018-09-20 NOTE — TELEPHONE ENCOUNTER
I reviewed his CT abdomen/pelvis and chest which reveal the following:     -There is no evidence of adenopathy.  -There is some right hydronephrosis but I suspect this is due to vesicoureteral reflux that she can see that the ureter is dilated all the way down to the ureterovesical junction and there appears to be no detrusor muscle present at the entrance of the ureter.  -No masses seen in the abdomen.  -There is no evidence of any new lung mass worrisome for metastases.

## 2018-09-21 NOTE — TELEPHONE ENCOUNTER
Pts wife called this am and wanted to know the pts lab results from the BUN &Creatnine lab test. He also would like to know when he can go back on his Metformin.       Pt also wanted to follow up on the phone call from yesterday to discuss the upcoming surgery. He is very concerned and wants to know when he could meet with Dr. Esparza. Pt is not experiencing any blood in his urine, no fever, but does complain of an aching sensation when he urinates.

## 2018-09-21 NOTE — TELEPHONE ENCOUNTER
I've shared with him all I know about his bladder cancer. His next visit has to be with the doctors at [place]. The only other option I have is for him to call down to [place] and see if another doctor that takes care of bladder cancer can see him sooner. It is not unusual for it to be a month before they can see a new patient.

## 2018-09-21 NOTE — TELEPHONE ENCOUNTER
Called pt and let them know and they voiced their understanding and will be contacting the dr at [place]

## 2018-10-29 ENCOUNTER — TELEPHONE (OUTPATIENT)
Dept: NEUROSURGERY | Facility: CLINIC | Age: 68
End: 2018-10-29

## 2018-10-29 NOTE — TELEPHONE ENCOUNTER
Patient's wife called stating the patient fell this weekend and they would like to get some xrays to make sure he hasn't broken his neck.  He is scheduled for surgery in [place] on Wednesday.  He did not go to the ER after the fall.  He isn't having any symptoms but just wanted to get checked.  I explained to the wife that since he hasn't been seen since Dec 2017 we would have to see him and evaluate him before ordering testings, etc.  She is going to call Dr Yousif's office to see if they will order testing since he has seen him recently.    ann dela cruz CMA

## 2018-10-30 ENCOUNTER — TRANSCRIBE ORDERS (OUTPATIENT)
Dept: ADMINISTRATIVE | Facility: HOSPITAL | Age: 68
End: 2018-10-30

## 2018-10-30 DIAGNOSIS — M54.12 CERVICAL RADICULOPATHY: Primary | ICD-10-CM

## 2018-11-01 ENCOUNTER — HOSPITAL ENCOUNTER (OUTPATIENT)
Dept: MRI IMAGING | Facility: HOSPITAL | Age: 68
Discharge: HOME | End: 2018-11-01
Attending: INTERNAL MEDICINE | Admitting: INTERNAL MEDICINE

## 2018-11-01 DIAGNOSIS — M54.12 CERVICAL RADICULOPATHY: ICD-10-CM

## 2018-11-01 PROCEDURE — 72141 MRI NECK SPINE W/O DYE: CPT

## 2018-11-06 ENCOUNTER — OFFICE VISIT (OUTPATIENT)
Dept: NEUROSURGERY | Facility: CLINIC | Age: 68
End: 2018-11-06

## 2018-11-06 VITALS
SYSTOLIC BLOOD PRESSURE: 116 MMHG | HEIGHT: 70 IN | WEIGHT: 249.6 LBS | BODY MASS INDEX: 35.73 KG/M2 | DIASTOLIC BLOOD PRESSURE: 64 MMHG

## 2018-11-06 DIAGNOSIS — Z87.891 FORMER SMOKER: ICD-10-CM

## 2018-11-06 DIAGNOSIS — M47.812 CERVICAL SPONDYLOSIS WITHOUT MYELOPATHY: Primary | ICD-10-CM

## 2018-11-06 DIAGNOSIS — M54.12 CERVICAL RADICULOPATHY: ICD-10-CM

## 2018-11-06 DIAGNOSIS — M48.02 SPINAL STENOSIS IN CERVICAL REGION: ICD-10-CM

## 2018-11-06 PROCEDURE — 99214 OFFICE O/P EST MOD 30 MIN: CPT | Performed by: NEUROLOGICAL SURGERY

## 2018-11-06 RX ORDER — LANOLIN ALCOHOL/MO/W.PET/CERES
1200 CREAM (GRAM) TOPICAL
COMMUNITY
End: 2018-11-06 | Stop reason: SDUPTHER

## 2018-11-06 RX ORDER — OXYBUTYNIN CHLORIDE 5 MG/1
TABLET ORAL
COMMUNITY
Start: 2018-11-02 | End: 2020-10-26

## 2018-11-06 RX ORDER — FLUTICASONE PROPIONATE 50 MCG
1 SPRAY, SUSPENSION (ML) NASAL 2 TIMES DAILY PRN
COMMUNITY
Start: 2018-10-15 | End: 2020-10-19 | Stop reason: SDUPTHER

## 2018-11-06 RX ORDER — CYCLOBENZAPRINE HCL 10 MG
10 TABLET ORAL 3 TIMES DAILY PRN
Qty: 90 TABLET | Refills: 1 | Status: SHIPPED | OUTPATIENT
Start: 2018-11-06 | End: 2020-10-26

## 2018-11-06 RX ORDER — DICLOFENAC SODIUM 75 MG/1
75 TABLET, DELAYED RELEASE ORAL 2 TIMES DAILY
Qty: 60 TABLET | Refills: 3 | Status: SHIPPED | OUTPATIENT
Start: 2018-11-06 | End: 2019-02-02 | Stop reason: HOSPADM

## 2018-11-06 RX ORDER — CYCLOBENZAPRINE HCL 10 MG
TABLET ORAL
COMMUNITY
Start: 2018-08-06 | End: 2018-11-06 | Stop reason: SDUPTHER

## 2018-11-06 RX ORDER — TAMSULOSIN HYDROCHLORIDE 0.4 MG/1
CAPSULE ORAL
COMMUNITY
Start: 2018-11-02 | End: 2020-10-26

## 2018-11-06 RX ORDER — BACLOFEN 10 MG/1
10 TABLET ORAL 3 TIMES DAILY PRN
COMMUNITY
Start: 2018-10-25 | End: 2019-02-02 | Stop reason: HOSPADM

## 2018-11-06 RX ORDER — OXYCODONE HYDROCHLORIDE AND ACETAMINOPHEN 5; 325 MG/1; MG/1
TABLET ORAL
COMMUNITY
Start: 2018-10-25 | End: 2018-11-06 | Stop reason: SDUPTHER

## 2018-11-06 RX ORDER — IBUPROFEN 800 MG/1
800 TABLET ORAL
COMMUNITY
Start: 2018-11-02 | End: 2018-11-10

## 2018-11-06 RX ORDER — OXYCODONE HYDROCHLORIDE AND ACETAMINOPHEN 5; 325 MG/1; MG/1
1 TABLET ORAL 2 TIMES DAILY
Qty: 20 TABLET | Refills: 0 | Status: SHIPPED | OUTPATIENT
Start: 2018-11-06 | End: 2020-10-26

## 2018-11-06 RX ORDER — CEFDINIR 300 MG/1
CAPSULE ORAL
COMMUNITY
Start: 2018-10-15 | End: 2018-11-06

## 2018-11-06 NOTE — PROGRESS NOTES
"SUBJECTIVE:  Patient ID: Chung Bradley is a 68 y.o. male is here today for follow-up.    Chief Complaint:left upper pain  Chief Complaint   Patient presents with   • Neck & arm pain     patient has abnormal imaging @ [facility]; states he has had multiple falls.       HPI  68-year-old male that we saw a year ago for right paraspinal neck pain and right upper extremity radicular pain.  He did very well after a dedicated course of physical therapy with 100% resolution of his symptoms.  About a week ago he started to develop left paraspinal neck pain and left upper extremity radicular pain.  He did 1 physical therapy session last Friday and then the next morning he felt that his pain was worse.  He was concerned about this effect his primary care doctor who ordered an MRI and is here to discuss the results.  He is describes pain on the left side of his neck with a radicular component down into the hand involving mostly the fifth finger.  He does get some numbness.  He had one episode in the last couple of days where he fell while he was urinating in the middle the night.  He said that \"his legs when out\".  He had no loss of consciousness.  He did hit the back of his head but he says he is felt fine ever since.  He says the pain on be worse with extension or worse with leftward flexion of his head    The following portions of the patient's history were reviewed and updated as appropriate: allergies, current medications, past family history, past medical history, past social history, past surgical history and problem list.    OBJECTIVE:    Review of Systems   Musculoskeletal: Positive for neck pain and neck stiffness.   Neurological: Positive for numbness.   All other systems reviewed and are negative.         Physical Exam   Constitutional: He is oriented to person, place, and time.   Neurological: He is oriented to person, place, and time. He has normal strength. Tandem gait test: Very mild difficulty with tandem walking. " Gait normal.   Reflex Scores:       Tricep reflexes are 1+ on the right side and 1+ on the left side.       Bicep reflexes are 1+ on the right side and 1+ on the left side.       Brachioradialis reflexes are 1+ on the right side and 1+ on the left side.       Patellar reflexes are 1+ on the right side and 1+ on the left side.       Achilles reflexes are 1+ on the right side and 1+ on the left side.  Psychiatric: His speech is normal.       Neurologic Exam     Mental Status   Oriented to person, place, and time.   Attention: normal.   Speech: speech is normal   Level of consciousness: alert  Knowledge: good.     Cranial Nerves   Cranial nerves II through XII intact.     Motor Exam   Muscle bulk: normal  Overall muscle tone: normal  Right arm pronator drift: absent  Left arm pronator drift: absent    Strength   Strength 5/5 throughout.     Sensory Exam   Light touch normal.   Pinprick normal.     Gait, Coordination, and Reflexes     Gait  Gait: normal    Coordination   Tandem gait test: Very mild difficulty with tandem walking.    Tremor   Resting tremor: absent  Intention tremor: absent  Action tremor: absent    Reflexes   Right brachioradialis: 1+  Left brachioradialis: 1+  Right biceps: 1+  Left biceps: 1+  Right triceps: 1+  Left triceps: 1+  Right patellar: 1+  Left patellar: 1+  Right achilles: 1+  Left achilles: 1+  Right Ames: absent  Left Ames: absent  Right ankle clonus: absent  Left ankle clonus: absent      Independent Review of Radiographic Studies:   MRI the cervical spine does show severe degenerative changes at C4 5, 56, 67 in general the right is worse than the left.  There is cord signal change and central stenosis behind some of the vertebral bodies.  Compared to his imaging a year ago and even before that this is essentially stable.    ASSESSMENT/PLAN:  The patient does have severe cervical stenosis and some cord signal change but this are chronic changes there is no acute issue on MRI.  He  is not grossly myelopathic on exam.  He is suffering from mostly neck pain and radiculopathy.  We discussed this at length with him and his wife.  I would proceed with a dedicated course of physical therapy given the amount of success that he had the last time we had this issue.  I cautioned him that should he need to get worse as far as pain numbness tingling weakness or difficulty walking to call us immediately.  We will see him in follow-up in 2-3 weeks.  We are also getting give him anti-inflammatory medication and renew his muscle relaxers and a low dose of narcotic pain medication.  He is scheduled for bladder removal surgery at [facility] before [place].  He really is not a candidate for major cervical fusion surgery until that is completed.  I am going to forward this note to his urologist at [facility].   I do feel that if he is going to get intubated by anesthesia for his bladder surgery that minimizing neck extension and neck manipulation would be in his best interest given the severe degree of cervical stenosis that he has.    1. Cervical spondylosis without myelopathy    2. Spinal stenosis in cervical region    3. Cervical radiculopathy    4. BMI 35.0-35.9,adult    5. Former smoker            Return in about 6 weeks (around 12/18/2018) for PT follow up w/HERACLIO.      Nicanor Scales MD

## 2018-11-06 NOTE — PATIENT INSTRUCTIONS
PATIENT TO CONTINUE TO FOLLOW UP WITH HIS PRIMARY CARE PROVIDER FOR YEARLY PHYSICAL EXAMS TO ENSURE COMPLETE HEALTH MAINTENANCE      BMI for Adults  Body mass index (BMI) is a number that is calculated from a person's weight and height. In most adults, the number is used to find how much of an adult's weight is made up of fat. BMI is not as accurate as a direct measure of body fat.  How is BMI calculated?  BMI is calculated by dividing weight in kilograms by height in meters squared. It can also be calculated by dividing weight in pounds by height in inches squared, then multiplying the resulting number by 703. Charts are available to help you find your BMI quickly and easily without doing this calculation.  How is BMI interpreted?  Health care professionals use BMI charts to identify whether an adult is underweight, at a normal weight, or overweight based on the following guidelines:  · Underweight: BMI less than 18.5.  · Normal weight: BMI between 18.5 and 24.9.  · Overweight: BMI between 25 and 29.9.  · Obese: BMI of 30 and above.    BMI is usually interpreted the same for males and females.  Weight includes both fat and muscle, so someone with a muscular build, such as an athlete, may have a BMI that is higher than 24.9. In cases like these, BMI may not accurately depict body fat. To determine if excess body fat is the cause of a BMI of 25 or higher, further assessments may need to be done by a health care provider.  Why is BMI a useful tool?  BMI is used to identify a possible weight problem that may be related to a medical problem or may increase the risk for medical problems. BMI can also be used to promote changes to reach a healthy weight.  This information is not intended to replace advice given to you by your health care provider. Make sure you discuss any questions you have with your health care provider.  Document Released: 08/29/2005 Document Revised: 04/27/2017 Document Reviewed: 05/15/2015  Reginald  Interactive Patient Education © 2018 [employer]

## 2018-11-29 LAB
CYTO UR: NORMAL
LAB AP CASE REPORT: NORMAL
LAB AP SYNOPTIC CHECKLIST: NORMAL
PATH REPORT.FINAL DX SPEC: NORMAL
PATH REPORT.GROSS SPEC: NORMAL

## 2018-12-13 ENCOUNTER — OFFICE VISIT (OUTPATIENT)
Dept: NEUROSURGERY | Facility: CLINIC | Age: 68
End: 2018-12-13

## 2018-12-13 VITALS
WEIGHT: 239.8 LBS | HEIGHT: 70 IN | BODY MASS INDEX: 34.33 KG/M2 | SYSTOLIC BLOOD PRESSURE: 108 MMHG | DIASTOLIC BLOOD PRESSURE: 62 MMHG

## 2018-12-13 DIAGNOSIS — Z87.891 FORMER SMOKER: ICD-10-CM

## 2018-12-13 DIAGNOSIS — M54.12 CERVICAL RADICULOPATHY: ICD-10-CM

## 2018-12-13 DIAGNOSIS — M48.02 SPINAL STENOSIS IN CERVICAL REGION: Primary | ICD-10-CM

## 2018-12-13 PROCEDURE — 99213 OFFICE O/P EST LOW 20 MIN: CPT | Performed by: NURSE PRACTITIONER

## 2018-12-13 NOTE — PATIENT INSTRUCTIONS

## 2018-12-13 NOTE — PROGRESS NOTES
"    Chief complaint:   Chief Complaint   Patient presents with   • Neck Pain     Chung returns after physical therapy and he states he thinks the therapy has been helping him.          Subjective     HPI: This is a 68-year-old male gentleman who been following for neck pain and left upper extremity radiculopathy.  He is here in follow-up today.  The patient to go for dedicated course of physical therapy and states that he feels like he is 80% better from his neck pain at this time.  He is very happy and satisfied with results of the therapy.  It is known that the patient does have cervical disc degeneration and stenosis.  There is some suggestion of a cord signal change however Dr. Scales did fill at this was chronic in nature.  He has not had any falls.  He is set to have bladder surgery on December 17 and is going to have quite a lengthy recovery from this procedure.  Overall though is very happy and satisfied with results the therapy he does still have some pain but he states that it is better than what it was.    Review of Systems   Musculoskeletal: Positive for myalgias and neck pain.   Neurological: Positive for numbness.         Objective      Vital Signs  /62 (BP Location: Right arm, Patient Position: Sitting)   Ht 177.8 cm (70\")   Wt 109 kg (239 lb 12.8 oz)   BMI 34.41 kg/m²     Physical Exam   Constitutional: He is oriented to person, place, and time. He appears well-developed and well-nourished.   HENT:   Head: Normocephalic.   Eyes: EOM are normal. Pupils are equal, round, and reactive to light.   Neck: Normal range of motion.   Pulmonary/Chest: Effort normal.   Musculoskeletal: Normal range of motion.        Cervical back: He exhibits pain.   Neurological: He is alert and oriented to person, place, and time. He has normal strength and normal reflexes. No cranial nerve deficit or sensory deficit. Gait normal. GCS eye subscore is 4. GCS verbal subscore is 5. GCS motor subscore is 6.   Skin: Skin " is warm.   Psychiatric: He has a normal mood and affect. His speech is normal and behavior is normal. Thought content normal.       Results Review: No new imaging          Assessment/Plan: The patient appears to be doing very well.  At this time he wants to proceed with the bladder surgery and get this recovery behind him and follow-up with us once this is completed and he is recovered and discuss further options at that time.  We will follow-up with him at Dr. Scales's next available.  BMI shows that he is overweight.  BMI chart was given the patient.  He is a nonsmoker.         Chung was seen today for neck pain.    Diagnoses and all orders for this visit:    Spinal stenosis in cervical region    Cervical radiculopathy    Former smoker    BMI 34.0-34.9,adult        I discussed the patients findings and my recommendations with patient  Héctor Yanez, HANNAH  12/13/18  11:58 AM

## 2019-01-30 ENCOUNTER — APPOINTMENT (OUTPATIENT)
Dept: ULTRASOUND IMAGING | Facility: HOSPITAL | Age: 69
End: 2019-01-30

## 2019-01-30 ENCOUNTER — HOSPITAL ENCOUNTER (INPATIENT)
Facility: HOSPITAL | Age: 69
LOS: 3 days | Discharge: HOME | End: 2019-02-02
Attending: INTERNAL MEDICINE | Admitting: INTERNAL MEDICINE

## 2019-01-30 PROBLEM — E87.5 ACUTE HYPERKALEMIA: Status: ACTIVE | Noted: 2019-01-30

## 2019-01-30 LAB
ALBUMIN SERPL-MCNC: 4.3 G/DL (ref 3.5–5)
ALBUMIN SERPL-MCNC: 4.6 G/DL (ref 3.5–5)
ALBUMIN/GLOB SERPL: 1.3 G/DL (ref 1.1–2.5)
ALBUMIN/GLOB SERPL: 1.5 G/DL (ref 1.1–2.5)
ALP SERPL-CCNC: 45 U/L (ref 24–120)
ALP SERPL-CCNC: 48 U/L (ref 24–120)
ALT SERPL W P-5'-P-CCNC: 21 U/L (ref 0–54)
ALT SERPL W P-5'-P-CCNC: <15 U/L (ref 0–54)
ANION GAP SERPL CALCULATED.3IONS-SCNC: 11 MMOL/L (ref 4–13)
ANION GAP SERPL CALCULATED.3IONS-SCNC: 13 MMOL/L (ref 4–13)
AST SERPL-CCNC: 26 U/L (ref 7–45)
AST SERPL-CCNC: 34 U/L (ref 7–45)
BASOPHILS # BLD AUTO: 0.03 10*3/MM3 (ref 0–0.2)
BASOPHILS NFR BLD AUTO: 0.4 % (ref 0–2)
BILIRUB SERPL-MCNC: 0.3 MG/DL (ref 0.1–1)
BILIRUB SERPL-MCNC: 0.3 MG/DL (ref 0.1–1)
BUN BLD-MCNC: 127 MG/DL (ref 5–21)
BUN BLD-MCNC: 133 MG/DL (ref 5–21)
BUN/CREAT SERPL: 34.1 (ref 7–25)
BUN/CREAT SERPL: 38 (ref 7–25)
CALCIUM SPEC-SCNC: 9.8 MG/DL (ref 8.4–10.4)
CALCIUM SPEC-SCNC: 9.8 MG/DL (ref 8.4–10.4)
CHLORIDE SERPL-SCNC: 105 MMOL/L (ref 98–110)
CHLORIDE SERPL-SCNC: 105 MMOL/L (ref 98–110)
CO2 SERPL-SCNC: 14 MMOL/L (ref 24–31)
CO2 SERPL-SCNC: 15 MMOL/L (ref 24–31)
CREAT BLD-MCNC: 3.34 MG/DL (ref 0.5–1.4)
CREAT BLD-MCNC: 3.9 MG/DL (ref 0.5–1.4)
DEPRECATED RDW RBC AUTO: 50 FL (ref 40–54)
EOSINOPHIL # BLD AUTO: 0.18 10*3/MM3 (ref 0–0.7)
EOSINOPHIL NFR BLD AUTO: 2.7 % (ref 0–4)
ERYTHROCYTE [DISTWIDTH] IN BLOOD BY AUTOMATED COUNT: 16.8 % (ref 12–15)
GFR SERPL CREATININE-BSD FRML MDRD: 15 ML/MIN/1.73
GFR SERPL CREATININE-BSD FRML MDRD: 18 ML/MIN/1.73
GLOBULIN UR ELPH-MCNC: 3.1 GM/DL
GLOBULIN UR ELPH-MCNC: 3.2 GM/DL
GLUCOSE BLD-MCNC: 106 MG/DL (ref 70–100)
GLUCOSE BLD-MCNC: 96 MG/DL (ref 70–100)
HCT VFR BLD AUTO: 32.3 % (ref 40–52)
HGB BLD-MCNC: 10.2 G/DL (ref 14–18)
IMM GRANULOCYTES # BLD AUTO: 0.03 10*3/MM3 (ref 0–0.03)
IMM GRANULOCYTES NFR BLD AUTO: 0.4 % (ref 0–5)
IRON 24H UR-MRATE: 49 MCG/DL (ref 42–180)
IRON SATN MFR SERPL: 10 % (ref 20–45)
LYMPHOCYTES # BLD AUTO: 1.33 10*3/MM3 (ref 0.72–4.86)
LYMPHOCYTES NFR BLD AUTO: 19.7 % (ref 15–45)
MCH RBC QN AUTO: 26.3 PG (ref 28–32)
MCHC RBC AUTO-ENTMCNC: 31.6 G/DL (ref 33–36)
MCV RBC AUTO: 83.2 FL (ref 82–95)
MONOCYTES # BLD AUTO: 0.44 10*3/MM3 (ref 0.19–1.3)
MONOCYTES NFR BLD AUTO: 6.5 % (ref 4–12)
NEUTROPHILS # BLD AUTO: 4.75 10*3/MM3 (ref 1.87–8.4)
NEUTROPHILS NFR BLD AUTO: 70.3 % (ref 39–78)
NRBC BLD AUTO-RTO: 0 /100 WBC (ref 0–0)
PLATELET # BLD AUTO: 238 10*3/MM3 (ref 130–400)
PMV BLD AUTO: 9.2 FL (ref 6–12)
POTASSIUM BLD-SCNC: 5.9 MMOL/L (ref 3.5–5.3)
POTASSIUM BLD-SCNC: 7 MMOL/L (ref 3.5–5.3)
PROT SERPL-MCNC: 7.5 G/DL (ref 6.3–8.7)
PROT SERPL-MCNC: 7.7 G/DL (ref 6.3–8.7)
RBC # BLD AUTO: 3.88 10*6/MM3 (ref 4.8–5.9)
SODIUM BLD-SCNC: 131 MMOL/L (ref 135–145)
SODIUM BLD-SCNC: 132 MMOL/L (ref 135–145)
SODIUM UR-SCNC: 47 MMOL/L (ref 30–90)
TIBC SERPL-MCNC: 473 MCG/DL (ref 225–420)
WBC NRBC COR # BLD: 6.76 10*3/MM3 (ref 4.8–10.8)

## 2019-01-30 PROCEDURE — 76775 US EXAM ABDO BACK WALL LIM: CPT

## 2019-01-30 PROCEDURE — 85025 COMPLETE CBC W/AUTO DIFF WBC: CPT | Performed by: INTERNAL MEDICINE

## 2019-01-30 PROCEDURE — 80053 COMPREHEN METABOLIC PANEL: CPT | Performed by: INTERNAL MEDICINE

## 2019-01-30 PROCEDURE — 83550 IRON BINDING TEST: CPT | Performed by: INTERNAL MEDICINE

## 2019-01-30 PROCEDURE — 83540 ASSAY OF IRON: CPT | Performed by: INTERNAL MEDICINE

## 2019-01-30 PROCEDURE — 84300 ASSAY OF URINE SODIUM: CPT | Performed by: INTERNAL MEDICINE

## 2019-01-30 RX ORDER — DOCUSATE SODIUM 100 MG/1
100 CAPSULE, LIQUID FILLED ORAL DAILY
Status: DISCONTINUED | OUTPATIENT
Start: 2019-01-30 | End: 2019-02-02 | Stop reason: HOSPADM

## 2019-01-30 RX ORDER — SODIUM CHLORIDE 0.9 % (FLUSH) 0.9 %
3 SYRINGE (ML) INJECTION EVERY 12 HOURS SCHEDULED
Status: DISCONTINUED | OUTPATIENT
Start: 2019-01-30 | End: 2019-02-02 | Stop reason: HOSPADM

## 2019-01-30 RX ORDER — SODIUM CHLORIDE 9 MG/ML
100 INJECTION, SOLUTION INTRAVENOUS CONTINUOUS
Status: DISCONTINUED | OUTPATIENT
Start: 2019-01-30 | End: 2019-01-30

## 2019-01-30 RX ORDER — SODIUM POLYSTYRENE SULFONATE 15 G/60ML
15 SUSPENSION ORAL; RECTAL ONCE
Status: CANCELLED | OUTPATIENT
Start: 2019-01-30 | End: 2019-01-30

## 2019-01-30 RX ORDER — NITROGLYCERIN 0.4 MG/1
0.4 TABLET SUBLINGUAL
Status: DISCONTINUED | OUTPATIENT
Start: 2019-01-30 | End: 2019-02-02 | Stop reason: HOSPADM

## 2019-01-30 RX ORDER — HYDROCORTISONE ACETATE 25 MG/1
25 SUPPOSITORY RECTAL 2 TIMES DAILY
Status: DISCONTINUED | OUTPATIENT
Start: 2019-01-30 | End: 2019-02-02 | Stop reason: HOSPADM

## 2019-01-30 RX ORDER — HYDROCORTISONE ACETATE 25 MG/1
25 SUPPOSITORY RECTAL 2 TIMES DAILY PRN
COMMUNITY

## 2019-01-30 RX ORDER — GUAIFENESIN 600 MG/1
1200 TABLET, EXTENDED RELEASE ORAL 2 TIMES DAILY
COMMUNITY
End: 2021-04-15

## 2019-01-30 RX ORDER — SODIUM CHLORIDE 0.9 % (FLUSH) 0.9 %
3-10 SYRINGE (ML) INJECTION AS NEEDED
Status: DISCONTINUED | OUTPATIENT
Start: 2019-01-30 | End: 2019-02-02 | Stop reason: HOSPADM

## 2019-01-30 RX ORDER — SODIUM POLYSTYRENE SULFONATE 4.1 MEQ/G
15 POWDER, FOR SUSPENSION ORAL; RECTAL ONCE
Status: COMPLETED | OUTPATIENT
Start: 2019-01-30 | End: 2019-01-30

## 2019-01-30 RX ADMIN — DOCUSATE SODIUM 100 MG: 100 CAPSULE ORAL at 14:13

## 2019-01-30 RX ADMIN — SODIUM POLYSTYRENE SULFONATE 15 G: 1 POWDER ORAL; RECTAL at 17:21

## 2019-01-30 RX ADMIN — HYDROCORTISONE ACETATE 25 MG: 25 SUPPOSITORY RECTAL at 20:03

## 2019-01-30 RX ADMIN — SODIUM BICARBONATE: 84 INJECTION, SOLUTION INTRAVENOUS at 17:21

## 2019-01-30 RX ADMIN — SODIUM CHLORIDE, PRESERVATIVE FREE 3 ML: 5 INJECTION INTRAVENOUS at 14:14

## 2019-01-30 RX ADMIN — SODIUM POLYSTYRENE SULFONATE 15 G: 1 POWDER ORAL; RECTAL at 14:13

## 2019-01-30 RX ADMIN — SODIUM CHLORIDE, PRESERVATIVE FREE 3 ML: 5 INJECTION INTRAVENOUS at 21:34

## 2019-01-30 RX ADMIN — SODIUM CHLORIDE 100 ML/HR: 9 INJECTION, SOLUTION INTRAVENOUS at 14:13

## 2019-01-31 LAB
ALBUMIN SERPL-MCNC: 3.7 G/DL (ref 3.5–5)
ALBUMIN/GLOB SERPL: 1.5 G/DL (ref 1.1–2.5)
ALP SERPL-CCNC: 39 U/L (ref 24–120)
ALT SERPL W P-5'-P-CCNC: 17 U/L (ref 0–54)
ANION GAP SERPL CALCULATED.3IONS-SCNC: 10 MMOL/L (ref 4–13)
AST SERPL-CCNC: 14 U/L (ref 7–45)
BASOPHILS # BLD AUTO: 0.03 10*3/MM3 (ref 0–0.2)
BASOPHILS NFR BLD AUTO: 0.5 % (ref 0–2)
BILIRUB SERPL-MCNC: 0.3 MG/DL (ref 0.1–1)
BUN BLD-MCNC: 116 MG/DL (ref 5–21)
BUN/CREAT SERPL: 39.2 (ref 7–25)
CALCIUM SPEC-SCNC: 9.4 MG/DL (ref 8.4–10.4)
CHLORIDE SERPL-SCNC: 108 MMOL/L (ref 98–110)
CO2 SERPL-SCNC: 17 MMOL/L (ref 24–31)
CREAT BLD-MCNC: 2.96 MG/DL (ref 0.5–1.4)
DEPRECATED RDW RBC AUTO: 49.7 FL (ref 40–54)
EOSINOPHIL # BLD AUTO: 0.23 10*3/MM3 (ref 0–0.7)
EOSINOPHIL NFR BLD AUTO: 4.1 % (ref 0–4)
ERYTHROCYTE [DISTWIDTH] IN BLOOD BY AUTOMATED COUNT: 16.8 % (ref 12–15)
GFR SERPL CREATININE-BSD FRML MDRD: 21 ML/MIN/1.73
GLOBULIN UR ELPH-MCNC: 2.4 GM/DL
GLUCOSE BLD-MCNC: 99 MG/DL (ref 70–100)
GLUCOSE BLDC GLUCOMTR-MCNC: 118 MG/DL (ref 70–130)
GLUCOSE BLDC GLUCOMTR-MCNC: 124 MG/DL (ref 70–130)
GLUCOSE BLDC GLUCOMTR-MCNC: 132 MG/DL (ref 70–130)
GLUCOSE BLDC GLUCOMTR-MCNC: 171 MG/DL (ref 70–130)
HCT VFR BLD AUTO: 28.6 % (ref 40–52)
HGB BLD-MCNC: 9.2 G/DL (ref 14–18)
IMM GRANULOCYTES # BLD AUTO: 0.03 10*3/MM3 (ref 0–0.03)
IMM GRANULOCYTES NFR BLD AUTO: 0.5 % (ref 0–5)
LYMPHOCYTES # BLD AUTO: 1.39 10*3/MM3 (ref 0.72–4.86)
LYMPHOCYTES NFR BLD AUTO: 24.6 % (ref 15–45)
MCH RBC QN AUTO: 26.4 PG (ref 28–32)
MCHC RBC AUTO-ENTMCNC: 32.2 G/DL (ref 33–36)
MCV RBC AUTO: 81.9 FL (ref 82–95)
MONOCYTES # BLD AUTO: 0.44 10*3/MM3 (ref 0.19–1.3)
MONOCYTES NFR BLD AUTO: 7.8 % (ref 4–12)
NEUTROPHILS # BLD AUTO: 3.54 10*3/MM3 (ref 1.87–8.4)
NEUTROPHILS NFR BLD AUTO: 62.5 % (ref 39–78)
NRBC BLD AUTO-RTO: 0 /100 WBC (ref 0–0)
PLATELET # BLD AUTO: 210 10*3/MM3 (ref 130–400)
PMV BLD AUTO: 9.8 FL (ref 6–12)
POTASSIUM BLD-SCNC: 5.5 MMOL/L (ref 3.5–5.3)
PROT SERPL-MCNC: 6.1 G/DL (ref 6.3–8.7)
RBC # BLD AUTO: 3.49 10*6/MM3 (ref 4.8–5.9)
SODIUM BLD-SCNC: 135 MMOL/L (ref 135–145)
WBC NRBC COR # BLD: 5.66 10*3/MM3 (ref 4.8–10.8)

## 2019-01-31 PROCEDURE — 80053 COMPREHEN METABOLIC PANEL: CPT | Performed by: INTERNAL MEDICINE

## 2019-01-31 PROCEDURE — 82962 GLUCOSE BLOOD TEST: CPT

## 2019-01-31 PROCEDURE — 85025 COMPLETE CBC W/AUTO DIFF WBC: CPT | Performed by: INTERNAL MEDICINE

## 2019-01-31 PROCEDURE — 94799 UNLISTED PULMONARY SVC/PX: CPT

## 2019-01-31 PROCEDURE — 25010000002 IRON SUCROSE PER 1 MG: Performed by: INTERNAL MEDICINE

## 2019-01-31 PROCEDURE — 94760 N-INVAS EAR/PLS OXIMETRY 1: CPT

## 2019-01-31 RX ORDER — SODIUM BICARBONATE 650 MG/1
650 TABLET ORAL 3 TIMES DAILY
Status: DISCONTINUED | OUTPATIENT
Start: 2019-01-31 | End: 2019-02-02 | Stop reason: HOSPADM

## 2019-01-31 RX ADMIN — SODIUM BICARBONATE: 84 INJECTION, SOLUTION INTRAVENOUS at 09:29

## 2019-01-31 RX ADMIN — SODIUM BICARBONATE 650 MG: 650 TABLET ORAL at 16:47

## 2019-01-31 RX ADMIN — SODIUM CHLORIDE, PRESERVATIVE FREE 3 ML: 5 INJECTION INTRAVENOUS at 09:02

## 2019-01-31 RX ADMIN — SODIUM BICARBONATE: 84 INJECTION, SOLUTION INTRAVENOUS at 01:12

## 2019-01-31 RX ADMIN — HYDROCORTISONE ACETATE 25 MG: 25 SUPPOSITORY RECTAL at 22:33

## 2019-01-31 RX ADMIN — IRON SUCROSE 200 MG: 20 INJECTION, SOLUTION INTRAVENOUS at 15:48

## 2019-01-31 RX ADMIN — SODIUM BICARBONATE: 84 INJECTION, SOLUTION INTRAVENOUS at 19:03

## 2019-01-31 RX ADMIN — SODIUM BICARBONATE 650 MG: 650 TABLET ORAL at 20:26

## 2019-02-01 LAB
ALBUMIN SERPL-MCNC: 3.9 G/DL (ref 3.5–5)
ALBUMIN/GLOB SERPL: 1.7 G/DL (ref 1.1–2.5)
ALP SERPL-CCNC: 44 U/L (ref 24–120)
ALT SERPL W P-5'-P-CCNC: 16 U/L (ref 0–54)
ANION GAP SERPL CALCULATED.3IONS-SCNC: 7 MMOL/L (ref 4–13)
AST SERPL-CCNC: 17 U/L (ref 7–45)
BASOPHILS # BLD AUTO: 0.04 10*3/MM3 (ref 0–0.2)
BASOPHILS NFR BLD AUTO: 0.9 % (ref 0–2)
BILIRUB SERPL-MCNC: 0.3 MG/DL (ref 0.1–1)
BUN BLD-MCNC: 67 MG/DL (ref 5–21)
BUN/CREAT SERPL: 42.9 (ref 7–25)
CALCIUM SPEC-SCNC: 9.8 MG/DL (ref 8.4–10.4)
CHLORIDE SERPL-SCNC: 110 MMOL/L (ref 98–110)
CO2 SERPL-SCNC: 23 MMOL/L (ref 24–31)
CREAT BLD-MCNC: 1.56 MG/DL (ref 0.5–1.4)
DEPRECATED RDW RBC AUTO: 50.5 FL (ref 40–54)
EOSINOPHIL # BLD AUTO: 0.17 10*3/MM3 (ref 0–0.7)
EOSINOPHIL NFR BLD AUTO: 3.7 % (ref 0–4)
ERYTHROCYTE [DISTWIDTH] IN BLOOD BY AUTOMATED COUNT: 17 % (ref 12–15)
GFR SERPL CREATININE-BSD FRML MDRD: 44 ML/MIN/1.73
GLOBULIN UR ELPH-MCNC: 2.3 GM/DL
GLUCOSE BLD-MCNC: 118 MG/DL (ref 70–100)
GLUCOSE BLDC GLUCOMTR-MCNC: 138 MG/DL (ref 70–130)
GLUCOSE BLDC GLUCOMTR-MCNC: 139 MG/DL (ref 70–130)
GLUCOSE BLDC GLUCOMTR-MCNC: 148 MG/DL (ref 70–130)
GLUCOSE BLDC GLUCOMTR-MCNC: 166 MG/DL (ref 70–130)
HCT VFR BLD AUTO: 30 % (ref 40–52)
HGB BLD-MCNC: 9.7 G/DL (ref 14–18)
IMM GRANULOCYTES # BLD AUTO: 0.02 10*3/MM3 (ref 0–0.03)
IMM GRANULOCYTES NFR BLD AUTO: 0.4 % (ref 0–5)
LYMPHOCYTES # BLD AUTO: 0.91 10*3/MM3 (ref 0.72–4.86)
LYMPHOCYTES NFR BLD AUTO: 19.9 % (ref 15–45)
MCH RBC QN AUTO: 26.8 PG (ref 28–32)
MCHC RBC AUTO-ENTMCNC: 32.3 G/DL (ref 33–36)
MCV RBC AUTO: 82.9 FL (ref 82–95)
MONOCYTES # BLD AUTO: 0.45 10*3/MM3 (ref 0.19–1.3)
MONOCYTES NFR BLD AUTO: 9.8 % (ref 4–12)
NEUTROPHILS # BLD AUTO: 2.98 10*3/MM3 (ref 1.87–8.4)
NEUTROPHILS NFR BLD AUTO: 65.3 % (ref 39–78)
NRBC BLD AUTO-RTO: 0 /100 WBC (ref 0–0)
PLATELET # BLD AUTO: 186 10*3/MM3 (ref 130–400)
PMV BLD AUTO: 9.1 FL (ref 6–12)
POTASSIUM BLD-SCNC: 5.2 MMOL/L (ref 3.5–5.3)
PROT SERPL-MCNC: 6.2 G/DL (ref 6.3–8.7)
RBC # BLD AUTO: 3.62 10*6/MM3 (ref 4.8–5.9)
SODIUM BLD-SCNC: 140 MMOL/L (ref 135–145)
WBC NRBC COR # BLD: 4.57 10*3/MM3 (ref 4.8–10.8)

## 2019-02-01 PROCEDURE — 94760 N-INVAS EAR/PLS OXIMETRY 1: CPT

## 2019-02-01 PROCEDURE — 25010000002 IRON SUCROSE PER 1 MG: Performed by: INTERNAL MEDICINE

## 2019-02-01 PROCEDURE — 82962 GLUCOSE BLOOD TEST: CPT

## 2019-02-01 PROCEDURE — 80053 COMPREHEN METABOLIC PANEL: CPT | Performed by: INTERNAL MEDICINE

## 2019-02-01 PROCEDURE — 94799 UNLISTED PULMONARY SVC/PX: CPT

## 2019-02-01 PROCEDURE — 85025 COMPLETE CBC W/AUTO DIFF WBC: CPT | Performed by: INTERNAL MEDICINE

## 2019-02-01 RX ADMIN — HYDROCORTISONE ACETATE 25 MG: 25 SUPPOSITORY RECTAL at 08:16

## 2019-02-01 RX ADMIN — SODIUM BICARBONATE 650 MG: 650 TABLET ORAL at 15:51

## 2019-02-01 RX ADMIN — SODIUM BICARBONATE: 84 INJECTION, SOLUTION INTRAVENOUS at 03:10

## 2019-02-01 RX ADMIN — HYDROCORTISONE ACETATE 25 MG: 25 SUPPOSITORY RECTAL at 20:29

## 2019-02-01 RX ADMIN — SODIUM CHLORIDE, PRESERVATIVE FREE 3 ML: 5 INJECTION INTRAVENOUS at 20:29

## 2019-02-01 RX ADMIN — IRON SUCROSE 200 MG: 20 INJECTION, SOLUTION INTRAVENOUS at 12:48

## 2019-02-01 RX ADMIN — SODIUM BICARBONATE 650 MG: 650 TABLET ORAL at 08:15

## 2019-02-01 RX ADMIN — SODIUM BICARBONATE: 84 INJECTION, SOLUTION INTRAVENOUS at 11:12

## 2019-02-01 RX ADMIN — SODIUM BICARBONATE 650 MG: 650 TABLET ORAL at 20:29

## 2019-02-01 NOTE — PLAN OF CARE
Problem: Renal Failure/Kidney Injury, Acute (Adult)  Goal: Signs and Symptoms of Listed Potential Problems Will be Absent, Minimized or Managed (Renal Failure/Kidney Injury, Acute)  Outcome: Ongoing (interventions implemented as appropriate)      Problem: Patient Care Overview  Goal: Plan of Care Review  Outcome: Ongoing (interventions implemented as appropriate)   02/01/19 1450   Coping/Psychosocial   Plan of Care Reviewed With patient   Plan of Care Review   Progress improving   OTHER   Outcome Summary Patient did not c/o any pain today. Good urine output. Fluid rate changed to 75ml/hr. Labs improving today. VSS. Possible home tomorrow.      Goal: Individualization and Mutuality  Outcome: Ongoing (interventions implemented as appropriate)

## 2019-02-01 NOTE — PROGRESS NOTES
[facility]  REGAN Lisa M.D.  HANNAH Lira      Internal Medicine Progress Note    2/1/2019   11:36 AM    Name:  Chung Bradley  MRN:    [id number]     Acct:     [id number]   Room:  [address] [place] [address] Day: 2     Admit Date: 1/30/2019 10:56 AM  PCP: Fidel Yousif MD    Subjective:     C/C: weakness    Interval History: Status:  Improved. Resting in bed. Family at bedside. Renal function improved. Potassium within normal limits. Patient tolerating treatment thus far. No new concerns.     Review of Systems   Constitution: Negative for chills, decreased appetite, weakness, malaise/fatigue, weight gain and weight loss.   HENT: Negative for congestion, ear discharge, hoarse voice and tinnitus.    Eyes: Negative for blurred vision, discharge, visual disturbance and visual halos.   Cardiovascular: Negative for chest pain, claudication, dyspnea on exertion, irregular heartbeat, leg swelling, orthopnea and paroxysmal nocturnal dyspnea.   Respiratory: Negative for cough, shortness of breath, sputum production and wheezing.    Endocrine: Negative for cold intolerance, heat intolerance and polyuria.   Hematologic/Lymphatic: Negative for adenopathy. Does not bruise/bleed easily.   Skin: Negative for dry skin, itching and suspicious lesions.   Musculoskeletal: Negative for arthritis, back pain, falls, joint pain, muscle weakness and myalgias.   Gastrointestinal: Negative for abdominal pain, constipation, diarrhea, dysphagia and hematemesis.   Genitourinary: Negative for bladder incontinence, dysuria and frequency.   Neurological: Negative for aphonia, disturbances in coordination and dizziness.   Psychiatric/Behavioral: Negative for altered mental status, depression, memory loss and substance abuse. The patient does not have insomnia and is not nervous/anxious.      Medications:     Allergies:   Allergies   Allergen Reactions   • Penicillins Rash       Current Meds:   Current  Facility-Administered Medications:   •  docusate sodium (COLACE) capsule 100 mg, 100 mg, Oral, Daily, Fidel Yousif MD, 100 mg at 01/30/19 1413  •  hydrocortisone (ANUSOL-HC) suppository 25 mg, 25 mg, Rectal, BID, Fidel Yousif MD, 25 mg at 02/01/19 0816  •  iron sucrose (VENOFER) 200 mg in sodium chloride 0.9 % 100 mL IVPB, 200 mg, Intravenous, Q24H, Francisco Lindquist MD, 200 mg at 01/31/19 1548  •  nitroglycerin (NITROSTAT) SL tablet 0.4 mg, 0.4 mg, Sublingual, Q5 Min PRN, Fidel Yousif MD  •  sodium bicarbonate tablet 650 mg, 650 mg, Oral, TID, Francisco Lindquist MD, 650 mg at 02/01/19 0815  •  sodium chloride 0.45 % 925 mL with sodium bicarbonate 8.4 % 75 mEq infusion, , Intravenous, Continuous, Chuy Walker, APRN, Last Rate: 75 mL/hr at 02/01/19 1112  •  sodium chloride 0.9 % flush 3 mL, 3 mL, Intravenous, Q12H, Fidel Yousif MD, 3 mL at 01/31/19 0902  •  sodium chloride 0.9 % flush 3-10 mL, 3-10 mL, Intravenous, PRN, Fidel Yousif MD    Data:     Code Status:    Code Status and Medical Interventions:   Ordered at: 01/30/19 1254     Code Status:    CPR     Medical Interventions (Level of Support Prior to Arrest):    Full       Family History   Problem Relation Age of Onset   • Cancer Father    • Hypertension Father    • Heart disease Mother    • Arthritis Mother    • Hypertension Mother    • Cancer Sister    • Cancer Brother    • Heart disease Sister        Social History     Socioeconomic History   • Marital status:      Spouse name: Not on file   • Number of children: Not on file   • Years of education: Not on file   • Highest education level: Not on file   Social Needs   • Financial resource strain: Not on file   • Food insecurity - worry: Not on file   • Food insecurity - inability: Not on file   • Transportation needs - medical: Not on file   • Transportation needs - non-medical: Not on file   Occupational History   • Not on file   Tobacco Use   • Smoking status: Former Smoker      "Last attempt to quit:      Years since quittin.0   • Smokeless tobacco: Never Used   Substance and Sexual Activity   • Alcohol use: No   • Drug use: No   • Sexual activity: Defer   Other Topics Concern   • Not on file   Social History Narrative   • Not on file       Vitals:  /77 (BP Location: Left arm, Patient Position: Lying)   Pulse 92   Temp 98.1 °F (36.7 °C) (Oral)   Resp 16   Ht 177.8 cm (70\")   Wt 90.5 kg (199 lb 9.6 oz)   SpO2 95%   BMI 28.64 kg/m²   Temp (24hrs), Av.4 °F (36.9 °C), Min:98.1 °F (36.7 °C), Max:98.5 °F (36.9 °C)            I/O (24Hr):    Intake/Output Summary (Last 24 hours) at 2019 1136  Last data filed at 2019 0700  Gross per 24 hour   Intake 2058 ml   Output 4300 ml   Net -2242 ml       Labs and imaging:      Recent Results (from the past 12 hour(s))   Comprehensive Metabolic Panel    Collection Time: 19  5:24 AM   Result Value Ref Range    Glucose 118 (H) 70 - 100 mg/dL    BUN 67 (H) 5 - 21 mg/dL    Creatinine 1.56 (H) 0.50 - 1.40 mg/dL    Sodium 140 135 - 145 mmol/L    Potassium 5.2 3.5 - 5.3 mmol/L    Chloride 110 98 - 110 mmol/L    CO2 23.0 (L) 24.0 - 31.0 mmol/L    Calcium 9.8 8.4 - 10.4 mg/dL    Total Protein 6.2 (L) 6.3 - 8.7 g/dL    Albumin 3.90 3.50 - 5.00 g/dL    ALT (SGPT) 16 0 - 54 U/L    AST (SGOT) 17 7 - 45 U/L    Alkaline Phosphatase 44 24 - 120 U/L    Total Bilirubin 0.3 0.1 - 1.0 mg/dL    eGFR Non African Amer 44 (L) >60 mL/min/1.73    Globulin 2.3 gm/dL    A/G Ratio 1.7 1.1 - 2.5 g/dL    BUN/Creatinine Ratio 42.9 (H) 7.0 - 25.0    Anion Gap 7.0 4.0 - 13.0 mmol/L   CBC Auto Differential    Collection Time: 19  5:24 AM   Result Value Ref Range    WBC 4.57 (L) 4.80 - 10.80 10*3/mm3    RBC 3.62 (L) 4.80 - 5.90 10*6/mm3    Hemoglobin 9.7 (L) 14.0 - 18.0 g/dL    Hematocrit 30.0 (L) 40.0 - 52.0 %    MCV 82.9 82.0 - 95.0 fL    MCH 26.8 (L) 28.0 - 32.0 pg    MCHC 32.3 (L) 33.0 - 36.0 g/dL    RDW 17.0 (H) 12.0 - 15.0 %    RDW-SD 50.5 40.0 " - 54.0 fl    MPV 9.1 6.0 - 12.0 fL    Platelets 186 130 - 400 10*3/mm3    Neutrophil % 65.3 39.0 - 78.0 %    Lymphocyte % 19.9 15.0 - 45.0 %    Monocyte % 9.8 4.0 - 12.0 %    Eosinophil % 3.7 0.0 - 4.0 %    Basophil % 0.9 0.0 - 2.0 %    Immature Grans % 0.4 0.0 - 5.0 %    Neutrophils, Absolute 2.98 1.87 - 8.40 10*3/mm3    Lymphocytes, Absolute 0.91 0.72 - 4.86 10*3/mm3    Monocytes, Absolute 0.45 0.19 - 1.30 10*3/mm3    Eosinophils, Absolute 0.17 0.00 - 0.70 10*3/mm3    Basophils, Absolute 0.04 0.00 - 0.20 10*3/mm3    Immature Grans, Absolute 0.02 0.00 - 0.03 10*3/mm3    nRBC 0.0 0.0 - 0.0 /100 WBC   POC Glucose Once    Collection Time: 02/01/19  7:25 AM   Result Value Ref Range    Glucose 148 (H) 70 - 130 mg/dL         Physical Examination:        Physical Exam   Constitutional: He is oriented to person, place, and time. He appears well-developed and well-nourished.   HENT:   Head: Normocephalic and atraumatic.   Nose: Nose normal.   Mouth/Throat: Oropharynx is clear and moist.   Eyes: Conjunctivae and EOM are normal. Pupils are equal, round, and reactive to light.   Neck: Normal range of motion. Neck supple.   Cardiovascular: Normal rate, regular rhythm, normal heart sounds and intact distal pulses.   Pulmonary/Chest: Effort normal and breath sounds normal.   Abdominal: Soft. Bowel sounds are normal.   Urostomy RLQ - appliance intact   Musculoskeletal: Normal range of motion.   Neurological: He is alert and oriented to person, place, and time.   Skin: Skin is warm and dry.   Psychiatric: He has a normal mood and affect. His behavior is normal.   Nursing note and vitals reviewed.      Assessment:           Acute hyperkalemia    Past Medical History:   Diagnosis Date   • Bladder neoplasm of uncertain malignant potential 7/20/2017   • Degenerative arthritis of cervical spine with nerve compression     per patient report   • Diabetes mellitus (CMS/[facility])    • GERD (gastroesophageal reflux disease)    • Hearing aid worn     • Hypertension    • Impaired hearing     without hearing aids can barely hear anything   • Neuropathy    • Sleep apnea     wears cpap        Plan:        1. Acute renal failure  2. Hyperkalemia  3. Bladder CA s/p total cystectomy/prostatectomy/ileal conduit  4. Metabolic acidosis  5. DM2 with hyperglycemia not on long term insulin  6. REYNALDO  7. Anemia    Continue current treatment. Monitor counts closely. Continue IV hydration. Hold nephrotoxic medications (NSAIDs, ACE inhibitors, glucophage, lasix). Appreciate nephrology assistance. Monitor electrolytes and renal function. Likely discharge home in am if counts stable.       Electronically signed by HANNAH Bar on 2/1/2019 at 11:36 AM

## 2019-02-01 NOTE — PLAN OF CARE
Problem: Renal Failure/Kidney Injury, Acute (Adult)  Goal: Signs and Symptoms of Listed Potential Problems Will be Absent, Minimized or Managed (Renal Failure/Kidney Injury, Acute)  Outcome: Ongoing (interventions implemented as appropriate)   02/01/19 0417   Goal/Outcome Evaluation   Problems Assessed (Acute Renal Failure/Kidney Injury) all   Problems Present (ARF/Kidney Injury) situational response       Problem: Patient Care Overview  Goal: Plan of Care Review  Outcome: Ongoing (interventions implemented as appropriate)   02/01/19 0417   Coping/Psychosocial   Plan of Care Reviewed With patient   Plan of Care Review   Progress improving   OTHER   Outcome Summary VSS, labs improving, no c/o pain this shift, safety maintained, will continue to monitor for changes.

## 2019-02-01 NOTE — PROGRESS NOTES
Nephrology ([facility]) Progress Note      Patient:  Chung Bradley  YOB: 1950  Date of Service: 2/1/2019  MRN: [id number]   Acct: [id number]   Primary Care Physician: Fidel Yousif MD  Advance Directive:   Code Status and Medical Interventions:   Ordered at: 01/30/19 1254     Code Status:    CPR     Medical Interventions (Level of Support Prior to Arrest):    Full     Admit Date: 1/30/2019       Hospital Day: 2  Referring Provider: Fidel Yousif MD      Patient personally seen and examined.  Complete chart including Consults, Notes, Operative Reports, Labs, Cardiology, and Radiology studies reviewed as able.        Subjective:  Chung Bradley is a 68 y.o. male  whom we were consulted for acute kidney injury and hyperkalemia.  History of CKD stage 3; baseline creatinine 1.5 mg.  also has history of total cystectomy and prostatectomy for the treatment of bladder cancer.  He has ileal conduit.  His recent surgery was done at [place].  Patient was taking diclofenac for the treatment of osteoarthritis of the back.  Hospital course remarkable for improved renal function and serum potassium.      Today feeling better overall, looking forward to discharge soon.  Urine output nonoliguric    Allergies:  Penicillins    Home Meds:  Medications Prior to Admission   Medication Sig Dispense Refill Last Dose   • allopurinol (ZYLOPRIM) 300 MG tablet Take 300 mg by mouth Daily.   Taking   • baclofen (LIORESAL) 10 MG tablet 10 mg 3 (Three) Times a Day As Needed.   Taking   • fenofibrate (TRICOR) 145 MG tablet Take 145 mg by mouth Every Night.   Taking   • fluticasone (FLONASE) 50 MCG/ACT nasal spray 1 spray into the nostril(s) as directed by provider 2 (Two) Times a Day As Needed.   Taking   • gabapentin (NEURONTIN) 400 MG capsule Take 800 mg by mouth Every 8 (Eight) Hours. Take 2 capsules by mouth in the am, two capsules by mouth in the afternoon, and 2 capsules by mouth in the pm.     Taking   • guaiFENesin (MUCINEX) 600 MG 12 hr tablet Take 1,200 mg by mouth 2 (Two) Times a Day.      • hydrocortisone (ANUSOL-HC) 25 MG suppository Insert 25 mg into the rectum 2 (Two) Times a Day As Needed for Hemorrhoids.      • hydrocortisone 2.5 % cream Apply  topically to the appropriate area as directed 2 (Two) Times a Day. Apply to hemorrhoids      • lovastatin (MEVACOR) 40 MG tablet Take 20 mg by mouth Daily.   Taking   • omeprazole (priLOSEC) 20 MG capsule Take 20 mg by mouth 2 (Two) Times a Day.   Taking   • oxyCODONE-acetaminophen (PERCOCET) 5-325 MG per tablet Take 1 tablet by mouth 2 (Two) Times a Day. (Patient taking differently: Take 1 tablet by mouth Every 6 (Six) Hours As Needed for Severe Pain .) 20 tablet 0    • aspirin 81 MG tablet Take 81 mg by mouth Daily.   Taking   • cholecalciferol (VITAMIN D3) 1000 units tablet Take 1,000 Units by mouth Daily.   Taking   • cyclobenzaprine (FLEXERIL) 10 MG tablet Take 1 tablet by mouth 3 (Three) Times a Day As Needed for Muscle Spasms. 90 tablet 1    • diclofenac (VOLTAREN) 75 MG EC tablet Take 1 tablet by mouth 2 (Two) Times a Day. 60 tablet 3    • furosemide (LASIX) 40 MG tablet Take 40 mg by mouth 2 (Two) Times a Day.   Taking   • lisinopril (PRINIVIL,ZESTRIL) 40 MG tablet Take 40 mg by mouth 2 (Two) Times a Day.   Taking   • magnesium oxide (MAG-OX) 400 MG tablet Take 1,200 mg by mouth Daily. Take 3 tablets by mouth two times daily.    Taking   • metFORMIN (GLUCOPHAGE) 1000 MG tablet Take 1,000 mg by mouth 2 (Two) Times a Day With Meals.   Taking   • Omega-3 Fatty Acids (FISH OIL) 1000 MG capsule capsule Take 1,000 mg by mouth Daily With Breakfast.   Taking   • oxybutynin (DITROPAN) 5 MG tablet    Taking   • PROCTOZONE-HC 2.5 % rectal cream    Taking   • tamsulosin (FLOMAX) 0.4 MG capsule 24 hr capsule    Taking   • vitamin B-12 (CYANOCOBALAMIN) 1000 MCG tablet Take 1,000 mcg by mouth Daily.   Taking       Medicines:  Current Facility-Administered  Medications   Medication Dose Route Frequency Provider Last Rate Last Dose   • docusate sodium (COLACE) capsule 100 mg  100 mg Oral Daily Fidel Yousif MD   100 mg at 01/30/19 1413   • hydrocortisone (ANUSOL-HC) suppository 25 mg  25 mg Rectal BID Fidel Yousif MD   25 mg at 02/01/19 0816   • iron sucrose (VENOFER) 200 mg in sodium chloride 0.9 % 100 mL IVPB  200 mg Intravenous Q24H Francisco Lindquist MD   200 mg at 01/31/19 1548   • nitroglycerin (NITROSTAT) SL tablet 0.4 mg  0.4 mg Sublingual Q5 Min PRN Fidel Yousif MD       • sodium bicarbonate tablet 650 mg  650 mg Oral TID Francisco Lindquist MD   650 mg at 02/01/19 0815   • sodium chloride 0.45 % 925 mL with sodium bicarbonate 8.4 % 75 mEq infusion   Intravenous Continuous Francisco Lindquist  mL/hr at 02/01/19 0310     • sodium chloride 0.9 % flush 3 mL  3 mL Intravenous Q12H Fidel Yousif MD   3 mL at 01/31/19 0902   • sodium chloride 0.9 % flush 3-10 mL  3-10 mL Intravenous PRN Fidel Yousif MD           Past Medical History:  Past Medical History:   Diagnosis Date   • Bladder neoplasm of uncertain malignant potential 7/20/2017   • Degenerative arthritis of cervical spine with nerve compression     per patient report   • Diabetes mellitus (CMS/HCC)    • GERD (gastroesophageal reflux disease)    • Hearing aid worn    • Hypertension    • Impaired hearing     without hearing aids can barely hear anything   • Neuropathy    • Sleep apnea     wears cpap       Past Surgical History:  Past Surgical History:   Procedure Laterality Date   • CATARACT EXTRACTION W/ INTRAOCULAR LENS IMPLANT     • CHOLECYSTECTOMY     • EXPLORATORY LAPAROTOMY     • LEG SURGERY     • TRANSURETHRAL RESECTION OF BLADDER TUMOR Bilateral 7/25/2017    Procedure: CYSTOSCOPY TRANSURETHRAL RESECTION OF BLADDER TUMOR & POSSIBLE BILATERAL RETROGRADE URETEROPYELOGRAMS;  Surgeon: Chris Esparza MD;  Location: [facility] OR;  Service:    • TRANSURETHRAL RESECTION OF BLADDER TUMOR N/A 11/3/2017     Procedure: CYSTOSCOPY TRANSURETHRAL RESECTION OF BLADDER TUMOR ;  Surgeon: Chris Esparza MD;  Location: [facility] OR;  Service:    • TRANSURETHRAL RESECTION OF BLADDER TUMOR N/A 9/10/2018    Procedure: CYSTOSCOPY TRANSURETHRAL RESECTION OF LARGE BLADDER TUMOR, CLOT EVACUATION, AND FULGURATION;  Surgeon: Chris Esparza MD;  Location: [facility] OR;  Service: Urology       Family History  Family History   Problem Relation Age of Onset   • Cancer Father    • Hypertension Father    • Heart disease Mother    • Arthritis Mother    • Hypertension Mother    • Cancer Sister    • Cancer Brother    • Heart disease Sister        Social History  Social History     Socioeconomic History   • Marital status:      Spouse name: Not on file   • Number of children: Not on file   • Years of education: Not on file   • Highest education level: Not on file   Social Needs   • Financial resource strain: Not on file   • Food insecurity - worry: Not on file   • Food insecurity - inability: Not on file   • Transportation needs - medical: Not on file   • Transportation needs - non-medical: Not on file   Occupational History   • Not on file   Tobacco Use   • Smoking status: Former Smoker     Last attempt to quit:      Years since quittin.0   • Smokeless tobacco: Never Used   Substance and Sexual Activity   • Alcohol use: No   • Drug use: No   • Sexual activity: Defer   Other Topics Concern   • Not on file   Social History Narrative   • Not on file         Review of Systems:  History obtained from chart review and the patient  General ROS: No fever or chills  Respiratory ROS: No cough, shortness of breath, wheezing  Cardiovascular ROS: No chest pain or palpitations  Gastrointestinal ROS: No abdominal pain or melena  Genito-Urinary ROS: No dysuria or hematuria  Neurological ROS: no headache or dizziness    Objective:  /77 (BP Location: Left arm, Patient Position: Lying)   Pulse 92   Temp 98.1 °F (36.7 °C) (Oral)   Resp 16  "  Ht 177.8 cm (70\")   Wt 90.5 kg (199 lb 9.6 oz)   SpO2 95%   BMI 28.64 kg/m²     Intake/Output Summary (Last 24 hours) at 2/1/2019 1050  Last data filed at 2/1/2019 0700  Gross per 24 hour   Intake 2058 ml   Output 4300 ml   Net -2242 ml     General: awake/alert   Neck: supple, no JVD  Chest:  clear to auscultation bilaterally without respiratory distress  CVS: regular rate and rhythm  Abdominal: soft, nontender, normal bowel sounds  Extremities: no cyanosis or edema  Skin: warm and dry without rash  Neuro: no focal motor deficits    Labs:  Results from last 7 days   Lab Units 02/01/19  0524 01/31/19  0336 01/30/19  1348   WBC 10*3/mm3 4.57* 5.66 6.76   HEMOGLOBIN g/dL 9.7* 9.2* 10.2*   HEMATOCRIT % 30.0* 28.6* 32.3*   PLATELETS 10*3/mm3 186 210 238         Results from last 7 days   Lab Units 02/01/19  0524 01/31/19  0336 01/30/19  1842   SODIUM mmol/L 140 135 132*   POTASSIUM mmol/L 5.2 5.5* 5.9*   CHLORIDE mmol/L 110 108 105   CO2 mmol/L 23.0* 17.0* 14.0*   BUN mg/dL 67* 116* 127*   CREATININE mg/dL 1.56* 2.96* 3.34*   CALCIUM mg/dL 9.8 9.4 9.8   BILIRUBIN mg/dL 0.3 0.3 0.3   ALK PHOS U/L 44 39 45   ALT (SGPT) U/L 16 17 21   AST (SGOT) U/L 17 14 26   GLUCOSE mg/dL 118* 99 96       Radiology:   Imaging Results (last 72 hours)     Procedure Component Value Units Date/Time    US Renal Bilateral [[id number]] Collected:  01/30/19 1935     Updated:  01/30/19 1940    Narrative:       RENAL ULTRASOUND COMPLETE 1/30/2019 6:07 PM CST     REASON FOR EXAM: ZOIE       COMPARISON: None       TECHNIQUE: Multiple longitudinal and transverse realtime sonographic  images of the kidneys and urinary bladder are obtained.      FINDINGS:      RIGHT KIDNEY: 11 x 5.7 x 6 cm. Normal in size, shape, contour and  position. No solid or cystic masses. The central echo complex is compact  with no evidence for hydronephrosis. No nephrolithiasis or abnormal  perinephric fluid collections . No hydroureter.      LEFT KIDNEY: 12.2 x 6.9 x 6.2 " cm. Normal in size, shape, contour and  position. No solid or cystic masses. Mild hydronephrosis is present. No  nephrolithiasis or abnormal perinephric fluid collections . No  hydroureter.      PELVIS: The bladder is unable to be visualized due to an ostomy bag and  surrounding straps. There is no surrounding ascites.        Impression:       1. Mild LEFT hydronephrosis.        This report was finalized on 01/30/2019 19:37 by Dr. Heath Aly MD.          Culture:         Assessment   1.  Acute kidney injury due to NSAIDs--resolving  2.  Baseline CKD stage 3  3.  Hyperkalemia--resolved  4.  Type 2 diabetes with nephropathy  5.  S/p total cystectomy and prostatectomy with ileal conduit  6.  Anemia of iron deficiency    Plan:  1.  Decrease rate IV fluids  2.  Monitor labs      Chuy Walker, HANNAH  2/1/2019  10:50 AM

## 2019-02-02 VITALS
SYSTOLIC BLOOD PRESSURE: 154 MMHG | OXYGEN SATURATION: 95 % | BODY MASS INDEX: 32.73 KG/M2 | RESPIRATION RATE: 18 BRPM | DIASTOLIC BLOOD PRESSURE: 77 MMHG | TEMPERATURE: 98 F | HEIGHT: 70 IN | WEIGHT: 228.6 LBS | HEART RATE: 83 BPM

## 2019-02-02 LAB
ALBUMIN SERPL-MCNC: 3.9 G/DL (ref 3.5–5)
ALBUMIN/GLOB SERPL: 1.6 G/DL (ref 1.1–2.5)
ALP SERPL-CCNC: 44 U/L (ref 24–120)
ALT SERPL W P-5'-P-CCNC: 15 U/L (ref 0–54)
ANION GAP SERPL CALCULATED.3IONS-SCNC: 8 MMOL/L (ref 4–13)
AST SERPL-CCNC: 20 U/L (ref 7–45)
BASOPHILS # BLD AUTO: 0.04 10*3/MM3 (ref 0–0.2)
BASOPHILS NFR BLD AUTO: 0.7 % (ref 0–2)
BILIRUB SERPL-MCNC: 0.3 MG/DL (ref 0.1–1)
BUN BLD-MCNC: 47 MG/DL (ref 5–21)
BUN/CREAT SERPL: 32.4 (ref 7–25)
CALCIUM SPEC-SCNC: 9.9 MG/DL (ref 8.4–10.4)
CHLORIDE SERPL-SCNC: 108 MMOL/L (ref 98–110)
CO2 SERPL-SCNC: 25 MMOL/L (ref 24–31)
CREAT BLD-MCNC: 1.45 MG/DL (ref 0.5–1.4)
DEPRECATED RDW RBC AUTO: 49.7 FL (ref 40–54)
EOSINOPHIL # BLD AUTO: 0.15 10*3/MM3 (ref 0–0.7)
EOSINOPHIL NFR BLD AUTO: 2.8 % (ref 0–4)
ERYTHROCYTE [DISTWIDTH] IN BLOOD BY AUTOMATED COUNT: 17 % (ref 12–15)
GFR SERPL CREATININE-BSD FRML MDRD: 48 ML/MIN/1.73
GLOBULIN UR ELPH-MCNC: 2.4 GM/DL
GLUCOSE BLD-MCNC: 110 MG/DL (ref 70–100)
HCT VFR BLD AUTO: 29.8 % (ref 40–52)
HGB BLD-MCNC: 9.5 G/DL (ref 14–18)
IMM GRANULOCYTES # BLD AUTO: 0.03 10*3/MM3 (ref 0–0.03)
IMM GRANULOCYTES NFR BLD AUTO: 0.6 % (ref 0–5)
LYMPHOCYTES # BLD AUTO: 1.22 10*3/MM3 (ref 0.72–4.86)
LYMPHOCYTES NFR BLD AUTO: 22.6 % (ref 15–45)
MCH RBC QN AUTO: 26.2 PG (ref 28–32)
MCHC RBC AUTO-ENTMCNC: 31.9 G/DL (ref 33–36)
MCV RBC AUTO: 82.1 FL (ref 82–95)
MONOCYTES # BLD AUTO: 0.51 10*3/MM3 (ref 0.19–1.3)
MONOCYTES NFR BLD AUTO: 9.5 % (ref 4–12)
NEUTROPHILS # BLD AUTO: 3.44 10*3/MM3 (ref 1.87–8.4)
NEUTROPHILS NFR BLD AUTO: 63.8 % (ref 39–78)
NRBC BLD AUTO-RTO: 0 /100 WBC (ref 0–0)
PLATELET # BLD AUTO: 211 10*3/MM3 (ref 130–400)
PMV BLD AUTO: 9.4 FL (ref 6–12)
POTASSIUM BLD-SCNC: 4.6 MMOL/L (ref 3.5–5.3)
PROT SERPL-MCNC: 6.3 G/DL (ref 6.3–8.7)
RBC # BLD AUTO: 3.63 10*6/MM3 (ref 4.8–5.9)
SODIUM BLD-SCNC: 141 MMOL/L (ref 135–145)
WBC NRBC COR # BLD: 5.39 10*3/MM3 (ref 4.8–10.8)

## 2019-02-02 PROCEDURE — 80053 COMPREHEN METABOLIC PANEL: CPT | Performed by: INTERNAL MEDICINE

## 2019-02-02 PROCEDURE — 85025 COMPLETE CBC W/AUTO DIFF WBC: CPT | Performed by: INTERNAL MEDICINE

## 2019-02-02 RX ORDER — SODIUM BICARBONATE 650 MG/1
650 TABLET ORAL 3 TIMES DAILY
Qty: 90 TABLET | Refills: 0 | Status: SHIPPED | OUTPATIENT
Start: 2019-02-02 | End: 2020-10-26

## 2019-02-02 RX ADMIN — SODIUM BICARBONATE 650 MG: 650 TABLET ORAL at 08:26

## 2019-02-02 RX ADMIN — SODIUM BICARBONATE: 84 INJECTION, SOLUTION INTRAVENOUS at 02:46

## 2019-02-02 RX ADMIN — HYDROCORTISONE ACETATE 25 MG: 25 SUPPOSITORY RECTAL at 08:26

## 2019-02-02 RX ADMIN — DOCUSATE SODIUM 100 MG: 100 CAPSULE ORAL at 08:26

## 2019-02-02 NOTE — PLAN OF CARE
Problem: Renal Failure/Kidney Injury, Acute (Adult)  Goal: Signs and Symptoms of Listed Potential Problems Will be Absent, Minimized or Managed (Renal Failure/Kidney Injury, Acute)  Outcome: Ongoing (interventions implemented as appropriate)      Problem: Patient Care Overview  Goal: Plan of Care Review  Outcome: Ongoing (interventions implemented as appropriate)   02/02/19 0239   Coping/Psychosocial   Plan of Care Reviewed With patient   Plan of Care Review   Progress improving   OTHER   Outcome Summary VSS BP WNL SR on tele. NS with Bicarb infusing. No complaints of pain. Pending AM BMP and CBC and possible discharge home today?      Goal: Individualization and Mutuality  Outcome: Ongoing (interventions implemented as appropriate)    Goal: Discharge Needs Assessment  Outcome: Ongoing (interventions implemented as appropriate)    Goal: Interprofessional Rounds/Family Conf  Outcome: Ongoing (interventions implemented as appropriate)

## 2019-02-02 NOTE — DISCHARGE SUMMARY
[facility]  Fidel Yousif M.D.  RAJINDER Yousif M.D.  HANNAH Lira    Internal Medicine Discharge Summary    Patient ID: Chung Bradley  MRN: [id number]     Acct:  [id number]       Patient's PCP: Fidel Yousif MD    Admit Date: 1/30/2019     Discharge Date: 2/2/2019      Admitting Physician: Fidel Youisf MD    Discharge Physician: HANNAH Bar     Active Discharge Diagnoses:  1. Acute renal failure  2. Hyperkalemia  3. Bladder CA s/p total cystectomy/prostatectomy/ileal conduit  4. Metabolic acidosis  5. DM2 with hyperglycemia not on long term insulin  6. REYNALDO  7. Anemia    Hospital Problems    Acute hyperkalemia     Past Medical History:   Diagnosis Date   • Bladder neoplasm of uncertain malignant potential 7/20/2017   • Degenerative arthritis of cervical spine with nerve compression     per patient report   • Diabetes mellitus (CMS/HCC)    • GERD (gastroesophageal reflux disease)    • Hearing aid worn    • Hypertension    • Impaired hearing     without hearing aids can barely hear anything   • Neuropathy    • Sleep apnea     wears cpap       The patient was seen and examined on the day of discharge and this discharge summary is in conjunction with any daily progress note from day of discharge.    Code Status:    Code Status and Medical Interventions:   Ordered at: 01/30/19 1254     Code Status:    CPR     Medical Interventions (Level of Support Prior to Arrest):    Full       Hospital Course: The patient has recently been undergoing treatment for bladder CA including cystectomy/prostatectomy/ileal conduit. He was seen in follow up and had labs and imaging. Imaging showed no obstruction, but lab work showed evidence of acute renal failure with hyperkalemia. The patient was already on his way back to this area and was instructed to present to PCP or to ER for follow up and treatment. He was notified by our office the next day and send to hospital. Initial BUN/Creat 133/3.90  with potassium of 7.0. He was seen in consultation by nephrology. He was given kayexalate and hydrated with IVF with bicarb. Renal function improved and potassium returned to normal. The patient tolerated without difficulty and is now felt stable for discharge to home with close outpatient follow up. BUN/Creat on day of discharge 47/1.45 which appears to be his baseline. Decline in renal function felt to be related to NSAID use at this time.     Consults:  Dr. Lindquist (nephrology)    Disposition: home    Discharged Condition: Stable    Follow Up: Fidel Yousif MD  [address] [place] [address]  [phone]    Follow up in 1 week(s)  hospital follow up with [facility]. WE WILL NOTIFY YOU OF YOUR APPOINTMENT DATE AND TIME    Chuy Walker, APRN  [address]  [address]  [place] [id number] [phone]    Follow up in 1 week(s)  WE WILL NOTIFY YOU OF YOUR APPOINTMENT DATE AND TIME      Diet: Diet Regular; Cardiac, Consistent Carbohydrate, Renal    Discharge Medications:      Discharge Medications      New Medications      Instructions Start Date   sodium bicarbonate 650 MG tablet   650 mg, Oral, 3 Times Daily         Changes to Medications      Instructions Start Date   oxyCODONE-acetaminophen 5-325 MG per tablet  Commonly known as:  PERCOCET  What changed:    · when to take this  · reasons to take this   1 tablet, Oral, 2 Times Daily         Continue These Medications      Instructions Start Date   allopurinol 300 MG tablet  Commonly known as:  ZYLOPRIM   300 mg, Oral, Daily      ANUSOL-HC 25 MG suppository  Generic drug:  hydrocortisone   25 mg, Rectal, 2 Times Daily PRN      aspirin 81 MG tablet   81 mg, Oral, Daily      cholecalciferol 1000 units tablet  Commonly known as:  VITAMIN D3   1,000 Units, Oral, Daily      cyclobenzaprine 10 MG tablet  Commonly known as:  FLEXERIL   10 mg, Oral, 3 Times Daily PRN      fenofibrate 145 MG tablet  Commonly known as:  TRICOR   145 mg, Oral, Nightly      fish oil  1000 MG capsule capsule   1,000 mg, Oral, Daily With Breakfast      fluticasone 50 MCG/ACT nasal spray  Commonly known as:  FLONASE   1 spray, Nasal, 2 Times Daily PRN      gabapentin 400 MG capsule  Commonly known as:  NEURONTIN   800 mg, Oral, Every 8 Hours Scheduled, Take 2 capsules by mouth in the am, two capsules by mouth in the afternoon, and 2 capsules by mouth in the pm.      guaiFENesin 600 MG 12 hr tablet  Commonly known as:  MUCINEX   1,200 mg, Oral, 2 Times Daily      hydrocortisone 2.5 % cream   Topical, 2 Times Daily, Apply to hemorrhoids       lisinopril 40 MG tablet  Commonly known as:  PRINIVIL,ZESTRIL   40 mg, Oral, 2 Times Daily      lovastatin 40 MG tablet  Commonly known as:  MEVACOR   20 mg, Oral, Daily      magnesium oxide 400 MG tablet  Commonly known as:  MAG-OX   1,200 mg, Oral, Daily, Take 3 tablets by mouth two times daily.      metFORMIN 1000 MG tablet  Commonly known as:  GLUCOPHAGE   1,000 mg, Oral, 2 Times Daily With Meals      omeprazole 20 MG capsule  Commonly known as:  priLOSEC   20 mg, Oral, 2 Times Daily      oxybutynin 5 MG tablet  Commonly known as:  DITROPAN   No dose, route, or frequency recorded.      tamsulosin 0.4 MG capsule 24 hr capsule  Commonly known as:  FLOMAX   No dose, route, or frequency recorded.      vitamin B-12 1000 MCG tablet  Commonly known as:  CYANOCOBALAMIN   1,000 mcg, Oral, Daily         Stop These Medications    baclofen 10 MG tablet  Commonly known as:  LIORESAL     diclofenac 75 MG EC tablet  Commonly known as:  VOLTAREN     furosemide 40 MG tablet  Commonly known as:  LASIX     PROCTOZONE-HC 2.5 % rectal cream  Generic drug:  hydrocortisone            Time Spent on discharge is  32 minutes in patient examination, evaluation, patient/family counseling as well as medication reconciliation, prescriptions for required medications, discharge plan and follow up.     Electronically signed by HANNAH Bar on 2/2/2019 at 9:39 AM

## 2019-02-03 ENCOUNTER — READMISSION MANAGEMENT (OUTPATIENT)
Dept: CALL CENTER | Facility: HOSPITAL | Age: 69
End: 2019-02-03

## 2019-02-03 NOTE — OUTREACH NOTE
Prep Survey      Responses   Facility patient discharged from?  [place]   Is patient eligible?  Yes   Discharge diagnosis  Acute renal failure   Does the patient have one of the following disease processes/diagnoses(primary or secondary)?  Other   Does the patient have Home health ordered?  No   Is there a DME ordered?  No   Medication alerts for this patient  lasix   Prep survey completed?  Yes          Jeny Mccormick RN

## 2019-02-05 ENCOUNTER — READMISSION MANAGEMENT (OUTPATIENT)
Dept: CALL CENTER | Facility: HOSPITAL | Age: 69
End: 2019-02-05

## 2019-02-05 NOTE — OUTREACH NOTE
"Medical Week 1 Survey      Responses   Facility patient discharged from?  [place]   Does the patient have one of the following disease processes/diagnoses(primary or secondary)?  Other   Is there a successful TCM telephone encounter documented?  No   Week 1 attempt successful?  Yes   Call start time  1510   Call end time  1513   Discharge diagnosis  Acute renal failure   Person spoke with today (if not patient) and relationship  wife   Meds reviewed with patient/caregiver?  Yes   Is the patient having any side effects they believe may be caused by any medication additions or changes?  No   Does the patient have all medications ordered at discharge?  Yes   Is the patient taking all medications as directed (includes completed medication regime)?  Yes   Does the patient have a primary care provider?   Yes   Does the patient have an appointment with their PCP within 7 days of discharge?  Yes   Has the patient kept scheduled appointments due by today?  N/A   Has home health visited the patient within 72 hours of discharge?  N/A   Psychosocial issues?  No   Did the patient receive a copy of their discharge instructions?  Yes   What is the patient's perception of their health status since discharge?  Improving   Is the patient/caregiver able to teach back signs and symptoms related to disease process for when to call PCP?  Yes   Is the patient/caregiver able to teach back signs and symptoms related to disease process for when to call 911?  Yes   Is the patient/caregiver able to teach back the hierarchy of who to call/visit for symptoms/problems? PCP, Specialist, Home health nurse, Urgent Care, ED, 911  Yes   Additional teach back comments  wife says pt is \"doing better\". He is [place]. Still weak, but getting better. Non smoker. Blood Sugars are OK   Week 1 call completed?  Yes          Yuko Tripathi RN  "

## 2019-02-14 ENCOUNTER — READMISSION MANAGEMENT (OUTPATIENT)
Dept: CALL CENTER | Facility: HOSPITAL | Age: 69
End: 2019-02-14

## 2019-02-14 NOTE — OUTREACH NOTE
Medical Week 2 Survey      Responses   Facility patient discharged from?  [place]   Does the patient have one of the following disease processes/diagnoses(primary or secondary)?  Other   Week 2 attempt successful?  Yes   Call start time  0953   Discharge diagnosis  Acute renal failure   Call end time  0956   Is patient permission given to speak with other caregiver?  Yes   List who call center can speak with  Wife   Person spoke with today (if not patient) and relationship  wife   Meds reviewed with patient/caregiver?  Yes   Is the patient taking all medications as directed (includes completed medication regime)?  Yes   Has the patient kept scheduled appointments due by today?  Yes   Comments  Has seen his MD and Renal doctor   What is the patient's perception of their health status since discharge?  Improving   Is the patient/caregiver able to teach back signs and symptoms related to disease process for when to call PCP?  Yes   Is the patient/caregiver able to teach back signs and symptoms related to disease process for when to call 911?  Yes   Is the patient/caregiver able to teach back the hierarchy of who to call/visit for symptoms/problems? PCP, Specialist, Home health nurse, Urgent Care, ED, 911  Yes   Additional teach back comments  Wife and pt state he is doing well. he is eating and drinking well. No pain   Week 2 Call Completed?  Yes          Gillian Espino RN

## 2020-01-27 ENCOUNTER — TELEPHONE (OUTPATIENT)
Dept: INTERNAL MEDICINE | Facility: CLINIC | Age: 70
End: 2020-01-27

## 2020-01-27 NOTE — TELEPHONE ENCOUNTER
Request the patient to come in for assessment. If he is unable to come in, ask him if he is coughing or blowing out his nose colored secretions? Has he been on a Z-Pack in the last 3 months? Thank you.

## 2020-01-27 NOTE — TELEPHONE ENCOUNTER
Cough, congestion, runny nose, no fever, nos chills, sore throat.  Has been taking OTC   Started  Friday afternoon  Pharmacy  [facility]

## 2020-01-28 ENCOUNTER — OFFICE VISIT (OUTPATIENT)
Dept: INTERNAL MEDICINE | Facility: CLINIC | Age: 70
End: 2020-01-28

## 2020-01-28 ENCOUNTER — TELEPHONE (OUTPATIENT)
Dept: INTERNAL MEDICINE | Facility: CLINIC | Age: 70
End: 2020-01-28

## 2020-01-28 VITALS
TEMPERATURE: 98 F | HEART RATE: 80 BPM | HEIGHT: 70 IN | SYSTOLIC BLOOD PRESSURE: 160 MMHG | DIASTOLIC BLOOD PRESSURE: 80 MMHG | BODY MASS INDEX: 35.07 KG/M2 | WEIGHT: 245 LBS

## 2020-01-28 DIAGNOSIS — J30.89 NON-SEASONAL ALLERGIC RHINITIS, UNSPECIFIED TRIGGER: Primary | ICD-10-CM

## 2020-01-28 DIAGNOSIS — J01.40 ACUTE NON-RECURRENT PANSINUSITIS: ICD-10-CM

## 2020-01-28 PROCEDURE — 99213 OFFICE O/P EST LOW 20 MIN: CPT | Performed by: NURSE PRACTITIONER

## 2020-01-28 RX ORDER — HYDROCHLOROTHIAZIDE 12.5 MG/1
12.5 TABLET ORAL DAILY
COMMUNITY
End: 2020-07-21 | Stop reason: SDUPTHER

## 2020-01-28 RX ORDER — AMLODIPINE BESYLATE 5 MG/1
5 TABLET ORAL DAILY
COMMUNITY
Start: 2020-01-08 | End: 2020-06-08 | Stop reason: SDUPTHER

## 2020-01-28 RX ORDER — AZITHROMYCIN 250 MG
CAPSULE ORAL
Qty: 1 TABLET | Refills: 0 | Status: SHIPPED | OUTPATIENT
Start: 2020-01-28 | End: 2020-05-20

## 2020-01-28 RX ORDER — GLIPIZIDE 2.5 MG/1
2.5 TABLET, EXTENDED RELEASE ORAL DAILY
COMMUNITY
Start: 2019-12-19 | End: 2020-06-08 | Stop reason: SDUPTHER

## 2020-01-28 NOTE — TELEPHONE ENCOUNTER
"[facility] called to get clarification on Zithromax 250 MG.     Pharm stated RX was coded as \"1\" brand name only and Ana Paula wanted to double check on this please call.    [phone]  "

## 2020-01-28 NOTE — TELEPHONE ENCOUNTER
Contacted Jackie Rasmussen on call for Dr. Yousif and permission received to fill generic zpack as directed. Called back and spoke with Ana Paula at pharmacy.  VTO and read back.

## 2020-01-28 NOTE — PROGRESS NOTES
Subjective   Chung Bradley is a 69 y.o. male.   Chief Complaint   Patient presents with   • Follow-up     Patient complains of sinus congestion and sore throat for past 4 days.  His blood sugar was 205 this morning.    • Sinusitis   • Sore Throat     Has been using Oral Anesthetic spray and Mucinex DM MAX       Patient presents to the clinic today for cough congestion runny nose sore throat which had started Friday afternoon of last week.  States that the nasal congestion and sinus can congestion in his face has increased over the last couple of days.  Patient has been using Flonase nasal spray and Mucinex DM, which had been helping up until a couple of days ago.  Patient complains of worsening sinus pressure in the maxillary and frontal sinuses.  Patient sounds stopped up and is flushed in appearance.  Patient is afebrile today.  Patient reports that blood pressure had been elevated only for the last couple of days, 150s-70s.  Patient only reports a couple days of elevated blood sugar readings between 200-205.  Patient denies any productive sputum, but has been over the last 3 days blowing out yellow-tinged snot.  Patient has sleep apnea and uses CPAP mask.  Patient tries to manually clean it on his own, but has had recurrent sinus congestion since starting to use CPAP mask.  Patient would like to get CPAP mask , however last time he tried to get it approved through his insurance his insurance stated that he was not eligible at the time.  Last time he requested the CPAP  was a year and a half ago.  Patient is requesting antibiotics if warranted today.       The following portions of the patient's history were reviewed and updated as appropriate: allergies, current medications, past family history, past medical history, past social history, past surgical history and problem list.    Review of Systems   Constitutional: Negative for activity change, appetite change, chills, fatigue, fever, unexpected  weight gain and unexpected weight loss.   HENT: Positive for congestion, postnasal drip, rhinorrhea, sinus pressure, sneezing and sore throat. Negative for ear discharge, ear pain, facial swelling, hearing loss, swollen glands, trouble swallowing and voice change.    Eyes: Negative for blurred vision and visual disturbance.   Respiratory: Negative for cough, chest tightness, shortness of breath and wheezing.    Cardiovascular: Negative for chest pain, palpitations and leg swelling.   Gastrointestinal: Negative for abdominal pain, constipation, diarrhea, nausea, vomiting and indigestion.   Endocrine: Negative for cold intolerance, heat intolerance, polydipsia and polyphagia.   Genitourinary: Negative for dysuria, frequency, hematuria and urgency.   Musculoskeletal: Negative for arthralgias, back pain, joint swelling and neck pain.   Skin: Negative for color change, rash and skin lesions.   Allergic/Immunologic: Positive for environmental allergies.   Neurological: Negative for dizziness, weakness, headache, memory problem and confusion.   Hematological: Does not bruise/bleed easily.   Psychiatric/Behavioral: Negative for agitation, hallucinations and suicidal ideas. The patient is not nervous/anxious.        Objective   Past Medical History:   Diagnosis Date   • Bladder neoplasm of uncertain malignant potential 7/20/2017   • Degenerative arthritis of cervical spine with nerve compression     per patient report   • Diabetes mellitus (CMS/HCC)    • GERD (gastroesophageal reflux disease)    • Hearing aid worn    • Hypertension    • Impaired hearing     without hearing aids can barely hear anything   • Neuropathy    • Sleep apnea     wears cpap      Past Surgical History:   Procedure Laterality Date   • CATARACT EXTRACTION W/ INTRAOCULAR LENS IMPLANT     • CHOLECYSTECTOMY     • EXPLORATORY LAPAROTOMY     • LEG SURGERY     • TRANSURETHRAL RESECTION OF BLADDER TUMOR Bilateral 7/25/2017    Procedure: CYSTOSCOPY  TRANSURETHRAL RESECTION OF BLADDER TUMOR & POSSIBLE BILATERAL RETROGRADE URETEROPYELOGRAMS;  Surgeon: Chris Esparza MD;  Location:  [facility] OR;  Service:    • TRANSURETHRAL RESECTION OF BLADDER TUMOR N/A 11/3/2017    Procedure: CYSTOSCOPY TRANSURETHRAL RESECTION OF BLADDER TUMOR ;  Surgeon: Chris Esparza MD;  Location:  [facility] OR;  Service:    • TRANSURETHRAL RESECTION OF BLADDER TUMOR N/A 9/10/2018    Procedure: CYSTOSCOPY TRANSURETHRAL RESECTION OF LARGE BLADDER TUMOR, CLOT EVACUATION, AND FULGURATION;  Surgeon: Chris Esparza MD;  Location:  [facility] OR;  Service: Urology        Current Outpatient Medications:   •  beclomethasone (BECONASE-AQ) 42 MCG/SPRAY nasal spray, 2 sprays into the nostril(s) as directed by provider 2 (Two) Times a Day. Dose is for each nostril., Disp: , Rfl:   •  Benzocaine (ORAL ANESTHETIC MT), Apply  to the mouth or throat As Needed., Disp: , Rfl:   •  hydroCHLOROthiazide (HYDRODIURIL) 12.5 MG tablet, Take 12.5 mg by mouth Daily., Disp: , Rfl:   •  Nystatin powder, Take  by mouth., Disp: , Rfl:   •  Pseudoephedrine-guaiFENesin (MUCINEX D MAX STRENGTH PO), Take  by mouth As Needed., Disp: , Rfl:   •  allopurinol (ZYLOPRIM) 300 MG tablet, Take 300 mg by mouth Daily., Disp: , Rfl:   •  amLODIPine (NORVASC) 5 MG tablet, Take 5 mg by mouth Daily., Disp: , Rfl:   •  aspirin 81 MG tablet, Take 81 mg by mouth Daily., Disp: , Rfl:   •  aspirin 81 MG tablet, Take 81 mg by mouth., Disp: , Rfl:   •  cholecalciferol (VITAMIN D3) 1000 units tablet, Take 1,000 Units by mouth Daily., Disp: , Rfl:   •  cyclobenzaprine (FLEXERIL) 10 MG tablet, Take 1 tablet by mouth 3 (Three) Times a Day As Needed for Muscle Spasms., Disp: 90 tablet, Rfl: 1  •  fenofibrate (TRICOR) 145 MG tablet, Take 145 mg by mouth Every Night., Disp: , Rfl:   •  fluticasone (FLONASE) 50 MCG/ACT nasal spray, 1 spray into the nostril(s) as directed by provider 2 (Two) Times a Day As Needed., Disp: , Rfl:   •  gabapentin (NEURONTIN) 400  MG capsule, Take 800 mg by mouth Every 8 (Eight) Hours. Take 2 capsules by mouth in the am, two capsules by mouth in the afternoon, and 2 capsules by mouth in the pm. , Disp: , Rfl:   •  glipizide (GLUCOTROL XL) 2.5 MG 24 hr tablet, Take 2.5 mg by mouth Daily., Disp: , Rfl:   •  guaiFENesin (MUCINEX) 600 MG 12 hr tablet, Take 1,200 mg by mouth 2 (Two) Times a Day., Disp: , Rfl:   •  hydrocortisone (ANUSOL-HC) 25 MG suppository, Insert 25 mg into the rectum 2 (Two) Times a Day As Needed for Hemorrhoids., Disp: , Rfl:   •  hydrocortisone 2.5 % cream, Apply  topically to the appropriate area as directed 2 (Two) Times a Day. Apply to hemorrhoids, Disp: , Rfl:   •  lovastatin (MEVACOR) 40 MG tablet, Take 20 mg by mouth Daily., Disp: , Rfl:   •  magnesium oxide (MAG-OX) 400 MG tablet, Take 1,200 mg by mouth Daily. Take 3 tablets by mouth two times daily. , Disp: , Rfl:   •  Omega-3 Fatty Acids (FISH OIL) 1000 MG capsule capsule, Take 1,000 mg by mouth Daily With Breakfast., Disp: , Rfl:   •  omeprazole (priLOSEC) 20 MG capsule, Take 20 mg by mouth 2 (Two) Times a Day., Disp: , Rfl:   •  oxybutynin (DITROPAN) 5 MG tablet, , Disp: , Rfl:   •  oxyCODONE-acetaminophen (PERCOCET) 5-325 MG per tablet, Take 1 tablet by mouth 2 (Two) Times a Day. (Patient taking differently: Take 1 tablet by mouth Every 6 (Six) Hours As Needed for Severe Pain .), Disp: 20 tablet, Rfl: 0  •  sodium bicarbonate 650 MG tablet, Take 1 tablet by mouth 3 (Three) Times a Day., Disp: 90 tablet, Rfl: 0  •  tamsulosin (FLOMAX) 0.4 MG capsule 24 hr capsule, , Disp: , Rfl:   •  vitamin B-12 (CYANOCOBALAMIN) 1000 MCG tablet, Take 1,000 mcg by mouth Daily., Disp: , Rfl:   •  ZITHROMAX Z-ONELIA 250 MG tablet, Take 2 tablets the first day, then 1 tablet daily for 4 days., Disp: 1 tablet, Rfl: 0   Vitals:    01/28/20 1024   BP: 160/80   Pulse: 80   Temp: 98 °F (36.7 °C)     Physical Exam   Constitutional: He is oriented to person, place, and time. He appears  well-developed and well-nourished.   HENT:   Head: Normocephalic and atraumatic.   Right Ear: External ear normal.   Left Ear: External ear normal.   Nose: Right sinus exhibits maxillary sinus tenderness and frontal sinus tenderness. Left sinus exhibits maxillary sinus tenderness and frontal sinus tenderness.   Mouth/Throat: Uvula is midline and mucous membranes are normal. Posterior oropharyngeal erythema present.   Eyes: Pupils are equal, round, and reactive to light. Conjunctivae and EOM are normal.   Neck: Normal range of motion. Neck supple. No thyromegaly present.   Cardiovascular: Normal rate, regular rhythm, normal heart sounds and intact distal pulses.   Pulmonary/Chest: Effort normal and breath sounds normal.   Abdominal: Soft. Bowel sounds are normal.   Lymphadenopathy:        Head (right side): No submental, no submandibular, no tonsillar, no preauricular, no posterior auricular and no occipital adenopathy present.        Head (left side): No submental, no submandibular, no tonsillar, no preauricular, no posterior auricular and no occipital adenopathy present.     He has no cervical adenopathy.   Neurological: He is alert and oriented to person, place, and time.   Skin: Skin is warm and dry. Capillary refill takes less than 2 seconds. Turgor is normal.   Psychiatric: He has a normal mood and affect. His behavior is normal. Thought content normal.   Nursing note and vitals reviewed.        Patient's Body mass index is 35.15 kg/m². BMI is above normal parameters. Recommendations include: educational material, exercise counseling and nutrition counseling.      Assessment/Plan   Diagnoses and all orders for this visit:    1. Non-seasonal allergic rhinitis, unspecified trigger (Primary)    2. Acute non-recurrent pansinusitis    Other orders  -     ZITHROMAX Z-ONELIA 250 MG tablet; Take 2 tablets the first day, then 1 tablet daily for 4 days.  Dispense: 1 tablet; Refill: 0    Patient presents today to the clinic  related to sinus pressure cough runny nose but denies fever, or chills, or night sweats.  Patient denies any diarrhea nausea or vomiting.  Patient states that symptoms are worsening and nasal secretions are starting to become yellow-tinged.  Advised patient to continue using Flonase nasal spray and Mucinex DM.  Advised patient to increase water consumption.  Patient will be started on azithromycin and will come back to see us in 2 weeks if symptoms do not improve.

## 2020-05-20 ENCOUNTER — OFFICE VISIT (OUTPATIENT)
Dept: INTERNAL MEDICINE | Facility: CLINIC | Age: 70
End: 2020-05-20

## 2020-05-20 VITALS
WEIGHT: 244 LBS | HEART RATE: 84 BPM | OXYGEN SATURATION: 97 % | SYSTOLIC BLOOD PRESSURE: 132 MMHG | HEIGHT: 70 IN | BODY MASS INDEX: 34.93 KG/M2 | DIASTOLIC BLOOD PRESSURE: 82 MMHG

## 2020-05-20 DIAGNOSIS — Z12.5 SCREENING PSA (PROSTATE SPECIFIC ANTIGEN): ICD-10-CM

## 2020-05-20 DIAGNOSIS — E11.9 TYPE 2 DIABETES MELLITUS WITHOUT COMPLICATION, WITHOUT LONG-TERM CURRENT USE OF INSULIN (HCC): Primary | ICD-10-CM

## 2020-05-20 DIAGNOSIS — I10 ESSENTIAL HYPERTENSION: ICD-10-CM

## 2020-05-20 DIAGNOSIS — E11.9 TYPE 2 DIABETES MELLITUS WITHOUT COMPLICATION, WITHOUT LONG-TERM CURRENT USE OF INSULIN (HCC): ICD-10-CM

## 2020-05-20 DIAGNOSIS — G47.33 OBSTRUCTIVE SLEEP APNEA SYNDROME: ICD-10-CM

## 2020-05-20 PROBLEM — G47.30 SLEEP APNEA: Status: ACTIVE | Noted: 2020-05-20

## 2020-05-20 PROCEDURE — G0439 PPPS, SUBSEQ VISIT: HCPCS | Performed by: INTERNAL MEDICINE

## 2020-05-20 NOTE — PROGRESS NOTES
The ABCs of the Annual Wellness Visit  Subsequent Medicare Wellness Visit    Chief Complaint   Patient presents with   • Medicare Wellness-subsequent       Subjective   History of Present Illness:  Chung Bradley is a 69 y.o. male who presents for a Subsequent Medicare Wellness Visit.    HEALTH RISK ASSESSMENT    Recent Hospitalizations:  No hospitalization(s) within the last year.    Current Medical Providers:  Patient Care Team:  Fidel Yousif MD as PCP - General  Fidel Yousif MD as PCP - Family Medicine  Chris Esparza MD as Consulting Physician (Urology)    Smoking Status:  Social History     Tobacco Use   Smoking Status Former Smoker   • Last attempt to quit:    • Years since quittin.3   Smokeless Tobacco Never Used       Alcohol Consumption:  Social History     Substance and Sexual Activity   Alcohol Use No       Depression Screen:   PHQ-2/PHQ-9 Depression Screening 2020   Little interest or pleasure in doing things 0   Feeling down, depressed, or hopeless 0   Total Score 0       Fall Risk Screen:  ZAHIDA Fall Risk Assessment was completed, and patient is at LOW risk for falls.Assessment completed on:2020    Health Habits and Functional and Cognitive Screening:  Functional & Cognitive Status 2020   Do you have difficulty preparing food and eating? No   Do you have difficulty bathing yourself, getting dressed or grooming yourself? No   Do you have difficulty using the toilet? No   Do you have difficulty moving around from place to place? No   Do you have trouble with steps or getting out of a bed or a chair? No   Current Diet Well Balanced Diet   Dental Exam Up to date   Eye Exam Up to date   Exercise (times per week) 2 times per week   Current Exercise Activities Include Walking   Do you need help using the phone?  No   Are you deaf or do you have serious difficulty hearing?  Yes   Do you need help with transportation? No   Do you need help shopping? No   Do you need help  preparing meals?  No   Do you need help with housework?  No   Do you need help with laundry? No   Do you need help taking your medications? No   Do you need help managing money? No   Do you ever drive or ride in a car without wearing a seat belt? No   Have you felt unusual stress, anger or loneliness in the last month? No   Who do you live with? Spouse   If you need help, do you have trouble finding someone available to you? No   Have you been bothered in the last four weeks by sexual problems? No   Do you have difficulty concentrating, remembering or making decisions? No         Does the patient have evidence of cognitive impairment? No    Asprin use counseling:Taking ASA appropriately as indicated    Age-appropriate Screening Schedule:  Refer to the list below for future screening recommendations based on patient's age, sex and/or medical conditions. Orders for these recommended tests are listed in the plan section. The patient has been provided with a written plan.    Health Maintenance   Topic Date Due   • URINE MICROALBUMIN  1950   • PNEUMOCOCCAL VACCINE (65+ HIGH RISK) (1 of 2 - PCV13) 08/12/2013   • ZOSTER VACCINE (2 of 3) 12/16/2013   • DIABETIC FOOT EXAM  07/20/2017   • DIABETIC EYE EXAM  07/20/2017   • HEMOGLOBIN A1C  05/06/2020   • INFLUENZA VACCINE  08/01/2020   • COLONOSCOPY  11/01/2025   • TDAP/TD VACCINES (2 - Td) 10/23/2027          The following portions of the patient's history were reviewed and updated as appropriate: allergies, current medications, past family history, past medical history, past social history, past surgical history and problem list.    Outpatient Medications Prior to Visit   Medication Sig Dispense Refill   • allopurinol (ZYLOPRIM) 300 MG tablet Take 300 mg by mouth Daily.     • amLODIPine (NORVASC) 5 MG tablet Take 5 mg by mouth Daily.     • aspirin 81 MG tablet Take 81 mg by mouth Daily.     • beclomethasone (BECONASE-AQ) 42 MCG/SPRAY nasal spray 2 sprays into the  nostril(s) as directed by provider 2 (Two) Times a Day. Dose is for each nostril.     • Benzocaine (ORAL ANESTHETIC MT) Apply  to the mouth or throat As Needed.     • cholecalciferol (VITAMIN D3) 1000 units tablet Take 1,000 Units by mouth Daily.     • cyclobenzaprine (FLEXERIL) 10 MG tablet Take 1 tablet by mouth 3 (Three) Times a Day As Needed for Muscle Spasms. 90 tablet 1   • fenofibrate (TRICOR) 145 MG tablet Take 145 mg by mouth Every Night.     • fluticasone (FLONASE) 50 MCG/ACT nasal spray 1 spray into the nostril(s) as directed by provider 2 (Two) Times a Day As Needed.     • gabapentin (NEURONTIN) 400 MG capsule Take 800 mg by mouth Every 8 (Eight) Hours. Take 2 capsules by mouth in the am, two capsules by mouth in the afternoon, and 2 capsules by mouth in the pm.      • glipizide (GLUCOTROL XL) 2.5 MG 24 hr tablet Take 2.5 mg by mouth Daily.     • guaiFENesin (MUCINEX) 600 MG 12 hr tablet Take 1,200 mg by mouth 2 (Two) Times a Day.     • hydroCHLOROthiazide (HYDRODIURIL) 12.5 MG tablet Take 12.5 mg by mouth Daily.     • hydrocortisone (ANUSOL-HC) 25 MG suppository Insert 25 mg into the rectum 2 (Two) Times a Day As Needed for Hemorrhoids.     • hydrocortisone 2.5 % cream Apply  topically to the appropriate area as directed 2 (Two) Times a Day. Apply to hemorrhoids     • lovastatin (MEVACOR) 40 MG tablet Take 20 mg by mouth Daily.     • magnesium oxide (MAG-OX) 400 MG tablet Take 1,200 mg by mouth Daily. Take 3 tablets by mouth two times daily.      • Nystatin powder Take  by mouth.     • Omega-3 Fatty Acids (FISH OIL) 1000 MG capsule capsule Take 1,000 mg by mouth Daily With Breakfast.     • omeprazole (priLOSEC) 20 MG capsule Take 20 mg by mouth 2 (Two) Times a Day.     • oxybutynin (DITROPAN) 5 MG tablet      • oxyCODONE-acetaminophen (PERCOCET) 5-325 MG per tablet Take 1 tablet by mouth 2 (Two) Times a Day. (Patient taking differently: Take 1 tablet by mouth Every 6 (Six) Hours As Needed for Severe  Pain .) 20 tablet 0   • Pseudoephedrine-guaiFENesin (MUCINEX D MAX STRENGTH PO) Take  by mouth As Needed.     • sodium bicarbonate 650 MG tablet Take 1 tablet by mouth 3 (Three) Times a Day. 90 tablet 0   • tamsulosin (FLOMAX) 0.4 MG capsule 24 hr capsule      • vitamin B-12 (CYANOCOBALAMIN) 1000 MCG tablet Take 1,000 mcg by mouth Daily.     • aspirin 81 MG tablet Take 81 mg by mouth.     • ZITHROMAX Z-ONELIA 250 MG tablet Take 2 tablets the first day, then 1 tablet daily for 4 days. 1 tablet 0     No facility-administered medications prior to visit.        Patient Active Problem List   Diagnosis   • Bladder neoplasm of uncertain malignant potential   • Spinal stenosis in cervical region   • BMI 34.0-34.9,adult   • Former smoker   • Cervical radiculopathy   • Malignant neoplasm of overlapping sites of bladder (CMS/HCC)   • BMI 33.0-33.9,adult   • Bladder cancer (CMS/HCC)   • Cervical spondylosis without myelopathy   • Acute hyperkalemia   • Hypertension   • Sleep apnea       Advanced Care Planning:  ACP discussion was held with the patient during this visit. Patient has an advance directive in EMR which is still valid.     Review of Systems   Constitutional: Positive for activity change. Negative for appetite change.   HENT: Negative for congestion and ear discharge.    Eyes: Negative for discharge and itching.   Respiratory: Negative for apnea, cough and choking.    Cardiovascular: Negative for chest pain, palpitations and leg swelling.   Gastrointestinal: Negative for abdominal distention and abdominal pain.   Endocrine: Negative for cold intolerance and heat intolerance.   Genitourinary:        Patient has a ureterostomy bag right lower quadrant   Musculoskeletal: Positive for arthralgias, back pain and myalgias. Negative for joint swelling.   Skin: Negative for color change and pallor.        Patient has a skin lesion left forearm right temporal area   Allergic/Immunologic: Negative for environmental allergies and  "food allergies.   Neurological: Negative for dizziness and headaches.   Psychiatric/Behavioral: Positive for agitation and confusion.       Compared to one year ago, the patient feels his physical health is the same.  Compared to one year ago, the patient feels his mental health is the same.    Reviewed chart for potential of high risk medication in the elderly: yes  Reviewed chart for potential of harmful drug interactions in the elderly:yes    Objective         Vitals:    05/20/20 0757   BP: 132/82   BP Location: Left arm   Pulse: 84   SpO2: 97%   Weight: 111 kg (244 lb)   Height: 177.8 cm (70\")   PainSc: 0-No pain       Body mass index is 35.01 kg/m².  Discussed the patient's BMI with him. The BMI is above average; BMI management plan is completed.    Physical Exam   Constitutional: He is oriented to person, place, and time. He appears well-developed and well-nourished.   HENT:   Head: Normocephalic and atraumatic.   Mouth/Throat: Oropharynx is clear and moist.   Eyes: Pupils are equal, round, and reactive to light. EOM are normal.   Neck: Normal range of motion. Neck supple.   Cardiovascular: Normal rate and regular rhythm.   Pulmonary/Chest: Effort normal and breath sounds normal.   Abdominal: Soft. Bowel sounds are normal.   Genitourinary:   Genitourinary Comments: Ureterostomy right lower quadrant   Musculoskeletal: Normal range of motion.   Neurological: He is alert and oriented to person, place, and time.   Skin: Skin is warm and dry.   Scaly lesion right temple area appearance of seborrheic keratosis small nodular 1 mm area left dorsal forearm   Psychiatric: He has a normal mood and affect. His behavior is normal.   Nursing note and vitals reviewed.            Assessment/Plan   Medicare Risks and Personalized Health Plan  CMS Preventative Services Quick Reference  Cardiovascular risk  Colon Cancer Screening  Diabetic Lab Screening   Immunizations Discussed/Encouraged (specific immunizations; Shingrix " )  Osteoprorosis Risk    The above risks/problems have been discussed with the patient.  Pertinent information has been shared with the patient in the After Visit Summary.  Follow up plans and orders are seen below in the Assessment/Plan Section.    Diagnoses and all orders for this visit:    1. Type 2 diabetes mellitus without complication, without long-term current use of insulin (CMS/[facility]) (Primary)  -     Hemoglobin A1c; Future    2. Screening PSA (prostate specific antigen)  -     PSA SCREENING; Future    3. Essential hypertension  -     Comprehensive Metabolic Panel; Future  -     Lipid Panel; Future  -     CBC & Differential; Future  -     Uric Acid; Future  -     TSH; Future  -     Urinalysis With Microscopic If Indicated (No Culture) - Urine, Clean Catch; Future    4. Obstructive sleep apnea syndrome      Follow Up:  No follow-ups on file.     An After Visit Summary and PPPS were given to the patient.     Patient should probably see  to have his skin lesions evaluated he should also update to Shingrix he wants to have that here when the Shingrix is available

## 2020-05-21 LAB
ALBUMIN SERPL-MCNC: 4.7 G/DL (ref 3.5–5.2)
ALBUMIN/GLOB SERPL: 2 G/DL
ALP SERPL-CCNC: 50 U/L (ref 39–117)
ALT SERPL-CCNC: 19 U/L (ref 1–41)
APPEARANCE UR: CLEAR
AST SERPL-CCNC: 19 U/L (ref 1–40)
BACTERIA #/AREA URNS HPF: ABNORMAL /HPF
BASOPHILS # BLD AUTO: 0.07 10*3/MM3 (ref 0–0.2)
BASOPHILS NFR BLD AUTO: 1 % (ref 0–1.5)
BILIRUB SERPL-MCNC: 0.6 MG/DL (ref 0.2–1.2)
BILIRUB UR QL STRIP: NEGATIVE
BUN SERPL-MCNC: 25 MG/DL (ref 8–23)
BUN/CREAT SERPL: 20.5 (ref 7–25)
CALCIUM SERPL-MCNC: 9.4 MG/DL (ref 8.6–10.5)
CASTS URNS MICRO: ABNORMAL
CHLORIDE SERPL-SCNC: 101 MMOL/L (ref 98–107)
CHOLEST SERPL-MCNC: 110 MG/DL (ref 0–200)
CO2 SERPL-SCNC: 25 MMOL/L (ref 22–29)
COLOR UR: YELLOW
CREAT SERPL-MCNC: 1.22 MG/DL (ref 0.76–1.27)
CRYSTALS URNS MICRO: ABNORMAL
EOSINOPHIL # BLD AUTO: 0.16 10*3/MM3 (ref 0–0.4)
EOSINOPHIL NFR BLD AUTO: 2.4 % (ref 0.3–6.2)
EPI CELLS #/AREA URNS HPF: ABNORMAL /HPF
ERYTHROCYTE [DISTWIDTH] IN BLOOD BY AUTOMATED COUNT: 15 % (ref 12.3–15.4)
GLOBULIN SER CALC-MCNC: 2.4 GM/DL
GLUCOSE SERPL-MCNC: 142 MG/DL (ref 65–99)
GLUCOSE UR QL: NEGATIVE
HBA1C MFR BLD: 6.4 % (ref 4.8–5.6)
HCT VFR BLD AUTO: 42.6 % (ref 37.5–51)
HCV AB S/CO SERPL IA: <0.1 S/CO RATIO (ref 0–0.9)
HDLC SERPL-MCNC: 26 MG/DL (ref 40–60)
HGB BLD-MCNC: 14.5 G/DL (ref 13–17.7)
HGB UR QL STRIP: ABNORMAL
IMM GRANULOCYTES # BLD AUTO: 0.05 10*3/MM3 (ref 0–0.05)
IMM GRANULOCYTES NFR BLD AUTO: 0.7 % (ref 0–0.5)
KETONES UR QL STRIP: NEGATIVE
LDLC SERPL CALC-MCNC: ABNORMAL MG/DL
LEUKOCYTE ESTERASE UR QL STRIP: ABNORMAL
LYMPHOCYTES # BLD AUTO: 1.28 10*3/MM3 (ref 0.7–3.1)
LYMPHOCYTES NFR BLD AUTO: 19.2 % (ref 19.6–45.3)
MCH RBC QN AUTO: 28.9 PG (ref 26.6–33)
MCHC RBC AUTO-ENTMCNC: 34 G/DL (ref 31.5–35.7)
MCV RBC AUTO: 85 FL (ref 79–97)
MICROALBUMIN UR-MCNC: 171.2 UG/ML
MONOCYTES # BLD AUTO: 0.47 10*3/MM3 (ref 0.1–0.9)
MONOCYTES NFR BLD AUTO: 7 % (ref 5–12)
NEUTROPHILS # BLD AUTO: 4.64 10*3/MM3 (ref 1.7–7)
NEUTROPHILS NFR BLD AUTO: 69.7 % (ref 42.7–76)
NITRITE UR QL STRIP: POSITIVE
NRBC BLD AUTO-RTO: 0.1 /100 WBC (ref 0–0.2)
PH UR STRIP: >=9 [PH] (ref 5–8)
PLATELET # BLD AUTO: 152 10*3/MM3 (ref 140–450)
POTASSIUM SERPL-SCNC: 3.7 MMOL/L (ref 3.5–5.2)
PROT SERPL-MCNC: 7.1 G/DL (ref 6–8.5)
PROT UR QL STRIP: ABNORMAL
PSA SERPL-MCNC: <0.014 NG/ML (ref 0–4)
RBC # BLD AUTO: 5.01 10*6/MM3 (ref 4.14–5.8)
RBC #/AREA URNS HPF: ABNORMAL /HPF
SODIUM SERPL-SCNC: 139 MMOL/L (ref 136–145)
SP GR UR: 1.02 (ref 1–1.03)
TRIGL SERPL-MCNC: 430 MG/DL (ref 0–150)
TSH SERPL DL<=0.005 MIU/L-ACNC: 1.75 UIU/ML (ref 0.27–4.2)
URATE SERPL-MCNC: 5.3 MG/DL (ref 3.4–7)
UROBILINOGEN UR STRIP-MCNC: ABNORMAL MG/DL
VLDLC SERPL CALC-MCNC: ABNORMAL MG/DL
WBC # BLD AUTO: 6.67 10*3/MM3 (ref 3.4–10.8)
WBC #/AREA URNS HPF: ABNORMAL /HPF

## 2020-05-28 ENCOUNTER — TELEPHONE (OUTPATIENT)
Dept: NEUROSURGERY | Facility: CLINIC | Age: 70
End: 2020-05-28

## 2020-05-28 NOTE — TELEPHONE ENCOUNTER
Chung left message on my VM requesting orders for physical therapy, when I reviewed his chart and seen that we have not seen him in the office for almost 2 years I returned his call to explain that we could make him an appointment for a reevaluation to see what he might need, he did not want an a appointment and said why should he come in and spend a bunch of D###n money when all he needed was physical therapy, he was very hateful.    Anyway he decided to see his family doctor for PT orders.     Just noted for reference.

## 2020-05-29 ENCOUNTER — TELEPHONE (OUTPATIENT)
Dept: INTERNAL MEDICINE | Facility: CLINIC | Age: 70
End: 2020-05-29

## 2020-05-29 DIAGNOSIS — M54.12 CERVICAL RADICULOPATHY: Primary | ICD-10-CM

## 2020-05-29 NOTE — TELEPHONE ENCOUNTER
Patients wife called in requesting a referral be placed for the patient so he can receive physical therapy (see message from yesterday). She states the patient is wanting to go where he had went to last time, but I could not find in chart. Please advise and call patients wife back to discuss. Call back number is [phone]

## 2020-06-01 NOTE — TELEPHONE ENCOUNTER
Per Norris looks like he went to [facility]. The last note I see is from 12/21/2018.

## 2020-06-03 NOTE — TELEPHONE ENCOUNTER
Please get more information what he is wanting physical therapy for what his symptoms are we may be able to order or he may have to come in and be evaluated

## 2020-06-03 NOTE — TELEPHONE ENCOUNTER
Wife states pt has cervical disc disease and is having a flare up of neck, shoulder, trapezius pain, after doing yard work.  He went to [facility] in [place] last year.

## 2020-06-04 NOTE — TELEPHONE ENCOUNTER
Wife informed this morning that order was faxed to [facility] and she can call and make him an appt.

## 2020-06-08 ENCOUNTER — OFFICE VISIT (OUTPATIENT)
Dept: INTERNAL MEDICINE | Facility: CLINIC | Age: 70
End: 2020-06-08

## 2020-06-08 VITALS
BODY MASS INDEX: 34.65 KG/M2 | WEIGHT: 242 LBS | SYSTOLIC BLOOD PRESSURE: 152 MMHG | HEART RATE: 81 BPM | HEIGHT: 70 IN | OXYGEN SATURATION: 97 % | DIASTOLIC BLOOD PRESSURE: 80 MMHG | TEMPERATURE: 97.7 F

## 2020-06-08 DIAGNOSIS — I10 ESSENTIAL HYPERTENSION: Primary | ICD-10-CM

## 2020-06-08 DIAGNOSIS — M47.812 CERVICAL SPONDYLOSIS WITHOUT MYELOPATHY: ICD-10-CM

## 2020-06-08 DIAGNOSIS — M48.02 SPINAL STENOSIS IN CERVICAL REGION: ICD-10-CM

## 2020-06-08 DIAGNOSIS — E11.65 TYPE 2 DIABETES MELLITUS WITH HYPERGLYCEMIA, WITHOUT LONG-TERM CURRENT USE OF INSULIN (HCC): ICD-10-CM

## 2020-06-08 DIAGNOSIS — M54.12 CERVICAL RADICULOPATHY: ICD-10-CM

## 2020-06-08 PROBLEM — E11.9 TYPE 2 DIABETES MELLITUS WITHOUT COMPLICATION, WITHOUT LONG-TERM CURRENT USE OF INSULIN: Status: ACTIVE | Noted: 2020-06-08

## 2020-06-08 PROBLEM — N17.9 ACUTE RENAL FAILURE: Status: ACTIVE | Noted: 2020-06-08

## 2020-06-08 PROCEDURE — 99214 OFFICE O/P EST MOD 30 MIN: CPT | Performed by: NURSE PRACTITIONER

## 2020-06-08 RX ORDER — AMLODIPINE BESYLATE 10 MG/1
10 TABLET ORAL DAILY
Qty: 30 TABLET | Refills: 3 | Status: SHIPPED | OUTPATIENT
Start: 2020-06-08 | End: 2020-09-09 | Stop reason: SDUPTHER

## 2020-06-08 RX ORDER — OXYCODONE HYDROCHLORIDE AND ACETAMINOPHEN 5; 325 MG/1; MG/1
1 TABLET ORAL DAILY PRN
Qty: 30 TABLET | Refills: 0 | Status: SHIPPED | OUTPATIENT
Start: 2020-06-08 | End: 2020-10-26

## 2020-06-08 RX ORDER — GLIPIZIDE 2.5 MG/1
2.5 TABLET, EXTENDED RELEASE ORAL DAILY
Qty: 30 TABLET | Refills: 11 | Status: SHIPPED | OUTPATIENT
Start: 2020-06-08 | End: 2020-09-09 | Stop reason: SDUPTHER

## 2020-06-08 NOTE — PATIENT INSTRUCTIONS
"DASH Eating Plan  DASH stands for \"Dietary Approaches to Stop Hypertension.\" The DASH eating plan is a healthy eating plan that has been shown to reduce high blood pressure (hypertension). It may also reduce your risk for type 2 diabetes, heart disease, and stroke. The DASH eating plan may also help with weight loss.  What are tips for following this plan?    General guidelines  · Avoid eating more than 2,300 mg (milligrams) of salt (sodium) a day. If you have hypertension, you may need to reduce your sodium intake to 1,500 mg a day.  · Limit alcohol intake to no more than 1 drink a day for nonpregnant women and 2 drinks a day for men. One drink equals 12 oz of beer, 5 oz of wine, or 1½ oz of hard liquor.  · Work with your health care provider to maintain a healthy body weight or to lose weight. Ask what an ideal weight is for you.  · Get at least 30 minutes of exercise that causes your heart to beat faster (aerobic exercise) most days of the week. Activities may include walking, swimming, or biking.  · Work with your health care provider or diet and nutrition specialist (dietitian) to adjust your eating plan to your individual calorie needs.  Reading food labels    · Check food labels for the amount of sodium per serving. Choose foods with less than 5 percent of the Daily Value of sodium. Generally, foods with less than 300 mg of sodium per serving fit into this eating plan.  · To find whole grains, look for the word \"whole\" as the first word in the ingredient list.  Shopping  · Buy products labeled as \"low-sodium\" or \"no salt added.\"  · Buy fresh foods. Avoid canned foods and premade or frozen meals.  Cooking  · Avoid adding salt when cooking. Use salt-free seasonings or herbs instead of table salt or sea salt. Check with your health care provider or pharmacist before using salt substitutes.  · Do not berger foods. Cook foods using healthy methods such as baking, boiling, grilling, and broiling instead.  · Cook with " heart-healthy oils, such as olive, canola, soybean, or sunflower oil.  Meal planning  · Eat a balanced diet that includes:  ? 5 or more servings of fruits and vegetables each day. At each meal, try to fill half of your plate with fruits and vegetables.  ? Up to 6-8 servings of whole grains each day.  ? Less than 6 oz of lean meat, poultry, or fish each day. A 3-oz serving of meat is about the same size as a deck of cards. One egg equals 1 oz.  ? 2 servings of low-fat dairy each day.  ? A serving of nuts, seeds, or beans 5 times each week.  ? Heart-healthy fats. Healthy fats called Omega-3 fatty acids are found in foods such as flaxseeds and coldwater fish, like sardines, salmon, and mackerel.  · Limit how much you eat of the following:  ? Canned or prepackaged foods.  ? Food that is high in trans fat, such as fried foods.  ? Food that is high in saturated fat, such as fatty meat.  ? Sweets, desserts, sugary drinks, and other foods with added sugar.  ? Full-fat dairy products.  · Do not salt foods before eating.  · Try to eat at least 2 vegetarian meals each week.  · Eat more home-cooked food and less restaurant, buffet, and fast food.  · When eating at a restaurant, ask that your food be prepared with less salt or no salt, if possible.  What foods are recommended?  The items listed may not be a complete list. Talk with your dietitian about what dietary choices are best for you.  Grains  Whole-grain or whole-wheat bread. Whole-grain or whole-wheat pasta. Brown rice. Oatmeal. Quinoa. Bulgur. Whole-grain and low-sodium cereals. Sobia bread. Low-fat, low-sodium crackers. Whole-wheat flour tortillas.  Vegetables  Fresh or frozen vegetables (raw, steamed, roasted, or grilled). Low-sodium or reduced-sodium tomato and vegetable juice. Low-sodium or reduced-sodium tomato sauce and tomato paste. Low-sodium or reduced-sodium canned vegetables.  Fruits  All fresh, dried, or frozen fruit. Canned fruit in natural juice (without  added sugar).  Meat and other protein foods  Skinless chicken or turkey. Ground chicken or turkey. Pork with fat trimmed off. Fish and seafood. Egg whites. Dried beans, peas, or lentils. Unsalted nuts, nut butters, and seeds. Unsalted canned beans. Lean cuts of beef with fat trimmed off. Low-sodium, lean deli meat.  Dairy  Low-fat (1%) or fat-free (skim) milk. Fat-free, low-fat, or reduced-fat cheeses. Nonfat, low-sodium ricotta or cottage cheese. Low-fat or nonfat yogurt. Low-fat, low-sodium cheese.  Fats and oils  Soft margarine without trans fats. Vegetable oil. Low-fat, reduced-fat, or light mayonnaise and salad dressings (reduced-sodium). Canola, safflower, olive, soybean, and sunflower oils. Avocado.  Seasoning and other foods  Herbs. Spices. Seasoning mixes without salt. Unsalted popcorn and pretzels. Fat-free sweets.  What foods are not recommended?  The items listed may not be a complete list. Talk with your dietitian about what dietary choices are best for you.  Grains  Baked goods made with fat, such as croissants, muffins, or some breads. Dry pasta or rice meal packs.  Vegetables  Creamed or fried vegetables. Vegetables in a cheese sauce. Regular canned vegetables (not low-sodium or reduced-sodium). Regular canned tomato sauce and paste (not low-sodium or reduced-sodium). Regular tomato and vegetable juice (not low-sodium or reduced-sodium). Pickles. Olives.  Fruits  Canned fruit in a light or heavy syrup. Fried fruit. Fruit in cream or butter sauce.  Meat and other protein foods  Fatty cuts of meat. Ribs. Fried meat. Valdez. Sausage. Bologna and other processed lunch meats. Salami. Fatback. Hotdogs. Bratwurst. Salted nuts and seeds. Canned beans with added salt. Canned or smoked fish. Whole eggs or egg yolks. Chicken or turkey with skin.  Dairy  Whole or 2% milk, cream, and half-and-half. Whole or full-fat cream cheese. Whole-fat or sweetened yogurt. Full-fat cheese. Nondairy creamers. Whipped toppings.  Processed cheese and cheese spreads.  Fats and oils  Butter. Stick margarine. Lard. Shortening. Ghee. Valdez fat. Tropical oils, such as coconut, palm kernel, or palm oil.  Seasoning and other foods  Salted popcorn and pretzels. Onion salt, garlic salt, seasoned salt, table salt, and sea salt. Worcestershire sauce. Tartar sauce. Barbecue sauce. Teriyaki sauce. Soy sauce, including reduced-sodium. Steak sauce. Canned and packaged gravies. Fish sauce. Oyster sauce. Cocktail sauce. Horseradish that you find on the shelf. Ketchup. Mustard. Meat flavorings and tenderizers. Bouillon cubes. Hot sauce and Tabasco sauce. Premade or packaged marinades. Premade or packaged taco seasonings. Relishes. Regular salad dressings.  Where to find more information:  · National Heart, Lung, and Blood [place]: www.nhlbi.nih.gov  · American Heart Association: www.heart.org  Summary  · The DASH eating plan is a healthy eating plan that has been shown to reduce high blood pressure (hypertension). It may also reduce your risk for type 2 diabetes, heart disease, and stroke.  · With the DASH eating plan, you should limit salt (sodium) intake to 2,300 mg a day. If you have hypertension, you may need to reduce your sodium intake to 1,500 mg a day.  · When on the DASH eating plan, aim to eat more fresh fruits and vegetables, whole grains, lean proteins, low-fat dairy, and heart-healthy fats.  · Work with your health care provider or diet and nutrition specialist (dietitian) to adjust your eating plan to your individual calorie needs.  This information is not intended to replace advice given to you by your health care provider. Make sure you discuss any questions you have with your health care provider.  Document Released: 12/06/2012 Document Revised: 11/30/2018 Document Reviewed: 12/11/2017  Elsevier Patient Education © 2020 [employer] Inc[employer]  Blood Pressure Record Sheet  To take your blood pressure, you will need a blood pressure machine. You can  buy a blood pressure machine (blood pressure monitor) at your clinic, drug store, or online. When choosing one, consider:  · An automatic monitor that has an arm cuff.  · A cuff that wraps snugly around your upper arm. You should be able to fit only one finger between your arm and the cuff.  · A device that stores blood pressure reading results.  · Do not choose a monitor that measures your blood pressure from your wrist or finger.  Follow your health care provider's instructions for how to take your blood pressure. To use this form:  · Get one reading in the morning (a.m.) before you take any medicines.  · Get one reading in the evening (p.m.) before supper.  · Take at least 2 readings with each blood pressure check. This makes sure the results are correct. Wait 1-2 minutes between measurements.  · Write down the results in the spaces on this form.  · Repeat this once a week, or as told by your health care provider.  · Make a follow-up appointment with your health care provider to discuss the results.  Blood pressure log  Date: _______________________  · a.m. _____________________(1st reading) _____________________(2nd reading)  · p.m. _____________________(1st reading) _____________________(2nd reading)  Date: _______________________  · a.m. _____________________(1st reading) _____________________(2nd reading)  · p.m. _____________________(1st reading) _____________________(2nd reading)  Date: _______________________  · a.m. _____________________(1st reading) _____________________(2nd reading)  · p.m. _____________________(1st reading) _____________________(2nd reading)  Date: _______________________  · a.m. _____________________(1st reading) _____________________(2nd reading)  · p.m. _____________________(1st reading) _____________________(2nd reading)  Date: _______________________  · a.m. _____________________(1st reading) _____________________(2nd reading)  · p.m. _____________________(1st reading)  _____________________(2nd reading)  This information is not intended to replace advice given to you by your health care provider. Make sure you discuss any questions you have with your health care provider.  Document Released: 09/15/2004 Document Revised: 02/15/2019 Document Reviewed: 12/18/2018  Elsevier Patient Education © 2020 [employer] Inc[employer]

## 2020-06-08 NOTE — PROGRESS NOTES
Subjective   Chung Bradley is a 69 y.o. male.   Chief Complaint   Patient presents with   • Hypertension     Pinched nerve in neck. Started physical therapy this morning. Patient stated that its been staying in the 150's the past couple of weeks.            Mr. Bradley presents as a walk-in today to assess blood pressure. He has a history of hypertension, acute renal failure, diabetes type 2, and chronic neck pain. He reports after that last couple of weeks his blood pressure has been sightly elevated 's. I rechecked his blood pressure after seated for 10 minutes and it improved to 143/85 with heart rate of 83. He denies chest pain, shortness of breath, edema or unexpected weight gain, or worsening of fatigue. He states that he is having more neck pain after lifting and working in the yard and worsening his chronic neck pain. He is working with Dr. Scales and will most likely require surgery this year or next, depending on the progression of his symptoms and responsiveness to physical therapy. He worsened his neck pain 2 weeks ago and had been taking tylenol to help with pain. He reports that his first physical therapy session was this morning with improvement in stiffness, but no improvement in pain or improvement in numbness along triceps, forearm, and his pinky and ring finger on the left side. He rates his pain as severe today and is requesting temporary opioid to improve his acute pain. He had previously been prescribed percocet 5/325 on 11/2019 for acute pain and had not had a refill since. He reports moderate improvement in severe pain that improves his ability to complete ADLs without difficulty. His TOBY is appropriate and has not been prescribed opioids this year.        The following portions of the patient's history were reviewed and updated as appropriate: allergies, current medications, past family history, past medical history, past social history, past surgical history and problem  list.    Review of Systems   Constitutional: Negative for activity change, appetite change, fatigue, fever, unexpected weight gain and unexpected weight loss.   HENT: Negative for swollen glands, trouble swallowing and voice change.    Eyes: Negative for blurred vision and visual disturbance.   Respiratory: Negative for cough and shortness of breath.    Cardiovascular: Negative for chest pain, palpitations and leg swelling.   Gastrointestinal: Negative for abdominal pain, constipation, diarrhea, nausea, vomiting and indigestion.   Endocrine: Negative for cold intolerance, heat intolerance, polydipsia and polyphagia.   Genitourinary: Negative for dysuria and frequency.   Musculoskeletal: Positive for arthralgias, myalgias, neck pain and neck stiffness. Negative for back pain and joint swelling.   Skin: Negative for color change, rash and skin lesions.   Neurological: Negative for dizziness, weakness, headache, memory problem and confusion.   Hematological: Does not bruise/bleed easily.   Psychiatric/Behavioral: Positive for sleep disturbance. Negative for agitation, hallucinations and suicidal ideas. The patient is not nervous/anxious.        Objective   Past Medical History:   Diagnosis Date   • Acute renal failure (CMS/HCC) 6/8/2020   • Bladder neoplasm of uncertain malignant potential 7/20/2017   • Degenerative arthritis of cervical spine with nerve compression     per patient report   • Diabetes mellitus (CMS/[facility])    • GERD (gastroesophageal reflux disease)    • Hearing aid worn    • Hypertension    • Impaired hearing     without hearing aids can barely hear anything   • Neuropathy    • Sleep apnea     wears cpap      Past Surgical History:   Procedure Laterality Date   • CATARACT EXTRACTION W/ INTRAOCULAR LENS IMPLANT     • CHOLECYSTECTOMY     • EXPLORATORY LAPAROTOMY     • LEG SURGERY     • TRANSURETHRAL RESECTION OF BLADDER TUMOR Bilateral 7/25/2017    Procedure: CYSTOSCOPY TRANSURETHRAL RESECTION OF BLADDER  TUMOR & POSSIBLE BILATERAL RETROGRADE URETEROPYELOGRAMS;  Surgeon: Chris Esparza MD;  Location:  [facility] OR;  Service:    • TRANSURETHRAL RESECTION OF BLADDER TUMOR N/A 11/3/2017    Procedure: CYSTOSCOPY TRANSURETHRAL RESECTION OF BLADDER TUMOR ;  Surgeon: Chris Esparza MD;  Location:  [facility] OR;  Service:    • TRANSURETHRAL RESECTION OF BLADDER TUMOR N/A 9/10/2018    Procedure: CYSTOSCOPY TRANSURETHRAL RESECTION OF LARGE BLADDER TUMOR, CLOT EVACUATION, AND FULGURATION;  Surgeon: Chris Esparza MD;  Location:  [facility] OR;  Service: Urology        Current Outpatient Medications:   •  allopurinol (ZYLOPRIM) 300 MG tablet, Take 300 mg by mouth Daily., Disp: , Rfl:   •  amLODIPine (NORVASC) 10 MG tablet, Take 1 tablet by mouth Daily., Disp: 30 tablet, Rfl: 3  •  aspirin 81 MG tablet, Take 81 mg by mouth Daily., Disp: , Rfl:   •  fenofibrate (TRICOR) 145 MG tablet, Take 145 mg by mouth Every Night., Disp: , Rfl:   •  fluticasone (FLONASE) 50 MCG/ACT nasal spray, 1 spray into the nostril(s) as directed by provider 2 (Two) Times a Day As Needed., Disp: , Rfl:   •  gabapentin (NEURONTIN) 400 MG capsule, Take 800 mg by mouth Every 8 (Eight) Hours. Take 2 capsules by mouth in the am, two capsules by mouth in the afternoon, and 2 capsules by mouth in the pm. , Disp: , Rfl:   •  hydroCHLOROthiazide (HYDRODIURIL) 12.5 MG tablet, Take 12.5 mg by mouth Daily., Disp: , Rfl:   •  lovastatin (MEVACOR) 40 MG tablet, Take 20 mg by mouth Daily., Disp: , Rfl:   •  omeprazole (priLOSEC) 20 MG capsule, Take 20 mg by mouth 2 (Two) Times a Day., Disp: , Rfl:   •  oxyCODONE-acetaminophen (PERCOCET) 5-325 MG per tablet, Take 1 tablet by mouth 2 (Two) Times a Day. (Patient taking differently: Take 1 tablet by mouth Every 6 (Six) Hours As Needed for Severe Pain .), Disp: 20 tablet, Rfl: 0  •  beclomethasone (BECONASE-AQ) 42 MCG/SPRAY nasal spray, 2 sprays into the nostril(s) as directed by provider 2 (Two) Times a Day. Dose is for each  nostril., Disp: , Rfl:   •  Benzocaine (ORAL ANESTHETIC MT), Apply  to the mouth or throat As Needed., Disp: , Rfl:   •  cholecalciferol (VITAMIN D3) 1000 units tablet, Take 1,000 Units by mouth Daily., Disp: , Rfl:   •  cyclobenzaprine (FLEXERIL) 10 MG tablet, Take 1 tablet by mouth 3 (Three) Times a Day As Needed for Muscle Spasms., Disp: 90 tablet, Rfl: 1  •  glipizide (GLUCOTROL XL) 2.5 MG 24 hr tablet, Take 1 tablet by mouth Daily., Disp: 30 tablet, Rfl: 11  •  guaiFENesin (MUCINEX) 600 MG 12 hr tablet, Take 1,200 mg by mouth 2 (Two) Times a Day., Disp: , Rfl:   •  hydrocortisone (ANUSOL-HC) 25 MG suppository, Insert 25 mg into the rectum 2 (Two) Times a Day As Needed for Hemorrhoids., Disp: , Rfl:   •  hydrocortisone 2.5 % cream, Apply  topically to the appropriate area as directed 2 (Two) Times a Day. Apply to hemorrhoids, Disp: , Rfl:   •  magnesium oxide (MAG-OX) 400 MG tablet, Take 1,200 mg by mouth Daily. Take 3 tablets by mouth two times daily. , Disp: , Rfl:   •  Nystatin powder, Take  by mouth., Disp: , Rfl:   •  Omega-3 Fatty Acids (FISH OIL) 1000 MG capsule capsule, Take 1,000 mg by mouth Daily With Breakfast., Disp: , Rfl:   •  oxybutynin (DITROPAN) 5 MG tablet, , Disp: , Rfl:   •  Pseudoephedrine-guaiFENesin (MUCINEX D MAX STRENGTH PO), Take  by mouth As Needed., Disp: , Rfl:   •  sodium bicarbonate 650 MG tablet, Take 1 tablet by mouth 3 (Three) Times a Day., Disp: 90 tablet, Rfl: 0  •  tamsulosin (FLOMAX) 0.4 MG capsule 24 hr capsule, , Disp: , Rfl:   •  vitamin B-12 (CYANOCOBALAMIN) 1000 MCG tablet, Take 1,000 mcg by mouth Daily., Disp: , Rfl:      Vitals:    06/08/20 1112   BP: 152/80   Pulse: 81   Temp: 97.7 °F (36.5 °C)   SpO2: 97%         06/08/20  1112   Weight: 110 kg (242 lb)     Patient's Body mass index is 34.72 kg/m². BMI is above normal parameters. Recommendations include: educational material, exercise counseling and nutrition counseling.      Physical Exam   Constitutional: He is  oriented to person, place, and time. He appears well-developed and well-nourished. He is obese.  HENT:   Head: Normocephalic and atraumatic.   Right Ear: Tympanic membrane, external ear and ear canal normal. Decreased hearing is noted.   Left Ear: Tympanic membrane, external ear and ear canal normal. Decreased hearing is noted.   Nose: Nose normal.   Mouth/Throat: Uvula is midline and oropharynx is clear and moist.   Eyes: Pupils are equal, round, and reactive to light. Conjunctivae and EOM are normal.   Neck: Muscular tenderness present. Neck rigidity present. Decreased range of motion present. No thyromegaly present.   Cardiovascular: Normal rate, regular rhythm, normal heart sounds and intact distal pulses.   Pulmonary/Chest: Effort normal and breath sounds normal. No accessory muscle usage. No tachypnea. No respiratory distress.   Abdominal: Soft. Bowel sounds are normal. There is no tenderness.   Musculoskeletal:        Cervical back: He exhibits decreased range of motion, pain and spasm.        Lumbar back: He exhibits decreased range of motion and tenderness.   Numbness intermittent down posterior upper and lower left extremity that extends to pinky and ring finger.     Decreased lumbar ROM 20%.    Decreased cervical ROM 30% lateral (Both sides); and 20% with extension and flexion.    Lymphadenopathy:     He has no cervical adenopathy.   Neurological: He is alert and oriented to person, place, and time. He has normal strength. A sensory deficit is present. He displays a negative Romberg sign.   Reflex Scores:       Tricep reflexes are 2+ on the right side and 2+ on the left side.       Bicep reflexes are 2+ on the right side and 2+ on the left side.       Patellar reflexes are 2+ on the right side and 2+ on the left side.  Skin: Skin is warm, dry and intact. Capillary refill takes less than 2 seconds. Turgor is normal. No rash noted.   Psychiatric: He has a normal mood and affect. His speech is normal and  behavior is normal. Judgment and thought content normal. Cognition and memory are normal.   Nursing note and vitals reviewed.            Assessment/Plan   Diagnoses and all orders for this visit:    1. Essential hypertension (Primary)  -     amLODIPine (NORVASC) 10 MG tablet; Take 1 tablet by mouth Daily.  Dispense: 30 tablet; Refill: 3    2. Type 2 diabetes mellitus with hyperglycemia, without long-term current use of insulin (CMS/[facility])  -     glipizide (GLUCOTROL XL) 2.5 MG 24 hr tablet; Take 1 tablet by mouth Daily.  Dispense: 30 tablet; Refill: 11    3. Cervical radiculopathy  -     [id number] [facility] - Urine, Clean Catch  -     oxyCODONE-acetaminophen (Percocet) 5-325 MG per tablet; Take 1 tablet by mouth Daily As Needed for Severe Pain .  Dispense: 30 tablet; Refill: 0    4. Spinal stenosis in cervical region  -     oxyCODONE-acetaminophen (Percocet) 5-325 MG per tablet; Take 1 tablet by mouth Daily As Needed for Severe Pain .  Dispense: 30 tablet; Refill: 0    5. Cervical spondylosis without myelopathy      Will give temporary opioid therapy. Completed UDS today. Ran TOBY that was appropriate for no recent use. Discussed the risks of opioid therapy, even short-term on addiction. He verbalized understanding. Signed controlled substance agreement with us today. With elevation in blood pressure will increase norvasc to 10 mg. Recheck in 3 months. Advise the patient to check blood pressure daily and bring logs to next visit to reassess needs.

## 2020-06-09 LAB
AMPHETAMINES UR QL SCN: NEGATIVE NG/ML
BARBITURATES UR QL SCN: NEGATIVE NG/ML
BENZODIAZ UR QL: NEGATIVE NG/ML
BZE UR QL: NEGATIVE NG/ML
CANNABINOIDS UR QL SCN: NEGATIVE NG/ML
OPIATES UR QL: NEGATIVE NG/ML
PCP UR QL: NEGATIVE NG/ML

## 2020-07-21 RX ORDER — HYDROCHLOROTHIAZIDE 12.5 MG/1
12.5 TABLET ORAL DAILY
Qty: 90 TABLET | Refills: 3 | Status: SHIPPED | OUTPATIENT
Start: 2020-07-21 | End: 2020-09-14

## 2020-07-21 NOTE — TELEPHONE ENCOUNTER
Caller: Eloina Bradley    Relationship: Emergency Contact    Best call back number: [phone]    Medication needed:   Requested Prescriptions     Pending Prescriptions Disp Refills   • hydroCHLOROthiazide (HYDRODIURIL) 12.5 MG tablet 90 tablet 0     Sig: Take 1 tablet by mouth Daily for 90 days.       When do you need the refill by: 07/27/2020    What details did the patient provide when requesting the medication: 8 DAYS LEFT    Does the patient have less than a 3 day supply:  [] Yes  [x] No    What is the patient's preferred pharmacy: [facility] [address] [place] [address] [phone] [facility] [phone]

## 2020-09-09 ENCOUNTER — OFFICE VISIT (OUTPATIENT)
Dept: INTERNAL MEDICINE | Facility: CLINIC | Age: 70
End: 2020-09-09

## 2020-09-09 VITALS
HEART RATE: 75 BPM | SYSTOLIC BLOOD PRESSURE: 160 MMHG | TEMPERATURE: 98.7 F | BODY MASS INDEX: 35.36 KG/M2 | OXYGEN SATURATION: 95 % | WEIGHT: 247 LBS | DIASTOLIC BLOOD PRESSURE: 74 MMHG | HEIGHT: 70 IN

## 2020-09-09 DIAGNOSIS — I10 ESSENTIAL HYPERTENSION: ICD-10-CM

## 2020-09-09 DIAGNOSIS — Z23 NEED FOR INFLUENZA VACCINATION: Primary | ICD-10-CM

## 2020-09-09 DIAGNOSIS — E11.65 TYPE 2 DIABETES MELLITUS WITH HYPERGLYCEMIA, WITHOUT LONG-TERM CURRENT USE OF INSULIN (HCC): ICD-10-CM

## 2020-09-09 PROCEDURE — 99214 OFFICE O/P EST MOD 30 MIN: CPT | Performed by: NURSE PRACTITIONER

## 2020-09-09 PROCEDURE — G0008 ADMIN INFLUENZA VIRUS VAC: HCPCS | Performed by: NURSE PRACTITIONER

## 2020-09-09 PROCEDURE — 90686 IIV4 VACC NO PRSV 0.5 ML IM: CPT | Performed by: NURSE PRACTITIONER

## 2020-09-09 RX ORDER — AMLODIPINE BESYLATE 10 MG/1
10 TABLET ORAL DAILY
Qty: 30 TABLET | Refills: 11 | Status: SHIPPED | OUTPATIENT
Start: 2020-09-09 | End: 2021-04-28 | Stop reason: SDUPTHER

## 2020-09-09 RX ORDER — GLIPIZIDE 2.5 MG/1
2.5 TABLET, EXTENDED RELEASE ORAL DAILY
Qty: 30 TABLET | Refills: 11 | Status: SHIPPED | OUTPATIENT
Start: 2020-09-09 | End: 2020-11-02 | Stop reason: SDUPTHER

## 2020-09-10 LAB
BUN SERPL-MCNC: 21 MG/DL (ref 8–23)
BUN/CREAT SERPL: 16 (ref 7–25)
CALCIUM SERPL-MCNC: 9.8 MG/DL (ref 8.6–10.5)
CHLORIDE SERPL-SCNC: 102 MMOL/L (ref 98–107)
CO2 SERPL-SCNC: 28 MMOL/L (ref 22–29)
CREAT SERPL-MCNC: 1.31 MG/DL (ref 0.76–1.27)
GLUCOSE SERPL-MCNC: 82 MG/DL (ref 65–99)
POTASSIUM SERPL-SCNC: 3.9 MMOL/L (ref 3.5–5.2)
SODIUM SERPL-SCNC: 141 MMOL/L (ref 136–145)

## 2020-09-10 NOTE — PROGRESS NOTES
Subjective   Chung Bradley is a 70 y.o. male.   Chief Complaint   Patient presents with   • Hypertension     153/73, 159/67 yesterday       Mr. Bradley is a 71 yo male who presents for hypertension follow up. Patient reports blood pressures elevated at home still since increase in HCTZ. Patient has complicated blood pressure management related history of renal failure with combination of Metformin and Ace Inhibitor, making the patient very weary of trying new medicines. Patient denies taking NSAIDs. Patient reports taking medicine as prescribed and denies missing or skipping doses.    Hypertension   This is a chronic problem. The current episode started more than 1 year ago. The problem has been waxing and waning since onset. The problem is resistant. Associated symptoms include headaches. Pertinent negatives include no anxiety, blurred vision, chest pain, malaise/fatigue, neck pain, palpitations, peripheral edema or shortness of breath. There are no associated agents to hypertension. Risk factors for coronary artery disease include male gender, obesity, dyslipidemia and diabetes mellitus. Past treatments include ACE inhibitors, lifestyle changes and calcium channel blockers. Current antihypertension treatment includes calcium channel blockers, diuretics and lifestyle changes. The current treatment provides no improvement. There are no compliance problems.         The following portions of the patient's history were reviewed and updated as appropriate: allergies, current medications, past family history, past medical history, past social history, past surgical history and problem list.    Review of Systems   Constitutional: Negative for activity change, appetite change, fatigue, fever, malaise/fatigue, unexpected weight gain and unexpected weight loss.   HENT: Negative for swollen glands, trouble swallowing and voice change.    Eyes: Negative for blurred vision and visual disturbance.   Respiratory: Negative for  cough and shortness of breath.    Cardiovascular: Negative for chest pain, palpitations and leg swelling.   Gastrointestinal: Negative for abdominal pain, constipation, diarrhea, nausea, vomiting and indigestion.   Endocrine: Negative for cold intolerance, heat intolerance, polydipsia and polyphagia.   Genitourinary: Negative for dysuria and frequency.   Musculoskeletal: Positive for arthralgias. Negative for back pain, joint swelling and neck pain.   Skin: Negative for color change, rash and skin lesions.   Neurological: Negative for dizziness, weakness, headache, memory problem and confusion.   Hematological: Does not bruise/bleed easily.   Psychiatric/Behavioral: Negative for agitation, hallucinations and suicidal ideas. The patient is not nervous/anxious.        Objective   Past Medical History:   Diagnosis Date   • Acute renal failure (CMS/HCC) 6/8/2020   • Bladder neoplasm of uncertain malignant potential 7/20/2017   • Degenerative arthritis of cervical spine with nerve compression     per patient report   • Diabetes mellitus (CMS/[facility])    • GERD (gastroesophageal reflux disease)    • Hearing aid worn    • Hypertension    • Impaired hearing     without hearing aids can barely hear anything   • Neuropathy    • Sleep apnea     wears cpap      Past Surgical History:   Procedure Laterality Date   • CATARACT EXTRACTION W/ INTRAOCULAR LENS IMPLANT     • CHOLECYSTECTOMY     • EXPLORATORY LAPAROTOMY     • LEG SURGERY     • TRANSURETHRAL RESECTION OF BLADDER TUMOR Bilateral 7/25/2017    Procedure: CYSTOSCOPY TRANSURETHRAL RESECTION OF BLADDER TUMOR & POSSIBLE BILATERAL RETROGRADE URETEROPYELOGRAMS;  Surgeon: Chris Esparza MD;  Location: [facility] OR;  Service:    • TRANSURETHRAL RESECTION OF BLADDER TUMOR N/A 11/3/2017    Procedure: CYSTOSCOPY TRANSURETHRAL RESECTION OF BLADDER TUMOR ;  Surgeon: Chris Esparza MD;  Location: [facility] OR;  Service:    • TRANSURETHRAL RESECTION OF BLADDER TUMOR N/A 9/10/2018    Procedure:  CYSTOSCOPY TRANSURETHRAL RESECTION OF LARGE BLADDER TUMOR, CLOT EVACUATION, AND FULGURATION;  Surgeon: Chris Esparza MD;  Location: [facility] OR;  Service: Urology        Current Outpatient Medications:   •  allopurinol (ZYLOPRIM) 300 MG tablet, Take 300 mg by mouth Daily., Disp: , Rfl:   •  aspirin 81 MG tablet, Take 81 mg by mouth Daily., Disp: , Rfl:   •  fenofibrate (TRICOR) 145 MG tablet, Take 145 mg by mouth Every Night., Disp: , Rfl:   •  fluticasone (FLONASE) 50 MCG/ACT nasal spray, 1 spray into the nostril(s) as directed by provider 2 (Two) Times a Day As Needed., Disp: , Rfl:   •  gabapentin (NEURONTIN) 400 MG capsule, Take 800 mg by mouth Every 8 (Eight) Hours. Take 2 capsules by mouth in the am, two capsules by mouth in the afternoon, and 2 capsules by mouth in the pm. , Disp: , Rfl:   •  hydroCHLOROthiazide (HYDRODIURIL) 12.5 MG tablet, Take 1 tablet by mouth Daily for 90 days., Disp: 90 tablet, Rfl: 3  •  lovastatin (MEVACOR) 40 MG tablet, Take 20 mg by mouth Daily., Disp: , Rfl:   •  omeprazole (priLOSEC) 20 MG capsule, Take 20 mg by mouth 2 (Two) Times a Day., Disp: , Rfl:   •  amLODIPine (NORVASC) 10 MG tablet, Take 1 tablet by mouth Daily., Disp: 30 tablet, Rfl: 11  •  beclomethasone (BECONASE-AQ) 42 MCG/SPRAY nasal spray, 2 sprays into the nostril(s) as directed by provider 2 (Two) Times a Day. Dose is for each nostril., Disp: , Rfl:   •  Benzocaine (ORAL ANESTHETIC MT), Apply  to the mouth or throat As Needed., Disp: , Rfl:   •  cholecalciferol (VITAMIN D3) 1000 units tablet, Take 1,000 Units by mouth Daily., Disp: , Rfl:   •  cyclobenzaprine (FLEXERIL) 10 MG tablet, Take 1 tablet by mouth 3 (Three) Times a Day As Needed for Muscle Spasms., Disp: 90 tablet, Rfl: 1  •  glipizide (GLUCOTROL XL) 2.5 MG 24 hr tablet, Take 1 tablet by mouth Daily., Disp: 30 tablet, Rfl: 11  •  guaiFENesin (MUCINEX) 600 MG 12 hr tablet, Take 1,200 mg by mouth 2 (Two) Times a Day., Disp: , Rfl:   •  hydrocortisone  (ANUSOL-HC) 25 MG suppository, Insert 25 mg into the rectum 2 (Two) Times a Day As Needed for Hemorrhoids., Disp: , Rfl:   •  hydrocortisone 2.5 % cream, Apply  topically to the appropriate area as directed 2 (Two) Times a Day. Apply to hemorrhoids, Disp: , Rfl:   •  magnesium oxide (MAG-OX) 400 MG tablet, Take 1,200 mg by mouth Daily. Take 3 tablets by mouth two times daily. , Disp: , Rfl:   •  Nystatin powder, Take  by mouth., Disp: , Rfl:   •  Omega-3 Fatty Acids (FISH OIL) 1000 MG capsule capsule, Take 1,000 mg by mouth Daily With Breakfast., Disp: , Rfl:   •  oxybutynin (DITROPAN) 5 MG tablet, , Disp: , Rfl:   •  oxyCODONE-acetaminophen (PERCOCET) 5-325 MG per tablet, Take 1 tablet by mouth 2 (Two) Times a Day. (Patient taking differently: Take 1 tablet by mouth Every 6 (Six) Hours As Needed for Severe Pain .), Disp: 20 tablet, Rfl: 0  •  oxyCODONE-acetaminophen (Percocet) 5-325 MG per tablet, Take 1 tablet by mouth Daily As Needed for Severe Pain ., Disp: 30 tablet, Rfl: 0  •  Pseudoephedrine-guaiFENesin (MUCINEX D MAX STRENGTH PO), Take  by mouth As Needed., Disp: , Rfl:   •  sodium bicarbonate 650 MG tablet, Take 1 tablet by mouth 3 (Three) Times a Day., Disp: 90 tablet, Rfl: 0  •  tamsulosin (FLOMAX) 0.4 MG capsule 24 hr capsule, , Disp: , Rfl:   •  vitamin B-12 (CYANOCOBALAMIN) 1000 MCG tablet, Take 1,000 mcg by mouth Daily., Disp: , Rfl:      Vitals:    09/09/20 1503   BP: 160/74   Pulse: 75   Temp: 98.7 °F (37.1 °C)   SpO2: 95%         09/09/20  1503   Weight: 112 kg (247 lb)     Patient's Body mass index is 35.44 kg/m². BMI is above normal parameters. Recommendations include: educational material, exercise counseling and nutrition counseling.      Physical Exam   Constitutional: He is oriented to person, place, and time. He appears well-developed and well-nourished.   HENT:   Head: Normocephalic and atraumatic.   Right Ear: External ear normal.   Left Ear: External ear normal.   Mouth/Throat: Oropharynx  is clear and moist.   Eyes: Pupils are equal, round, and reactive to light. Conjunctivae and EOM are normal.   Neck: Normal range of motion. Neck supple. No thyromegaly present.   Cardiovascular: Normal rate, regular rhythm, normal heart sounds and intact distal pulses.   Pulmonary/Chest: Effort normal and breath sounds normal.   Abdominal: Soft. Bowel sounds are normal.   Musculoskeletal:        Lumbar back: He exhibits decreased range of motion and tenderness.   Reduced lumbar ROM 20% with flexion and extension.   Lymphadenopathy:     He has no cervical adenopathy.   Neurological: He is alert and oriented to person, place, and time.   Skin: Skin is warm and dry.   Psychiatric: He has a normal mood and affect. His behavior is normal. Thought content normal.   Nursing note and vitals reviewed.            Assessment/Plan   Diagnoses and all orders for this visit:    1. Need for influenza vaccination (Primary)  -     Fluarix/Fluzone/Afluria Quad>6 Months    2. Essential hypertension  -     amLODIPine (NORVASC) 10 MG tablet; Take 1 tablet by mouth Daily.  Dispense: 30 tablet; Refill: 11  -     Basic Metabolic Panel    3. Type 2 diabetes mellitus with hyperglycemia, without long-term current use of insulin (CMS/[facility])  -     glipizide (GLUCOTROL XL) 2.5 MG 24 hr tablet; Take 1 tablet by mouth Daily.  Dispense: 30 tablet; Refill: 11      Will check renal function with labs today and determine which blood pressure medicine to start according to lab. Will consider beta blocker or increase hctz.Edcuated him on the benefits to weight loss and blood pressure and blood sugar management. Will continue current diabetes management. Patient will have follow up with Dr. Yousif in 2 months.

## 2020-09-11 DIAGNOSIS — I10 ESSENTIAL HYPERTENSION: Primary | ICD-10-CM

## 2020-09-11 NOTE — PATIENT INSTRUCTIONS
"DASH Eating Plan  DASH stands for \"Dietary Approaches to Stop Hypertension.\" The DASH eating plan is a healthy eating plan that has been shown to reduce high blood pressure (hypertension). It may also reduce your risk for type 2 diabetes, heart disease, and stroke. The DASH eating plan may also help with weight loss.  What are tips for following this plan?    General guidelines  · Avoid eating more than 2,300 mg (milligrams) of salt (sodium) a day. If you have hypertension, you may need to reduce your sodium intake to 1,500 mg a day.  · Limit alcohol intake to no more than 1 drink a day for nonpregnant women and 2 drinks a day for men. One drink equals 12 oz of beer, 5 oz of wine, or 1½ oz of hard liquor.  · Work with your health care provider to maintain a healthy body weight or to lose weight. Ask what an ideal weight is for you.  · Get at least 30 minutes of exercise that causes your heart to beat faster (aerobic exercise) most days of the week. Activities may include walking, swimming, or biking.  · Work with your health care provider or diet and nutrition specialist (dietitian) to adjust your eating plan to your individual calorie needs.  Reading food labels    · Check food labels for the amount of sodium per serving. Choose foods with less than 5 percent of the Daily Value of sodium. Generally, foods with less than 300 mg of sodium per serving fit into this eating plan.  · To find whole grains, look for the word \"whole\" as the first word in the ingredient list.  Shopping  · Buy products labeled as \"low-sodium\" or \"no salt added.\"  · Buy fresh foods. Avoid canned foods and premade or frozen meals.  Cooking  · Avoid adding salt when cooking. Use salt-free seasonings or herbs instead of table salt or sea salt. Check with your health care provider or pharmacist before using salt substitutes.  · Do not berger foods. Cook foods using healthy methods such as baking, boiling, grilling, and broiling instead.  · Cook with " heart-healthy oils, such as olive, canola, soybean, or sunflower oil.  Meal planning  · Eat a balanced diet that includes:  ? 5 or more servings of fruits and vegetables each day. At each meal, try to fill half of your plate with fruits and vegetables.  ? Up to 6-8 servings of whole grains each day.  ? Less than 6 oz of lean meat, poultry, or fish each day. A 3-oz serving of meat is about the same size as a deck of cards. One egg equals 1 oz.  ? 2 servings of low-fat dairy each day.  ? A serving of nuts, seeds, or beans 5 times each week.  ? Heart-healthy fats. Healthy fats called Omega-3 fatty acids are found in foods such as flaxseeds and coldwater fish, like sardines, salmon, and mackerel.  · Limit how much you eat of the following:  ? Canned or prepackaged foods.  ? Food that is high in trans fat, such as fried foods.  ? Food that is high in saturated fat, such as fatty meat.  ? Sweets, desserts, sugary drinks, and other foods with added sugar.  ? Full-fat dairy products.  · Do not salt foods before eating.  · Try to eat at least 2 vegetarian meals each week.  · Eat more home-cooked food and less restaurant, buffet, and fast food.  · When eating at a restaurant, ask that your food be prepared with less salt or no salt, if possible.  What foods are recommended?  The items listed may not be a complete list. Talk with your dietitian about what dietary choices are best for you.  Grains  Whole-grain or whole-wheat bread. Whole-grain or whole-wheat pasta. Brown rice. Oatmeal. Quinoa. Bulgur. Whole-grain and low-sodium cereals. Sobia bread. Low-fat, low-sodium crackers. Whole-wheat flour tortillas.  Vegetables  Fresh or frozen vegetables (raw, steamed, roasted, or grilled). Low-sodium or reduced-sodium tomato and vegetable juice. Low-sodium or reduced-sodium tomato sauce and tomato paste. Low-sodium or reduced-sodium canned vegetables.  Fruits  All fresh, dried, or frozen fruit. Canned fruit in natural juice (without  added sugar).  Meat and other protein foods  Skinless chicken or turkey. Ground chicken or turkey. Pork with fat trimmed off. Fish and seafood. Egg whites. Dried beans, peas, or lentils. Unsalted nuts, nut butters, and seeds. Unsalted canned beans. Lean cuts of beef with fat trimmed off. Low-sodium, lean deli meat.  Dairy  Low-fat (1%) or fat-free (skim) milk. Fat-free, low-fat, or reduced-fat cheeses. Nonfat, low-sodium ricotta or cottage cheese. Low-fat or nonfat yogurt. Low-fat, low-sodium cheese.  Fats and oils  Soft margarine without trans fats. Vegetable oil. Low-fat, reduced-fat, or light mayonnaise and salad dressings (reduced-sodium). Canola, safflower, olive, soybean, and sunflower oils. Avocado.  Seasoning and other foods  Herbs. Spices. Seasoning mixes without salt. Unsalted popcorn and pretzels. Fat-free sweets.  What foods are not recommended?  The items listed may not be a complete list. Talk with your dietitian about what dietary choices are best for you.  Grains  Baked goods made with fat, such as croissants, muffins, or some breads. Dry pasta or rice meal packs.  Vegetables  Creamed or fried vegetables. Vegetables in a cheese sauce. Regular canned vegetables (not low-sodium or reduced-sodium). Regular canned tomato sauce and paste (not low-sodium or reduced-sodium). Regular tomato and vegetable juice (not low-sodium or reduced-sodium). Pickles. Olives.  Fruits  Canned fruit in a light or heavy syrup. Fried fruit. Fruit in cream or butter sauce.  Meat and other protein foods  Fatty cuts of meat. Ribs. Fried meat. Valdez. Sausage. Bologna and other processed lunch meats. Salami. Fatback. Hotdogs. Bratwurst. Salted nuts and seeds. Canned beans with added salt. Canned or smoked fish. Whole eggs or egg yolks. Chicken or turkey with skin.  Dairy  Whole or 2% milk, cream, and half-and-half. Whole or full-fat cream cheese. Whole-fat or sweetened yogurt. Full-fat cheese. Nondairy creamers. Whipped toppings.  Processed cheese and cheese spreads.  Fats and oils  Butter. Stick margarine. Lard. Shortening. Ghee. Valdez fat. Tropical oils, such as coconut, palm kernel, or palm oil.  Seasoning and other foods  Salted popcorn and pretzels. Onion salt, garlic salt, seasoned salt, table salt, and sea salt. Worcestershire sauce. Tartar sauce. Barbecue sauce. Teriyaki sauce. Soy sauce, including reduced-sodium. Steak sauce. Canned and packaged gravies. Fish sauce. Oyster sauce. Cocktail sauce. Horseradish that you find on the shelf. Ketchup. Mustard. Meat flavorings and tenderizers. Bouillon cubes. Hot sauce and Tabasco sauce. Premade or packaged marinades. Premade or packaged taco seasonings. Relishes. Regular salad dressings.  Where to find more information:  · National Heart, Lung, and Blood [place]: www.nhlbi.nih.gov  · American Heart Association: www.heart.org  Summary  · The DASH eating plan is a healthy eating plan that has been shown to reduce high blood pressure (hypertension). It may also reduce your risk for type 2 diabetes, heart disease, and stroke.  · With the DASH eating plan, you should limit salt (sodium) intake to 2,300 mg a day. If you have hypertension, you may need to reduce your sodium intake to 1,500 mg a day.  · When on the DASH eating plan, aim to eat more fresh fruits and vegetables, whole grains, lean proteins, low-fat dairy, and heart-healthy fats.  · Work with your health care provider or diet and nutrition specialist (dietitian) to adjust your eating plan to your individual calorie needs.  This information is not intended to replace advice given to you by your health care provider. Make sure you discuss any questions you have with your health care provider.  Document Released: 12/06/2012 Document Revised: 11/30/2018 Document Reviewed: 12/11/2017  Elsevier Patient Education © 2020 [employer]

## 2020-09-14 ENCOUNTER — TELEPHONE (OUTPATIENT)
Dept: INTERNAL MEDICINE | Facility: CLINIC | Age: 70
End: 2020-09-14

## 2020-09-14 ENCOUNTER — OFFICE VISIT (OUTPATIENT)
Dept: INTERNAL MEDICINE | Facility: CLINIC | Age: 70
End: 2020-09-14

## 2020-09-14 VITALS
BODY MASS INDEX: 35.36 KG/M2 | SYSTOLIC BLOOD PRESSURE: 157 MMHG | WEIGHT: 247 LBS | HEART RATE: 55 BPM | HEIGHT: 70 IN | DIASTOLIC BLOOD PRESSURE: 70 MMHG

## 2020-09-14 DIAGNOSIS — I10 ESSENTIAL HYPERTENSION: Primary | ICD-10-CM

## 2020-09-14 DIAGNOSIS — N28.9 RENAL INSUFFICIENCY: ICD-10-CM

## 2020-09-14 PROCEDURE — 99442 PR PHYS/QHP TELEPHONE EVALUATION 11-20 MIN: CPT | Performed by: NURSE PRACTITIONER

## 2020-09-14 RX ORDER — CHLORTHALIDONE 25 MG/1
12.5 TABLET ORAL DAILY
Qty: 30 TABLET | Refills: 3 | Status: SHIPPED | OUTPATIENT
Start: 2020-09-14 | End: 2020-11-12 | Stop reason: SDUPTHER

## 2020-09-14 NOTE — TELEPHONE ENCOUNTER
Pt's wife called in stating pt is having problems with BP.     Wife stated the top number is staying around 161 and the bottom is 64-62. Caller did not have pulse readings.     Caller stated she wants pt to see  only. Offered appt with NP caller declined.    Please advise [phone]

## 2020-09-14 NOTE — PATIENT INSTRUCTIONS
"DASH Eating Plan  DASH stands for \"Dietary Approaches to Stop Hypertension.\" The DASH eating plan is a healthy eating plan that has been shown to reduce high blood pressure (hypertension). It may also reduce your risk for type 2 diabetes, heart disease, and stroke. The DASH eating plan may also help with weight loss.  What are tips for following this plan?    General guidelines  · Avoid eating more than 2,300 mg (milligrams) of salt (sodium) a day. If you have hypertension, you may need to reduce your sodium intake to 1,500 mg a day.  · Limit alcohol intake to no more than 1 drink a day for nonpregnant women and 2 drinks a day for men. One drink equals 12 oz of beer, 5 oz of wine, or 1½ oz of hard liquor.  · Work with your health care provider to maintain a healthy body weight or to lose weight. Ask what an ideal weight is for you.  · Get at least 30 minutes of exercise that causes your heart to beat faster (aerobic exercise) most days of the week. Activities may include walking, swimming, or biking.  · Work with your health care provider or diet and nutrition specialist (dietitian) to adjust your eating plan to your individual calorie needs.  Reading food labels    · Check food labels for the amount of sodium per serving. Choose foods with less than 5 percent of the Daily Value of sodium. Generally, foods with less than 300 mg of sodium per serving fit into this eating plan.  · To find whole grains, look for the word \"whole\" as the first word in the ingredient list.  Shopping  · Buy products labeled as \"low-sodium\" or \"no salt added.\"  · Buy fresh foods. Avoid canned foods and premade or frozen meals.  Cooking  · Avoid adding salt when cooking. Use salt-free seasonings or herbs instead of table salt or sea salt. Check with your health care provider or pharmacist before using salt substitutes.  · Do not berger foods. Cook foods using healthy methods such as baking, boiling, grilling, and broiling instead.  · Cook with " heart-healthy oils, such as olive, canola, soybean, or sunflower oil.  Meal planning  · Eat a balanced diet that includes:  ? 5 or more servings of fruits and vegetables each day. At each meal, try to fill half of your plate with fruits and vegetables.  ? Up to 6-8 servings of whole grains each day.  ? Less than 6 oz of lean meat, poultry, or fish each day. A 3-oz serving of meat is about the same size as a deck of cards. One egg equals 1 oz.  ? 2 servings of low-fat dairy each day.  ? A serving of nuts, seeds, or beans 5 times each week.  ? Heart-healthy fats. Healthy fats called Omega-3 fatty acids are found in foods such as flaxseeds and coldwater fish, like sardines, salmon, and mackerel.  · Limit how much you eat of the following:  ? Canned or prepackaged foods.  ? Food that is high in trans fat, such as fried foods.  ? Food that is high in saturated fat, such as fatty meat.  ? Sweets, desserts, sugary drinks, and other foods with added sugar.  ? Full-fat dairy products.  · Do not salt foods before eating.  · Try to eat at least 2 vegetarian meals each week.  · Eat more home-cooked food and less restaurant, buffet, and fast food.  · When eating at a restaurant, ask that your food be prepared with less salt or no salt, if possible.  What foods are recommended?  The items listed may not be a complete list. Talk with your dietitian about what dietary choices are best for you.  Grains  Whole-grain or whole-wheat bread. Whole-grain or whole-wheat pasta. Brown rice. Oatmeal. Quinoa. Bulgur. Whole-grain and low-sodium cereals. Sobia bread. Low-fat, low-sodium crackers. Whole-wheat flour tortillas.  Vegetables  Fresh or frozen vegetables (raw, steamed, roasted, or grilled). Low-sodium or reduced-sodium tomato and vegetable juice. Low-sodium or reduced-sodium tomato sauce and tomato paste. Low-sodium or reduced-sodium canned vegetables.  Fruits  All fresh, dried, or frozen fruit. Canned fruit in natural juice (without  added sugar).  Meat and other protein foods  Skinless chicken or turkey. Ground chicken or turkey. Pork with fat trimmed off. Fish and seafood. Egg whites. Dried beans, peas, or lentils. Unsalted nuts, nut butters, and seeds. Unsalted canned beans. Lean cuts of beef with fat trimmed off. Low-sodium, lean deli meat.  Dairy  Low-fat (1%) or fat-free (skim) milk. Fat-free, low-fat, or reduced-fat cheeses. Nonfat, low-sodium ricotta or cottage cheese. Low-fat or nonfat yogurt. Low-fat, low-sodium cheese.  Fats and oils  Soft margarine without trans fats. Vegetable oil. Low-fat, reduced-fat, or light mayonnaise and salad dressings (reduced-sodium). Canola, safflower, olive, soybean, and sunflower oils. Avocado.  Seasoning and other foods  Herbs. Spices. Seasoning mixes without salt. Unsalted popcorn and pretzels. Fat-free sweets.  What foods are not recommended?  The items listed may not be a complete list. Talk with your dietitian about what dietary choices are best for you.  Grains  Baked goods made with fat, such as croissants, muffins, or some breads. Dry pasta or rice meal packs.  Vegetables  Creamed or fried vegetables. Vegetables in a cheese sauce. Regular canned vegetables (not low-sodium or reduced-sodium). Regular canned tomato sauce and paste (not low-sodium or reduced-sodium). Regular tomato and vegetable juice (not low-sodium or reduced-sodium). Pickles. Olives.  Fruits  Canned fruit in a light or heavy syrup. Fried fruit. Fruit in cream or butter sauce.  Meat and other protein foods  Fatty cuts of meat. Ribs. Fried meat. Valdez. Sausage. Bologna and other processed lunch meats. Salami. Fatback. Hotdogs. Bratwurst. Salted nuts and seeds. Canned beans with added salt. Canned or smoked fish. Whole eggs or egg yolks. Chicken or turkey with skin.  Dairy  Whole or 2% milk, cream, and half-and-half. Whole or full-fat cream cheese. Whole-fat or sweetened yogurt. Full-fat cheese. Nondairy creamers. Whipped toppings.  Processed cheese and cheese spreads.  Fats and oils  Butter. Stick margarine. Lard. Shortening. Ghee. Valdez fat. Tropical oils, such as coconut, palm kernel, or palm oil.  Seasoning and other foods  Salted popcorn and pretzels. Onion salt, garlic salt, seasoned salt, table salt, and sea salt. Worcestershire sauce. Tartar sauce. Barbecue sauce. Teriyaki sauce. Soy sauce, including reduced-sodium. Steak sauce. Canned and packaged gravies. Fish sauce. Oyster sauce. Cocktail sauce. Horseradish that you find on the shelf. Ketchup. Mustard. Meat flavorings and tenderizers. Bouillon cubes. Hot sauce and Tabasco sauce. Premade or packaged marinades. Premade or packaged taco seasonings. Relishes. Regular salad dressings.  Where to find more information:  · National Heart, Lung, and Blood [place]: www.nhlbi.nih.gov  · American Heart Association: www.heart.org  Summary  · The DASH eating plan is a healthy eating plan that has been shown to reduce high blood pressure (hypertension). It may also reduce your risk for type 2 diabetes, heart disease, and stroke.  · With the DASH eating plan, you should limit salt (sodium) intake to 2,300 mg a day. If you have hypertension, you may need to reduce your sodium intake to 1,500 mg a day.  · When on the DASH eating plan, aim to eat more fresh fruits and vegetables, whole grains, lean proteins, low-fat dairy, and heart-healthy fats.  · Work with your health care provider or diet and nutrition specialist (dietitian) to adjust your eating plan to your individual calorie needs.  This information is not intended to replace advice given to you by your health care provider. Make sure you discuss any questions you have with your health care provider.  Document Released: 12/06/2012 Document Revised: 11/30/2018 Document Reviewed: 12/11/2017  Elsevier Patient Education © 2020 [employer]

## 2020-09-14 NOTE — TELEPHONE ENCOUNTER
Wife calling stating his blood sugars are spiking now that he take metoprolol tartrate (LOPRESSOR) 25 MG tablet    190 level    [phone]

## 2020-09-17 ENCOUNTER — TELEPHONE (OUTPATIENT)
Dept: INTERNAL MEDICINE | Facility: CLINIC | Age: 70
End: 2020-09-17

## 2020-09-17 NOTE — TELEPHONE ENCOUNTER
Please call patient regarding his breathing mask the ins  keep sending over request and we never reply [phone]      [facility]     Please advise

## 2020-09-17 NOTE — PROGRESS NOTES
"Subjective   Chung Bradley is a 70 y.o. male.   Chief Complaint   Patient presents with   • Hypertension     150's/70's. Patient has only taken metoprolol once and he woke up sweating. Blood sugar 190 and blood pressure 157/73       You have chosen to receive care through a telephone visit. Do you consent to use a telephone visit for your medical care today? Yes    Mr. Bradley is a 69 yo male who report that his blood pressure and blood sugars elevated after taking metoprolol and reports frustration over blood pressure issues. Patient reports past use of lisinopril caused \"my kidney's to fail and was admitted into the hospital\". Patient had taken metoprolol for 1 day and \"felt bad\". Patient reporting blood pressures of 160's/70's.    Hypertension  This is a chronic problem. The current episode started more than 1 year ago. The problem has been waxing and waning since onset. The problem is resistant. Associated symptoms include anxiety. Pertinent negatives include no blurred vision, chest pain, headaches, malaise/fatigue, neck pain, orthopnea, palpitations, peripheral edema or shortness of breath. There are no associated agents to hypertension. Risk factors for coronary artery disease include diabetes mellitus, obesity, male gender, sedentary lifestyle and dyslipidemia. Past treatments include ACE inhibitors, beta blockers, calcium channel blockers, lifestyle changes and diuretics. Current antihypertension treatment includes diuretics, lifestyle changes and calcium channel blockers. The current treatment provides no improvement. Compliance problems include diet, exercise and psychosocial issues.         The following portions of the patient's history were reviewed and updated as appropriate: allergies, current medications, past family history, past medical history, past social history, past surgical history and problem list.    Review of Systems   Constitutional: Negative for activity change, appetite change, fatigue, " fever, malaise/fatigue, unexpected weight gain and unexpected weight loss.   HENT: Negative for swollen glands, trouble swallowing and voice change.    Eyes: Negative for blurred vision and visual disturbance.   Respiratory: Negative for cough and shortness of breath.    Cardiovascular: Negative for chest pain, palpitations, orthopnea and leg swelling.   Gastrointestinal: Negative for abdominal pain, constipation, diarrhea, nausea, vomiting and indigestion.   Endocrine: Negative for cold intolerance, heat intolerance, polydipsia and polyphagia.   Genitourinary: Negative for dysuria, frequency and erectile dysfunction.   Musculoskeletal: Negative for arthralgias, back pain, joint swelling and neck pain.   Skin: Negative for color change, rash and skin lesions.   Neurological: Negative for dizziness, weakness, headache, memory problem and confusion.   Hematological: Does not bruise/bleed easily.   Psychiatric/Behavioral: Negative for agitation, hallucinations and suicidal ideas. The patient is nervous/anxious.        Objective   Past Medical History:   Diagnosis Date   • Acute renal failure (CMS/HCC) 6/8/2020   • Bladder neoplasm of uncertain malignant potential 7/20/2017   • Degenerative arthritis of cervical spine with nerve compression     per patient report   • Diabetes mellitus (CMS/[facility])    • GERD (gastroesophageal reflux disease)    • Hearing aid worn    • Hypertension    • Impaired hearing     without hearing aids can barely hear anything   • Neuropathy    • Sleep apnea     wears cpap      Past Surgical History:   Procedure Laterality Date   • CATARACT EXTRACTION W/ INTRAOCULAR LENS IMPLANT     • CHOLECYSTECTOMY     • EXPLORATORY LAPAROTOMY     • LEG SURGERY     • TRANSURETHRAL RESECTION OF BLADDER TUMOR Bilateral 7/25/2017    Procedure: CYSTOSCOPY TRANSURETHRAL RESECTION OF BLADDER TUMOR & POSSIBLE BILATERAL RETROGRADE URETEROPYELOGRAMS;  Surgeon: Chris Esparza MD;  Location: [facility] OR;  Service:    •  TRANSURETHRAL RESECTION OF BLADDER TUMOR N/A 11/3/2017    Procedure: CYSTOSCOPY TRANSURETHRAL RESECTION OF BLADDER TUMOR ;  Surgeon: Chris Esparza MD;  Location:  [facility] OR;  Service:    • TRANSURETHRAL RESECTION OF BLADDER TUMOR N/A 9/10/2018    Procedure: CYSTOSCOPY TRANSURETHRAL RESECTION OF LARGE BLADDER TUMOR, CLOT EVACUATION, AND FULGURATION;  Surgeon: Chris Esparza MD;  Location:  [facility] OR;  Service: Urology        Current Outpatient Medications:   •  allopurinol (ZYLOPRIM) 300 MG tablet, Take 300 mg by mouth Daily., Disp: , Rfl:   •  amLODIPine (NORVASC) 10 MG tablet, Take 1 tablet by mouth Daily., Disp: 30 tablet, Rfl: 11  •  aspirin 81 MG tablet, Take 81 mg by mouth Daily., Disp: , Rfl:   •  beclomethasone (BECONASE-AQ) 42 MCG/SPRAY nasal spray, 2 sprays into the nostril(s) as directed by provider 2 (Two) Times a Day. Dose is for each nostril., Disp: , Rfl:   •  Benzocaine (ORAL ANESTHETIC MT), Apply  to the mouth or throat As Needed., Disp: , Rfl:   •  cholecalciferol (VITAMIN D3) 1000 units tablet, Take 1,000 Units by mouth Daily., Disp: , Rfl:   •  cyclobenzaprine (FLEXERIL) 10 MG tablet, Take 1 tablet by mouth 3 (Three) Times a Day As Needed for Muscle Spasms., Disp: 90 tablet, Rfl: 1  •  fenofibrate (TRICOR) 145 MG tablet, Take 145 mg by mouth Every Night., Disp: , Rfl:   •  fluticasone (FLONASE) 50 MCG/ACT nasal spray, 1 spray into the nostril(s) as directed by provider 2 (Two) Times a Day As Needed., Disp: , Rfl:   •  gabapentin (NEURONTIN) 400 MG capsule, Take 800 mg by mouth Every 8 (Eight) Hours. Take 2 capsules by mouth in the am, two capsules by mouth in the afternoon, and 2 capsules by mouth in the pm. , Disp: , Rfl:   •  glipizide (GLUCOTROL XL) 2.5 MG 24 hr tablet, Take 1 tablet by mouth Daily., Disp: 30 tablet, Rfl: 11  •  guaiFENesin (MUCINEX) 600 MG 12 hr tablet, Take 1,200 mg by mouth 2 (Two) Times a Day., Disp: , Rfl:   •  hydrocortisone (ANUSOL-HC) 25 MG suppository, Insert 25 mg  into the rectum 2 (Two) Times a Day As Needed for Hemorrhoids., Disp: , Rfl:   •  hydrocortisone 2.5 % cream, Apply  topically to the appropriate area as directed 2 (Two) Times a Day. Apply to hemorrhoids, Disp: , Rfl:   •  lovastatin (MEVACOR) 40 MG tablet, Take 20 mg by mouth Daily., Disp: , Rfl:   •  magnesium oxide (MAG-OX) 400 MG tablet, Take 1,200 mg by mouth Daily. Take 3 tablets by mouth two times daily. , Disp: , Rfl:   •  Nystatin powder, Take  by mouth., Disp: , Rfl:   •  Omega-3 Fatty Acids (FISH OIL) 1000 MG capsule capsule, Take 1,000 mg by mouth Daily With Breakfast., Disp: , Rfl:   •  omeprazole (priLOSEC) 20 MG capsule, Take 20 mg by mouth 2 (Two) Times a Day., Disp: , Rfl:   •  oxybutynin (DITROPAN) 5 MG tablet, , Disp: , Rfl:   •  oxyCODONE-acetaminophen (PERCOCET) 5-325 MG per tablet, Take 1 tablet by mouth 2 (Two) Times a Day. (Patient taking differently: Take 1 tablet by mouth Every 6 (Six) Hours As Needed for Severe Pain .), Disp: 20 tablet, Rfl: 0  •  oxyCODONE-acetaminophen (Percocet) 5-325 MG per tablet, Take 1 tablet by mouth Daily As Needed for Severe Pain ., Disp: 30 tablet, Rfl: 0  •  Pseudoephedrine-guaiFENesin (MUCINEX D MAX STRENGTH PO), Take  by mouth As Needed., Disp: , Rfl:   •  sodium bicarbonate 650 MG tablet, Take 1 tablet by mouth 3 (Three) Times a Day., Disp: 90 tablet, Rfl: 0  •  tamsulosin (FLOMAX) 0.4 MG capsule 24 hr capsule, , Disp: , Rfl:   •  vitamin B-12 (CYANOCOBALAMIN) 1000 MCG tablet, Take 1,000 mcg by mouth Daily., Disp: , Rfl:   •  chlorthalidone (HYGROTON) 25 MG tablet, Take 0.5 tablets by mouth Daily., Disp: 30 tablet, Rfl: 3     Vitals:    09/14/20 1505   BP: 157/70   Pulse: 55         09/14/20  1505   Weight: 112 kg (247 lb)     Patient's Body mass index is 35.44 kg/m². BMI is above normal parameters. Recommendations include: educational material, exercise counseling and nutrition counseling.      Physical Exam (N/A; telelphone Visit)          Assessment/Plan    Diagnoses and all orders for this visit:    1. Essential hypertension (Primary)  -     chlorthalidone (HYGROTON) 25 MG tablet; Take 0.5 tablets by mouth Daily.  Dispense: 30 tablet; Refill: 3    2. Renal insufficiency  -     Basic Metabolic Panel; Future    This visit has been rescheduled as a phone visit to comply with patient safety concerns in accordance with CDC recommendations. Total time of discussion was 11 minutes.    Will start longer acting thiazide and recheck his renal function in 2 weeks. Discussed case with Dr. Yousif and will continue to consult on this case with Dr. Yousif. Consider hydralazine or clonidine next.

## 2020-09-17 NOTE — TELEPHONE ENCOUNTER
Called [facility] and talked with Trena and she says they have just sent the request to us by fax today.  It comes from a different department, so unsure if/when fax was sent, but was just queued to send today, so will watch for that paperwork to come across my desk.

## 2020-09-21 ENCOUNTER — TELEPHONE (OUTPATIENT)
Dept: INTERNAL MEDICINE | Facility: CLINIC | Age: 70
End: 2020-09-21

## 2020-09-21 NOTE — TELEPHONE ENCOUNTER
Pt's wife called with BP readings  Monday    169/72  Tuesday 167/72  Wednesday 160/74  Thursday 158/85  Friday  158/78  Saturday 163/80  Sunday 168.72  Today  155/72    Blood sugar readings  AM ranging between 150-167  PM ranging between 128-135    Please call him at [phone]

## 2020-09-21 NOTE — TELEPHONE ENCOUNTER
Called patient and discussed medicines. Patient reports that he tried the metoprolol over the weekend and noticed no change in blood sugars and would like to trial metprolol for blood pressure control. Will have him take Metoprolol 12.5 in the evening and 12.5 in the morning. Check blood pressures daily.  Blood sugars are running low in the evening related to increased exercise and diet modification. Patient is on glipizide 2.5 in the morning. Patient reports blood sugars in the AM are running higher. Advised patient as he adjust his diet to follow plan as follows: Blood sugar readings 130 or less- take 2.5 glipizide; if he is >130 he can take 5 mg of the glipizide. Patient and wife verbalized understanding education and completed a teach back exercise. Advised patient to call with worsening blood pressure or blood sugars.

## 2020-09-23 DIAGNOSIS — N28.9 RENAL INSUFFICIENCY: ICD-10-CM

## 2020-09-24 LAB
BUN SERPL-MCNC: 19 MG/DL (ref 8–23)
BUN/CREAT SERPL: 17.6 (ref 7–25)
CALCIUM SERPL-MCNC: 9 MG/DL (ref 8.6–10.5)
CHLORIDE SERPL-SCNC: 104 MMOL/L (ref 98–107)
CO2 SERPL-SCNC: 27.9 MMOL/L (ref 22–29)
CREAT SERPL-MCNC: 1.08 MG/DL (ref 0.76–1.27)
GLUCOSE SERPL-MCNC: 137 MG/DL (ref 65–99)
POTASSIUM SERPL-SCNC: 4 MMOL/L (ref 3.5–5.2)
SODIUM SERPL-SCNC: 143 MMOL/L (ref 136–145)

## 2020-10-12 ENCOUNTER — TELEPHONE (OUTPATIENT)
Dept: INTERNAL MEDICINE | Facility: CLINIC | Age: 70
End: 2020-10-12

## 2020-10-12 ENCOUNTER — CLINICAL SUPPORT (OUTPATIENT)
Dept: INTERNAL MEDICINE | Facility: CLINIC | Age: 70
End: 2020-10-12

## 2020-10-12 VITALS — HEART RATE: 56 BPM | SYSTOLIC BLOOD PRESSURE: 150 MMHG | DIASTOLIC BLOOD PRESSURE: 80 MMHG

## 2020-10-12 DIAGNOSIS — I10 ESSENTIAL HYPERTENSION: Primary | ICD-10-CM

## 2020-10-12 PROCEDURE — 99211 OFF/OP EST MAY X REQ PHY/QHP: CPT | Performed by: INTERNAL MEDICINE

## 2020-10-12 RX ORDER — METOPROLOL SUCCINATE 25 MG/1
TABLET, EXTENDED RELEASE ORAL
Qty: 15 TABLET | Refills: 0
Start: 2020-10-12 | End: 2021-09-13 | Stop reason: SDUPTHER

## 2020-10-12 NOTE — PROGRESS NOTES
Blood Pressure Check  Chung Bradley is a 70 y.o. male is here today for a blood pressure check.    Vitals:    10/12/20 0959   BP: 150/80   Pulse: 56        Reason for Blood Pressure Check  Has been getting high home BP readings    Current Medications    Current Outpatient Medications:   •  allopurinol (ZYLOPRIM) 300 MG tablet, Take 300 mg by mouth Daily., Disp: , Rfl:   •  amLODIPine (NORVASC) 10 MG tablet, Take 1 tablet by mouth Daily., Disp: 30 tablet, Rfl: 11  •  aspirin 81 MG tablet, Take 81 mg by mouth Daily., Disp: , Rfl:   •  beclomethasone (BECONASE-AQ) 42 MCG/SPRAY nasal spray, 2 sprays into the nostril(s) as directed by provider 2 (Two) Times a Day. Dose is for each nostril., Disp: , Rfl:   •  Benzocaine (ORAL ANESTHETIC MT), Apply  to the mouth or throat As Needed., Disp: , Rfl:   •  chlorthalidone (HYGROTON) 25 MG tablet, Take 0.5 tablets by mouth Daily., Disp: 30 tablet, Rfl: 3  •  cholecalciferol (VITAMIN D3) 1000 units tablet, Take 1,000 Units by mouth Daily., Disp: , Rfl:   •  cyclobenzaprine (FLEXERIL) 10 MG tablet, Take 1 tablet by mouth 3 (Three) Times a Day As Needed for Muscle Spasms., Disp: 90 tablet, Rfl: 1  •  fenofibrate (TRICOR) 145 MG tablet, Take 145 mg by mouth Every Night., Disp: , Rfl:   •  fluticasone (FLONASE) 50 MCG/ACT nasal spray, 1 spray into the nostril(s) as directed by provider 2 (Two) Times a Day As Needed., Disp: , Rfl:   •  gabapentin (NEURONTIN) 400 MG capsule, Take 800 mg by mouth Every 8 (Eight) Hours. Take 2 capsules by mouth in the am, two capsules by mouth in the afternoon, and 2 capsules by mouth in the pm. , Disp: , Rfl:   •  glipizide (GLUCOTROL XL) 2.5 MG 24 hr tablet, Take 1 tablet by mouth Daily., Disp: 30 tablet, Rfl: 11  •  guaiFENesin (MUCINEX) 600 MG 12 hr tablet, Take 1,200 mg by mouth 2 (Two) Times a Day., Disp: , Rfl:   •  hydrocortisone (ANUSOL-HC) 25 MG suppository, Insert 25 mg into the rectum 2 (Two) Times a Day As Needed for Hemorrhoids., Disp: ,  Rfl:   •  hydrocortisone 2.5 % cream, Apply  topically to the appropriate area as directed 2 (Two) Times a Day. Apply to hemorrhoids, Disp: , Rfl:   •  lovastatin (MEVACOR) 40 MG tablet, Take 20 mg by mouth Daily., Disp: , Rfl:   •  magnesium oxide (MAG-OX) 400 MG tablet, Take 1,200 mg by mouth Daily. Take 3 tablets by mouth two times daily. , Disp: , Rfl:   •  Nystatin powder, Take  by mouth., Disp: , Rfl:   •  Omega-3 Fatty Acids (FISH OIL) 1000 MG capsule capsule, Take 1,000 mg by mouth Daily With Breakfast., Disp: , Rfl:   •  omeprazole (priLOSEC) 20 MG capsule, Take 20 mg by mouth 2 (Two) Times a Day., Disp: , Rfl:   •  oxybutynin (DITROPAN) 5 MG tablet, , Disp: , Rfl:   •  oxyCODONE-acetaminophen (PERCOCET) 5-325 MG per tablet, Take 1 tablet by mouth 2 (Two) Times a Day. (Patient taking differently: Take 1 tablet by mouth Every 6 (Six) Hours As Needed for Severe Pain .), Disp: 20 tablet, Rfl: 0  •  oxyCODONE-acetaminophen (Percocet) 5-325 MG per tablet, Take 1 tablet by mouth Daily As Needed for Severe Pain ., Disp: 30 tablet, Rfl: 0  •  Pseudoephedrine-guaiFENesin (MUCINEX D MAX STRENGTH PO), Take  by mouth As Needed., Disp: , Rfl:   •  sodium bicarbonate 650 MG tablet, Take 1 tablet by mouth 3 (Three) Times a Day., Disp: 90 tablet, Rfl: 0  •  tamsulosin (FLOMAX) 0.4 MG capsule 24 hr capsule, , Disp: , Rfl:   •  vitamin B-12 (CYANOCOBALAMIN) 1000 MCG tablet, Take 1,000 mcg by mouth Daily., Disp: , Rfl:      Chung Bradley is compliant with current medications.    New Medications Prescribed This Visit  No orders of the defined types were placed in this encounter.       Recommendations  Will forward today's BP reading to provider and will contact patient with any recommended medication changes.  He says he has a FU OV here the end of this month.

## 2020-10-12 NOTE — TELEPHONE ENCOUNTER
Per pt he is not having any of those symptoms but will call to be seen sooner if he does.  Also he is only on the medication that is in chart   looks like metoprolol was the newest

## 2020-10-12 NOTE — TELEPHONE ENCOUNTER
BP check in office today was 150/80 P-56 and irregular.  He says this is about what it is running at home as well.  I told him if you had further medication change instructions, we would call him, o/w would re-evaluate at his upcoming appt in about 2 weeks.

## 2020-10-12 NOTE — TELEPHONE ENCOUNTER
Is patient taking beta blocker? Please call and ask. Is he having heart palpitations, shortness of breath, chest pain, or increased fatigue? Have patient follow up sooner if he is symptomatic for ECG to evaluate his irregular heart rate.

## 2020-10-19 RX ORDER — FLUTICASONE PROPIONATE 50 MCG
1 SPRAY, SUSPENSION (ML) NASAL 2 TIMES DAILY PRN
Qty: 1 BOTTLE | Refills: 3 | Status: SHIPPED | OUTPATIENT
Start: 2020-10-19

## 2020-10-26 ENCOUNTER — OFFICE VISIT (OUTPATIENT)
Dept: INTERNAL MEDICINE | Facility: CLINIC | Age: 70
End: 2020-10-26

## 2020-10-26 VITALS
SYSTOLIC BLOOD PRESSURE: 166 MMHG | HEART RATE: 73 BPM | DIASTOLIC BLOOD PRESSURE: 86 MMHG | HEIGHT: 70 IN | BODY MASS INDEX: 34.65 KG/M2 | OXYGEN SATURATION: 100 % | TEMPERATURE: 97.3 F | WEIGHT: 242 LBS

## 2020-10-26 DIAGNOSIS — E11.9 ENCOUNTER FOR DIABETIC FOOT EXAM (HCC): Primary | ICD-10-CM

## 2020-10-26 DIAGNOSIS — E11.9 TYPE 2 DIABETES MELLITUS WITHOUT COMPLICATION, WITHOUT LONG-TERM CURRENT USE OF INSULIN (HCC): ICD-10-CM

## 2020-10-26 DIAGNOSIS — I10 ESSENTIAL HYPERTENSION: ICD-10-CM

## 2020-10-26 LAB — HBA1C MFR BLD: 6.2 %

## 2020-10-26 PROCEDURE — 99214 OFFICE O/P EST MOD 30 MIN: CPT | Performed by: INTERNAL MEDICINE

## 2020-10-26 PROCEDURE — 83036 HEMOGLOBIN GLYCOSYLATED A1C: CPT | Performed by: INTERNAL MEDICINE

## 2020-10-26 RX ORDER — CLONIDINE HYDROCHLORIDE 0.1 MG/1
0.1 TABLET ORAL NIGHTLY
Qty: 30 TABLET | Refills: 5 | Status: SHIPPED | OUTPATIENT
Start: 2020-10-26 | End: 2021-01-22

## 2020-10-26 NOTE — PROGRESS NOTES
Subjective   Chung Bradley is a 70 y.o. male.   Chief Complaint   Patient presents with   • Hypertension     4 week follow up say robert on 09/14/2020   • Diabetes     a1c: 6.2       Patient presents today with elevated blood pressure readings from home his home glucose readings are sometimes elevated but for the most part okay.  He is taking his medications as prescribed he tells me that recently metoprolol was added to his regimen now is having some bradycardia.       The following portions of the patient's history were reviewed and updated as appropriate: allergies, current medications, past family history, past medical history, past social history, past surgical history and problem list.    Review of Systems   Constitutional: Negative for activity change, appetite change, fatigue, fever, unexpected weight gain and unexpected weight loss.   HENT: Negative for swollen glands, trouble swallowing and voice change.    Eyes: Negative for blurred vision and visual disturbance.   Respiratory: Negative for cough and shortness of breath.    Cardiovascular: Negative for chest pain, palpitations and leg swelling.   Gastrointestinal: Negative for abdominal pain, constipation, diarrhea, nausea, vomiting and indigestion.   Endocrine: Negative for cold intolerance, heat intolerance, polydipsia and polyphagia.   Genitourinary: Negative for dysuria and frequency.   Musculoskeletal: Negative for arthralgias, back pain, joint swelling and neck pain.   Skin: Negative for color change, rash and skin lesions.   Neurological: Negative for dizziness, weakness, headache, memory problem and confusion.   Hematological: Does not bruise/bleed easily.   Psychiatric/Behavioral: Negative for agitation, hallucinations and suicidal ideas. The patient is not nervous/anxious.        Objective   Past Medical History:   Diagnosis Date   • Acute renal failure (CMS/HCC) 6/8/2020   • Bladder neoplasm of uncertain malignant potential 7/20/2017   •  Degenerative arthritis of cervical spine with nerve compression     per patient report   • Diabetes mellitus (CMS/HCC)    • GERD (gastroesophageal reflux disease)    • Hearing aid worn    • Hypertension    • Impaired hearing     without hearing aids can barely hear anything   • Neuropathy    • Sleep apnea     wears cpap      Past Surgical History:   Procedure Laterality Date   • CATARACT EXTRACTION W/ INTRAOCULAR LENS IMPLANT     • CHOLECYSTECTOMY     • EXPLORATORY LAPAROTOMY     • LEG SURGERY     • TRANSURETHRAL RESECTION OF BLADDER TUMOR Bilateral 7/25/2017    Procedure: CYSTOSCOPY TRANSURETHRAL RESECTION OF BLADDER TUMOR & POSSIBLE BILATERAL RETROGRADE URETEROPYELOGRAMS;  Surgeon: Chris Esparza MD;  Location:  [facility] OR;  Service:    • TRANSURETHRAL RESECTION OF BLADDER TUMOR N/A 11/3/2017    Procedure: CYSTOSCOPY TRANSURETHRAL RESECTION OF BLADDER TUMOR ;  Surgeon: Chris Esparza MD;  Location:  [facility] OR;  Service:    • TRANSURETHRAL RESECTION OF BLADDER TUMOR N/A 9/10/2018    Procedure: CYSTOSCOPY TRANSURETHRAL RESECTION OF LARGE BLADDER TUMOR, CLOT EVACUATION, AND FULGURATION;  Surgeon: Chris Esparza MD;  Location:  [facility] OR;  Service: Urology        Current Outpatient Medications:   •  allopurinol (ZYLOPRIM) 300 MG tablet, Take 300 mg by mouth Daily., Disp: , Rfl:   •  amLODIPine (NORVASC) 10 MG tablet, Take 1 tablet by mouth Daily., Disp: 30 tablet, Rfl: 11  •  aspirin 81 MG tablet, Take 81 mg by mouth Daily., Disp: , Rfl:   •  chlorthalidone (HYGROTON) 25 MG tablet, Take 0.5 tablets by mouth Daily., Disp: 30 tablet, Rfl: 3  •  fenofibrate (TRICOR) 145 MG tablet, Take 145 mg by mouth Every Night., Disp: , Rfl:   •  fluticasone (FLONASE) 50 MCG/ACT nasal spray, 1 spray into the nostril(s) as directed by provider 2 (Two) Times a Day As Needed for Allergies., Disp: 1 bottle, Rfl: 3  •  gabapentin (NEURONTIN) 400 MG capsule, Take 800 mg by mouth Every 8 (Eight) Hours. Take 2 capsules by mouth in the am,  two capsules by mouth in the afternoon, and 2 capsules by mouth in the pm. , Disp: , Rfl:   •  glipizide (GLUCOTROL XL) 2.5 MG 24 hr tablet, Take 1 tablet by mouth Daily. (Patient taking differently: Take 2.5 mg by mouth 2 (two) times a day.), Disp: 30 tablet, Rfl: 11  •  guaiFENesin (MUCINEX) 600 MG 12 hr tablet, Take 1,200 mg by mouth 2 (Two) Times a Day., Disp: , Rfl:   •  hydrocortisone (ANUSOL-HC) 25 MG suppository, Insert 25 mg into the rectum 2 (Two) Times a Day As Needed for Hemorrhoids., Disp: , Rfl:   •  hydrocortisone 2.5 % cream, Apply  topically to the appropriate area as directed 2 (Two) Times a Day. Apply to hemorrhoids, Disp: , Rfl:   •  lovastatin (MEVACOR) 40 MG tablet, Take 20 mg by mouth Daily., Disp: , Rfl:   •  metoprolol succinate XL (Toprol XL) 25 MG 24 hr tablet, Take 0.5 tablet in the AM and taken 0.5 tablet in the PM, Disp: 15 tablet, Rfl: 0  •  Nystatin powder, Take  by mouth., Disp: , Rfl:   •  omeprazole (priLOSEC) 20 MG capsule, Take 20 mg by mouth 2 (Two) Times a Day., Disp: , Rfl:   •  Pseudoephedrine-guaiFENesin (MUCINEX D MAX STRENGTH PO), Take  by mouth As Needed., Disp: , Rfl:   •  cloNIDine (Catapres) 0.1 MG tablet, Take 1 tablet by mouth Every Night., Disp: 30 tablet, Rfl: 5     Vitals:    10/26/20 0916   BP: 166/86   Pulse: 73   Temp: 97.3 °F (36.3 °C)   SpO2: 100%         10/26/20  0916   Weight: 110 kg (242 lb)     Patient's Body mass index is 34.72 kg/m². BMI is .      Physical Exam  Vitals signs and nursing note reviewed.   Constitutional:       General: He is not in acute distress.     Appearance: Normal appearance. He is well-developed.   HENT:      Head: Normocephalic and atraumatic.      Right Ear: External ear normal.      Left Ear: External ear normal.      Nose: Nose normal.   Eyes:      Extraocular Movements: Extraocular movements intact.      Conjunctiva/sclera: Conjunctivae normal.      Pupils: Pupils are equal, round, and reactive to light.   Neck:       Musculoskeletal: Normal range of motion and neck supple. No neck rigidity or muscular tenderness.   Cardiovascular:      Rate and Rhythm: Normal rate and regular rhythm.      Pulses: Normal pulses.      Heart sounds: Normal heart sounds.   Pulmonary:      Effort: Pulmonary effort is normal.      Breath sounds: Normal breath sounds.   Abdominal:      General: Bowel sounds are normal.      Palpations: Abdomen is soft.   Musculoskeletal: Normal range of motion.   Skin:     General: Skin is warm and dry.   Neurological:      General: No focal deficit present.      Mental Status: He is alert and oriented to person, place, and time.   Psychiatric:         Mood and Affect: Mood normal.         Behavior: Behavior normal.               Assessment/Plan   Diagnoses and all orders for this visit:    1. Encounter for diabetic foot exam (CMS/[facility]) (Primary)  -     POC Glycosylated Hemoglobin (Hb A1C)    2. Type 2 diabetes mellitus without complication, without long-term current use of insulin (CMS/[facility])    3. Essential hypertension    Other orders  -     cloNIDine (Catapres) 0.1 MG tablet; Take 1 tablet by mouth Every Night.  Dispense: 30 tablet; Refill: 5        Patient is having some bradycardia I am going to allow him to continue taking  metoprolol however I am adding clonidine if he becomes more bradycardic I will wean him off of the metoprolol.  His blood pressure is not ideally controlled he is sugars seem to be doing okay at this point so no change in the regimen there.  Bring him back short-term in about 2 weeks to reevaluate.

## 2020-11-02 DIAGNOSIS — E11.65 TYPE 2 DIABETES MELLITUS WITH HYPERGLYCEMIA, WITHOUT LONG-TERM CURRENT USE OF INSULIN (HCC): ICD-10-CM

## 2020-11-02 RX ORDER — GLIPIZIDE 2.5 MG/1
2.5 TABLET, EXTENDED RELEASE ORAL 2 TIMES DAILY
Qty: 180 TABLET | Refills: 3 | Status: SHIPPED | OUTPATIENT
Start: 2020-11-02 | End: 2021-11-01 | Stop reason: SDUPTHER

## 2020-11-09 ENCOUNTER — OFFICE VISIT (OUTPATIENT)
Dept: INTERNAL MEDICINE | Facility: CLINIC | Age: 70
End: 2020-11-09

## 2020-11-09 VITALS
BODY MASS INDEX: 34.93 KG/M2 | TEMPERATURE: 97.3 F | WEIGHT: 244 LBS | SYSTOLIC BLOOD PRESSURE: 136 MMHG | HEIGHT: 70 IN | DIASTOLIC BLOOD PRESSURE: 68 MMHG | HEART RATE: 63 BPM | OXYGEN SATURATION: 98 %

## 2020-11-09 DIAGNOSIS — E11.65 TYPE 2 DIABETES MELLITUS WITH HYPERGLYCEMIA, WITHOUT LONG-TERM CURRENT USE OF INSULIN (HCC): ICD-10-CM

## 2020-11-09 DIAGNOSIS — I10 ESSENTIAL HYPERTENSION: Primary | ICD-10-CM

## 2020-11-09 PROCEDURE — 99213 OFFICE O/P EST LOW 20 MIN: CPT | Performed by: INTERNAL MEDICINE

## 2020-11-09 NOTE — PROGRESS NOTES
Subjective   Chung Bradley is a 70 y.o. male.   Chief Complaint   Patient presents with   • Hypertension     2 week follow up, med check   • Diabetes     10/26/20 A1C: 6.1       This is a follow-up visit for patient with diabetes hypertension he is taking his medications as prescribed and there are no new problems.       The following portions of the patient's history were reviewed and updated as appropriate: allergies, current medications, past family history, past medical history, past social history, past surgical history and problem list.    Review of Systems   Constitutional: Negative for activity change, appetite change, fatigue, fever, unexpected weight gain and unexpected weight loss.   HENT: Negative for swollen glands, trouble swallowing and voice change.    Eyes: Negative for blurred vision and visual disturbance.   Respiratory: Negative for cough and shortness of breath.    Cardiovascular: Negative for chest pain, palpitations and leg swelling.   Gastrointestinal: Negative for abdominal pain, constipation, diarrhea, nausea, vomiting and indigestion.   Endocrine: Negative for cold intolerance, heat intolerance, polydipsia and polyphagia.   Genitourinary: Negative for dysuria and frequency.   Musculoskeletal: Negative for arthralgias, back pain, joint swelling and neck pain.   Skin: Negative for color change, rash and skin lesions.   Neurological: Negative for dizziness, weakness, headache, memory problem and confusion.   Hematological: Does not bruise/bleed easily.   Psychiatric/Behavioral: Negative for agitation, hallucinations and suicidal ideas. The patient is not nervous/anxious.        Objective   Past Medical History:   Diagnosis Date   • Acute renal failure (CMS/HCC) 6/8/2020   • Bladder neoplasm of uncertain malignant potential 7/20/2017   • Degenerative arthritis of cervical spine with nerve compression     per patient report   • Diabetes mellitus (CMS/[facility])    • GERD (gastroesophageal reflux  disease)    • Hearing aid worn    • Hypertension    • Impaired hearing     without hearing aids can barely hear anything   • Neuropathy    • Sleep apnea     wears cpap      Past Surgical History:   Procedure Laterality Date   • CATARACT EXTRACTION W/ INTRAOCULAR LENS IMPLANT     • CHOLECYSTECTOMY     • EXPLORATORY LAPAROTOMY     • LEG SURGERY     • TRANSURETHRAL RESECTION OF BLADDER TUMOR Bilateral 7/25/2017    Procedure: CYSTOSCOPY TRANSURETHRAL RESECTION OF BLADDER TUMOR & POSSIBLE BILATERAL RETROGRADE URETEROPYELOGRAMS;  Surgeon: Chris Esparza MD;  Location:  [facility];  Service:    • TRANSURETHRAL RESECTION OF BLADDER TUMOR N/A 11/3/2017    Procedure: CYSTOSCOPY TRANSURETHRAL RESECTION OF BLADDER TUMOR ;  Surgeon: Chris Esparza MD;  Location:  [facility];  Service:    • TRANSURETHRAL RESECTION OF BLADDER TUMOR N/A 9/10/2018    Procedure: CYSTOSCOPY TRANSURETHRAL RESECTION OF LARGE BLADDER TUMOR, CLOT EVACUATION, AND FULGURATION;  Surgeon: Chris Esparza MD;  Location:  [facility] OR;  Service: Urology        Current Outpatient Medications:   •  allopurinol (ZYLOPRIM) 300 MG tablet, Take 300 mg by mouth Daily., Disp: , Rfl:   •  amLODIPine (NORVASC) 10 MG tablet, Take 1 tablet by mouth Daily., Disp: 30 tablet, Rfl: 11  •  aspirin 81 MG tablet, Take 81 mg by mouth Daily., Disp: , Rfl:   •  chlorthalidone (HYGROTON) 25 MG tablet, Take 0.5 tablets by mouth Daily., Disp: 30 tablet, Rfl: 3  •  cloNIDine (Catapres) 0.1 MG tablet, Take 1 tablet by mouth Every Night., Disp: 30 tablet, Rfl: 5  •  fenofibrate (TRICOR) 145 MG tablet, Take 145 mg by mouth Every Night., Disp: , Rfl:   •  fluticasone (FLONASE) 50 MCG/ACT nasal spray, 1 spray into the nostril(s) as directed by provider 2 (Two) Times a Day As Needed for Allergies., Disp: 1 bottle, Rfl: 3  •  gabapentin (NEURONTIN) 400 MG capsule, Take 800 mg by mouth Every 8 (Eight) Hours. Take 2 capsules by mouth in the am, two capsules by mouth in the afternoon, and 2  capsules by mouth in the pm.   Patient is taking 2 capsules in the AM, 2 capsules in the afternoon, 1 capsule in the PM, Disp: , Rfl:   •  glipizide (GLUCOTROL XL) 2.5 MG 24 hr tablet, Take 1 tablet by mouth 2 (two) times a day., Disp: 180 tablet, Rfl: 3  •  guaiFENesin (MUCINEX) 600 MG 12 hr tablet, Take 1,200 mg by mouth 2 (Two) Times a Day., Disp: , Rfl:   •  hydrocortisone (ANUSOL-HC) 25 MG suppository, Insert 25 mg into the rectum 2 (Two) Times a Day As Needed for Hemorrhoids., Disp: , Rfl:   •  hydrocortisone 2.5 % cream, Apply  topically to the appropriate area as directed 2 (Two) Times a Day. Apply to hemorrhoids, Disp: , Rfl:   •  lovastatin (MEVACOR) 40 MG tablet, Take 20 mg by mouth Daily., Disp: , Rfl:   •  metoprolol succinate XL (Toprol XL) 25 MG 24 hr tablet, Take 0.5 tablet in the AM and taken 0.5 tablet in the PM, Disp: 15 tablet, Rfl: 0  •  Nystatin powder, Take  by mouth., Disp: , Rfl:   •  omeprazole (priLOSEC) 20 MG capsule, Take 20 mg by mouth 2 (Two) Times a Day., Disp: , Rfl:   •  Pseudoephedrine-guaiFENesin (MUCINEX D MAX STRENGTH PO), Take  by mouth As Needed., Disp: , Rfl:      Vitals:    11/09/20 0938   BP: 136/68   Pulse: 63   Temp: 97.3 °F (36.3 °C)   SpO2: 98%         11/09/20  0938   Weight: 111 kg (244 lb)     Patient's Body mass index is 35.01 kg/m². BMI is .      Physical Exam  Vitals signs and nursing note reviewed.   Constitutional:       General: He is not in acute distress.     Appearance: Normal appearance. He is well-developed.   HENT:      Head: Normocephalic and atraumatic.      Right Ear: External ear normal.      Left Ear: External ear normal.      Nose: Nose normal.   Eyes:      Extraocular Movements: Extraocular movements intact.      Conjunctiva/sclera: Conjunctivae normal.      Pupils: Pupils are equal, round, and reactive to light.   Neck:      Musculoskeletal: Normal range of motion and neck supple. No neck rigidity or muscular tenderness.   Cardiovascular:       Rate and Rhythm: Normal rate and regular rhythm.      Pulses: Normal pulses.      Heart sounds: Normal heart sounds.   Pulmonary:      Effort: Pulmonary effort is normal.      Breath sounds: Normal breath sounds.   Abdominal:      General: Bowel sounds are normal.      Palpations: Abdomen is soft.   Musculoskeletal: Normal range of motion.   Skin:     General: Skin is warm and dry.   Neurological:      General: No focal deficit present.      Mental Status: He is alert and oriented to person, place, and time.   Psychiatric:         Mood and Affect: Mood normal.         Behavior: Behavior normal.               Assessment/Plan   Diagnoses and all orders for this visit:    1. Essential hypertension (Primary)    2. Type 2 diabetes mellitus with hyperglycemia, without long-term current use of insulin (CMS/[facility])    Patient is doing well with his A1c his blood pressure is reasonably well controlled we will see him back in 6 months.

## 2020-11-12 DIAGNOSIS — I10 ESSENTIAL HYPERTENSION: ICD-10-CM

## 2020-11-12 RX ORDER — CHLORTHALIDONE 25 MG/1
25 TABLET ORAL DAILY
Qty: 90 TABLET | Refills: 3
Start: 2020-11-12 | End: 2020-11-24

## 2020-11-12 NOTE — TELEPHONE ENCOUNTER
Dr. Yousif reviewed and says to increase Chlorthalidone from 1/2 to one daily.  Will need BMP rechecked in 2 weeks.  Will need OV if this does not improve.  Instructed wife and also instructed on leg elevation - she voiced understanding.

## 2020-11-24 RX ORDER — CHLORTHALIDONE 25 MG/1
25 TABLET ORAL DAILY
Qty: 90 TABLET | Refills: 3 | Status: SHIPPED | OUTPATIENT
Start: 2020-11-24 | End: 2021-11-22 | Stop reason: SDUPTHER

## 2020-11-24 NOTE — TELEPHONE ENCOUNTER
Pt called in requesting corrected RX for:    Chlorthalidone 25 Mg to be sent to [facility]. Pt stated he is taking one tablet daily instead 1/2 tablet.     Any questions please call [phone]

## 2021-01-22 RX ORDER — CLONIDINE HYDROCHLORIDE 0.1 MG/1
TABLET ORAL
Qty: 90 TABLET | Refills: 3 | Status: SHIPPED | OUTPATIENT
Start: 2021-01-22 | End: 2021-04-23 | Stop reason: SDUPTHER

## 2021-04-15 ENCOUNTER — OFFICE VISIT (OUTPATIENT)
Dept: INTERNAL MEDICINE | Facility: CLINIC | Age: 71
End: 2021-04-15

## 2021-04-15 VITALS
WEIGHT: 240 LBS | TEMPERATURE: 97.7 F | DIASTOLIC BLOOD PRESSURE: 60 MMHG | HEART RATE: 69 BPM | BODY MASS INDEX: 34.36 KG/M2 | SYSTOLIC BLOOD PRESSURE: 160 MMHG | HEIGHT: 70 IN | OXYGEN SATURATION: 95 %

## 2021-04-15 DIAGNOSIS — C67.9 MALIGNANT NEOPLASM OF URINARY BLADDER, UNSPECIFIED SITE (HCC): ICD-10-CM

## 2021-04-15 DIAGNOSIS — M54.50 ACUTE RIGHT-SIDED LOW BACK PAIN WITHOUT SCIATICA: Primary | ICD-10-CM

## 2021-04-15 DIAGNOSIS — I10 ESSENTIAL HYPERTENSION: ICD-10-CM

## 2021-04-15 PROCEDURE — 99213 OFFICE O/P EST LOW 20 MIN: CPT | Performed by: INTERNAL MEDICINE

## 2021-04-15 RX ORDER — MELOXICAM 7.5 MG/1
7.5 TABLET ORAL DAILY
Qty: 14 TABLET | Refills: 0 | Status: SHIPPED | OUTPATIENT
Start: 2021-04-15 | End: 2021-11-03

## 2021-04-15 RX ORDER — BACLOFEN 10 MG/1
10 TABLET ORAL 3 TIMES DAILY
Qty: 30 TABLET | Refills: 0 | Status: SHIPPED | OUTPATIENT
Start: 2021-04-15 | End: 2021-11-03

## 2021-04-15 NOTE — PROGRESS NOTES
Subjective   Chung Bradley is a 70 y.o. male.   Chief Complaint   Patient presents with   • Hip Pain     Right. Started about 1 week ago.        History of Present Illness patient has severe back pain just above his right iliac crest.    The following portions of the patient's history were reviewed and updated as appropriate: allergies, current medications, past family history, past medical history, past social history, past surgical history and problem list.    Review of Systems   Constitutional: Negative for activity change, appetite change, fatigue, fever, unexpected weight gain and unexpected weight loss.   HENT: Positive for hearing loss. Negative for swollen glands, trouble swallowing and voice change.    Eyes: Negative for blurred vision and visual disturbance.   Respiratory: Negative for cough and shortness of breath.    Cardiovascular: Negative for chest pain, palpitations and leg swelling.   Gastrointestinal: Negative for abdominal pain, constipation, diarrhea, nausea, vomiting and indigestion.   Endocrine: Negative for cold intolerance, heat intolerance, polydipsia and polyphagia.   Genitourinary: Negative for dysuria and frequency.   Musculoskeletal: Positive for back pain. Negative for arthralgias, joint swelling and neck pain.   Skin: Negative for color change, rash and skin lesions.   Neurological: Negative for dizziness, weakness, headache, memory problem and confusion.   Hematological: Does not bruise/bleed easily.   Psychiatric/Behavioral: Negative for agitation, hallucinations and suicidal ideas. The patient is not nervous/anxious.        Objective   Past Medical History:   Diagnosis Date   • Acute renal failure (CMS/HCC) 6/8/2020   • Bladder neoplasm of uncertain malignant potential 7/20/2017   • Degenerative arthritis of cervical spine with nerve compression     per patient report   • Diabetes mellitus (CMS/HCC)    • GERD (gastroesophageal reflux disease)    • Hearing aid worn    • Hypertension     • Impaired hearing     without hearing aids can barely hear anything   • Neuropathy    • Sleep apnea     wears cpap      Past Surgical History:   Procedure Laterality Date   • CATARACT EXTRACTION W/ INTRAOCULAR LENS IMPLANT     • CHOLECYSTECTOMY     • EXPLORATORY LAPAROTOMY     • LEG SURGERY     • TRANSURETHRAL RESECTION OF BLADDER TUMOR Bilateral 7/25/2017    Procedure: CYSTOSCOPY TRANSURETHRAL RESECTION OF BLADDER TUMOR & POSSIBLE BILATERAL RETROGRADE URETEROPYELOGRAMS;  Surgeon: Chris Esparza MD;  Location:  [facility] OR;  Service:    • TRANSURETHRAL RESECTION OF BLADDER TUMOR N/A 11/3/2017    Procedure: CYSTOSCOPY TRANSURETHRAL RESECTION OF BLADDER TUMOR ;  Surgeon: Chris Esparza MD;  Location:  [facility];  Service:    • TRANSURETHRAL RESECTION OF BLADDER TUMOR N/A 9/10/2018    Procedure: CYSTOSCOPY TRANSURETHRAL RESECTION OF LARGE BLADDER TUMOR, CLOT EVACUATION, AND FULGURATION;  Surgeon: Chris Esparza MD;  Location:  [facility];  Service: Urology        Current Outpatient Medications:   •  allopurinol (ZYLOPRIM) 300 MG tablet, Take 300 mg by mouth Daily., Disp: , Rfl:   •  amLODIPine (NORVASC) 10 MG tablet, Take 1 tablet by mouth Daily., Disp: 30 tablet, Rfl: 11  •  aspirin 81 MG tablet, Take 81 mg by mouth Daily., Disp: , Rfl:   •  chlorthalidone (HYGROTON) 25 MG tablet, Take 1 tablet by mouth Daily., Disp: 90 tablet, Rfl: 3  •  cloNIDine (CATAPRES) 0.1 MG tablet, Take 1 tablet by mouth nightly, Disp: 90 tablet, Rfl: 3  •  fenofibrate (TRICOR) 145 MG tablet, Take 145 mg by mouth Every Night., Disp: , Rfl:   •  fluticasone (FLONASE) 50 MCG/ACT nasal spray, 1 spray into the nostril(s) as directed by provider 2 (Two) Times a Day As Needed for Allergies., Disp: 1 bottle, Rfl: 3  •  gabapentin (NEURONTIN) 400 MG capsule, Take 800 mg by mouth Every 8 (Eight) Hours. Take 2 capsules by mouth in the am, two capsules by mouth in the afternoon, and 2 capsules by mouth in the pm.   Patient is taking 2 capsules in  the AM, 2 capsules in the afternoon, 1 capsule in the PM, Disp: , Rfl:   •  glipizide (GLUCOTROL XL) 2.5 MG 24 hr tablet, Take 1 tablet by mouth 2 (two) times a day., Disp: 180 tablet, Rfl: 3  •  hydrocortisone (ANUSOL-HC) 25 MG suppository, Insert 25 mg into the rectum 2 (Two) Times a Day As Needed for Hemorrhoids., Disp: , Rfl:   •  hydrocortisone 2.5 % cream, Apply  topically to the appropriate area as directed 2 (Two) Times a Day. Apply to hemorrhoids, Disp: , Rfl:   •  lovastatin (MEVACOR) 40 MG tablet, Take 20 mg by mouth Daily., Disp: , Rfl:   •  metoprolol succinate XL (Toprol XL) 25 MG 24 hr tablet, Take 0.5 tablet in the AM and taken 0.5 tablet in the PM, Disp: 15 tablet, Rfl: 0  •  Nystatin powder, Take  by mouth., Disp: , Rfl:   •  omeprazole (priLOSEC) 20 MG capsule, Take 20 mg by mouth 2 (Two) Times a Day., Disp: , Rfl:   •  baclofen (LIORESAL) 10 MG tablet, Take 1 tablet by mouth 3 (Three) Times a Day., Disp: 30 tablet, Rfl: 0  •  guaiFENesin (MUCINEX) 600 MG 12 hr tablet, Take 1,200 mg by mouth 2 (Two) Times a Day., Disp: , Rfl:   •  meloxicam (Mobic) 7.5 MG tablet, Take 1 tablet by mouth Daily., Disp: 14 tablet, Rfl: 0  •  Pseudoephedrine-guaiFENesin (MUCINEX D MAX STRENGTH PO), Take  by mouth As Needed., Disp: , Rfl:      Vitals:    04/15/21 1358   BP: 160/60   Pulse: 69   Temp: 97.7 °F (36.5 °C)   SpO2: 95%         04/15/21  1358   Weight: 109 kg (240 lb)     Patient's Body mass index is 34.44 kg/m². BMI is .      Physical Exam  Vitals and nursing note reviewed.   Constitutional:       General: He is not in acute distress.     Appearance: Normal appearance. He is well-developed.   HENT:      Head: Normocephalic and atraumatic.      Right Ear: External ear normal.      Left Ear: External ear normal.      Nose: Nose normal.   Eyes:      Extraocular Movements: Extraocular movements intact.      Conjunctiva/sclera: Conjunctivae normal.      Pupils: Pupils are equal, round, and reactive to light.    Cardiovascular:      Rate and Rhythm: Normal rate and regular rhythm.      Pulses: Normal pulses.      Heart sounds: Normal heart sounds.   Pulmonary:      Effort: Pulmonary effort is normal.      Breath sounds: Normal breath sounds.   Abdominal:      General: Bowel sounds are normal.      Palpations: Abdomen is soft.   Musculoskeletal:         General: Normal range of motion.      Cervical back: Normal range of motion and neck supple. No rigidity. No muscular tenderness.   Skin:     General: Skin is warm and dry.   Neurological:      General: No focal deficit present.      Mental Status: He is alert and oriented to person, place, and time.   Psychiatric:         Mood and Affect: Mood normal.         Behavior: Behavior normal.               Assessment/Plan   Diagnoses and all orders for this visit:    1. Acute right-sided low back pain without sciatica (Primary)  -     Ambulatory Referral to Physical Therapy Evaluate and treat    2. Essential hypertension    3. Malignant neoplasm of urinary bladder, unspecified site (CMS/HCC)    Other orders  -     meloxicam (Mobic) 7.5 MG tablet; Take 1 tablet by mouth Daily.  Dispense: 14 tablet; Refill: 0  -     baclofen (LIORESAL) 10 MG tablet; Take 1 tablet by mouth 3 (Three) Times a Day.  Dispense: 30 tablet; Refill: 0        At this point patient has rather severe back pain I am giving him muscle relaxer and a nonsteroidal of the nonsteroidal just for a limited period of time due to this chronic kidney disease and diabetes. I am not x-raying or do anything else at this point I will have him come back in about 2 weeks and will follow up at that point if he still having quite a bit of pain will do lumbar spine films. I have also ordered physical therapy he is wanting to take that to his local physical therapist and that is fine.

## 2021-04-23 RX ORDER — CLONIDINE HYDROCHLORIDE 0.1 MG/1
0.1 TABLET ORAL NIGHTLY
Qty: 90 TABLET | Refills: 3 | OUTPATIENT
Start: 2021-04-23

## 2021-04-23 RX ORDER — CLONIDINE HYDROCHLORIDE 0.1 MG/1
0.1 TABLET ORAL NIGHTLY
Qty: 90 TABLET | Refills: 3 | Status: SHIPPED | OUTPATIENT
Start: 2021-04-23 | End: 2022-04-19 | Stop reason: SDUPTHER

## 2021-04-28 ENCOUNTER — OFFICE VISIT (OUTPATIENT)
Dept: INTERNAL MEDICINE | Facility: CLINIC | Age: 71
End: 2021-04-28

## 2021-04-28 VITALS
OXYGEN SATURATION: 97 % | BODY MASS INDEX: 34.93 KG/M2 | DIASTOLIC BLOOD PRESSURE: 70 MMHG | TEMPERATURE: 98 F | HEIGHT: 70 IN | SYSTOLIC BLOOD PRESSURE: 162 MMHG | HEART RATE: 57 BPM | WEIGHT: 244 LBS

## 2021-04-28 DIAGNOSIS — E11.65 TYPE 2 DIABETES MELLITUS WITH HYPERGLYCEMIA, WITHOUT LONG-TERM CURRENT USE OF INSULIN (HCC): ICD-10-CM

## 2021-04-28 DIAGNOSIS — I10 ESSENTIAL HYPERTENSION: ICD-10-CM

## 2021-04-28 DIAGNOSIS — I87.303 STASIS EDEMA OF BOTH LOWER EXTREMITIES: Primary | ICD-10-CM

## 2021-04-28 PROCEDURE — 99214 OFFICE O/P EST MOD 30 MIN: CPT | Performed by: INTERNAL MEDICINE

## 2021-04-28 RX ORDER — HYDRALAZINE HYDROCHLORIDE 10 MG/1
10 TABLET, FILM COATED ORAL 3 TIMES DAILY
Qty: 90 TABLET | Refills: 3 | Status: SHIPPED | OUTPATIENT
Start: 2021-04-28 | End: 2021-09-02 | Stop reason: SDUPTHER

## 2021-04-28 RX ORDER — AMLODIPINE BESYLATE 5 MG/1
5 TABLET ORAL DAILY
Qty: 30 TABLET | Refills: 5 | Status: SHIPPED | OUTPATIENT
Start: 2021-04-28 | End: 2021-10-25 | Stop reason: SDUPTHER

## 2021-04-28 NOTE — PROGRESS NOTES
Subjective   Chung Bradley is a 70 y.o. male.   Chief Complaint   Patient presents with   • Edema     Legs, feet, and right hand       History of Present Illness patient presents primarily with fluid retention after having his amlodipine increased from 5-10.    The following portions of the patient's history were reviewed and updated as appropriate: allergies, current medications, past family history, past medical history, past social history, past surgical history and problem list.    Review of Systems   Constitutional: Negative for activity change, appetite change, fatigue, fever, unexpected weight gain and unexpected weight loss.   HENT: Negative for swollen glands, trouble swallowing and voice change.    Eyes: Negative for blurred vision and visual disturbance.   Respiratory: Negative for cough and shortness of breath.    Cardiovascular: Positive for leg swelling. Negative for chest pain and palpitations.   Gastrointestinal: Negative for abdominal pain, constipation, diarrhea, nausea, vomiting and indigestion.   Endocrine: Negative for cold intolerance, heat intolerance, polydipsia and polyphagia.   Genitourinary: Negative for dysuria and frequency.   Musculoskeletal: Negative for arthralgias, back pain, joint swelling and neck pain.   Skin: Negative for color change, rash and skin lesions.   Neurological: Negative for dizziness, weakness, headache, memory problem and confusion.   Hematological: Does not bruise/bleed easily.   Psychiatric/Behavioral: Negative for agitation, hallucinations and suicidal ideas. The patient is not nervous/anxious.        Objective   Past Medical History:   Diagnosis Date   • Acute renal failure (CMS/HCC) 6/8/2020   • Bladder neoplasm of uncertain malignant potential 7/20/2017   • Degenerative arthritis of cervical spine with nerve compression     per patient report   • Diabetes mellitus (CMS/HCC)    • GERD (gastroesophageal reflux disease)    • Hearing aid worn    • Hypertension     • Impaired hearing     without hearing aids can barely hear anything   • Neuropathy    • Sleep apnea     wears cpap      Past Surgical History:   Procedure Laterality Date   • CATARACT EXTRACTION W/ INTRAOCULAR LENS IMPLANT     • CHOLECYSTECTOMY     • EXPLORATORY LAPAROTOMY     • LEG SURGERY     • TRANSURETHRAL RESECTION OF BLADDER TUMOR Bilateral 7/25/2017    Procedure: CYSTOSCOPY TRANSURETHRAL RESECTION OF BLADDER TUMOR & POSSIBLE BILATERAL RETROGRADE URETEROPYELOGRAMS;  Surgeon: Chris Esparza MD;  Location:  [facility];  Service:    • TRANSURETHRAL RESECTION OF BLADDER TUMOR N/A 11/3/2017    Procedure: CYSTOSCOPY TRANSURETHRAL RESECTION OF BLADDER TUMOR ;  Surgeon: Chris Esparza MD;  Location:  [facility];  Service:    • TRANSURETHRAL RESECTION OF BLADDER TUMOR N/A 9/10/2018    Procedure: CYSTOSCOPY TRANSURETHRAL RESECTION OF LARGE BLADDER TUMOR, CLOT EVACUATION, AND FULGURATION;  Surgeon: Chris Esparza MD;  Location:  [facility];  Service: Urology        Current Outpatient Medications:   •  allopurinol (ZYLOPRIM) 300 MG tablet, Take 300 mg by mouth Daily., Disp: , Rfl:   •  amLODIPine (NORVASC) 5 MG tablet, Take 1 tablet by mouth Daily., Disp: 30 tablet, Rfl: 5  •  aspirin 81 MG tablet, Take 81 mg by mouth Daily., Disp: , Rfl:   •  chlorthalidone (HYGROTON) 25 MG tablet, Take 1 tablet by mouth Daily., Disp: 90 tablet, Rfl: 3  •  cloNIDine (CATAPRES) 0.1 MG tablet, Take 1 tablet by mouth Every Night., Disp: 90 tablet, Rfl: 3  •  fenofibrate (TRICOR) 145 MG tablet, Take 145 mg by mouth Every Night., Disp: , Rfl:   •  fluticasone (FLONASE) 50 MCG/ACT nasal spray, 1 spray into the nostril(s) as directed by provider 2 (Two) Times a Day As Needed for Allergies., Disp: 1 bottle, Rfl: 3  •  gabapentin (NEURONTIN) 400 MG capsule, Take 800 mg by mouth Every 8 (Eight) Hours. Take 2 capsules by mouth in the am, two capsules by mouth in the afternoon, and 2 capsules by mouth in the pm.   Patient is taking 2 capsules in  the AM, 2 capsules in the afternoon, 1 capsule in the PM, Disp: , Rfl:   •  glipizide (GLUCOTROL XL) 2.5 MG 24 hr tablet, Take 1 tablet by mouth 2 (two) times a day., Disp: 180 tablet, Rfl: 3  •  hydrocortisone (ANUSOL-HC) 25 MG suppository, Insert 25 mg into the rectum 2 (Two) Times a Day As Needed for Hemorrhoids., Disp: , Rfl:   •  hydrocortisone 2.5 % cream, Apply  topically to the appropriate area as directed 2 (Two) Times a Day. Apply to hemorrhoids, Disp: , Rfl:   •  lovastatin (MEVACOR) 40 MG tablet, Take 20 mg by mouth Daily., Disp: , Rfl:   •  metoprolol succinate XL (Toprol XL) 25 MG 24 hr tablet, Take 0.5 tablet in the AM and taken 0.5 tablet in the PM, Disp: 15 tablet, Rfl: 0  •  Nystatin powder, Take  by mouth., Disp: , Rfl:   •  omeprazole (priLOSEC) 20 MG capsule, Take 20 mg by mouth 2 (Two) Times a Day., Disp: , Rfl:   •  baclofen (LIORESAL) 10 MG tablet, Take 1 tablet by mouth 3 (Three) Times a Day., Disp: 30 tablet, Rfl: 0  •  hydrALAZINE (APRESOLINE) 10 MG tablet, Take 1 tablet by mouth 3 (Three) Times a Day., Disp: 90 tablet, Rfl: 3  •  meloxicam (Mobic) 7.5 MG tablet, Take 1 tablet by mouth Daily., Disp: 14 tablet, Rfl: 0     Vitals:    04/28/21 0921   BP: 162/70   Pulse: 57   Temp: 98 °F (36.7 °C)   SpO2: 97%         04/28/21 0921   Weight: 111 kg (244 lb)     Patient's Body mass index is 35.01 kg/m². BMI is .      Physical Exam  Vitals and nursing note reviewed.   Constitutional:       General: He is not in acute distress.     Appearance: Normal appearance. He is well-developed.   HENT:      Head: Normocephalic and atraumatic.      Right Ear: External ear normal.      Left Ear: External ear normal.      Nose: Nose normal.   Eyes:      Extraocular Movements: Extraocular movements intact.      Conjunctiva/sclera: Conjunctivae normal.      Pupils: Pupils are equal, round, and reactive to light.   Cardiovascular:      Rate and Rhythm: Normal rate and regular rhythm.      Pulses: Normal pulses.       Heart sounds: Normal heart sounds.   Pulmonary:      Effort: Pulmonary effort is normal.      Breath sounds: Normal breath sounds.   Abdominal:      General: Bowel sounds are normal.      Palpations: Abdomen is soft.   Musculoskeletal:         General: Normal range of motion.      Cervical back: Normal range of motion and neck supple. No rigidity. No muscular tenderness.   Skin:     General: Skin is warm and dry.   Neurological:      General: No focal deficit present.      Mental Status: He is alert and oriented to person, place, and time.   Psychiatric:         Mood and Affect: Mood normal.         Behavior: Behavior normal.               Assessment/Plan   Diagnoses and all orders for this visit:    1. Stasis edema of both lower extremities (Primary)    2. Essential hypertension  -     amLODIPine (NORVASC) 5 MG tablet; Take 1 tablet by mouth Daily.  Dispense: 30 tablet; Refill: 5    3. Type 2 diabetes mellitus with hyperglycemia, without long-term current use of insulin (CMS/[facility])    Other orders  -     hydrALAZINE (APRESOLINE) 10 MG tablet; Take 1 tablet by mouth 3 (Three) Times a Day.  Dispense: 90 tablet; Refill: 3      This time we are dealing primarily with fluid retention associated with his amlodipine therapy.  I am decreasing him from 10 to 5 mg.  I am adding hydralazine 10 mg 3 times daily we will continue to adjust the hydralazine up until we can control his fluid or remove the amlodipine.

## 2021-05-24 DIAGNOSIS — E11.65 TYPE 2 DIABETES MELLITUS WITH HYPERGLYCEMIA, WITHOUT LONG-TERM CURRENT USE OF INSULIN (HCC): ICD-10-CM

## 2021-05-24 DIAGNOSIS — Z12.5 SCREENING PSA (PROSTATE SPECIFIC ANTIGEN): ICD-10-CM

## 2021-05-24 DIAGNOSIS — I10 ESSENTIAL HYPERTENSION: Primary | ICD-10-CM

## 2021-05-25 DIAGNOSIS — Z12.5 SCREENING PSA (PROSTATE SPECIFIC ANTIGEN): ICD-10-CM

## 2021-05-25 DIAGNOSIS — E11.65 TYPE 2 DIABETES MELLITUS WITH HYPERGLYCEMIA, WITHOUT LONG-TERM CURRENT USE OF INSULIN (HCC): ICD-10-CM

## 2021-05-25 DIAGNOSIS — I10 ESSENTIAL HYPERTENSION: ICD-10-CM

## 2021-05-26 ENCOUNTER — OFFICE VISIT (OUTPATIENT)
Dept: INTERNAL MEDICINE | Facility: CLINIC | Age: 71
End: 2021-05-26

## 2021-05-26 VITALS
WEIGHT: 235 LBS | HEIGHT: 70 IN | TEMPERATURE: 97.1 F | BODY MASS INDEX: 33.64 KG/M2 | DIASTOLIC BLOOD PRESSURE: 66 MMHG | SYSTOLIC BLOOD PRESSURE: 156 MMHG | OXYGEN SATURATION: 98 % | HEART RATE: 59 BPM

## 2021-05-26 DIAGNOSIS — C67.8 MALIGNANT NEOPLASM OF OVERLAPPING SITES OF BLADDER (HCC): ICD-10-CM

## 2021-05-26 DIAGNOSIS — E11.65 TYPE 2 DIABETES MELLITUS WITH HYPERGLYCEMIA, WITHOUT LONG-TERM CURRENT USE OF INSULIN (HCC): Primary | ICD-10-CM

## 2021-05-26 DIAGNOSIS — M48.02 SPINAL STENOSIS IN CERVICAL REGION: ICD-10-CM

## 2021-05-26 DIAGNOSIS — I10 ESSENTIAL HYPERTENSION: ICD-10-CM

## 2021-05-26 LAB
CHOLEST SERPL-MCNC: 106 MG/DL (ref 0–200)
HBA1C MFR BLD: 6 % (ref 4.8–5.6)
HDLC SERPL-MCNC: 25 MG/DL (ref 40–60)
LDLC SERPL CALC-MCNC: 26 MG/DL (ref 0–100)
PSA SERPL-MCNC: <0.014 NG/ML (ref 0–4)
TRIGL SERPL-MCNC: 392 MG/DL (ref 0–150)
TSH SERPL DL<=0.005 MIU/L-ACNC: 2.1 UIU/ML (ref 0.27–4.2)
URATE SERPL-MCNC: 5.2 MG/DL (ref 3.4–7)
VLDLC SERPL CALC-MCNC: 55 MG/DL (ref 5–40)

## 2021-05-26 PROCEDURE — G0439 PPPS, SUBSEQ VISIT: HCPCS | Performed by: INTERNAL MEDICINE

## 2021-05-26 NOTE — PROGRESS NOTES
The ABCs of the Annual Wellness Visit  Subsequent Medicare Wellness Visit    Chief Complaint   Patient presents with   • Medicare Wellness-subsequent       Subjective   History of Present Illness:  Chung Bradley is a 70 y.o. male who presents for a Subsequent Medicare Wellness Visit.    HEALTH RISK ASSESSMENT    Recent Hospitalizations:  No hospitalization(s) within the last year.    Current Medical Providers:  Patient Care Team:  Fidel Yousif MD as PCP - General  Fidel Yousif MD as PCP - Family Medicine  Chris Esparza MD as Consulting Physician (Urology)    Smoking Status:  Social History     Tobacco Use   Smoking Status Former Smoker   • Quit date:    • Years since quittin.4   Smokeless Tobacco Never Used       Alcohol Consumption:  Social History     Substance and Sexual Activity   Alcohol Use No       Depression Screen:   PHQ-2/PHQ-9 Depression Screening 2021   Little interest or pleasure in doing things 0   Feeling down, depressed, or hopeless 0   Total Score 0       Fall Risk Screen:  ZAHIDA Fall Risk Assessment was completed, and patient is at LOW risk for falls.Assessment completed on:2021    Health Habits and Functional and Cognitive Screening:  Functional & Cognitive Status 2021   Do you have difficulty preparing food and eating? No   Do you have difficulty bathing yourself, getting dressed or grooming yourself? No   Do you have difficulty using the toilet? No   Do you have difficulty moving around from place to place? No   Do you have trouble with steps or getting out of a bed or a chair? No   Current Diet Well Balanced Diet   Dental Exam Up to date   Eye Exam Up to date   Exercise (times per week) 4 times per week   Current Exercise Activities Include Yard Work   Do you need help using the phone?  No   Are you deaf or do you have serious difficulty hearing?  Yes   Do you need help with transportation? No   Do you need help shopping? No   Do you need help preparing  meals?  No   Do you need help with housework?  No   Do you need help with laundry? No   Do you need help taking your medications? No   Do you need help managing money? No   Do you ever drive or ride in a car without wearing a seat belt? No   Have you felt unusual stress, anger or loneliness in the last month? No   Who do you live with? Spouse   If you need help, do you have trouble finding someone available to you? No   Have you been bothered in the last four weeks by sexual problems? No   Do you have difficulty concentrating, remembering or making decisions? No         Does the patient have evidence of cognitive impairment? No    Asprin use counseling:Taking ASA appropriately as indicated    Age-appropriate Screening Schedule:  Refer to the list below for future screening recommendations based on patient's age, sex and/or medical conditions. Orders for these recommended tests are listed in the plan section. The patient has been provided with a written plan.    Health Maintenance   Topic Date Due   • ZOSTER VACCINE (2 of 3) 12/16/2013   • INFLUENZA VACCINE  08/01/2021   • DIABETIC FOOT EXAM  10/26/2021   • HEMOGLOBIN A1C  11/25/2021   • DIABETIC EYE EXAM  04/15/2022   • URINE MICROALBUMIN  05/25/2022   • TDAP/TD VACCINES (2 - Td or Tdap) 10/23/2027          The following portions of the patient's history were reviewed and updated as appropriate: allergies, current medications, past family history, past medical history, past social history, past surgical history and problem list.    Outpatient Medications Prior to Visit   Medication Sig Dispense Refill   • allopurinol (ZYLOPRIM) 300 MG tablet Take 300 mg by mouth Daily.     • amLODIPine (NORVASC) 5 MG tablet Take 1 tablet by mouth Daily. 30 tablet 5   • aspirin 81 MG tablet Take 81 mg by mouth Daily.     • baclofen (LIORESAL) 10 MG tablet Take 1 tablet by mouth 3 (Three) Times a Day. 30 tablet 0   • chlorthalidone (HYGROTON) 25 MG tablet Take 1 tablet by mouth Daily.  90 tablet 3   • cloNIDine (CATAPRES) 0.1 MG tablet Take 1 tablet by mouth Every Night. 90 tablet 3   • fenofibrate (TRICOR) 145 MG tablet Take 145 mg by mouth Every Night.     • fluticasone (FLONASE) 50 MCG/ACT nasal spray 1 spray into the nostril(s) as directed by provider 2 (Two) Times a Day As Needed for Allergies. 1 bottle 3   • gabapentin (NEURONTIN) 400 MG capsule Take 800 mg by mouth Every 8 (Eight) Hours. Take 2 capsules by mouth in the am, two capsules by mouth in the afternoon, and 2 capsules by mouth in the pm.     Patient is taking 2 capsules in the AM, 2 capsules in the afternoon, 1 capsule in the PM     • glipizide (GLUCOTROL XL) 2.5 MG 24 hr tablet Take 1 tablet by mouth 2 (two) times a day. 180 tablet 3   • hydrALAZINE (APRESOLINE) 10 MG tablet Take 1 tablet by mouth 3 (Three) Times a Day. 90 tablet 3   • hydrocortisone (ANUSOL-HC) 25 MG suppository Insert 25 mg into the rectum 2 (Two) Times a Day As Needed for Hemorrhoids.     • hydrocortisone 2.5 % cream Apply  topically to the appropriate area as directed 2 (Two) Times a Day. Apply to hemorrhoids     • lovastatin (MEVACOR) 40 MG tablet Take 20 mg by mouth Daily.     • meloxicam (Mobic) 7.5 MG tablet Take 1 tablet by mouth Daily. 14 tablet 0   • metoprolol succinate XL (Toprol XL) 25 MG 24 hr tablet Take 0.5 tablet in the AM and taken 0.5 tablet in the PM 15 tablet 0   • Nystatin powder Take  by mouth.     • omeprazole (priLOSEC) 20 MG capsule Take 20 mg by mouth 2 (Two) Times a Day.       No facility-administered medications prior to visit.       Patient Active Problem List   Diagnosis   • Bladder neoplasm of uncertain malignant potential   • Spinal stenosis in cervical region   • BMI 34.0-34.9,adult   • Former smoker   • Cervical radiculopathy   • Malignant neoplasm of overlapping sites of bladder (CMS/HCC)   • BMI 33.0-33.9,adult   • Bladder cancer (CMS/HCC)   • Cervical spondylosis without myelopathy   • Acute hyperkalemia   • Hypertension   •  Sleep apnea   • Type 2 diabetes mellitus with hyperglycemia, without long-term current use of insulin (CMS/[facility])   • Acute renal failure (CMS/[facility])   • Joseph's esophagus without dysplasia   • Family history of esophageal cancer   • History of colonic polyps   • Encounter for diabetic foot exam (CMS/[facility])       Advanced Care Planning:  ACP discussion was held with the patient during this visit. Patient has an advance directive in EMR which is still valid.     Review of Systems   Constitutional: Negative for activity change, appetite change and chills.   HENT: Positive for hearing loss. Negative for congestion, ear pain and facial swelling.    Eyes: Negative for pain, discharge and itching.   Respiratory: Negative for apnea, cough and shortness of breath.    Cardiovascular: Negative for chest pain, palpitations and leg swelling.   Gastrointestinal: Negative for abdominal distention, abdominal pain and anal bleeding.   Endocrine: Negative for cold intolerance and heat intolerance.   Genitourinary: Negative for difficulty urinating, dysuria and flank pain.   Musculoskeletal: Positive for arthralgias and back pain. Negative for joint swelling.   Skin: Negative for color change, pallor and rash.   Allergic/Immunologic: Negative for environmental allergies and food allergies.   Neurological: Negative for dizziness and facial asymmetry.   Hematological: Negative for adenopathy. Does not bruise/bleed easily.   Psychiatric/Behavioral: Negative for agitation, behavioral problems and confusion.       Compared to one year ago, the patient feels his physical health is the same.  Compared to one year ago, the patient feels his mental health is the same.    Reviewed chart for potential of high risk medication in the elderly: yes  Reviewed chart for potential of harmful drug interactions in the elderly:yes    Objective         Vitals:    05/26/21 1023   BP: 156/66   BP Location: Left arm   Patient Position: Sitting   Cuff Size: Adult  "  Pulse: 59   Temp: 97.1 °F (36.2 °C)   TempSrc: Temporal   SpO2: 98%   Weight: 107 kg (235 lb)   Height: 177.8 cm (70\")   PainSc: 0-No pain       Body mass index is 33.72 kg/m².  Discussed the patient's BMI with him. The BMI is above average; BMI management plan is completed.    Physical Exam  Vitals and nursing note reviewed.   Constitutional:       General: He is not in acute distress.     Appearance: Normal appearance. He is well-developed.   HENT:      Head: Normocephalic and atraumatic.      Right Ear: External ear normal.      Left Ear: External ear normal.      Nose: Nose normal.   Eyes:      Extraocular Movements: Extraocular movements intact.      Conjunctiva/sclera: Conjunctivae normal.      Pupils: Pupils are equal, round, and reactive to light.   Cardiovascular:      Rate and Rhythm: Normal rate and regular rhythm.      Pulses: Normal pulses.      Heart sounds: Normal heart sounds.   Pulmonary:      Effort: Pulmonary effort is normal.      Breath sounds: Normal breath sounds.   Abdominal:      General: Bowel sounds are normal.      Palpations: Abdomen is soft.   Musculoskeletal:         General: Normal range of motion.      Cervical back: Normal range of motion and neck supple. No rigidity. No muscular tenderness.   Skin:     General: Skin is warm and dry.   Neurological:      General: No focal deficit present.      Mental Status: He is alert and oriented to person, place, and time.   Psychiatric:         Mood and Affect: Mood normal.         Behavior: Behavior normal.         Lab Results   Component Value Date    CHLPL 106 05/25/2021    TRIG 392 (H) 05/25/2021    HDL 25 (L) 05/25/2021    LDL 26 05/25/2021    VLDL 55 (H) 05/25/2021    HGBA1C 6.00 (H) 05/25/2021        Assessment/Plan   Medicare Risks and Personalized Health Plan  CMS Preventative Services Quick Reference  Advance Directive Discussion  Cardiovascular risk  Colon Cancer Screening  Immunizations Discussed/Encouraged (specific " immunizations; Shingrix )  Prostate Cancer Screening     The above risks/problems have been discussed with the patient.  Pertinent information has been shared with the patient in the After Visit Summary.  Follow up plans and orders are seen below in the Assessment/Plan Section.    Diagnoses and all orders for this visit:    1. Type 2 diabetes mellitus with hyperglycemia, without long-term current use of insulin (CMS/HCC) (Primary)    2. Spinal stenosis in cervical region    3. Malignant neoplasm of overlapping sites of bladder (CMS/HCC)    4. Essential hypertension      Follow Up:  Return in about 6 months (around 11/26/2021).     An After Visit Summary and PPPS were given to the patient.       Today patient's A1c is indicating good control of his diabetes his blood pressure is better he is losing some weight which is going to help both his sugar and his blood pressure.  He is almost completing his physical therapy at this point but still having quite a bit of back stiffness.  We will see him back in 6 months for follow-up

## 2021-06-30 NOTE — TELEPHONE ENCOUNTER
From: Immanuel Rothman  To: Rut Anton  Sent: 6/29/2021 10:12 PM CDT  Subject: Medication Question    This message is being sent by Pamela Rothman on behalf of Immanuel Rothman.    Jael Bettencourt,  I have submitted 2 medication requests since last week and now I am down to only enough medication for Immanuel for tomorrow morning. I was told we would never be put in this situation where my child runs out of his stimulant but after multiple requests and phone calls with no response we are literally down to a half a days worth of medication. Please let me know that a refill has been submitted so my  can get it picked up in town before Immanuel's 2nd dose is needed. One of us will have to take time out of our work days to get it filled and bring it to his school. Please respond ASAP.   Pt was seen by Dr. Yousif on 11/9/20. Pt stated there was changes to BP med.     Pt is c/o retained fluid in legs, feet, and ankles. Pt is requesting a fluid pill.    Please call [phone].

## 2021-07-22 ENCOUNTER — OFFICE VISIT (OUTPATIENT)
Dept: GASTROENTEROLOGY | Age: 71
End: 2021-07-22
Payer: MEDICARE

## 2021-07-22 VITALS
BODY MASS INDEX: 34.79 KG/M2 | SYSTOLIC BLOOD PRESSURE: 150 MMHG | HEIGHT: 70 IN | WEIGHT: 243 LBS | OXYGEN SATURATION: 98 % | DIASTOLIC BLOOD PRESSURE: 60 MMHG | HEART RATE: 57 BPM

## 2021-07-22 DIAGNOSIS — K22.70 BARRETT'S ESOPHAGUS WITHOUT DYSPLASIA: Primary | ICD-10-CM

## 2021-07-22 DIAGNOSIS — Z86.010 HISTORY OF COLONIC POLYPS: ICD-10-CM

## 2021-07-22 DIAGNOSIS — R12 CHRONIC HEARTBURN: ICD-10-CM

## 2021-07-22 PROCEDURE — 99203 OFFICE O/P NEW LOW 30 MIN: CPT | Performed by: NURSE PRACTITIONER

## 2021-07-22 PROCEDURE — G8419 CALC BMI OUT NRM PARAM NOF/U: HCPCS | Performed by: NURSE PRACTITIONER

## 2021-07-22 PROCEDURE — 1036F TOBACCO NON-USER: CPT | Performed by: NURSE PRACTITIONER

## 2021-07-22 PROCEDURE — 4040F PNEUMOC VAC/ADMIN/RCVD: CPT | Performed by: NURSE PRACTITIONER

## 2021-07-22 PROCEDURE — 3017F COLORECTAL CA SCREEN DOC REV: CPT | Performed by: NURSE PRACTITIONER

## 2021-07-22 PROCEDURE — G8427 DOCREV CUR MEDS BY ELIG CLIN: HCPCS | Performed by: NURSE PRACTITIONER

## 2021-07-22 PROCEDURE — 1123F ACP DISCUSS/DSCN MKR DOCD: CPT | Performed by: NURSE PRACTITIONER

## 2021-07-22 RX ORDER — HYDRALAZINE HYDROCHLORIDE 10 MG/1
TABLET, FILM COATED ORAL
COMMUNITY
Start: 2021-04-28

## 2021-07-22 RX ORDER — CLONIDINE HYDROCHLORIDE 0.1 MG/1
TABLET ORAL
COMMUNITY
Start: 2021-07-21

## 2021-07-22 RX ORDER — FLUTICASONE PROPIONATE 50 MCG
1 SPRAY, SUSPENSION (ML) NASAL 2 TIMES DAILY PRN
COMMUNITY
Start: 2020-10-19

## 2021-07-22 RX ORDER — CHLORTHALIDONE 25 MG/1
TABLET ORAL
COMMUNITY
Start: 2021-05-24

## 2021-07-22 RX ORDER — GLIPIZIDE 2.5 MG/1
TABLET, EXTENDED RELEASE ORAL
COMMUNITY
Start: 2021-05-03

## 2021-07-22 RX ORDER — AMLODIPINE BESYLATE 5 MG/1
TABLET ORAL
COMMUNITY
Start: 2021-05-24

## 2021-07-22 ASSESSMENT — ENCOUNTER SYMPTOMS
VOICE CHANGE: 0
COUGH: 0
BLOOD IN STOOL: 0
SORE THROAT: 0
ABDOMINAL DISTENTION: 0
CONSTIPATION: 0
NAUSEA: 0
RECTAL PAIN: 0
TROUBLE SWALLOWING: 0
VOMITING: 0
BACK PAIN: 1
DIARRHEA: 0
ABDOMINAL PAIN: 0
ANAL BLEEDING: 0
SHORTNESS OF BREATH: 0

## 2021-07-22 NOTE — PATIENT INSTRUCTIONS
You are going to have an Endoscopy and here are some basic instructions:    Nothing to eat or drink after midnight EXCEPT:  PLEASE TAKE MEDICATION(S) FOR HIGH BLOOD PRESSURE, SEIZURES, HEART, AND THYROID WITH A SIP OF WATER AT LEAST 2 HOURS PRIOR TO ARRIVAL TIME.   YOU MAY ALSO TAKE ANY INHALERS YOU ARE PRESCRIBED. You will not be able to drive for 24 hours after the procedure due to sedation. Bring a  with you the day of the procedure. No aspirin, ibuprofen, naproxen, fish oil or vitamin E for 5 days before procedure. Continue current medications. If you are on blood thinners, clearance from the prescribing physician will be obtained before your procedure is scheduled. Increased [email or web address] may be associated with discontinuation of your blood thinner and include, but not limited to, stroke, TIA, or cardiac event. If biopsies are taken during the procedure they will be sent to a pathologist for analysis. You will be notified by mail of the pathology results in 2-3 weeks. Your physician may also schedule a follow up appointment with the nurse practitioner to discuss pathology, symptoms or to check if you have had any problems related to your procedure. If you prefer not to return to the office after your procedure please discuss this with your physician on the day of your procedure. You are going to have a colonoscopy and here are some instructions: You will be given specific directions regarding restrictions to diet and bowel prep instructions including laxatives. Please read these instructions one week prior to your scheduled procedure to ensure that you are prepared. Follow prep instructions provided for bowel prep. Take all of the bowel prep as directed. If you are having problems with nausea, stop your prep for 30-45 min to allow the nausea to subside before resuming your prep.      Nothing to eat or drink after midnight the day of the procedure EXCEPT:  PLEASE TAKE soups    Foods and drinks to avoid  Avoid foods that contain too much fiber. Stay clear of dark colored beverages. They can stick to the walls of the digestive tract and make it difficult to differentiate from blood. Some of these foods are:  Red meat, rice, nuts and vegetables   Milk, other milk based fluids and cream   Most fruit and puddings   Whole grain pasta   Cereals, bran and seeds   Colored beverages, especially those that are red or purple in color   Red colored Jell-O   On the day before the colonoscopy, continue to drink plenty of clear fluids. It is important   to keep yourself hydrated before the exam.     Please follow all instructions as provided for cleansing the bowel. Failure to have an adequately prepped colon may cause you to have incomplete exam with further testing required.

## 2021-08-26 ENCOUNTER — ANESTHESIA EVENT (OUTPATIENT)
Dept: OPERATING ROOM | Age: 71
End: 2021-08-26

## 2021-08-26 ENCOUNTER — OFFICE VISIT (OUTPATIENT)
Age: 71
End: 2021-08-26

## 2021-08-26 DIAGNOSIS — Z11.59 SCREENING FOR VIRAL DISEASE: Primary | ICD-10-CM

## 2021-08-26 LAB — SARS-COV-2, NAAT: NOT DETECTED

## 2021-08-26 PROCEDURE — 99999 PR OFFICE/OUTPT VISIT,PROCEDURE ONLY: CPT | Performed by: NURSE PRACTITIONER

## 2021-08-30 ENCOUNTER — ANESTHESIA (OUTPATIENT)
Dept: OPERATING ROOM | Age: 71
End: 2021-08-30

## 2021-08-30 ENCOUNTER — HOSPITAL ENCOUNTER (OUTPATIENT)
Age: 71
Setting detail: OUTPATIENT SURGERY
Discharge: HOME | End: 2021-08-30
Attending: INTERNAL MEDICINE | Admitting: INTERNAL MEDICINE
Payer: MEDICARE

## 2021-08-30 ENCOUNTER — HOSPITAL ENCOUNTER (OUTPATIENT)
Age: 71
Setting detail: SPECIMEN
Discharge: HOME | End: 2021-08-30
Payer: MEDICARE

## 2021-08-30 ENCOUNTER — APPOINTMENT (OUTPATIENT)
Dept: OPERATING ROOM | Age: 71
End: 2021-08-30

## 2021-08-30 VITALS — OXYGEN SATURATION: 95 % | DIASTOLIC BLOOD PRESSURE: 82 MMHG | SYSTOLIC BLOOD PRESSURE: 149 MMHG

## 2021-08-30 VITALS
OXYGEN SATURATION: 95 % | RESPIRATION RATE: 18 BRPM | DIASTOLIC BLOOD PRESSURE: 81 MMHG | TEMPERATURE: 98.2 F | BODY MASS INDEX: 34.36 KG/M2 | WEIGHT: 240 LBS | HEART RATE: 69 BPM | SYSTOLIC BLOOD PRESSURE: 159 MMHG | HEIGHT: 70 IN

## 2021-08-30 PROCEDURE — G8918 PT W/O PREOP ORDER IV AB PRO: HCPCS

## 2021-08-30 PROCEDURE — 43239 EGD BIOPSY SINGLE/MULTIPLE: CPT | Performed by: INTERNAL MEDICINE

## 2021-08-30 PROCEDURE — 45385 COLONOSCOPY W/LESION REMOVAL: CPT | Performed by: INTERNAL MEDICINE

## 2021-08-30 PROCEDURE — 45380 COLONOSCOPY AND BIOPSY: CPT

## 2021-08-30 PROCEDURE — 45380 COLONOSCOPY AND BIOPSY: CPT | Performed by: INTERNAL MEDICINE

## 2021-08-30 PROCEDURE — 43239 EGD BIOPSY SINGLE/MULTIPLE: CPT

## 2021-08-30 PROCEDURE — 88342 IMHCHEM/IMCYTCHM 1ST ANTB: CPT

## 2021-08-30 PROCEDURE — 88305 TISSUE EXAM BY PATHOLOGIST: CPT

## 2021-08-30 PROCEDURE — 45385 COLONOSCOPY W/LESION REMOVAL: CPT

## 2021-08-30 PROCEDURE — G8907 PT DOC NO EVENTS ON DISCHARG: HCPCS

## 2021-08-30 RX ORDER — PROPOFOL 10 MG/ML
INJECTION, EMULSION INTRAVENOUS PRN
Status: DISCONTINUED | OUTPATIENT
Start: 2021-08-30 | End: 2021-08-30 | Stop reason: SDUPTHER

## 2021-08-30 RX ORDER — SODIUM CHLORIDE 9 MG/ML
INJECTION, SOLUTION INTRAVENOUS CONTINUOUS
Status: DISCONTINUED | OUTPATIENT
Start: 2021-08-30 | End: 2021-08-30 | Stop reason: HOSPADM

## 2021-08-30 RX ORDER — LIDOCAINE HYDROCHLORIDE 10 MG/ML
INJECTION, SOLUTION INFILTRATION; PERINEURAL PRN
Status: DISCONTINUED | OUTPATIENT
Start: 2021-08-30 | End: 2021-08-30 | Stop reason: SDUPTHER

## 2021-08-30 RX ADMIN — LIDOCAINE HYDROCHLORIDE 40 MG: 10 INJECTION, SOLUTION INFILTRATION; PERINEURAL at 08:07

## 2021-08-30 RX ADMIN — PROPOFOL 400 MG: 10 INJECTION, EMULSION INTRAVENOUS at 08:07

## 2021-08-30 RX ADMIN — SODIUM CHLORIDE: 9 INJECTION, SOLUTION INTRAVENOUS at 07:17

## 2021-08-30 NOTE — PROGRESS NOTES
DR. Villareal Plain  IN TO SEE PT/FAMILY TO DISCUSS RESULTS OF PROCEDURE. DISCHARGE INSTRUCTIONS GIVEN TO PT/FAMILY. BOTH VERBALIZED UNDERSTANDING  OF INSTRUCTIONS.

## 2021-08-30 NOTE — OP NOTE
the procedure to remove as much air as possible from the bowel. The colonoscope was removed from the patient, and the procedure was terminated. Findings are listed below. Findings: The mucosa appeared normal throughout the entire examined colon. In the ascending colon, a 3 mm sessile polyp was removed completely with forceps polypectomy. In the proximal transverse colon, a 6 mm sessile polyp was removed completely with cold snare polypectomy. In the descending colon, a 5 mm sessile polyp was removed completely with cold snare polypectomy. In the rectum, a 3 mm sessile polyp was removed completely with forceps polypectomy. Retroflexion in the rectum was normal and revealed no further abnormalities. Recommendations:  1. Repeat colonoscopy: pending pathology - 3 years  2. Await biopsy results-you will receive a letter with your results within 7-10 days. Findings and recommendations were discussed w/ the patient. A copy of the images was provided. Melissa Gunter am scribing for and in the presence of Dr. Sebas Varela MD.  Electronically signed by Seb Muller RN on 8/30/2021 at 7:28 AM    I personally performed the services described in this documentation as scribed by Khris Mcpherson, and it appears accurate and complete.      Sebas Varela MD  8/30/2021

## 2021-08-30 NOTE — OP NOTE
Endoscopic Procedure Note    Patient: Kurtis Godfrey : 1950  Med Rec#: [id number] Acc#: [id number]     Primary Care Provider Cristal Flores MD  Referring Provider: ARNAV Briceño    Endoscopist: Mame Veloz MD    Date of Procedure:  2021    Procedure:   1. EGD with biopsy    Indications:   1. Hx of Russell's esophagus   2. Reflux     Anesthesia:  Sedation was administered by anesthesia who monitored the patient during the procedure. Estimated Blood Loss: minimal    Procedure:   After reviewing the patient's chart and obtaining informed consent, the patient was placed in the left lateral decubitus position. A forward-viewing Olympus endoscope was lubricated and inserted through the mouth into the oropharynx. Under direct visualization, the upper esophagus was intubated. The scope was advanced to the level of the third portion of duodenum. Scope was slowly withdrawn with careful inspection of the mucosal surfaces. The scope was retroflexed for inspection of the gastric fundus and incisura. Findings and maneuvers are listed in impression below. The patient tolerated the procedure well. The scope was removed. There were no immediate complications. Findings:   Esophagus: abnormal: there were mucosal changes of Russell's- biopsied. There is no hiatal hernia present. Stomach:  abnormal: Mild mucosal changes suggestive of gastritis noted -  Gastric biopsies were taken from the antrum and body to rule out Helicobacter pylori infection. Duodenum: normal      IMPRESSION:  1. S/p gastric biopsies obtained as above  2. Hx of Russell's- biopsied     RECOMMENDATIONS:    1. Await path results, the patient will be contacted in 7-10 days with biopsy results. 2.  Continue PPI  3. Repeat EGD in 3 years, for hx of Russell's esophagus    The results were discussed with the patient and family. A copy of the images obtained were given to the patient.      Omar Lowe am scribing for and in the presence of Dr. Dulce Steward MD.  Electronically signed by Robel Rivas RN on 8/30/2021 at 7:27 AM    I personally performed the services described in this documentation as scribed by Gwyn Do, and it appears accurate and complete.      Nina Rebolledo MD  8/30/2021

## 2021-08-30 NOTE — H&P
Patient Name: Rohan Ramírez  : 1950  MRN: [id number]  DATE: 21    Allergies: Allergies   Allergen Reactions    Pcn [Penicillins] Rash        ENDOSCOPY  History and Physical    Procedure:    [] Diagnostic Colonoscopy       [x] Screening Colonoscopy  [x] EGD      [] ERCP      [] EUS       [] Other    [x] Previous office notes/History and Physical reviewed from the patients chart. Please see EMR for further details of HPI. I have examined the patient's status immediately prior to the procedure and:      Indications/HPI:    []Abdominal Pain   []Cancer- GI/Lung     []Fhx of colon CA/polyps  []History of Polyps  [x]Barretts            []Melena  []Abnormal Imaging              []Dysphagia              []Persistent Pneumonia   []Anemia                            []Food Impaction        [x]History of Polyps  [] GI Bleed             []Pulmonary nodule/Mass   []Change in bowel habits []Heartburn/Reflux  []Rectal Bleed (BRBPR)  []Chest Pain - Non Cardiac []Heme (+) Stool []Ulcers  []Constipation  []Hemoptysis  []Varices  []Diarrhea  []Hypoxemia    []Nausea/Vomiting   [x]Screening   []Crohns/Colitis  []Other:     Anesthesia:   [x] MAC [] Moderate Sedation   [] General   [] None     ROS: 12 pt Review of Symptoms was negative unless mentioned above    Medications:   Prior to Admission medications    Medication Sig Start Date End Date Taking? Authorizing Provider   aspirin 81 MG tablet Take 81 mg by mouth daily.    Yes Historical Provider, MD   chlorthalidone (HYGROTON) 25 MG tablet TAKE 1 TABLET BY MOUTH ONCE DAILY 21   Historical Provider, MD   glipiZIDE (GLUCOTROL XL) 2.5 MG extended release tablet TAKE 1 TABLET BY MOUTH TWICE DAILY 5/3/21   Historical Provider, MD   amLODIPine (NORVASC) 5 MG tablet TAKE 1 TABLET BY MOUTH ONCE DAILY 21   Historical Provider, MD   metoprolol tartrate (LOPRESSOR) 25 MG tablet TAKE 1 TABLET BY MOUTH ONCE DAILY AT NIGHT 21   Historical Provider, MD   hydrALAZINE GASTROINTESTINAL ENDOSCOPY  11/13/2014    Dr. Paris Thompson: dewey neg, MODERATE CHRONIC ESOPHAGOGASTRITIS. 3y recall       Social History:  Social History     Tobacco Use    Smoking status: Former Smoker     Start date: 8/21/1996    Smokeless tobacco: Never Used   Vaping Use    Vaping Use: Never used   Substance Use Topics    Alcohol use: No    Drug use: No       Vital Signs:   Vitals:    08/30/21 0711   BP: (!) 161/94   Pulse: 74   Resp: 18   Temp: 98.2 °F (36.8 °C)   SpO2: 95%        Physical Exam:  Cardiac:  [x]WNL  []Comments:  Pulmonary:  [x]WNL   []Comments:  Neuro/Mental Status:  [x]WNL  []Comments:  Abdominal:  [x]WNL    []Comments:  Other:   []WNL  []Comments:    Informed Consent:  The risks and benefits of the procedure have been discussed with either the patient or if they cannot consent, their representative. Assessment:  Patient examined and appropriate for planned sedation and procedure. Plan:  Proceed with planned sedation and procedure as above. Alejandro Mckeon am scribing for and in the presence of Dr. Julia Lange MD.  Electronically signed by Alana Sahu RN on 8/30/2021 at 7:26 AM    I personally performed the services described in this documentation as scribed by Karlene Douglass, and it appears accurate and complete.      Julia Lange MD  8/30/2021

## 2021-08-30 NOTE — ANESTHESIA PRE PROCEDURE
Department of Anesthesiology  Preprocedure Note       Name:  Monica Duckworth   Age:  66 y.o.  :  1950                                          MRN:  [id number]         Date:  2021      Surgeon: Jesús Shineor):  Bacilio Villalta MD    Procedure: Procedure(s):  EGD BIOPSY  COLORECTAL CANCER SCREENING, NOT HIGH RISK    Medications prior to admission:   Prior to Admission medications    Medication Sig Start Date End Date Taking? Authorizing Provider   aspirin 81 MG tablet Take 81 mg by mouth daily. Yes Historical Provider, MD   chlorthalidone (HYGROTON) 25 MG tablet TAKE 1 TABLET BY MOUTH ONCE DAILY 21   Historical Provider, MD   glipiZIDE (GLUCOTROL XL) 2.5 MG extended release tablet TAKE 1 TABLET BY MOUTH TWICE DAILY 5/3/21   Historical Provider, MD   amLODIPine (NORVASC) 5 MG tablet TAKE 1 TABLET BY MOUTH ONCE DAILY 21   Historical Provider, MD   metoprolol tartrate (LOPRESSOR) 25 MG tablet TAKE 1 TABLET BY MOUTH ONCE DAILY AT NIGHT 21   Historical Provider, MD   hydrALAZINE (APRESOLINE) 10 MG tablet TAKE 1 TABLET BY MOUTH THREE TIMES DAILY 21   Historical Provider, MD   cloNIDine (CATAPRES) 0.1 MG tablet  21   Historical Provider, MD   fluticasone (FLONASE) 50 MCG/ACT nasal spray 1 spray by Nasal route 2 times daily as needed 10/19/20   Historical Provider, MD   colchicine (COLCRYS) 0.6 MG tablet Take 0.6 mg by mouth daily    Historical Provider, MD   fenofibrate (TRICOR) 145 MG tablet Take 145 mg by mouth daily. Historical Provider, MD   allopurinol (ZYLOPRIM) 300 MG tablet Take 300 mg by mouth daily. Historical Provider, MD   gabapentin (NEURONTIN) 400 MG capsule Take 400 mg by mouth daily.  Take 6 caps daily    Historical Provider, MD   OMEPRAZOLE PO Take 20 mg by mouth 2 times daily     Historical Provider, MD   lovastatin (MEVACOR) 40 MG tablet Take 20 mg by mouth nightly     Historical Provider, MD       Current medications:    Current Facility-Administered Medications Medication Dose Route Frequency Provider Last Rate Last Admin    0.9 % sodium chloride infusion   IntraVENous Continuous Jolanta Malagon MD           Allergies: Allergies   Allergen Reactions    Pcn [Penicillins] Rash       Problem List:    Patient Active Problem List   Diagnosis Code    Heartburn R12    Family history of esophageal cancer Z80.0    Russell's esophagus without dysplasia K22.70    History of colonic polyps Z86.010    Chronic heartburn R12       Past Medical History:        Diagnosis Date    [facility] at rest [facility])     Russell esophagus     [facility]     Bladder    Constipation     CPAP (continuous positive airway pressure) dependence     CVD (cardiovascular disease)     Diabetes ([facility]     ED (erectile dysfunction)     Gout     Hemorrhoid     HLD (hyperlipidemia)     HTN (hypertension)     Neuropathy     Sleep apnea     Thyroid disease     Thyroid Fever       Past Surgical History:        Procedure Laterality Date    ABDOMINAL EXPLORATION SURGERY      APPENDECTOMY      BLADDER SURGERY      2 tumors removed    CARDIAC CATHETERIZATION      CATARACT REMOVAL      Bilateral w/ lens implants.  CHOLECYSTECTOMY      COLONOSCOPY  02/2010    Nabil:  HP,  5yr recall    COLONOSCOPY  11/2015    Anthony:  normal 5y recall    FEMUR SURGERY      AL EGD TRANSORAL BIOPSY SINGLE/MULTIPLE N/A 01/25/2018    Dr Cruz-Active chronic esophagogastritis--3 yr recall    UPPER GASTROINTESTINAL ENDOSCOPY  2/2010 3/2011    UPPER GASTROINTESTINAL ENDOSCOPY  11/13/2014    Dr. Judit Marquis: dewey neg, MODERATE CHRONIC ESOPHAGOGASTRITIS. 3y recall       Social History:    Social History     Tobacco Use    Smoking status: Former Smoker     Start date: 8/21/1996    Smokeless tobacco: Never Used   Substance Use Topics    Alcohol use:  No                                Counseling given: Not Answered      Vital Signs (Current):   Vitals:    08/30/21 0711   BP: (!) 161/94   Pulse: 74   Resp: 18   Temp: 98.2 °F (36.8 °C)   TempSrc: Temporal   SpO2: 95%   Weight: 240 lb (108.9 kg)   Height: 5' 10\" (1.778 m)                                              BP Readings from Last 3 Encounters:   08/30/21 (!) 161/94   07/22/21 (!) 150/60   01/25/18 118/70       NPO Status: Time of last liquid consumption: 0200                        Time of last solid consumption: 1700                        Date of last liquid consumption: 08/30/21                        Date of last solid food consumption: 08/28/21    BMI:   Wt Readings from Last 3 Encounters:   08/30/21 240 lb (108.9 kg)   07/22/21 243 lb (110.2 kg)   01/25/18 244 lb (110.7 kg)     Body mass index is 34.44 kg/m². CBC:   Lab Results   Component Value Date    WBC 6.00 03/21/2013    RBC 4.77 03/21/2013    HGB 13.9 03/21/2013    HCT 41.3 03/21/2013    MCV 86.6 03/21/2013    RDW 15.0 03/21/2013     03/21/2013       CMP:   Lab Results   Component Value Date     03/21/2013     03/21/2013    CO2 31 03/21/2013    BUN 15 03/21/2013    CREATININE 1.0 03/21/2013    LABGLOM > 60 03/21/2013    GLUCOSE 95 03/21/2013    PROT 7.2 03/21/2013    CALCIUM 9.5 03/21/2013    ALKPHOS 49 03/21/2013    AST 29 03/21/2013    ALT 30 03/21/2013       POC Tests: No results for input(s): POCGLU, POCNA, POCK, POCCL, POCBUN, POCHEMO, POCHCT in the last 72 hours.     Coags: No results found for: PROTIME, INR, APTT    HCG (If Applicable): No results found for: PREGTESTUR, PREGSERUM, HCG, HCGQUANT     ABGs: No results found for: PHART, PO2ART, CKK3ERJ, NTT7JUL, BEART, T2PKOCMV     Type & Screen (If Applicable):  No results found for: LABABO, LABRH    Drug/Infectious Status (If Applicable):  No results found for: HIV, HEPCAB    COVID-19 Screening (If Applicable):   Lab Results   Component Value Date    COVID19 Not Detected 08/26/2021           Anesthesia Evaluation  Patient summary reviewed  Airway: Mallampati: II  TM distance: >3 FB   Neck ROM: full  Mouth opening: < 3 FB Dental: normal exam         Pulmonary:normal exam    (+) sleep apnea: on CPAP,                            ROS comment: Former smoker   Cardiovascular:    (+) hypertension:, CAD: no interval change, hyperlipidemia         Beta Blocker:  Dose within 24 Hrs         Neuro/Psych:   Negative Neuro/Psych ROS              GI/Hepatic/Renal:            ROS comment: Russell's esophagus  BMI 34. Endo/Other:    (+) DiabetesType II DM, , malignancy/cancer (bladder cancer). Abdominal:             Vascular: negative vascular ROS. Other Findings:             Anesthesia Plan      general and TIVA     ASA 3       Induction: intravenous. Anesthetic plan and risks discussed with patient.                       ARNAV Arrington - CRNA   8/30/2021

## 2021-08-30 NOTE — ANESTHESIA POSTPROCEDURE EVALUATION
Department of Anesthesiology  Postprocedure Note    Patient: Zachary Mcneil  MRN: [id number]  YOB: 1950  Date of evaluation: 8/30/2021  Time:  8:28 AM     Procedure Summary     Date: 08/30/21 Room / Location: [facility] ENDO 01 / [address] [place] [address]    Anesthesia Start: 9545 Anesthesia Stop:     Procedures:       EGD BIOPSY (N/A Esophagus)      COLONOSCOPY POLYPECTOMY SNARE/COLD BIOPSY (N/A Abdomen) Diagnosis: (BARRETTS, HX POLYPS)    Surgeons: Jolanta Malagon MD Responsible Provider: ARNAV Escoto CRNA    Anesthesia Type: general, TIVA ASA Status: 3          Anesthesia Type: No value filed. Irving Phase I:      Irving Phase II:      Last vitals: Reviewed and per EMR flowsheets.        Anesthesia Post Evaluation    Patient location during evaluation: bedside  Patient participation: complete - patient participated  Level of consciousness: sleepy but conscious  Pain score: 0  Airway patency: patent  Nausea & Vomiting: no nausea and no vomiting  Complications: no  Cardiovascular status: hemodynamically stable and blood pressure returned to baseline  Respiratory status: acceptable and nasal cannula  Hydration status: stable

## 2021-09-02 RX ORDER — HYDRALAZINE HYDROCHLORIDE 10 MG/1
10 TABLET, FILM COATED ORAL 3 TIMES DAILY
Qty: 270 TABLET | Refills: 3 | Status: SHIPPED | OUTPATIENT
Start: 2021-09-02 | End: 2022-09-09 | Stop reason: SDUPTHER

## 2021-09-02 NOTE — TELEPHONE ENCOUNTER
Incoming Refill Request      Medication requested (name and dose): Hydralazine 10mg     Pharmacy where request should be sent: [facility]     Additional details provided by patient: 90 day supply needed    Best call back number: [phone]    Does the patient have less than a 3 day supply:  [] Yes  [x] No    Lula Coleman  09/02/21, 11:44 CDT

## 2021-09-13 DIAGNOSIS — I10 ESSENTIAL HYPERTENSION: ICD-10-CM

## 2021-09-13 RX ORDER — METOPROLOL SUCCINATE 25 MG/1
TABLET, EXTENDED RELEASE ORAL
Qty: 90 TABLET | Refills: 3 | Status: SHIPPED | OUTPATIENT
Start: 2021-09-13 | End: 2022-09-09 | Stop reason: SDUPTHER

## 2021-10-25 DIAGNOSIS — I10 ESSENTIAL HYPERTENSION: ICD-10-CM

## 2021-10-25 RX ORDER — AMLODIPINE BESYLATE 5 MG/1
5 TABLET ORAL DAILY
Qty: 90 TABLET | Refills: 3 | Status: SHIPPED | OUTPATIENT
Start: 2021-10-25 | End: 2022-10-21 | Stop reason: SDUPTHER

## 2021-11-01 DIAGNOSIS — E11.65 TYPE 2 DIABETES MELLITUS WITH HYPERGLYCEMIA, WITHOUT LONG-TERM CURRENT USE OF INSULIN (HCC): ICD-10-CM

## 2021-11-01 RX ORDER — GLIPIZIDE 2.5 MG/1
2.5 TABLET, EXTENDED RELEASE ORAL 2 TIMES DAILY
Qty: 180 TABLET | Refills: 0 | Status: SHIPPED | OUTPATIENT
Start: 2021-11-01 | End: 2022-01-28 | Stop reason: SDUPTHER

## 2021-11-03 ENCOUNTER — OFFICE VISIT (OUTPATIENT)
Dept: INTERNAL MEDICINE | Facility: CLINIC | Age: 71
End: 2021-11-03

## 2021-11-03 VITALS
TEMPERATURE: 97.9 F | SYSTOLIC BLOOD PRESSURE: 146 MMHG | WEIGHT: 240 LBS | DIASTOLIC BLOOD PRESSURE: 70 MMHG | OXYGEN SATURATION: 98 % | BODY MASS INDEX: 34.36 KG/M2 | HEIGHT: 70 IN | HEART RATE: 68 BPM

## 2021-11-03 DIAGNOSIS — I10 PRIMARY HYPERTENSION: Primary | ICD-10-CM

## 2021-11-03 DIAGNOSIS — T46.6X5A MYALGIA DUE TO STATIN: ICD-10-CM

## 2021-11-03 DIAGNOSIS — M79.10 MYALGIA DUE TO STATIN: ICD-10-CM

## 2021-11-03 PROCEDURE — 99214 OFFICE O/P EST MOD 30 MIN: CPT | Performed by: INTERNAL MEDICINE

## 2021-11-03 RX ORDER — LOVASTATIN 20 MG/1
20 TABLET ORAL NIGHTLY
Qty: 90 TABLET | Refills: 3 | Status: SHIPPED | OUTPATIENT
Start: 2021-11-03 | End: 2022-10-24 | Stop reason: SDUPTHER

## 2021-11-03 RX ORDER — ROSUVASTATIN CALCIUM 40 MG/1
40 TABLET, COATED ORAL DAILY
COMMUNITY
End: 2021-11-03

## 2021-11-03 NOTE — PROGRESS NOTES
Subjective   Chung Bradley is a 71 y.o. male.   Chief Complaint   Patient presents with   • Leg Swelling     both legs and feet trouble with them for about 3 weeks;  swelling will go down for the most part after legs are elevated at night    • [facility]     [facility] and [facility] him on rosuvastatin        History of Present Illness patient is here for problem visit having both leg swelling he is also having severe leg aches over the last few weeks.  [facility] and [facility]ofibrate and placed him on 40 mg of rosuvastatin.  He noticed the leg aches shortly thereafter.  The swelling is minor he is on amlodipine and we have already decreased him from 10 to 5 mg.    The following portions of the patient's history were reviewed and updated as appropriate: allergies, current medications, past family history, past medical history, past social history, past surgical history and problem list.    Review of Systems   Constitutional: Negative for activity change, appetite change, fatigue, fever, unexpected weight gain and unexpected weight loss.   HENT: Negative for swollen glands, trouble swallowing and voice change.    Eyes: Negative for blurred vision and visual disturbance.   Respiratory: Negative for cough and shortness of breath.    Cardiovascular: Positive for leg swelling. Negative for chest pain and palpitations.   Gastrointestinal: Negative for abdominal pain, constipation, diarrhea, nausea, vomiting and indigestion.   Endocrine: Negative for cold intolerance, heat intolerance, polydipsia and polyphagia.   Genitourinary: Negative for dysuria and frequency.   Musculoskeletal: Negative for arthralgias, back pain, joint swelling and neck pain.   Skin: Negative for color change, rash and skin lesions.   Neurological: Negative for dizziness, weakness, headache, memory problem and confusion.   Hematological: Does not bruise/bleed easily.    Psychiatric/Behavioral: Negative for agitation, hallucinations and suicidal ideas. The patient is not nervous/anxious.        Objective   Past Medical History:   Diagnosis Date   • Acute renal failure (HCC) 6/8/2020   • Bladder neoplasm of uncertain malignant potential 7/20/2017   • Degenerative arthritis of cervical spine with nerve compression     per patient report   • Diabetes mellitus (HCC)    • GERD (gastroesophageal reflux disease)    • Hearing aid worn    • Hypertension    • Impaired hearing     without hearing aids can barely hear anything   • Neuropathy    • Sleep apnea     wears cpap      Past Surgical History:   Procedure Laterality Date   • CATARACT EXTRACTION W/ INTRAOCULAR LENS IMPLANT     • CHOLECYSTECTOMY     • EXPLORATORY LAPAROTOMY     • LEG SURGERY     • TRANSURETHRAL RESECTION OF BLADDER TUMOR Bilateral 7/25/2017    Procedure: CYSTOSCOPY TRANSURETHRAL RESECTION OF BLADDER TUMOR & POSSIBLE BILATERAL RETROGRADE URETEROPYELOGRAMS;  Surgeon: Chris Esparza MD;  Location:  [facility] OR;  Service:    • TRANSURETHRAL RESECTION OF BLADDER TUMOR N/A 11/3/2017    Procedure: CYSTOSCOPY TRANSURETHRAL RESECTION OF BLADDER TUMOR ;  Surgeon: Chris Esparza MD;  Location:  [facility] OR;  Service:    • TRANSURETHRAL RESECTION OF BLADDER TUMOR N/A 9/10/2018    Procedure: CYSTOSCOPY TRANSURETHRAL RESECTION OF LARGE BLADDER TUMOR, CLOT EVACUATION, AND FULGURATION;  Surgeon: Chris Esparza MD;  Location:  [facility] OR;  Service: Urology        Current Outpatient Medications:   •  allopurinol (ZYLOPRIM) 300 MG tablet, Take 300 mg by mouth Daily., Disp: , Rfl:   •  amLODIPine (NORVASC) 5 MG tablet, Take 1 tablet by mouth Daily., Disp: 90 tablet, Rfl: 3  •  aspirin 81 MG tablet, Take 81 mg by mouth Daily., Disp: , Rfl:   •  chlorthalidone (HYGROTON) 25 MG tablet, Take 1 tablet by mouth Daily., Disp: 90 tablet, Rfl: 3  •  cloNIDine (CATAPRES) 0.1 MG tablet, Take 1 tablet by mouth Every Night., Disp: 90 tablet, Rfl: 3  •   fluticasone (FLONASE) 50 MCG/ACT nasal spray, 1 spray into the nostril(s) as directed by provider 2 (Two) Times a Day As Needed for Allergies., Disp: 1 bottle, Rfl: 3  •  gabapentin (NEURONTIN) 400 MG capsule, Take 800 mg by mouth Every 8 (Eight) Hours. Take 2 capsules by mouth in the am, two capsules by mouth in the afternoon, and 2 capsules by mouth in the pm.   Patient is taking 2 capsules in the AM, 2 capsules in the afternoon, 1 capsule in the PM, Disp: , Rfl:   •  glipizide (GLUCOTROL XL) 2.5 MG 24 hr tablet, Take 1 tablet by mouth 2 (Two) Times a Day., Disp: 180 tablet, Rfl: 0  •  hydrALAZINE (APRESOLINE) 10 MG tablet, Take 1 tablet by mouth 3 (Three) Times a Day., Disp: 270 tablet, Rfl: 3  •  hydrocortisone (ANUSOL-HC) 25 MG suppository, Insert 25 mg into the rectum 2 (Two) Times a Day As Needed for Hemorrhoids., Disp: , Rfl:   •  hydrocortisone 2.5 % cream, Apply  topically to the appropriate area as directed 2 (Two) Times a Day. Apply to hemorrhoids, Disp: , Rfl:   •  metoprolol succinate XL (Toprol XL) 25 MG 24 hr tablet, Take 0.5 tablet in the AM and taken 0.5 tablet in the PM, Disp: 90 tablet, Rfl: 3  •  Nystatin powder, Take  by mouth., Disp: , Rfl:   •  omeprazole (priLOSEC) 20 MG capsule, Take 20 mg by mouth 2 (Two) Times a Day., Disp: , Rfl:   •  lovastatin (MEVACOR) 20 MG tablet, Take 1 tablet by mouth Every Night., Disp: 90 tablet, Rfl: 3      Vitals:    11/03/21 1040   BP: 146/70   Pulse: 68   Temp: 97.9 °F (36.6 °C)   SpO2: 98%         11/03/21  1040   Weight: 109 kg (240 lb)       Body mass index is 34.44 kg/m².    Physical Exam  Vitals and nursing note reviewed.   Constitutional:       General: He is not in acute distress.     Appearance: Normal appearance. He is well-developed.   HENT:      Head: Normocephalic and atraumatic.      Right Ear: External ear normal.      Left Ear: External ear normal.      Nose: Nose normal.   Eyes:      Extraocular Movements: Extraocular movements intact.       Conjunctiva/sclera: Conjunctivae normal.      Pupils: Pupils are equal, round, and reactive to light.   Cardiovascular:      Rate and Rhythm: Normal rate and regular rhythm.      Pulses: Normal pulses.      Heart sounds: Normal heart sounds.   Pulmonary:      Effort: Pulmonary effort is normal.      Breath sounds: Normal breath sounds.   Abdominal:      General: Bowel sounds are normal.      Palpations: Abdomen is soft.   Musculoskeletal:         General: Normal range of motion.      Cervical back: Normal range of motion and neck supple. No rigidity. No muscular tenderness.      Right lower leg: Edema present.      Left lower leg: Edema present.   Skin:     General: Skin is warm and dry.   Neurological:      General: No focal deficit present.      Mental Status: He is alert and oriented to person, place, and time.   Psychiatric:         Mood and Affect: Mood normal.         Behavior: Behavior normal.               Assessment/Plan   Diagnoses and all orders for this visit:    1. Primary hypertension (Primary)    2. Myalgia due to statin    Other orders  -     lovastatin (MEVACOR) 20 MG tablet; Take 1 tablet by mouth Every Night.  Dispense: 90 tablet; Refill: 3      Patient is here for leg aches where this is definitely a statin induced myopathy I told him to stop the Crestor and to resume lovastatin.  In regard to the triglyceride therapy I have asked him to hold off on resuming that at this point we will see him back and recheck his lipids.  In regard to his blood pressure he is doing okay there but he is having some edema associated with amlodipine therapy he is already decreased from 10-5 and I told him that if they need to decrease from 5-2-1/2 he can do that prior to his return visit.

## 2021-11-22 DIAGNOSIS — I10 ESSENTIAL HYPERTENSION: ICD-10-CM

## 2021-11-22 RX ORDER — CHLORTHALIDONE 25 MG/1
25 TABLET ORAL DAILY
Qty: 90 TABLET | Refills: 0 | Status: SHIPPED | OUTPATIENT
Start: 2021-11-22 | End: 2022-02-18 | Stop reason: SDUPTHER

## 2021-12-06 ENCOUNTER — OFFICE VISIT (OUTPATIENT)
Dept: INTERNAL MEDICINE | Facility: CLINIC | Age: 71
End: 2021-12-06

## 2021-12-06 VITALS
HEIGHT: 70 IN | DIASTOLIC BLOOD PRESSURE: 70 MMHG | OXYGEN SATURATION: 98 % | WEIGHT: 246 LBS | BODY MASS INDEX: 35.22 KG/M2 | TEMPERATURE: 97.1 F | HEART RATE: 69 BPM | SYSTOLIC BLOOD PRESSURE: 148 MMHG

## 2021-12-06 DIAGNOSIS — I10 PRIMARY HYPERTENSION: ICD-10-CM

## 2021-12-06 DIAGNOSIS — E11.65 TYPE 2 DIABETES MELLITUS WITH HYPERGLYCEMIA, WITHOUT LONG-TERM CURRENT USE OF INSULIN (HCC): ICD-10-CM

## 2021-12-06 DIAGNOSIS — L91.8 SKIN TAG: ICD-10-CM

## 2021-12-06 DIAGNOSIS — L82.1 SEBORRHEIC KERATOSIS: Primary | ICD-10-CM

## 2021-12-06 LAB — HBA1C MFR BLD: 6.5 %

## 2021-12-06 PROCEDURE — 3044F HG A1C LEVEL LT 7.0%: CPT | Performed by: INTERNAL MEDICINE

## 2021-12-06 PROCEDURE — 83036 HEMOGLOBIN GLYCOSYLATED A1C: CPT | Performed by: INTERNAL MEDICINE

## 2021-12-06 PROCEDURE — 99214 OFFICE O/P EST MOD 30 MIN: CPT | Performed by: INTERNAL MEDICINE

## 2021-12-06 NOTE — PROGRESS NOTES
Subjective   Chung Bradley is a 71 y.o. male.   Chief Complaint   Patient presents with   • Hypertension   • Diabetes     a1c: 6.5   • other     spot on right side of head above ear, pt wonders if it can be frozen off   • other     mole on left inner thigh, right where underware would be.        History of Present Illness patient is here to follow-up on his high blood pressure and diabetes he is also questioning a mole on the right side of his scalp above his ear as well as a left groin mole.    The following portions of the patient's history were reviewed and updated as appropriate: allergies, current medications, past family history, past medical history, past social history, past surgical history and problem list.    Review of Systems   Constitutional: Negative for activity change, appetite change, fatigue, fever, unexpected weight gain and unexpected weight loss.   HENT: Positive for hearing loss. Negative for swollen glands, trouble swallowing and voice change.    Eyes: Negative for blurred vision and visual disturbance.   Respiratory: Negative for cough and shortness of breath.    Cardiovascular: Negative for chest pain, palpitations and leg swelling.   Gastrointestinal: Negative for abdominal pain, constipation, diarrhea, nausea, vomiting and indigestion.   Endocrine: Negative for cold intolerance, heat intolerance, polydipsia and polyphagia.   Genitourinary: Negative for dysuria and frequency.   Musculoskeletal: Negative for arthralgias, back pain, joint swelling and neck pain.   Skin: Positive for skin lesions. Negative for color change and rash.   Neurological: Negative for dizziness, weakness, headache, memory problem and confusion.   Hematological: Does not bruise/bleed easily.   Psychiatric/Behavioral: Negative for agitation, hallucinations and suicidal ideas. The patient is not nervous/anxious.        Objective   Past Medical History:   Diagnosis Date   • Acute renal failure (HCC) 6/8/2020   •  Bladder neoplasm of uncertain malignant potential 7/20/2017   • Degenerative arthritis of cervical spine with nerve compression     per patient report   • Diabetes mellitus (HCC)    • GERD (gastroesophageal reflux disease)    • Hearing aid worn    • Hypertension    • Impaired hearing     without hearing aids can barely hear anything   • Neuropathy    • Sleep apnea     wears cpap      Past Surgical History:   Procedure Laterality Date   • CATARACT EXTRACTION W/ INTRAOCULAR LENS IMPLANT     • CHOLECYSTECTOMY     • EXPLORATORY LAPAROTOMY     • LEG SURGERY     • TRANSURETHRAL RESECTION OF BLADDER TUMOR Bilateral 7/25/2017    Procedure: CYSTOSCOPY TRANSURETHRAL RESECTION OF BLADDER TUMOR & POSSIBLE BILATERAL RETROGRADE URETEROPYELOGRAMS;  Surgeon: Chris Esparza MD;  [facility];  Service:    • TRANSURETHRAL RESECTION OF BLADDER TUMOR N/A 11/3/2017    Procedure: CYSTOSCOPY TRANSURETHRAL RESECTION OF BLADDER TUMOR ;  Surgeon: Chris Esparza MD;  Location:  [facility] OR;  Service:    • TRANSURETHRAL RESECTION OF BLADDER TUMOR N/A 9/10/2018    Procedure: CYSTOSCOPY TRANSURETHRAL RESECTION OF LARGE BLADDER TUMOR, CLOT EVACUATION, AND FULGURATION;  Surgeon: Chris Esparza MD;  Location:  [facility] OR;  Service: Urology        Current Outpatient Medications:   •  allopurinol (ZYLOPRIM) 300 MG tablet, Take 300 mg by mouth Daily., Disp: , Rfl:   •  amLODIPine (NORVASC) 5 MG tablet, Take 1 tablet by mouth Daily., Disp: 90 tablet, Rfl: 3  •  aspirin 81 MG tablet, Take 81 mg by mouth Daily., Disp: , Rfl:   •  chlorthalidone (HYGROTON) 25 MG tablet, Take 1 tablet by mouth Daily., Disp: 90 tablet, Rfl: 0  •  cloNIDine (CATAPRES) 0.1 MG tablet, Take 1 tablet by mouth Every Night., Disp: 90 tablet, Rfl: 3  •  fluticasone (FLONASE) 50 MCG/ACT nasal spray, 1 spray into the nostril(s) as directed by provider 2 (Two) Times a Day As Needed for Allergies., Disp: 1 bottle, Rfl: 3  •  gabapentin (NEURONTIN) 400 MG capsule, Take 800 mg  by mouth Every 8 (Eight) Hours. Take 2 capsules by mouth in the am, two capsules by mouth in the afternoon, and 2 capsules by mouth in the pm.   Patient is taking 2 capsules in the AM, 2 capsules in the afternoon, 1 capsule in the PM, Disp: , Rfl:   •  glipizide (GLUCOTROL XL) 2.5 MG 24 hr tablet, Take 1 tablet by mouth 2 (Two) Times a Day., Disp: 180 tablet, Rfl: 0  •  hydrALAZINE (APRESOLINE) 10 MG tablet, Take 1 tablet by mouth 3 (Three) Times a Day., Disp: 270 tablet, Rfl: 3  •  hydrocortisone (ANUSOL-HC) 25 MG suppository, Insert 25 mg into the rectum 2 (Two) Times a Day As Needed for Hemorrhoids., Disp: , Rfl:   •  hydrocortisone 2.5 % cream, Apply  topically to the appropriate area as directed 2 (Two) Times a Day. Apply to hemorrhoids, Disp: , Rfl:   •  lovastatin (MEVACOR) 20 MG tablet, Take 1 tablet by mouth Every Night., Disp: 90 tablet, Rfl: 3  •  metoprolol succinate XL (Toprol XL) 25 MG 24 hr tablet, Take 0.5 tablet in the AM and taken 0.5 tablet in the PM, Disp: 90 tablet, Rfl: 3  •  Nystatin powder, Take  by mouth., Disp: , Rfl:   •  omeprazole (priLOSEC) 20 MG capsule, Take 20 mg by mouth 2 (Two) Times a Day., Disp: , Rfl:       Vitals:    12/06/21 0837   BP: 148/70   Pulse: 69   Temp: 97.1 °F (36.2 °C)   SpO2: 98%         12/06/21  0837   Weight: 112 kg (246 lb)       Body mass index is 35.3 kg/m².    Physical Exam  Vitals and nursing note reviewed.   Constitutional:       General: He is not in acute distress.     Appearance: Normal appearance. He is well-developed.   HENT:      Head: Normocephalic and atraumatic.      Right Ear: External ear normal.      Left Ear: External ear normal.      Nose: Nose normal.   Eyes:      Extraocular Movements: Extraocular movements intact.      Conjunctiva/sclera: Conjunctivae normal.      Pupils: Pupils are equal, round, and reactive to light.   Cardiovascular:      Rate and Rhythm: Normal rate and regular rhythm.      Pulses: Normal pulses.      Heart sounds:  Normal heart sounds.   Pulmonary:      Effort: Pulmonary effort is normal.      Breath sounds: Normal breath sounds.   Abdominal:      General: Bowel sounds are normal.      Palpations: Abdomen is soft.   Musculoskeletal:         General: Normal range of motion.      Cervical back: Normal range of motion and neck supple. No rigidity. No muscular tenderness.   Skin:     General: Skin is warm and dry.      Findings: Lesion ( Patient has a skin tag in his left groin on the upper most portion of his inner left thigh.  He also has a large scaly 1 sonometer seborrheic keratosis above his right ear) present.   Neurological:      General: No focal deficit present.      Mental Status: He is alert and oriented to person, place, and time.   Psychiatric:         Mood and Affect: Mood normal.         Behavior: Behavior normal.               Assessment/Plan   Diagnoses and all orders for this visit:    1. Seborrheic keratosis (Primary)    2. Skin tag    3. Type 2 diabetes mellitus with hyperglycemia, without long-term current use of insulin (HCC)    4. Primary hypertension      At today's visit patient has reasonably well-controlled blood pressure and diabetes.  In addition to that he wanted me to evaluate what turns out to be a skin tag and a seborrheic keratosis.  I told him the skin tag was broad-based and needs to be surgically removed the seborrheic keratosis is a benign lesion and I discouraged any intervention unless it significantly changes.

## 2022-01-28 DIAGNOSIS — E11.65 TYPE 2 DIABETES MELLITUS WITH HYPERGLYCEMIA, WITHOUT LONG-TERM CURRENT USE OF INSULIN: ICD-10-CM

## 2022-01-28 RX ORDER — GLIPIZIDE 2.5 MG/1
2.5 TABLET, EXTENDED RELEASE ORAL 2 TIMES DAILY
Qty: 180 TABLET | Refills: 1 | Status: SHIPPED | OUTPATIENT
Start: 2022-01-28 | End: 2022-08-02 | Stop reason: SDUPTHER

## 2022-02-18 DIAGNOSIS — I10 ESSENTIAL HYPERTENSION: ICD-10-CM

## 2022-02-18 RX ORDER — CHLORTHALIDONE 25 MG/1
25 TABLET ORAL DAILY
Qty: 90 TABLET | Refills: 3 | Status: SHIPPED | OUTPATIENT
Start: 2022-02-18 | End: 2023-02-14 | Stop reason: SDUPTHER

## 2022-04-19 ENCOUNTER — TELEPHONE (OUTPATIENT)
Dept: INTERNAL MEDICINE | Facility: CLINIC | Age: 72
End: 2022-04-19

## 2022-04-19 RX ORDER — CLONIDINE HYDROCHLORIDE 0.1 MG/1
0.1 TABLET ORAL NIGHTLY
Qty: 90 TABLET | Refills: 0 | Status: SHIPPED | OUTPATIENT
Start: 2022-04-19 | End: 2022-07-14 | Stop reason: SDUPTHER

## 2022-05-03 ENCOUNTER — OFFICE VISIT (OUTPATIENT)
Dept: INTERNAL MEDICINE | Facility: CLINIC | Age: 72
End: 2022-05-03

## 2022-05-03 VITALS
WEIGHT: 244 LBS | SYSTOLIC BLOOD PRESSURE: 142 MMHG | BODY MASS INDEX: 34.93 KG/M2 | OXYGEN SATURATION: 98 % | HEART RATE: 70 BPM | HEIGHT: 70 IN | TEMPERATURE: 97.9 F | DIASTOLIC BLOOD PRESSURE: 68 MMHG

## 2022-05-03 DIAGNOSIS — M79.662 PAIN IN LEFT LOWER LEG: Primary | ICD-10-CM

## 2022-05-03 PROCEDURE — 99213 OFFICE O/P EST LOW 20 MIN: CPT | Performed by: FAMILY MEDICINE

## 2022-05-03 NOTE — PROGRESS NOTES
Subjective     Chief Complaint   Patient presents with   • Leg Pain     Left leg pain, started off and on about 4 months ago but seems to be getting worse . Pain starts behind his knee and then runs down to the front of his leg. It seems to be worse when he is sitting.        History of Present Illness     Chung Bradley is a 71-year-old male who presents to the clinic today for left leg pain. He is accompanied by his wife.    The patient reports he is experiencing left lower extremity pain, which starts in the nerves in the posterior left knee and radiates down and around his lower leg. It is exacerbated mostly from sitting, bending and squatting. If he sits too long, it starts to bother him, so he has to slide forward and sit more on the edge of the seat. He states that when he is up on his feet for an extended time, it gets to aching and the pain travels down into his foot. He denies having back pain that relates to this.    He notes taking medication for neuropathy in his feet and legs, [facility]    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    Patient's PMR from outside medical facility reviewed and noted.    Review of Systems     Otherwise complete ROS reviewed and negative except as mentioned in the HPI.    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   • Degenerative arthritis of cervical spine with nerve compression     per patient report   • Diabetes mellitus (HCC)    • GERD (gastroesophageal reflux disease)    • Hearing aid worn    • Hypertension    • Impaired hearing     without hearing aids can barely hear anything   • Neuropathy    • Sleep apnea     wears cpap     Past Surgical History:  Past Surgical History:   Procedure Laterality Date   • CATARACT EXTRACTION W/ INTRAOCULAR  LENS IMPLANT     • CHOLECYSTECTOMY     • EXPLORATORY LAPAROTOMY     • LEG SURGERY     • TRANSURETHRAL RESECTION OF BLADDER TUMOR Bilateral 7/25/2017    Procedure: CYSTOSCOPY TRANSURETHRAL RESECTION OF BLADDER TUMOR & POSSIBLE BILATERAL RETROGRADE URETEROPYELOGRAMS;  Surgeon: Chris Esparza MD;  Location:  [facility] OR;  Service:    • TRANSURETHRAL RESECTION OF BLADDER TUMOR N/A 11/3/2017    Procedure: CYSTOSCOPY TRANSURETHRAL RESECTION OF BLADDER TUMOR ;  Surgeon: Chris Esparza MD;  Location:  [facility] OR;  Service:    • TRANSURETHRAL RESECTION OF BLADDER TUMOR N/A 9/10/2018    Procedure: CYSTOSCOPY TRANSURETHRAL RESECTION OF LARGE BLADDER TUMOR, CLOT EVACUATION, AND FULGURATION;  Surgeon: Chris Esparza MD;  Location:  [facility] OR;  Service: Urology     Social History:  reports that he quit smoking about 25 years ago. He has never used smokeless tobacco. He reports that he does not drink alcohol and does not use drugs.    Family History: family history includes Arthritis in his mother; Cancer in his brother, father, and sister; Heart disease in his mother and sister; Hypertension in his father and mother.      Allergies:  Allergies   Allergen Reactions   • Penicillins Rash     Medications:  Prior to Admission medications    Medication Sig Start Date End Date Taking? Authorizing Provider   allopurinol (ZYLOPRIM) 300 MG tablet Take 300 mg by mouth Daily.   Yes ProviderSanket MD   amLODIPine (NORVASC) 5 MG tablet Take 1 tablet by mouth Daily. 10/25/21  Yes Fidel Yousif MD   aspirin 81 MG tablet Take 81 mg by mouth Daily.   Yes Sanket Marrero MD   chlorthalidone (HYGROTON) 25 MG tablet Take 1 tablet by mouth Daily. 2/18/22  Yes Sarah Thakkar DO   cloNIDine (CATAPRES) 0.1 MG tablet Take 1 tablet by mouth Every Night. 4/19/22  Yes Sarah Thakkar DO   fluticasone (FLONASE) 50 MCG/ACT nasal spray 1 spray into the nostril(s) as directed by provider 2 (Two) Times a Day As Needed for Allergies.  "10/19/20  Yes Fidel Yousif MD   gabapentin (NEURONTIN) 400 MG capsule Take 800 mg by mouth Every 8 (Eight) Hours. Take 2 capsules by mouth in the am, two capsules by mouth in the afternoon, and 2 capsules by mouth in the pm.     Patient is taking 2 capsules in the AM, 2 capsules in the afternoon, 1 capsule in the PM   Yes Sanket Marrero MD   glipizide (GLUCOTROL XL) 2.5 MG 24 hr tablet Take 1 tablet by mouth 2 (Two) Times a Day. 1/28/22  Yes Fidel Yousif MD   hydrALAZINE (APRESOLINE) 10 MG tablet Take 1 tablet by mouth 3 (Three) Times a Day. 9/2/21  Yes Fidel Yousif MD   hydrocortisone (ANUSOL-HC) 25 MG suppository Insert 25 mg into the rectum 2 (Two) Times a Day As Needed for Hemorrhoids.   Yes Sanket Marrero MD   hydrocortisone 2.5 % cream Apply  topically to the appropriate area as directed 2 (Two) Times a Day. Apply to hemorrhoids   Yes Sanket Marrero MD   lovastatin (MEVACOR) 20 MG tablet Take 1 tablet by mouth Every Night. 11/3/21  Yes Fidel Yousif MD   metoprolol succinate XL (Toprol XL) 25 MG 24 hr tablet Take 0.5 tablet in the AM and taken 0.5 tablet in the PM 9/13/21  Yes Fidel Yousif MD   Nystatin powder Take  by mouth.   Yes Sanket Marrero MD   omeprazole (priLOSEC) 20 MG capsule Take 20 mg by mouth 2 (Two) Times a Day.   Yes Sanket Marrero MD       Objective     Vital Signs: /68 (BP Location: Left arm, Patient Position: Sitting, Cuff Size: Adult)   Pulse 70   Temp 97.9 °F (36.6 °C) (Temporal)   Ht 177.8 cm (70\")   Wt 111 kg (244 lb)   SpO2 98%   BMI 35.01 kg/m²   Physical Exam  Vitals and nursing note reviewed.   Constitutional:       Appearance: Normal appearance.   HENT:      Head: Normocephalic and atraumatic.      Right Ear: External ear normal.      Left Ear: External ear normal.      Nose: Nose normal.      Mouth/Throat:      Mouth: Mucous membranes are moist.   Eyes:      Extraocular Movements: Extraocular movements intact. "      Conjunctiva/sclera: Conjunctivae normal.      Pupils: Pupils are equal, round, and reactive to light.   Cardiovascular:      Rate and Rhythm: Normal rate and regular rhythm.      Pulses: Normal pulses.      Heart sounds: No murmur heard.    No friction rub. No gallop.   Pulmonary:      Effort: Pulmonary effort is normal.      Breath sounds: Normal breath sounds.   Abdominal:      General: Bowel sounds are normal.      Palpations: Abdomen is soft.   Musculoskeletal:         General: Normal range of motion.      Cervical back: Normal range of motion and neck supple.   Skin:     General: Skin is warm and dry.      Capillary Refill: Capillary refill takes less than 2 seconds.   Neurological:      General: No focal deficit present.      Mental Status: He is alert and oriented to person, place, and time.      Cranial Nerves: No cranial nerve deficit.   Psychiatric:         Mood and Affect: Mood normal.         Behavior: Behavior normal.               Results Reviewed:  Glucose   Date Value Ref Range Status   12/01/2021 149 (H) 65 - 99 mg/dL Final   02/02/2019 110 (H) 70 - 100 mg/dL Final     BUN   Date Value Ref Range Status   12/30/2021 24 8 - 26 mg/dL Final     Creatinine   Date Value Ref Range Status   12/30/2021 1.07 0.72 - 1.25 mg/dL Final     Sodium   Date Value Ref Range Status   12/30/2021 140 136 - 145 mmol/L Final     Potassium   Date Value Ref Range Status   12/30/2021 3.7 3.3 - 4.8 mmol/L Final     Comment:     Plasma reference ranges are shown, please note that serum reference ranges are higher than plasma.     Chloride   Date Value Ref Range Status   12/30/2021 103 98 - 107 mmol/L Final     Total CO2   Date Value Ref Range Status   12/30/2021 26 23 - 31 mmol/L Final     Calcium   Date Value Ref Range Status   12/30/2021 8.9 8.4 - 10.5 mg/dL Final     ALT (SGPT)   Date Value Ref Range Status   05/20/2020 19 1 - 41 U/L Final   02/02/2019 15 0 - 54 U/L Final     AST (SGOT)   Date Value Ref Range Status    05/20/2020 19 1 - 40 U/L Final   02/02/2019 20 7 - 45 U/L Final     WBC   Date Value Ref Range Status   05/20/2020 6.67 3.40 - 10.80 10*3/mm3 Final     Hematocrit   Date Value Ref Range Status   05/20/2020 42.6 37.5 - 51.0 % Final   02/02/2019 29.8 (L) 40.0 - 52.0 % Final   12/21/2018 36 (L) 41 - 49 % Final     Platelets   Date Value Ref Range Status   05/20/2020 152 140 - 450 10*3/mm3 Final   02/02/2019 211 130 - 400 10*3/mm3 Final     Triglycerides   Date Value Ref Range Status   12/01/2021 494 (H) 0 - 150 mg/dL Final     HDL Cholesterol   Date Value Ref Range Status   12/01/2021 29 (L) 40 - 60 mg/dL Final     LDL Chol Calc (NIH)   Date Value Ref Range Status   12/01/2021 36 0 - 100 mg/dL Final     Hemoglobin A1C   Date Value Ref Range Status   12/06/2021 6.5 % Final   12/21/2018 6.3 % Final     Comment:      Diagnosis of Diabetes    Diabetes Category   Hemoglobin A1c %   Diabetic     >= 6.5 %   Pre-diabetic    5.7-6.4 %   Non-diabetic    <= 5.6 %       Glycemic Targets for Type I and Type II Diabetics    Population    Hemoglobin A1c %   Non-pregnant Adults   < 7.0 %   Pregnant Adults    < 6.0 %   Children and Adolescents  < 7.5 %     Source:  American Diabetes Association. Standards of medical care in [id number]. Diabetes Care. 2015;38(suppl 1):S1-S93.         Assessment / Plan     Assessment/Plan:  1. Pain in left lower leg    - EMG & Nerve Conduction Test; Future      2. History of peripheral neuropathy.     We are going to do an EMG with nerve conduction velocities on this extremity, as the pain he is describing is actually not a peripheral neuropathy type pain. An order has been put in for this and he will be called for an appointment. We have instructed him to call us if he has not heard from anyone in 10 days.      No follow-ups on file.He will follow up after the EMG test, unless patient needs to be seen sooner or acute issues arise.      I have discussed the patient results/orders and and  plan/recommendation with them at today's visit.      Sarah Thakkar DO   05/03/2022      Transcribed from ambient dictation for Sarah Thakkar DO by RITO ALVAREZ.  05/03/22   17:51 CDT    Patient verbalized consent to the visit recording.

## 2022-06-08 ENCOUNTER — APPOINTMENT (OUTPATIENT)
Dept: NEUROLOGY | Facility: HOSPITAL | Age: 72
End: 2022-06-08

## 2022-07-15 RX ORDER — CLONIDINE HYDROCHLORIDE 0.1 MG/1
0.1 TABLET ORAL NIGHTLY
Qty: 90 TABLET | Refills: 3 | Status: SHIPPED | OUTPATIENT
Start: 2022-07-15 | End: 2022-08-02 | Stop reason: SDUPTHER

## 2022-07-26 ENCOUNTER — LAB (OUTPATIENT)
Dept: INTERNAL MEDICINE | Facility: CLINIC | Age: 72
End: 2022-07-26

## 2022-07-26 DIAGNOSIS — I10 ESSENTIAL HYPERTENSION: Primary | ICD-10-CM

## 2022-07-26 DIAGNOSIS — Z12.5 SCREENING PSA (PROSTATE SPECIFIC ANTIGEN): ICD-10-CM

## 2022-07-26 DIAGNOSIS — E11.65 TYPE 2 DIABETES MELLITUS WITH HYPERGLYCEMIA, WITHOUT LONG-TERM CURRENT USE OF INSULIN: ICD-10-CM

## 2022-07-27 LAB
ALBUMIN SERPL-MCNC: 4.3 G/DL (ref 3.5–5.2)
ALBUMIN/GLOB SERPL: 2 G/DL
ALP SERPL-CCNC: 70 U/L (ref 39–117)
ALT SERPL-CCNC: 21 U/L (ref 1–41)
APPEARANCE UR: ABNORMAL
AST SERPL-CCNC: 23 U/L (ref 1–40)
BACTERIA #/AREA URNS HPF: ABNORMAL /HPF
BASOPHILS # BLD AUTO: 0.05 10*3/MM3 (ref 0–0.2)
BASOPHILS NFR BLD AUTO: 0.6 % (ref 0–1.5)
BILIRUB SERPL-MCNC: 0.5 MG/DL (ref 0–1.2)
BILIRUB UR QL STRIP: NEGATIVE
BUN SERPL-MCNC: 24 MG/DL (ref 8–23)
BUN/CREAT SERPL: 23.3 (ref 7–25)
CALCIUM SERPL-MCNC: 9.2 MG/DL (ref 8.6–10.5)
CASTS URNS MICRO: ABNORMAL
CHLORIDE SERPL-SCNC: 99 MMOL/L (ref 98–107)
CHOLEST SERPL-MCNC: 168 MG/DL (ref 0–200)
CO2 SERPL-SCNC: 26.2 MMOL/L (ref 22–29)
COLOR UR: YELLOW
CREAT SERPL-MCNC: 1.03 MG/DL (ref 0.76–1.27)
EGFRCR SERPLBLD CKD-EPI 2021: 76.7 ML/MIN/1.73
EOSINOPHIL # BLD AUTO: 0.14 10*3/MM3 (ref 0–0.4)
EOSINOPHIL NFR BLD AUTO: 1.7 % (ref 0.3–6.2)
EPI CELLS #/AREA URNS HPF: ABNORMAL /HPF
ERYTHROCYTE [DISTWIDTH] IN BLOOD BY AUTOMATED COUNT: 15.4 % (ref 12.3–15.4)
GLOBULIN SER CALC-MCNC: 2.1 GM/DL
GLUCOSE SERPL-MCNC: 120 MG/DL (ref 65–99)
GLUCOSE UR QL STRIP: NEGATIVE
HBA1C MFR BLD: 7 % (ref 4.8–5.6)
HCT VFR BLD AUTO: 46.5 % (ref 37.5–51)
HDLC SERPL-MCNC: 26 MG/DL (ref 40–60)
HGB BLD-MCNC: 15.6 G/DL (ref 13–17.7)
HGB UR QL STRIP: ABNORMAL
IMM GRANULOCYTES # BLD AUTO: 0.05 10*3/MM3 (ref 0–0.05)
IMM GRANULOCYTES NFR BLD AUTO: 0.6 % (ref 0–0.5)
KETONES UR QL STRIP: NEGATIVE
LDLC SERPL CALC-MCNC: ABNORMAL MG/DL
LEUKOCYTE ESTERASE UR QL STRIP: ABNORMAL
LYMPHOCYTES # BLD AUTO: 1.28 10*3/MM3 (ref 0.7–3.1)
LYMPHOCYTES NFR BLD AUTO: 15.8 % (ref 19.6–45.3)
MAGNESIUM SERPL-MCNC: 1.3 MG/DL (ref 1.6–2.4)
MCH RBC QN AUTO: 29.4 PG (ref 26.6–33)
MCHC RBC AUTO-ENTMCNC: 33.5 G/DL (ref 31.5–35.7)
MCV RBC AUTO: 87.6 FL (ref 79–97)
MICROALBUMIN UR-MCNC: 1667.4 UG/ML
MONOCYTES # BLD AUTO: 0.54 10*3/MM3 (ref 0.1–0.9)
MONOCYTES NFR BLD AUTO: 6.7 % (ref 5–12)
NEUTROPHILS # BLD AUTO: 6.06 10*3/MM3 (ref 1.7–7)
NEUTROPHILS NFR BLD AUTO: 74.6 % (ref 42.7–76)
NITRITE UR QL STRIP: NEGATIVE
NRBC BLD AUTO-RTO: 0 /100 WBC (ref 0–0.2)
PH UR STRIP: 6.5 [PH] (ref 5–8)
PLATELET # BLD AUTO: 162 10*3/MM3 (ref 140–450)
POTASSIUM SERPL-SCNC: 3.7 MMOL/L (ref 3.5–5.2)
PROT SERPL-MCNC: 6.4 G/DL (ref 6–8.5)
PROT UR QL STRIP: ABNORMAL
PSA SERPL-MCNC: <0.014 NG/ML (ref 0–4)
RBC # BLD AUTO: 5.31 10*6/MM3 (ref 4.14–5.8)
RBC #/AREA URNS HPF: ABNORMAL /HPF
SODIUM SERPL-SCNC: 140 MMOL/L (ref 136–145)
SP GR UR STRIP: 1.02 (ref 1–1.03)
TRIGL SERPL-MCNC: 860 MG/DL (ref 0–150)
TSH SERPL DL<=0.005 MIU/L-ACNC: 2.2 UIU/ML (ref 0.27–4.2)
URATE SERPL-MCNC: 6 MG/DL (ref 3.4–7)
UROBILINOGEN UR STRIP-MCNC: ABNORMAL MG/DL
VLDLC SERPL CALC-MCNC: ABNORMAL MG/DL
WBC # BLD AUTO: 8.12 10*3/MM3 (ref 3.4–10.8)
WBC #/AREA URNS HPF: ABNORMAL /HPF

## 2022-08-02 ENCOUNTER — OFFICE VISIT (OUTPATIENT)
Dept: INTERNAL MEDICINE | Facility: CLINIC | Age: 72
End: 2022-08-02

## 2022-08-02 VITALS
BODY MASS INDEX: 34.79 KG/M2 | HEART RATE: 75 BPM | DIASTOLIC BLOOD PRESSURE: 74 MMHG | HEIGHT: 70 IN | WEIGHT: 243 LBS | SYSTOLIC BLOOD PRESSURE: 170 MMHG | OXYGEN SATURATION: 96 % | TEMPERATURE: 97.1 F

## 2022-08-02 DIAGNOSIS — E11.65 TYPE 2 DIABETES MELLITUS WITH HYPERGLYCEMIA, WITHOUT LONG-TERM CURRENT USE OF INSULIN: ICD-10-CM

## 2022-08-02 DIAGNOSIS — K21.9 GASTROESOPHAGEAL REFLUX DISEASE WITHOUT ESOPHAGITIS: ICD-10-CM

## 2022-08-02 DIAGNOSIS — C67.8 MALIGNANT NEOPLASM OF OVERLAPPING SITES OF BLADDER: ICD-10-CM

## 2022-08-02 DIAGNOSIS — I10 PRIMARY HYPERTENSION: Primary | ICD-10-CM

## 2022-08-02 DIAGNOSIS — K22.70 BARRETT'S ESOPHAGUS WITHOUT DYSPLASIA: ICD-10-CM

## 2022-08-02 PROCEDURE — 99214 OFFICE O/P EST MOD 30 MIN: CPT | Performed by: FAMILY MEDICINE

## 2022-08-02 RX ORDER — CLONIDINE HYDROCHLORIDE 0.1 MG/1
0.1 TABLET ORAL EVERY 8 HOURS
Qty: 270 TABLET | Refills: 3 | Status: SHIPPED | OUTPATIENT
Start: 2022-08-02

## 2022-08-02 RX ORDER — GLIPIZIDE 5 MG/1
5 TABLET, FILM COATED, EXTENDED RELEASE ORAL 2 TIMES DAILY
Qty: 180 TABLET | Refills: 1 | Status: SHIPPED | OUTPATIENT
Start: 2022-08-02 | End: 2023-01-30 | Stop reason: SDUPTHER

## 2022-08-02 NOTE — PROGRESS NOTES
Subjective     Chief Complaint   Patient presents with   • Hypertension     Pt states his BP has been running in the 170's    • Diabetes     Worried about elevated blood sugar        History of Present Illness     The patient is accompanied by an adult female.    Hypertension  The patient reports his blood pressure has been elevated for several weeks. He mentions his blood pressure has been staying in the 160s and 170s mm Hg. He notes his blood sugar was 114 mg/dL the other day, and this morning it was 180 mg/dL. He reports yesterday afternoon it went down to 143 mg/dL, and now it is 260 mg/dL. He mentions he was supposed to have his 6-month checkup 2.5 months ago, but he got canceled out, but they could not set it until the other day, but then he got canceled out again. He reports he is taking amlodipine 5 mg, metoprolol 25 mg, and clonidine 0.1 mg once a day. He mentions he ran out of glipizide 2.5 mg yesterday. He reports he is not sure if he has been on glipizide for his blood sugar. He mentions he has not been eating sausage and pedroza for a few months. [facility] because he is having trouble with his eyes. He reports he sleeps with a bag for 30 minutes, and every morning in his bag runs anywhere from 1500 to 2500. He mentions he gets some fluid in his legs today, but he sleeps with them elevated at night, and he is okay the next morning.    Hyperlipidemia  The patient reports he was taken off of fenofibrate by Dr. Yousif. He mentions he was told his triglycerides were a little high, but not high enough to worry him.    History of malignant neoplasm of the bladder  The patient reports he had his bladder, prostate, and lymph nodes removed. He mentions 6 months later he was put in the hospital, and his kidneys were shutting down. He reports he was on metformin and lisinopril for years. He mentions he was told not to take more of that. He reports he  was told he would not let him have arthritis medicine on account of his kidneys. He mentions he was drinking milk 2 to 3 times a week, and he did not need that. He reports he has had 3 bad experiences with nurse practitioners, and one of them gave him the wrong medicine. He reports he does not do nurse practitioners to him, and they do not keep up with the medicine like they need to.    Dry eyes  The patient reports he gets aching in his eyes, and he has to take eye drops. He mentions he has not had his pressure checked in over a year. He reports he can get his phone and look at his phone for a few minutes, and his eye is blurry.    Patient's PMR from outside medical facility reviewed and noted.    Complete ROS reviewed and negative except as mentioned in the HPI.      Patient's PMR from outside medical facility reviewed and noted.    Review of Systems     Otherwise complete ROS reviewed and negative except as mentioned in the HPI.    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   • Degenerative arthritis of cervical spine with nerve compression     per patient report   • Diabetes mellitus (HCC)    • GERD (gastroesophageal reflux disease)    • Hearing aid worn    • Hypertension    • Impaired hearing     without hearing aids can barely hear anything   • Neuropathy    • Sleep apnea     wears cpap     Past Surgical History:  Past Surgical History:   Procedure Laterality Date   • CATARACT EXTRACTION W/ INTRAOCULAR LENS IMPLANT     • CHOLECYSTECTOMY     • EXPLORATORY LAPAROTOMY     • LEG SURGERY     • TRANSURETHRAL RESECTION OF BLADDER TUMOR Bilateral 7/25/2017    Procedure: CYSTOSCOPY TRANSURETHRAL RESECTION OF BLADDER TUMOR & POSSIBLE BILATERAL RETROGRADE URETEROPYELOGRAMS;  Surgeon: Chris Esparza MD;  Location: [facility] OR;  Service:    • TRANSURETHRAL RESECTION OF BLADDER TUMOR N/A 11/3/2017     Procedure: CYSTOSCOPY TRANSURETHRAL RESECTION OF BLADDER TUMOR ;  Surgeon: Chris Esparza MD;  Location:  [facility] OR;  Service:    • TRANSURETHRAL RESECTION OF BLADDER TUMOR N/A 9/10/2018    Procedure: CYSTOSCOPY TRANSURETHRAL RESECTION OF LARGE BLADDER TUMOR, CLOT EVACUATION, AND FULGURATION;  Surgeon: Chris Esparza MD;  Location:  [facility] OR;  Service: Urology     Social History:  reports that he quit smoking about 25 years ago. He has never used smokeless tobacco. He reports that he does not drink alcohol and does not use drugs.    Family History: family history includes Arthritis in his mother; Cancer in his brother, father, and sister; Heart disease in his mother and sister; Hypertension in his father and mother.      Allergies:  Allergies   Allergen Reactions   • Penicillins Rash     Medications:  Prior to Admission medications    Medication Sig Start Date End Date Taking? Authorizing Provider   allopurinol (ZYLOPRIM) 300 MG tablet Take 300 mg by mouth Daily.   Yes ProviderSanket MD   amLODIPine (NORVASC) 5 MG tablet Take 1 tablet by mouth Daily. 10/25/21  Yes Fidel Yousif MD   aspirin 81 MG tablet Take 81 mg by mouth Daily.   Yes ProviderSanket MD   chlorthalidone (HYGROTON) 25 MG tablet Take 1 tablet by mouth Daily. 2/18/22  Yes Sarah Thakkar DO   cloNIDine (CATAPRES) 0.1 MG tablet Take 1 tablet by mouth Every 8 (Eight) Hours. 8/2/22  Yes Sarah Thakkar DO   fluticasone (FLONASE) 50 MCG/ACT nasal spray 1 spray into the nostril(s) as directed by provider 2 (Two) Times a Day As Needed for Allergies. 10/19/20  Yes Fidel Yousif MD   gabapentin (NEURONTIN) 400 MG capsule Take 800 mg by mouth Every 8 (Eight) Hours. Take 2 capsules by mouth in the am, two capsules by mouth in the afternoon, and 2 capsules by mouth in the pm.     Patient is taking 2 capsules in the AM, 2 capsules in the afternoon, 1 capsule in the PM   Yes ProviderSanket MD   glipizide (GLUCOTROL XL) 5  "MG ER tablet Take 1 tablet by mouth 2 (Two) Times a Day. 8/2/22  Yes Sarah Thakkar DO   hydrALAZINE (APRESOLINE) 10 MG tablet Take 1 tablet by mouth 3 (Three) Times a Day. 9/2/21  Yes Fidel Yousif MD   hydrocortisone (ANUSOL-HC) 25 MG suppository Insert 25 mg into the rectum 2 (Two) Times a Day As Needed for Hemorrhoids.   Yes Sanket Marrero MD   hydrocortisone 2.5 % cream Apply  topically to the appropriate area as directed 2 (Two) Times a Day. Apply to hemorrhoids   Yes Sanket Marrero MD   lovastatin (MEVACOR) 20 MG tablet Take 1 tablet by mouth Every Night. 11/3/21  Yes Fidel Yousif MD   metoprolol succinate XL (Toprol XL) 25 MG 24 hr tablet Take 0.5 tablet in the AM and taken 0.5 tablet in the PM 9/13/21  Yes Fidel Yousif MD   Nystatin powder Take  by mouth.   Yes Sanket Marrero MD   omeprazole (priLOSEC) 20 MG capsule Take 20 mg by mouth 2 (Two) Times a Day.   Yes Sanket Marrero MD   cloNIDine (CATAPRES) 0.1 MG tablet Take 1 tablet by mouth Every Night. 7/15/22 8/2/22 Yes Sarah Thakkar DO   glipizide (GLUCOTROL XL) 2.5 MG 24 hr tablet Take 1 tablet by mouth 2 (Two) Times a Day. 1/28/22 8/2/22 Yes Fidel Yousif MD       Objective     Vital Signs: /74 (BP Location: Left arm, Patient Position: Sitting, Cuff Size: Adult)   Pulse 75   Temp 97.1 °F (36.2 °C) (Temporal)   Ht 177.8 cm (70\")   Wt 110 kg (243 lb)   SpO2 96%   BMI 34.87 kg/m²   Physical Exam  Vitals and nursing note reviewed.   Constitutional:       Appearance: Normal appearance.   HENT:      Head: Normocephalic and atraumatic.      Right Ear: External ear normal.      Left Ear: External ear normal.      Nose: Nose normal.      Mouth/Throat:      Mouth: Mucous membranes are moist.   Eyes:      Extraocular Movements: Extraocular movements intact.      Conjunctiva/sclera: Conjunctivae normal.      Pupils: Pupils are equal, round, and reactive to light.   Cardiovascular:      Rate and " Rhythm: Normal rate and regular rhythm.      Pulses: Normal pulses.      Heart sounds: No murmur heard.    No friction rub. No gallop.   Pulmonary:      Effort: Pulmonary effort is normal.      Breath sounds: Normal breath sounds.   Abdominal:      General: Bowel sounds are normal.      Palpations: Abdomen is soft.   Musculoskeletal:         General: Normal range of motion.      Cervical back: Normal range of motion and neck supple.   Skin:     General: Skin is warm and dry.      Capillary Refill: Capillary refill takes less than 2 seconds.   Neurological:      General: No focal deficit present.      Mental Status: He is alert and oriented to person, place, and time.      Cranial Nerves: No cranial nerve deficit.   Psychiatric:         Mood and Affect: Mood normal.         Behavior: Behavior normal.         BMI is >= 30 and <35. (Class 1 Obesity). The following options were offered after discussion;: exercise counseling/recommendations and nutrition counseling/recommendations      Results Reviewed:  Glucose   Date Value Ref Range Status   07/26/2022 120 (H) 65 - 99 mg/dL Final   02/02/2019 110 (H) 70 - 100 mg/dL Final     BUN   Date Value Ref Range Status   07/26/2022 24 (H) 8 - 23 mg/dL Final   12/30/2021 24 8 - 26 mg/dL Final     Creatinine   Date Value Ref Range Status   07/26/2022 1.03 0.76 - 1.27 mg/dL Final   12/30/2021 1.07 0.72 - 1.25 mg/dL Final     Sodium   Date Value Ref Range Status   07/26/2022 140 136 - 145 mmol/L Final   12/30/2021 140 136 - 145 mmol/L Final     Potassium   Date Value Ref Range Status   07/26/2022 3.7 3.5 - 5.2 mmol/L Final   12/30/2021 3.7 3.3 - 4.8 mmol/L Final     Comment:     Plasma reference ranges are shown, please note that serum reference ranges are higher than plasma.     Chloride   Date Value Ref Range Status   07/26/2022 99 98 - 107 mmol/L Final   12/30/2021 103 98 - 107 mmol/L Final     Total CO2   Date Value Ref Range Status   07/26/2022 26.2 22.0 - 29.0 mmol/L Final    12/30/2021 26 23 - 31 mmol/L Final     Calcium   Date Value Ref Range Status   07/26/2022 9.2 8.6 - 10.5 mg/dL Final   12/30/2021 8.9 8.4 - 10.5 mg/dL Final     ALT (SGPT)   Date Value Ref Range Status   07/26/2022 21 1 - 41 U/L Final   02/02/2019 15 0 - 54 U/L Final     AST (SGOT)   Date Value Ref Range Status   07/26/2022 23 1 - 40 U/L Final   02/02/2019 20 7 - 45 U/L Final     WBC   Date Value Ref Range Status   07/26/2022 8.12 3.40 - 10.80 10*3/mm3 Final     Hematocrit   Date Value Ref Range Status   07/26/2022 46.5 37.5 - 51.0 % Final   02/02/2019 29.8 (L) 40.0 - 52.0 % Final   12/21/2018 36 (L) 41 - 49 % Final     Platelets   Date Value Ref Range Status   07/26/2022 162 140 - 450 10*3/mm3 Final   02/02/2019 211 130 - 400 10*3/mm3 Final     Triglycerides   Date Value Ref Range Status   07/26/2022 860 (H) 0 - 150 mg/dL Final     HDL Cholesterol   Date Value Ref Range Status   07/26/2022 26 (L) 40 - 60 mg/dL Final     LDL Chol Calc (NIH)   Date Value Ref Range Status   07/26/2022 CANCELED mg/dL      Comment:     Test not performed  Unable to calculate    Result canceled by the ancillary.       Hemoglobin A1C   Date Value Ref Range Status   07/26/2022 7.00 (H) 4.80 - 5.60 % Final     Comment:     Hemoglobin A1C Ranges:  Increased Risk for Diabetes  5.7% to 6.4%  Diabetes                     >= 6.5%  Diabetic Goal                < 7.0%     12/06/2021 6.5 % Final   12/21/2018 6.3 % Final     Comment:      Diagnosis of Diabetes    Diabetes Category   Hemoglobin A1c %   Diabetic     >= 6.5 %   Pre-diabetic    5.7-6.4 %   Non-diabetic    <= 5.6 %       Glycemic Targets for Type I and Type II Diabetics    Population    Hemoglobin A1c %   Non-pregnant Adults   < 7.0 %   Pregnant Adults    < 6.0 %   Children and Adolescents  < 7.5 %     Source:  American Diabetes Association. Standards of medical care in [id number]. Diabetes Care. 2015;38(suppl 1):S1-S93.         Assessment / Plan     Assessment/Plan:  1. Type 2  diabetes mellitus with hyperglycemia, without long-term current use of insulin (HCC)    - glipizide (GLUCOTROL XL) 5 MG ER tablet; Take 1 tablet by mouth 2 (Two) Times a Day.  Dispense: 180 tablet; Refill: 1  cmp  a1c    2. Primary hypertension  Monitor blood pressure.  Goal less than 130/80.  Continue current medications.  If blood pressure remains elevated, please return to the office with a log of the blood pressures.    3. Malignant neoplasm of overlapping sites of bladder (HCC)  Follow urology routinely    4. Gastroesophageal reflux disease without esophagitis  Continue PPI  Follow-up with GI for scope    5. Joseph's esophagus without dysplasia  Continue PPI  Routine follow-up with GI        Plan: The patient is going to be better about his diet. He is going to stop eating so many hot dogs, pedroza, and sausage. He is going to check his blood sugars twice a day. We are going to increase the glipizide to 5 mg twice a day and the clonidine 2.1 mg three times a day. He is to keep a log of his blood sugars and his blood pressure and bring them back to the office with him when he comes in 3 months. He is to get a good dilated eye exam. [facility]. We have encouraged him to keep that appointment. Follow up in 3 months.    Return in about 3 months (around 11/2/2022). unless patient needs to be seen sooner or acute issues arise.      I have discussed the patient results/orders and and plan/recommendation with them at today's visit.        08/02/2022    Transcribed from ambient dictation for Sarah Thakkar DO by JOHN CASTORENA.  08/02/22   10:23 CDT    Patient verbalized consent to the visit recording.

## 2022-08-09 ENCOUNTER — TELEPHONE (OUTPATIENT)
Dept: INTERNAL MEDICINE | Facility: CLINIC | Age: 72
End: 2022-08-09

## 2022-08-09 NOTE — TELEPHONE ENCOUNTER
Pt seen 8/2 with elevated BP and sugar.  He says he is now developed leg swelling and has gained 5-7 lbs of fluid since last visit.  Denies any significant SOB.  Says BP and sugar are still running in the same ranges as before.  He is wanting to be seen this week, does not wish to see APRN. Will check with Dr. Thakkar regarding the schedule and call him back.

## 2022-08-09 NOTE — TELEPHONE ENCOUNTER
Dr. Thakkar reviewed and says ok to add to tomorrow or Thursdays schedule.  Appt made for tomorrow.

## 2022-08-10 ENCOUNTER — OFFICE VISIT (OUTPATIENT)
Dept: INTERNAL MEDICINE | Facility: CLINIC | Age: 72
End: 2022-08-10

## 2022-08-10 VITALS
WEIGHT: 249 LBS | BODY MASS INDEX: 35.65 KG/M2 | DIASTOLIC BLOOD PRESSURE: 70 MMHG | TEMPERATURE: 97.1 F | OXYGEN SATURATION: 97 % | SYSTOLIC BLOOD PRESSURE: 140 MMHG | HEIGHT: 70 IN | HEART RATE: 62 BPM

## 2022-08-10 DIAGNOSIS — I10 PRIMARY HYPERTENSION: Primary | ICD-10-CM

## 2022-08-10 DIAGNOSIS — E11.65 TYPE 2 DIABETES MELLITUS WITH HYPERGLYCEMIA, WITHOUT LONG-TERM CURRENT USE OF INSULIN: ICD-10-CM

## 2022-08-10 PROCEDURE — 99214 OFFICE O/P EST MOD 30 MIN: CPT | Performed by: FAMILY MEDICINE

## 2022-08-10 RX ORDER — LOSARTAN POTASSIUM 50 MG/1
50 TABLET ORAL DAILY
Qty: 30 TABLET | Refills: 3 | Status: SHIPPED | OUTPATIENT
Start: 2022-08-10 | End: 2022-12-05 | Stop reason: SDUPTHER

## 2022-08-10 NOTE — PROGRESS NOTES
"        Subjective     Chief Complaint   Patient presents with   • Diabetes     BG has been running high   • Edema     Bilateral leg edema issues for a few wks, edema was not as bad 2 wks ago when he was seen here       History of Present Illness    The patient presents today for a follow-up visit. He is accompanied by an adult female.    The patient states his blood pressure has continued to slightly increase. He states his blood pressure readings at home are approximately 150, 160, and 170 mmHg systolic. He notes that today in the office his systolic blood pressure, measured from his right upper extremity, was  \"130 something,\" The adult female notes that his blood pressure was also measured from his left upper extremity. The patient suspects he is retaining fluid, though he notes this may have started prior to his medication being changed. The patient takes amlodipine and metoprolol 25 mg caily. He is fatigued. He reports weakness of his bilateral lower extremities, though he notes he has neuropathy in his bilateral feet and lower extremities. The patient states he has gained 5 pounds since his last visit. He recalls a previous episode of fluid retention which, when treated by Dr. Yousif, resulted in him losing \"7 pounds of fluid.\" He notes [facility] issues.    The patient states his blood glucose this morning, 08/10/2022, was approximately 180 mg/dL and was approximately 170 mg/dLwhich the adult female states is not noted in his blood glucose log. The adult female reports they have been monitoring his sugar intake and reading nutrition labels. They inquire about the sugar content of various foods. He notes he did not eat breakfast yesterday, 08/09/2022, and then noted a blood glucose of 191 mg/dL after a lunch of eggs and cheese. The patient reports that he does not eat anything containing \"over 2 to 3 grams of sugar.\" The adult female and the patient state he does " "not eat bread, sweets, cakes, or cookies. They note that he drinks water as well as 2 cups of coffee in the morning. The adult female reports that he has not been eating anything \"fattening\" and notes they do not understand why his blood glucose is high and he is gaining weight. The patient is offered a referral to [facility] for diabetic diet education, and he states he attended diabetic diet education 2 to 3 times and could not follow the advice offered, as he states he was advised to eat foods a diabetic should not eat. The adult female states he was advised to eat fruit, and he cannot eat fruit. The patient thinks he needs to eat smaller portions. He states he uses almond milk for cereal, which he eats approximately once per month. He states he stopped using grease to cook eggs and uses a small amount of butter instead. The patient states he removes the grease from hamburger. He states he has reduced his intake of potatoes, chips, and hot dogs and eats small amounts of Cheetos.    The patient confirms that his only medication allergy is PENICILLIN.    He utilizes a urostomy bag.    Patient's PMR from outside medical facility reviewed and noted.    Review of Systems     Otherwise complete ROS reviewed and negative except as mentioned in the HPI.    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   • Degenerative arthritis of cervical spine with nerve compression     per patient report   • Diabetes mellitus (HCC)    • GERD (gastroesophageal reflux disease)    • Hearing aid worn    • Hypertension    • Impaired hearing     without hearing aids can barely hear anything   • Neuropathy    • Sleep apnea     wears cpap     Past Surgical History:  Past Surgical History:   Procedure Laterality Date   • CATARACT EXTRACTION W/ INTRAOCULAR LENS IMPLANT     • CHOLECYSTECTOMY     • EXPLORATORY LAPAROTOMY     • " LEG SURGERY     • TRANSURETHRAL RESECTION OF BLADDER TUMOR Bilateral 7/25/2017    Procedure: CYSTOSCOPY TRANSURETHRAL RESECTION OF BLADDER TUMOR & POSSIBLE BILATERAL RETROGRADE URETEROPYELOGRAMS;  Surgeon: Chris Esparza MD;  Location:  [facility] OR;  Service:    • TRANSURETHRAL RESECTION OF BLADDER TUMOR N/A 11/3/2017    Procedure: CYSTOSCOPY TRANSURETHRAL RESECTION OF BLADDER TUMOR ;  Surgeon: Chris Esparza MD;  Location:  [facility];  Service:    • TRANSURETHRAL RESECTION OF BLADDER TUMOR N/A 9/10/2018    Procedure: CYSTOSCOPY TRANSURETHRAL RESECTION OF LARGE BLADDER TUMOR, CLOT EVACUATION, AND FULGURATION;  Surgeon: Chris Esparza MD;  Location:  [facility] OR;  Service: Urology     Social History:  reports that he quit smoking about 25 years ago. He has never used smokeless tobacco. He reports that he does not drink alcohol and does not use drugs.    Family History: family history includes Arthritis in his mother; Cancer in his brother, father, and sister; Heart disease in his mother and sister; Hypertension in his father and mother.      Allergies:  Allergies   Allergen Reactions   • Penicillins Rash     Medications:  Prior to Admission medications    Medication Sig Start Date End Date Taking? Authorizing Provider   allopurinol (ZYLOPRIM) 300 MG tablet Take 300 mg by mouth Daily.   Yes ProviderSanket MD   amLODIPine (NORVASC) 5 MG tablet Take 1 tablet by mouth Daily. 10/25/21  Yes Fidel Yousif MD   aspirin 81 MG tablet Take 81 mg by mouth Daily.   Yes ProviderSanket MD   chlorthalidone (HYGROTON) 25 MG tablet Take 1 tablet by mouth Daily. 2/18/22  Yes Sarah Thakkar DO   cloNIDine (CATAPRES) 0.1 MG tablet Take 1 tablet by mouth Every 8 (Eight) Hours. 8/2/22  Yes Sarah Thakkar DO   fluticasone (FLONASE) 50 MCG/ACT nasal spray 1 spray into the nostril(s) as directed by provider 2 (Two) Times a Day As Needed for Allergies. 10/19/20  Yes Fidel Yousif MD   gabapentin (NEURONTIN)  "400 MG capsule Take 800 mg by mouth Every 8 (Eight) Hours. Take 2 capsules by mouth in the am, two capsules by mouth in the afternoon, and 2 capsules by mouth in the pm.     Patient is taking 2 capsules in the AM, 2 capsules in the afternoon, 1 capsule in the PM   Yes Sanket Marrero MD   glipizide (GLUCOTROL XL) 5 MG ER tablet Take 1 tablet by mouth 2 (Two) Times a Day. 8/2/22  Yes Sarah Thakkar DO   hydrALAZINE (APRESOLINE) 10 MG tablet Take 1 tablet by mouth 3 (Three) Times a Day. 9/2/21  Yes Fidel Yousif MD   hydrocortisone (ANUSOL-HC) 25 MG suppository Insert 25 mg into the rectum 2 (Two) Times a Day As Needed for Hemorrhoids.   Yes Sanket Marrero MD   hydrocortisone 2.5 % cream Apply  topically to the appropriate area as directed 2 (Two) Times a Day. Apply to hemorrhoids   Yes Sanket Marrero MD   lovastatin (MEVACOR) 20 MG tablet Take 1 tablet by mouth Every Night. 11/3/21  Yes Fidel Yousif MD   metoprolol succinate XL (Toprol XL) 25 MG 24 hr tablet Take 0.5 tablet in the AM and taken 0.5 tablet in the PM 9/13/21  Yes Fidel Yousif MD   Nystatin powder Take  by mouth.   Yes Sanket Marrero MD   omeprazole (priLOSEC) 20 MG capsule Take 20 mg by mouth 2 (Two) Times a Day.   Yes Sanket Marrero MD       Objective     Vital Signs: /70 (BP Location: Right arm, Patient Position: Sitting, Cuff Size: Adult)   Pulse 62   Temp 97.1 °F (36.2 °C) (Temporal)   Ht 177.8 cm (70\")   Wt 113 kg (249 lb)   SpO2 97%   BMI 35.73 kg/m²   Physical Exam  Vitals and nursing note reviewed.   Constitutional:       Appearance: Normal appearance.   HENT:      Head: Normocephalic and atraumatic.      Right Ear: External ear normal.      Left Ear: External ear normal.      Nose: Nose normal.      Mouth/Throat:      Mouth: Mucous membranes are moist.   Eyes:      Extraocular Movements: Extraocular movements intact.      Conjunctiva/sclera: Conjunctivae normal.      Pupils: " Pupils are equal, round, and reactive to light.   Cardiovascular:      Rate and Rhythm: Normal rate and regular rhythm.      Pulses: Normal pulses.      Heart sounds: No murmur heard.    No friction rub. No gallop.   Pulmonary:      Effort: Pulmonary effort is normal.      Breath sounds: Normal breath sounds.   Abdominal:      General: Bowel sounds are normal.      Palpations: Abdomen is soft.   Musculoskeletal:         General: Normal range of motion.      Cervical back: Normal range of motion and neck supple.      Comments: +1 pitting edema  Edema is primarily confined to his bilateral lower extremities and involves bilateral thighs.     Skin:     General: Skin is warm and dry.      Capillary Refill: Capillary refill takes less than 2 seconds.   Neurological:      General: No focal deficit present.      Mental Status: He is alert and oriented to person, place, and time.      Cranial Nerves: No cranial nerve deficit.   Psychiatric:         Mood and Affect: Mood normal.         Behavior: Behavior normal.               Results Reviewed:  Glucose   Date Value Ref Range Status   07/26/2022 120 (H) 65 - 99 mg/dL Final   02/02/2019 110 (H) 70 - 100 mg/dL Final     BUN   Date Value Ref Range Status   07/26/2022 24 (H) 8 - 23 mg/dL Final   12/30/2021 24 8 - 26 mg/dL Final     Creatinine   Date Value Ref Range Status   07/26/2022 1.03 0.76 - 1.27 mg/dL Final   12/30/2021 1.07 0.72 - 1.25 mg/dL Final     Sodium   Date Value Ref Range Status   07/26/2022 140 136 - 145 mmol/L Final   12/30/2021 140 136 - 145 mmol/L Final     Potassium   Date Value Ref Range Status   07/26/2022 3.7 3.5 - 5.2 mmol/L Final   12/30/2021 3.7 3.3 - 4.8 mmol/L Final     Comment:     Plasma reference ranges are shown, please note that serum reference ranges are higher than plasma.     Chloride   Date Value Ref Range Status   07/26/2022 99 98 - 107 mmol/L Final   12/30/2021 103 98 - 107 mmol/L Final     Total CO2   Date Value Ref Range Status    07/26/2022 26.2 22.0 - 29.0 mmol/L Final   12/30/2021 26 23 - 31 mmol/L Final     Calcium   Date Value Ref Range Status   07/26/2022 9.2 8.6 - 10.5 mg/dL Final   12/30/2021 8.9 8.4 - 10.5 mg/dL Final     ALT (SGPT)   Date Value Ref Range Status   07/26/2022 21 1 - 41 U/L Final   02/02/2019 15 0 - 54 U/L Final     AST (SGOT)   Date Value Ref Range Status   07/26/2022 23 1 - 40 U/L Final   02/02/2019 20 7 - 45 U/L Final     WBC   Date Value Ref Range Status   07/26/2022 8.12 3.40 - 10.80 10*3/mm3 Final     Hematocrit   Date Value Ref Range Status   07/26/2022 46.5 37.5 - 51.0 % Final   02/02/2019 29.8 (L) 40.0 - 52.0 % Final   12/21/2018 36 (L) 41 - 49 % Final     Platelets   Date Value Ref Range Status   07/26/2022 162 140 - 450 10*3/mm3 Final   02/02/2019 211 130 - 400 10*3/mm3 Final     Triglycerides   Date Value Ref Range Status   07/26/2022 860 (H) 0 - 150 mg/dL Final     HDL Cholesterol   Date Value Ref Range Status   07/26/2022 26 (L) 40 - 60 mg/dL Final     LDL Chol Calc (NIH)   Date Value Ref Range Status   07/26/2022 CANCELED mg/dL      Comment:     Test not performed  Unable to calculate    Result canceled by the ancillary.       Hemoglobin A1C   Date Value Ref Range Status   07/26/2022 7.00 (H) 4.80 - 5.60 % Final     Comment:     Hemoglobin A1C Ranges:  Increased Risk for Diabetes  5.7% to 6.4%  Diabetes                     >= 6.5%  Diabetic Goal                < 7.0%     12/06/2021 6.5 % Final   12/21/2018 6.3 % Final     Comment:      Diagnosis of Diabetes    Diabetes Category   Hemoglobin A1c %   Diabetic     >= 6.5 %   Pre-diabetic    5.7-6.4 %   Non-diabetic    <= 5.6 %       Glycemic Targets for Type I and Type II Diabetics    Population    Hemoglobin A1c %   Non-pregnant Adults   < 7.0 %   Pregnant Adults    < 6.0 %   Children and Adolescents  < 7.5 %     Source:  American Diabetes Association. Standards of medical care in [id number]. Diabetes Care. 2015;38(suppl 1):S1-S93.         Assessment  / Plan     Assessment/Plan:  1. Primary hypertension    - Comprehensive metabolic panel; Future    2. Type 2 diabetes mellitus with hyperglycemia, without long-term current use of insulin (HCC)          Plan  The patient is very worried about blood pressure and blood glucose readings. We have discussed this at length. He is going to add losartan 50 mg daily for his blood pressure. His chemistries will be monitored in 3 weeks for reassessment. He is to monitor his blood pressure at least daily. From the standpoint of his diabetes mellitus, he is supposed to measure his food and eat 1 portion and not multiple portions and see how this will help lower his blood glucose. He will follow up in the office 4 weeks.    Return in about 4 weeks (around 9/7/2022). unless patient needs to be seen sooner or acute issues arise.      I have discussed the patient results/orders and and plan/recommendation with them at today's visit.      Sarah Thakkar DO   08/10/2022      Transcribed from ambient dictation for Sarah Thakkar DO by MESHA CAMARA.  08/10/22   16:08 CDT    Patient verbalized consent to the visit recording.

## 2022-08-19 ENCOUNTER — TELEPHONE (OUTPATIENT)
Dept: INTERNAL MEDICINE | Facility: CLINIC | Age: 72
End: 2022-08-19

## 2022-08-19 RX ORDER — FUROSEMIDE 20 MG/1
20 TABLET ORAL DAILY
Qty: 3 TABLET | Refills: 0 | Status: SHIPPED | OUTPATIENT
Start: 2022-08-19

## 2022-08-19 NOTE — TELEPHONE ENCOUNTER
I printed this message and gave to Dr Thakkar    She said to do Lasix 20mg for 3 days, weigh daily, and monitor bp.    I spoke with patient's wife and informed.     Will send Lasix to [facility] in [place].

## 2022-08-19 NOTE — TELEPHONE ENCOUNTER
Patient's wife called stating that 's legs are swollen. States its from ankles to the knees. It has been going on for 4-5 days now. They did add the Losartan like you said from 8/10 office visit. Please advise on what needs to be done or if he needs to be seen.

## 2022-08-22 ENCOUNTER — TELEPHONE (OUTPATIENT)
Dept: INTERNAL MEDICINE | Facility: CLINIC | Age: 72
End: 2022-08-22

## 2022-08-22 NOTE — TELEPHONE ENCOUNTER
"Wife called to report after 3 days of Lasix - she says legs are unchanged after Lasix and he is \"low on energy\".  Denies SOB.  Weight has fluctuated from 253 on Friday, down to 249 on Saturday, back to 253 on Sunday.  Urine output unchanged.  BP today is 132/56 P-59.  Informed Dr. Thakkar is out of the office on Monday, would be Tuesday before she would get a call back.  Please advise.  "

## 2022-08-23 ENCOUNTER — TELEPHONE (OUTPATIENT)
Dept: INTERNAL MEDICINE | Facility: CLINIC | Age: 72
End: 2022-08-23

## 2022-08-23 NOTE — TELEPHONE ENCOUNTER
Caller: Eloina Bradley    Relationship: Emergency Contact    Best call back number [phone]    What is the best time to reach you: ANYTIME    Who are you requesting to speak with (clinical staff, provider,  specific staff member): BRENT      What was the call regarding: RETURNING BRENT'S CALL    Do you require a callback: YES

## 2022-09-09 ENCOUNTER — TELEPHONE (OUTPATIENT)
Dept: INTERNAL MEDICINE | Facility: CLINIC | Age: 72
End: 2022-09-09

## 2022-09-09 ENCOUNTER — OFFICE VISIT (OUTPATIENT)
Dept: INTERNAL MEDICINE | Facility: CLINIC | Age: 72
End: 2022-09-09

## 2022-09-09 VITALS
BODY MASS INDEX: 35.79 KG/M2 | TEMPERATURE: 96.8 F | DIASTOLIC BLOOD PRESSURE: 70 MMHG | SYSTOLIC BLOOD PRESSURE: 152 MMHG | WEIGHT: 250 LBS | HEART RATE: 60 BPM | OXYGEN SATURATION: 98 % | HEIGHT: 70 IN

## 2022-09-09 DIAGNOSIS — I10 ESSENTIAL HYPERTENSION: ICD-10-CM

## 2022-09-09 DIAGNOSIS — G47.33 OBSTRUCTIVE SLEEP APNEA SYNDROME: ICD-10-CM

## 2022-09-09 DIAGNOSIS — E78.2 MIXED HYPERLIPIDEMIA: Primary | ICD-10-CM

## 2022-09-09 DIAGNOSIS — E11.65 TYPE 2 DIABETES MELLITUS WITH HYPERGLYCEMIA, WITHOUT LONG-TERM CURRENT USE OF INSULIN: ICD-10-CM

## 2022-09-09 PROCEDURE — 99214 OFFICE O/P EST MOD 30 MIN: CPT | Performed by: FAMILY MEDICINE

## 2022-09-09 RX ORDER — HYDRALAZINE HYDROCHLORIDE 10 MG/1
10 TABLET, FILM COATED ORAL 3 TIMES DAILY
Qty: 270 TABLET | Refills: 3 | Status: SHIPPED | OUTPATIENT
Start: 2022-09-09

## 2022-09-09 RX ORDER — PROCHLORPERAZINE 25 MG/1
SUPPOSITORY RECTAL
Qty: 6 EACH | Refills: 3 | Status: SHIPPED | OUTPATIENT
Start: 2022-09-09

## 2022-09-09 RX ORDER — PROCHLORPERAZINE 25 MG/1
1 SUPPOSITORY RECTAL CONTINUOUS
Qty: 1 EACH | Refills: 0 | Status: SHIPPED | OUTPATIENT
Start: 2022-09-09

## 2022-09-09 RX ORDER — METOPROLOL SUCCINATE 25 MG/1
TABLET, EXTENDED RELEASE ORAL
Qty: 90 TABLET | Refills: 3 | Status: SHIPPED | OUTPATIENT
Start: 2022-09-09

## 2022-09-09 RX ORDER — ORAL SEMAGLUTIDE 3 MG/1
3 TABLET ORAL DAILY
Qty: 30 TABLET | Refills: 3 | Status: SHIPPED | OUTPATIENT
Start: 2022-09-09 | End: 2022-09-13

## 2022-09-09 NOTE — TELEPHONE ENCOUNTER
Needing to have progressive notes as to why he needs the Dexcom sent to the [facility].  Attn to Nurse Rosalina

## 2022-09-09 NOTE — TELEPHONE ENCOUNTER
Wife called back stating insurance said Pioglipazon comes in 15 & 30 MG and can be taken with Glipizide. But needs to watch blood sugar. They would like this called into [facility] in [place]

## 2022-09-09 NOTE — TELEPHONE ENCOUNTER
Spoke with pt wife let them know they will need to contact insurance and see what equivelt is covered to this medication

## 2022-09-09 NOTE — TELEPHONE ENCOUNTER
Caller: Michael Bradley  VER[place]    Relationship: Emergency Contact    Best call back number: [phone]    What is the best time to reach you:  ANY    Who are you requesting to speak with (clinical staff, provider,  specific staff member):  CLINICAL STAFF        What was the call regarding: THE PATIENT'S WIFE STATES THAT THE [employer] IS $500 FOR A THIRTY DAY SUPPLY . THE PATIENT'S WIFE STATES THAT SHE WOULD LIKE TO LET DR. MARES KNOW ABOUT THE COST AND ALSO FIND OUT WHAT OTHER SUGGESTIONS DR. MARES HAS FOR THE PATIENT.    Do you require a callback: YES

## 2022-09-09 NOTE — PROGRESS NOTES
Subjective     Chief Complaint   Patient presents with   • Hypertension     1 month follow up   • Diabetes       History of Present Illness   The patient presents for hypertension, hyperlipidemia and diabetes mellitus type 2. The patient is accompanied by an adult female who contributes to his medical history.     The patient states he is doing well and states his blood pressure is improving.     The patient expresses confusion about his blood glucose levels, mentioning that in the past 8 weeks he has been monitoring the foods or drinks he intakes. He mentions if sugar is present and it is over 2 or 3 gm's, he does not consume it. He denies eating cake, ice cream, hot dogs, and bologna. The patient notes he has eaten sausage and pedroza twice in 8 weeks. He expresses he stopped using grease to cook foods, and discontinued eating carbohydrates. The patient notes he has began eating turkey and lettuce for lunch, without bread and if he consumes canned foods he will either drain the juice from the can or rinse it off.The patient confirms drinking water and an occasional diet soda that he will drink 1 or 2 a week due to artificial sweetener. He confirms taking his blood glucose medication, 1 in the morning and 1 at night, and mentions that he dosage was doubled. The patient mentions that he is retaining fluid, and expresses he will go and sit for 2 or 3 hours in the afternoon and accumulate 1100 mL in his colostomy bag. He reports wearing the colostomy bag during the night, and in the morning it will be filled to 1100 - 1500 mL. The patient adds one morning he noticed his bag was filled to 2500 mL. He mentions occasionally in the afternoon, if he is working outside, the patient's blood glucose levels will decrease to 97 or 100, which he then will go sit down for a while. He mentions he is starting to feel better and is still experiencing some fatigue in the legs.     The patient reports he has been taking vitamins  and inquires about increasing his vitamins and magnesium.     The patient inquires about his triglyceride levels and the female accompanying him mentions that his triglyceride levels where elevated last time it was checked.     The patient confirms receiving his influenza vaccine annually.     The female accompanying the patient states that the patient has received his shingles vaccine.     The patient mentions he has received his first COVID-19 booster vaccine and once the next booster vaccine becomes available he will receive it.     Patient has been monitoring his blood sugar as requested at least before meals and at bedtime.  He notes this is really starting to cause his fingers to be uncomfortable.  We have discussed use of a Dexcom versus a dayron.  He would like to have an order for Dexcom.    Has been using CPAP routinely for years.   Does help he feels.      Patient's PMR from outside medical facility reviewed and noted.    Review of Systems     Otherwise complete ROS reviewed and negative except as mentioned in the HPI.    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   • Degenerative arthritis of cervical spine with nerve compression     per patient report   • Diabetes mellitus (HCC)    • GERD (gastroesophageal reflux disease)    • Hearing aid worn    • Hypertension    • Impaired hearing     without hearing aids can barely hear anything   • Neuropathy    • Sleep apnea     wears cpap     Past Surgical History:  Past Surgical History:   Procedure Laterality Date   • CATARACT EXTRACTION W/ INTRAOCULAR LENS IMPLANT     • CHOLECYSTECTOMY     • EXPLORATORY LAPAROTOMY     • LEG SURGERY     • TRANSURETHRAL RESECTION OF BLADDER TUMOR Bilateral 7/25/2017    Procedure: CYSTOSCOPY TRANSURETHRAL RESECTION OF BLADDER TUMOR & POSSIBLE BILATERAL RETROGRADE URETEROPYELOGRAMS;  Surgeon: Chirs Esparza,  MD;  Location:  [facility];  Service:    • TRANSURETHRAL RESECTION OF BLADDER TUMOR N/A 11/3/2017    Procedure: CYSTOSCOPY TRANSURETHRAL RESECTION OF BLADDER TUMOR ;  Surgeon: Chris Esparza MD;  Location:  [facility];  Service:    • TRANSURETHRAL RESECTION OF BLADDER TUMOR N/A 9/10/2018    Procedure: CYSTOSCOPY TRANSURETHRAL RESECTION OF LARGE BLADDER TUMOR, CLOT EVACUATION, AND FULGURATION;  Surgeon: Chris Esparza MD;  Location:  [facility];  Service: Urology     Social History:  reports that he quit smoking about 25 years ago. He has never used smokeless tobacco. He reports that he does not drink alcohol and does not use drugs.    Family History: family history includes Arthritis in his mother; Cancer in his brother, father, and sister; Heart disease in his mother and sister; Hypertension in his father and mother.      Allergies:  Allergies   Allergen Reactions   • Penicillins Rash     Medications:  Prior to Admission medications    Medication Sig Start Date End Date Taking? Authorizing Provider   allopurinol (ZYLOPRIM) 300 MG tablet Take 300 mg by mouth Daily.   Yes Provider, MD Sanket   amLODIPine (NORVASC) 5 MG tablet Take 1 tablet by mouth Daily. 10/25/21  Yes Fidel Yousif MD   aspirin 81 MG tablet Take 81 mg by mouth Daily.   Yes ProviderSanket MD   chlorthalidone (HYGROTON) 25 MG tablet Take 1 tablet by mouth Daily. 2/18/22  Yes Sarah Thakkar DO   cloNIDine (CATAPRES) 0.1 MG tablet Take 1 tablet by mouth Every 8 (Eight) Hours. 8/2/22  Yes Sarah Thakkar DO   fluticasone (FLONASE) 50 MCG/ACT nasal spray 1 spray into the nostril(s) as directed by provider 2 (Two) Times a Day As Needed for Allergies. 10/19/20  Yes Fidel Yousif MD   gabapentin (NEURONTIN) 400 MG capsule Take 800 mg by mouth Every 8 (Eight) Hours. Take 2 capsules by mouth in the am, two capsules by mouth in the afternoon, and 2 capsules by mouth in the pm.     Patient is taking 2 capsules in the AM, 2 capsules  "in the afternoon, 1 capsule in the PM   Yes Sanket Marrero MD   glipizide (GLUCOTROL XL) 5 MG ER tablet Take 1 tablet by mouth 2 (Two) Times a Day. 8/2/22  Yes Sarah Thakkar DO   hydrALAZINE (APRESOLINE) 10 MG tablet Take 1 tablet by mouth 3 (Three) Times a Day. 9/2/21  Yes Fidel Yousif MD   hydrocortisone (ANUSOL-HC) 25 MG suppository Insert 25 mg into the rectum 2 (Two) Times a Day As Needed for Hemorrhoids.   Yes Sanket Marrero MD   hydrocortisone 2.5 % cream Apply  topically to the appropriate area as directed 2 (Two) Times a Day. Apply to hemorrhoids   Yes Sanket Marrero MD   losartan (COZAAR) 50 MG tablet Take 1 tablet by mouth Daily. 8/10/22  Yes Sarah Thakkar DO   lovastatin (MEVACOR) 20 MG tablet Take 1 tablet by mouth Every Night. 11/3/21  Yes Fidel Yousif MD   metoprolol succinate XL (Toprol XL) 25 MG 24 hr tablet Take 0.5 tablet in the AM and taken 0.5 tablet in the PM 9/13/21  Yes Fidel Yousif MD   Nystatin powder Take  by mouth.   Yes Sanket Marrero MD   omeprazole (priLOSEC) 20 MG capsule Take 20 mg by mouth 2 (Two) Times a Day.   Yes Sanket Marrero MD   furosemide (Lasix) 20 MG tablet Take 1 tablet by mouth Daily. 8/19/22   Sarah Thakkar DO       Objective     Vital Signs: /70 (BP Location: Left arm, Patient Position: Sitting, Cuff Size: Adult)   Pulse 60   Temp 96.8 °F (36 °C) (Temporal)   Ht 177.8 cm (70\")   Wt 113 kg (250 lb)   SpO2 98%   BMI 35.87 kg/m²   Physical Exam  Vitals and nursing note reviewed.   Constitutional:       Appearance: Normal appearance.   HENT:      Head: Normocephalic and atraumatic.      Right Ear: External ear normal.      Left Ear: External ear normal.      Nose: Nose normal.      Mouth/Throat:      Mouth: Mucous membranes are moist.   Eyes:      Extraocular Movements: Extraocular movements intact.      Conjunctiva/sclera: Conjunctivae normal.      Pupils: Pupils are equal, round, and " reactive to light.   Cardiovascular:      Rate and Rhythm: Normal rate and regular rhythm.      Pulses: Normal pulses.      Heart sounds: No murmur heard.    No friction rub. No gallop.   Pulmonary:      Effort: Pulmonary effort is normal.      Breath sounds: Normal breath sounds.   Abdominal:      General: Bowel sounds are normal.      Palpations: Abdomen is soft.   Musculoskeletal:         General: Normal range of motion.      Cervical back: Normal range of motion and neck supple.   Skin:     General: Skin is warm and dry.      Capillary Refill: Capillary refill takes less than 2 seconds.   Neurological:      General: No focal deficit present.      Mental Status: He is alert and oriented to person, place, and time.      Cranial Nerves: No cranial nerve deficit.   Psychiatric:         Mood and Affect: Mood normal.         Behavior: Behavior normal.         Class 2 Severe Obesity (BMI >=35 and <=39.9). Obesity-related health conditions include the following: hypertension. Obesity is unchanged. BMI is is above average; BMI management plan is completed. We discussed portion control and increasing exercise.      Results Reviewed:  Glucose   Date Value Ref Range Status   09/06/2022 157 (H) 65 - 99 mg/dL Final   02/02/2019 110 (H) 70 - 100 mg/dL Final     BUN   Date Value Ref Range Status   09/06/2022 26 (H) 8 - 23 mg/dL Final   12/30/2021 24 8 - 26 mg/dL Final     Creatinine   Date Value Ref Range Status   09/06/2022 1.32 (H) 0.76 - 1.27 mg/dL Final   12/30/2021 1.07 0.72 - 1.25 mg/dL Final     Sodium   Date Value Ref Range Status   09/06/2022 141 136 - 145 mmol/L Final   12/30/2021 140 136 - 145 mmol/L Final     Potassium   Date Value Ref Range Status   09/06/2022 4.4 3.5 - 5.2 mmol/L Final   12/30/2021 3.7 3.3 - 4.8 mmol/L Final     Comment:     Plasma reference ranges are shown, please note that serum reference ranges are higher than plasma.     Chloride   Date Value Ref Range Status   09/06/2022 105 98 - 107 mmol/L  Final   12/30/2021 103 98 - 107 mmol/L Final     Total CO2   Date Value Ref Range Status   09/06/2022 27.8 22.0 - 29.0 mmol/L Final   12/30/2021 26 23 - 31 mmol/L Final     Calcium   Date Value Ref Range Status   09/06/2022 9.1 8.6 - 10.5 mg/dL Final   12/30/2021 8.9 8.4 - 10.5 mg/dL Final     ALT (SGPT)   Date Value Ref Range Status   09/06/2022 16 1 - 41 U/L Final   02/02/2019 15 0 - 54 U/L Final     AST (SGOT)   Date Value Ref Range Status   09/06/2022 16 1 - 40 U/L Final   02/02/2019 20 7 - 45 U/L Final     WBC   Date Value Ref Range Status   07/26/2022 8.12 3.40 - 10.80 10*3/mm3 Final     Hematocrit   Date Value Ref Range Status   07/26/2022 46.5 37.5 - 51.0 % Final   02/02/2019 29.8 (L) 40.0 - 52.0 % Final   12/21/2018 36 (L) 41 - 49 % Final     Platelets   Date Value Ref Range Status   07/26/2022 162 140 - 450 10*3/mm3 Final   02/02/2019 211 130 - 400 10*3/mm3 Final     Triglycerides   Date Value Ref Range Status   07/26/2022 860 (H) 0 - 150 mg/dL Final     HDL Cholesterol   Date Value Ref Range Status   07/26/2022 26 (L) 40 - 60 mg/dL Final     LDL Chol Calc (NIH)   Date Value Ref Range Status   07/26/2022 CANCELED mg/dL      Comment:     Test not performed  Unable to calculate    Result canceled by the ancillary.       Hemoglobin A1C   Date Value Ref Range Status   07/26/2022 7.00 (H) 4.80 - 5.60 % Final     Comment:     Hemoglobin A1C Ranges:  Increased Risk for Diabetes  5.7% to 6.4%  Diabetes                     >= 6.5%  Diabetic Goal                < 7.0%     12/06/2021 6.5 % Final   12/21/2018 6.3 % Final     Comment:      Diagnosis of Diabetes    Diabetes Category   Hemoglobin A1c %   Diabetic     >= 6.5 %   Pre-diabetic    5.7-6.4 %   Non-diabetic    <= 5.6 %       Glycemic Targets for Type I and Type II Diabetics    Population    Hemoglobin A1c %   Non-pregnant Adults   < 7.0 %   Pregnant Adults    < 6.0 %   Children and Adolescents  < 7.5 %     Source:  American Diabetes Association. Standards of  medical care in [id number]. Diabetes Care. 2015;38(suppl 1):S1-S93.         Assessment / Plan     Assessment/Plan:  1. Essential hypertension    - metoprolol succinate XL (Toprol XL) 25 MG 24 hr tablet; Take 0.5 tablet in the AM and taken 0.5 tablet in the PM  Dispense: 90 tablet; Refill: 3    2. Mixed hyperlipidemia    - Lipid Panel    3. Type 2 diabetes mellitus with hyperglycemia, without long-term current use of insulin ([facility])    Requested Dexcom for patient use.  I feel this will improve his compliance with checking his blood sugars and actually help improve his blood sugar control    4.  Obstructive sleep apnea    Supplies as needed.         Plan:  The patient is going to try Rybelsus 3 mg daily. If he is unable to afford it, he is to call the office. I have given him a prescription for the Dexcom monitoring system, [facility]. The patient is going to continue to attempt to eat lean meats and he is going to come back on 09/12/2022, for a lipid panel.     The patient will follow up in 3 months unless he has a problem.      Return in about 3 months (around 12/9/2022). unless patient needs to be seen sooner or acute issues arise.      I have discussed the patient results/orders and and plan/recommendation with them at today's visit.      Sarah Thakkar DO   09/09/2022    Transcribed from ambient dictation for Sarah Thakkar DO by Annie Waterman .  09/09/22   15:05 CDT    Patient verbalized consent to the visit recording.

## 2022-09-12 ENCOUNTER — TELEPHONE (OUTPATIENT)
Dept: INTERNAL MEDICINE | Facility: CLINIC | Age: 72
End: 2022-09-12

## 2022-09-12 NOTE — TELEPHONE ENCOUNTER
Wife called stating pt wants Faith CHENG To call him. All she would say is he wanted to talk to her. I kept asking and one thing she said was med changes .

## 2022-09-12 NOTE — TELEPHONE ENCOUNTER
Needing new Cpap machine as his is leaking air.  Has been compliant with Cpap use for the last 30+ years.  Ok to addend 9/9 note to include Cpap supplies?  ([employer])

## 2022-09-12 NOTE — TELEPHONE ENCOUNTER
Wife says she does not recall him taking Januvia.  Please send in Rx if that is your next recommendation.

## 2022-09-13 LAB
CHOLEST SERPL-MCNC: 103 MG/DL (ref 0–200)
HDLC SERPL-MCNC: 25 MG/DL (ref 40–60)
LDLC SERPL CALC-MCNC: 24 MG/DL (ref 0–100)
TRIGL SERPL-MCNC: 384 MG/DL (ref 0–150)
VLDLC SERPL CALC-MCNC: 54 MG/DL (ref 5–40)

## 2022-09-16 ENCOUNTER — TELEPHONE (OUTPATIENT)
Dept: INTERNAL MEDICINE | Facility: CLINIC | Age: 72
End: 2022-09-16

## 2022-09-16 NOTE — TELEPHONE ENCOUNTER
Caller: Eloina Bradley    Relationship: Emergency Contact    Best call back number: [phone]    What is the best time to reach you: ANY    Who are you requesting to speak with (clinical staff, provider,  specific staff member): CLINICAL     What was the call regarding: PATIENTS WIFE STATES THAT THERE ARE SOME QUESTIONS ABOUT PATIENTS MEDICATION CHANGES. REQUESTED THAT BRENT CALL HER BACK.     Do you require a callback: YES

## 2022-09-16 NOTE — TELEPHONE ENCOUNTER
Wife called and stated that Januiva is still to high, it is over $200 a month. The only other option they gave was one discussed previously ( pioglipazon) that would adversely effect other issues he had.  Please advise

## 2022-10-21 DIAGNOSIS — I10 ESSENTIAL HYPERTENSION: ICD-10-CM

## 2022-10-21 RX ORDER — AMLODIPINE BESYLATE 5 MG/1
5 TABLET ORAL DAILY
Qty: 90 TABLET | Refills: 3 | Status: SHIPPED | OUTPATIENT
Start: 2022-10-21

## 2022-10-21 NOTE — TELEPHONE ENCOUNTER
Caller: Eloina Bradley    Relationship: Emergency Contact    Best call back number:[phone]     Requested Prescriptions:   Requested Prescriptions     Pending Prescriptions Disp Refills   • amLODIPine (NORVASC) 5 MG tablet 90 tablet 3     Sig: Take 1 tablet by mouth Daily.        Pharmacy where request should be sent: [facility] [address] [place] [address] [phone] [facility] [phone] FX     Additional details provided by patient: PATIENT HAS LESS THAN 3 LEFT.     Does the patient have less than a 3 day supply:  [x] Yes  [] No    Kevin Richards Rep   10/21/22 14:36 CDT

## 2022-10-24 RX ORDER — LOVASTATIN 20 MG/1
20 TABLET ORAL NIGHTLY
Qty: 90 TABLET | Refills: 3 | Status: SHIPPED | OUTPATIENT
Start: 2022-10-24

## 2022-12-05 RX ORDER — LOSARTAN POTASSIUM 50 MG/1
50 TABLET ORAL DAILY
Qty: 90 TABLET | Refills: 3 | Status: SHIPPED | OUTPATIENT
Start: 2022-12-05

## 2022-12-21 ENCOUNTER — TELEPHONE (OUTPATIENT)
Dept: INTERNAL MEDICINE | Facility: CLINIC | Age: 72
End: 2022-12-21

## 2022-12-21 NOTE — TELEPHONE ENCOUNTER
Caller: Eloina Bradley    Relationship to patient: Emergency Contact    Best call back number: [phone]    Patient is needing:     PATIENT CALLED AND WANTED TO KNOW THE DATES OF FOLLOWING VACCINATIONS:    TETANUS AND PNEUMONIA

## 2023-01-30 DIAGNOSIS — E11.65 TYPE 2 DIABETES MELLITUS WITH HYPERGLYCEMIA, WITHOUT LONG-TERM CURRENT USE OF INSULIN: ICD-10-CM

## 2023-01-30 RX ORDER — GLIPIZIDE 5 MG/1
5 TABLET, FILM COATED, EXTENDED RELEASE ORAL 2 TIMES DAILY
Qty: 180 TABLET | Refills: 1 | Status: SHIPPED | OUTPATIENT
Start: 2023-01-30

## 2023-01-30 NOTE — TELEPHONE ENCOUNTER
Caller: Eloina Bradley    Relationship: Emergency Contact    Best call back number:[phone]    Requested Prescriptions:   Requested Prescriptions     Pending Prescriptions Disp Refills   • glipizide (GLUCOTROL XL) 5 MG ER tablet 180 tablet 1     Sig: Take 1 tablet by mouth 2 (Two) Times a Day.        Pharmacy where request should be sent: [facility] [address] [place] [address] [phone] [facility] [phone] FX     Does the patient have less than a 3 day supply:  [x] Yes  [] No    Would you like a call back once the refill request has been completed: [x] Yes [] No    If the office needs to give you a call back, can they leave a voicemail: [x] Yes [] No    Kevin Kimball Rep   01/30/23 10:21 CST

## 2023-02-02 ENCOUNTER — TELEPHONE (OUTPATIENT)
Dept: INTERNAL MEDICINE | Facility: CLINIC | Age: 73
End: 2023-02-02
Payer: MEDICARE

## 2023-02-02 DIAGNOSIS — M10.9 GOUT, UNSPECIFIED CAUSE, UNSPECIFIED CHRONICITY, UNSPECIFIED SITE: Primary | ICD-10-CM

## 2023-02-02 RX ORDER — COLCHICINE 0.6 MG/1
0.6 TABLET ORAL DAILY
Qty: 6 TABLET | Refills: 0 | Status: SHIPPED | OUTPATIENT
Start: 2023-02-02

## 2023-02-02 NOTE — TELEPHONE ENCOUNTER
Pt's spouse called requesting status of Gout medication being called in for pt.  PT is currently in [place] and needs medication called in to [facility] [place], [place] - [address]     I told Pt that Alisha has the message sent to Sarah and that she's with PT's and will get to it as soon as she can.  She voiced understanding.

## 2023-02-02 NOTE — TELEPHONE ENCOUNTER
Patient is okay to either stop taking the lovastatin to take the colchicine, or continue lovastatin with an alternative such as prednisone for the gout. Whatever Sarah feels is best, he is agreeable to.

## 2023-02-02 NOTE — TELEPHONE ENCOUNTER
Patient's wife called and stated that the patient has a history of gout and is needing medication for it. They are in [place] and need meds sent there. I inquired about them contacting his PCP. She stated that they had been trying for several days to contact someone there with no answer. I informed her of inclement weather here as a possible reason as offices were closed. She states the patient hasn't seen Dr. Lion and wants to establish care with Sarah Pugh. I advised I would have to consult with Sarah about prescribing medications and let her know how we needed to move forward. She voiced understanding and had no further questions.

## 2023-02-02 NOTE — TELEPHONE ENCOUNTER
Patient called back and states he would rather stop taking the lovastatin in order to take the colchicine because he knows that works best for his gout flare ups and he believes his cholesterol is doing good.

## 2023-02-02 NOTE — TELEPHONE ENCOUNTER
Colchicine has contraindication for cholesterol medication he is on.   Can he hold  lovastatin for 3 days?

## 2023-02-02 NOTE — TELEPHONE ENCOUNTER
I do not see a history of gout on his chart and I can not see where and what he was treated with. Please ask him what he is usually treated with.

## 2023-02-14 ENCOUNTER — TELEPHONE (OUTPATIENT)
Dept: INTERNAL MEDICINE | Facility: CLINIC | Age: 73
End: 2023-02-14
Payer: MEDICARE

## 2023-02-14 DIAGNOSIS — I10 ESSENTIAL HYPERTENSION: ICD-10-CM

## 2023-02-14 RX ORDER — CHLORTHALIDONE 25 MG/1
25 TABLET ORAL DAILY
Qty: 90 TABLET | Refills: 3 | Status: SHIPPED | OUTPATIENT
Start: 2023-02-14

## 2023-02-14 NOTE — TELEPHONE ENCOUNTER
"PT wife called wanting refills for:     chlorthalidone (HYGROTON) 25 MG tablet     Even though he is not a patient of ours and I explained that to him that he would need to be seen before any refills would be submitted. PT wants to switch from [place] to our location.    PT was very vocal and insistant since he has \"seen Sarah when his wife had an appointment that we can refill his meds.\"  They are currently in [place], [place] and cannot come in for an appointment. [facility] -[phone]    PT wife stated to call her if you cannot fill his meds - [phone].      Thanks,     "

## 2023-02-14 NOTE — TELEPHONE ENCOUNTER
Caller: Eloina Bradley    Relationship: Emergency Contact    Best call back number:[phone]     Requested Prescriptions:   Requested Prescriptions     Pending Prescriptions Disp Refills   • chlorthalidone (HYGROTON) 25 MG tablet 90 tablet 3     Sig: Take 1 tablet by mouth Daily.        Pharmacy where request should be sent: [facility] [address] [place] [address] [phone] [facility] [phone] FX         Does the patient have less than a 3 day supply:  [x] Yes  [] No        Kevin Pulido Rep   02/14/23 08:50 CST

## 2023-02-14 NOTE — TELEPHONE ENCOUNTER
Called pt wife and let her know that patient will have to have an office visit before any medication can be refilled. Pt wife voiced understanding. I offered apt but she did not schedule at this time.

## 2023-04-17 ENCOUNTER — LAB (OUTPATIENT)
Dept: INTERNAL MEDICINE | Facility: CLINIC | Age: 73
End: 2023-04-17
Payer: MEDICARE

## 2023-04-17 DIAGNOSIS — E78.2 MIXED HYPERLIPIDEMIA: ICD-10-CM

## 2023-04-17 DIAGNOSIS — E11.65 TYPE 2 DIABETES MELLITUS WITH HYPERGLYCEMIA, WITHOUT LONG-TERM CURRENT USE OF INSULIN: Primary | ICD-10-CM

## 2023-04-17 DIAGNOSIS — I10 PRIMARY HYPERTENSION: ICD-10-CM

## 2023-04-18 LAB
ALBUMIN SERPL-MCNC: 4.1 G/DL (ref 3.5–5.2)
ALBUMIN/GLOB SERPL: 2.1 G/DL
ALP SERPL-CCNC: 70 U/L (ref 39–117)
ALT SERPL-CCNC: 21 U/L (ref 1–41)
APPEARANCE UR: ABNORMAL
AST SERPL-CCNC: 19 U/L (ref 1–40)
BACTERIA #/AREA URNS HPF: ABNORMAL /HPF
BASOPHILS # BLD AUTO: 0.05 10*3/MM3 (ref 0–0.2)
BASOPHILS NFR BLD AUTO: 0.7 % (ref 0–1.5)
BILIRUB SERPL-MCNC: 0.4 MG/DL (ref 0–1.2)
BILIRUB UR QL STRIP: NEGATIVE
BUN SERPL-MCNC: 21 MG/DL (ref 8–23)
BUN/CREAT SERPL: 18.1 (ref 7–25)
CALCIUM SERPL-MCNC: 9.1 MG/DL (ref 8.6–10.5)
CASTS URNS MICRO: ABNORMAL
CHLORIDE SERPL-SCNC: 102 MMOL/L (ref 98–107)
CHOLEST SERPL-MCNC: 141 MG/DL (ref 0–200)
CO2 SERPL-SCNC: 26.2 MMOL/L (ref 22–29)
COLOR UR: YELLOW
CREAT SERPL-MCNC: 1.16 MG/DL (ref 0.76–1.27)
CRYSTALS URNS MICRO: ABNORMAL
EGFRCR SERPLBLD CKD-EPI 2021: 66.1 ML/MIN/1.73
EOSINOPHIL # BLD AUTO: 0.15 10*3/MM3 (ref 0–0.4)
EOSINOPHIL NFR BLD AUTO: 2.1 % (ref 0.3–6.2)
EPI CELLS #/AREA URNS HPF: ABNORMAL /HPF
ERYTHROCYTE [DISTWIDTH] IN BLOOD BY AUTOMATED COUNT: 14.9 % (ref 12.3–15.4)
GLOBULIN SER CALC-MCNC: 2 GM/DL
GLUCOSE SERPL-MCNC: 217 MG/DL (ref 65–99)
GLUCOSE UR QL STRIP: ABNORMAL
HBA1C MFR BLD: 6.4 % (ref 4.8–5.6)
HCT VFR BLD AUTO: 43.4 % (ref 37.5–51)
HDLC SERPL-MCNC: 25 MG/DL (ref 40–60)
HGB BLD-MCNC: 14.7 G/DL (ref 13–17.7)
HGB UR QL STRIP: NEGATIVE
IMM GRANULOCYTES # BLD AUTO: 0.04 10*3/MM3 (ref 0–0.05)
IMM GRANULOCYTES NFR BLD AUTO: 0.6 % (ref 0–0.5)
KETONES UR QL STRIP: NEGATIVE
LDLC SERPL CALC-MCNC: 17 MG/DL (ref 0–100)
LEUKOCYTE ESTERASE UR QL STRIP: ABNORMAL
LYMPHOCYTES # BLD AUTO: 1.48 10*3/MM3 (ref 0.7–3.1)
LYMPHOCYTES NFR BLD AUTO: 20.8 % (ref 19.6–45.3)
MCH RBC QN AUTO: 30 PG (ref 26.6–33)
MCHC RBC AUTO-ENTMCNC: 33.9 G/DL (ref 31.5–35.7)
MCV RBC AUTO: 88.6 FL (ref 79–97)
MONOCYTES # BLD AUTO: 0.42 10*3/MM3 (ref 0.1–0.9)
MONOCYTES NFR BLD AUTO: 5.9 % (ref 5–12)
NEUTROPHILS # BLD AUTO: 4.96 10*3/MM3 (ref 1.7–7)
NEUTROPHILS NFR BLD AUTO: 69.9 % (ref 42.7–76)
NITRITE UR QL STRIP: NEGATIVE
NRBC BLD AUTO-RTO: 0 /100 WBC (ref 0–0.2)
PH UR STRIP: >=9 [PH] (ref 5–8)
PLATELET # BLD AUTO: 148 10*3/MM3 (ref 140–450)
POTASSIUM SERPL-SCNC: 4.1 MMOL/L (ref 3.5–5.2)
PROT SERPL-MCNC: 6.1 G/DL (ref 6–8.5)
PROT UR QL STRIP: ABNORMAL
RBC # BLD AUTO: 4.9 10*6/MM3 (ref 4.14–5.8)
RBC #/AREA URNS HPF: ABNORMAL /HPF
SODIUM SERPL-SCNC: 140 MMOL/L (ref 136–145)
SP GR UR STRIP: 1.01 (ref 1–1.03)
TRIGL SERPL-MCNC: 786 MG/DL (ref 0–150)
TSH SERPL DL<=0.005 MIU/L-ACNC: 1.74 UIU/ML (ref 0.27–4.2)
UROBILINOGEN UR STRIP-MCNC: ABNORMAL MG/DL
VLDLC SERPL CALC-MCNC: 99 MG/DL (ref 5–40)
WBC # BLD AUTO: 7.1 10*3/MM3 (ref 3.4–10.8)
WBC #/AREA URNS HPF: ABNORMAL /HPF

## 2023-04-19 ENCOUNTER — OFFICE VISIT (OUTPATIENT)
Dept: INTERNAL MEDICINE | Facility: CLINIC | Age: 73
End: 2023-04-19
Payer: MEDICARE

## 2023-04-19 VITALS
SYSTOLIC BLOOD PRESSURE: 128 MMHG | OXYGEN SATURATION: 97 % | DIASTOLIC BLOOD PRESSURE: 76 MMHG | WEIGHT: 243 LBS | BODY MASS INDEX: 34.79 KG/M2 | TEMPERATURE: 97.3 F | HEIGHT: 70 IN | HEART RATE: 55 BPM

## 2023-04-19 DIAGNOSIS — E11.65 TYPE 2 DIABETES MELLITUS WITH HYPERGLYCEMIA, WITHOUT LONG-TERM CURRENT USE OF INSULIN: ICD-10-CM

## 2023-04-19 DIAGNOSIS — K22.70 BARRETT'S ESOPHAGUS WITHOUT DYSPLASIA: ICD-10-CM

## 2023-04-19 DIAGNOSIS — E78.2 MIXED HYPERLIPIDEMIA: ICD-10-CM

## 2023-04-19 DIAGNOSIS — I10 PRIMARY HYPERTENSION: ICD-10-CM

## 2023-04-19 PROBLEM — E11.9 ENCOUNTER FOR DIABETIC FOOT EXAM: Status: RESOLVED | Noted: 2020-10-26 | Resolved: 2023-04-19

## 2023-04-19 PROBLEM — N17.9 ACUTE RENAL FAILURE: Status: RESOLVED | Noted: 2020-06-08 | Resolved: 2023-04-19

## 2023-04-19 PROBLEM — E87.5 ACUTE HYPERKALEMIA: Status: RESOLVED | Noted: 2019-01-30 | Resolved: 2023-04-19

## 2023-04-19 NOTE — PROGRESS NOTES
The ABCs of the Annual Wellness Visit  Subsequent Medicare Wellness Visit    Chief Complaint   Patient presents with   • Medicare Wellness-subsequent      Subjective    History of Present Illness:  Chung Bradley is a 72 y.o. male who presents for a Subsequent Medicare Wellness Visit.    The following portions of the patient's history were reviewed and   updated as appropriate: allergies, current medications, past family history, past medical history, past social history, past surgical history and problem list.    Compared to one year ago, the patient feels his physical   health is the same.    Compared to one year ago, the patient feels his mental   health is the same.    Recent Hospitalizations:  He was not admitted to the hospital during the last year.       Current Medical Providers:  Patient Care Team:  Daquan Lion MD as PCP - General (Internal Medicine)  Chris Esparza MD as Consulting Physician (Urology)    Outpatient Medications Prior to Visit   Medication Sig Dispense Refill   • allopurinol (ZYLOPRIM) 300 MG tablet Take 1 tablet by mouth Daily.     • amLODIPine (NORVASC) 5 MG tablet Take 1 tablet by mouth Daily. 90 tablet 3   • aspirin 81 MG tablet Take 1 tablet by mouth Daily.     • chlorthalidone (HYGROTON) 25 MG tablet Take 1 tablet by mouth Daily. 90 tablet 3   • colchicine 0.6 MG tablet Take 1 tablet by mouth Daily. Take 1.2mg now, then additional 0.6mg 1 hour later if still in pain. May repeat following day 6 tablet 0   • Continuous Blood Gluc Transmit (Dexcom G6 Transmitter) misc 1 each Continuous. 1 each 0   • cyanocobalamin (VITAMIN B-12) 1000 MCG tablet Take 1 tablet by mouth Daily.     • fluticasone (FLONASE) 50 MCG/ACT nasal spray 1 spray into the nostril(s) as directed by provider 2 (Two) Times a Day As Needed for Allergies. 1 bottle 3   • gabapentin (NEURONTIN) 400 MG capsule Take 2 capsules by mouth Every 8 (Eight) Hours. Take 2 capsules by mouth in the am, two capsules by  mouth in the afternoon, and 2 capsules by mouth in the pm.     Patient is taking 2 capsules in the AM, 2 capsules in the afternoon, 1 capsule in the PM     • glipizide (GLUCOTROL XL) 5 MG ER tablet Take 1 tablet by mouth 2 (Two) Times a Day. 180 tablet 1   • hydrALAZINE (APRESOLINE) 10 MG tablet Take 1 tablet by mouth 3 (Three) Times a Day. 270 tablet 3   • hydrocortisone 2.5 % cream Apply  topically to the appropriate area as directed 2 (Two) Times a Day. Apply to hemorrhoids     • losartan (COZAAR) 50 MG tablet Take 1 tablet by mouth Daily. 90 tablet 3   • lovastatin (MEVACOR) 20 MG tablet Take 1 tablet by mouth Every Night. 90 tablet 3   • metoprolol succinate XL (Toprol XL) 25 MG 24 hr tablet Take 0.5 tablet in the AM and taken 0.5 tablet in the PM 90 tablet 3   • Nystatin powder Take  by mouth.     • omeprazole (priLOSEC) 20 MG capsule Take 1 capsule by mouth 2 (Two) Times a Day.     • cloNIDine (CATAPRES) 0.1 MG tablet Take 1 tablet by mouth Every 8 (Eight) Hours. 270 tablet 3   • Continuous Blood Gluc  (Dexcom G6 ) device 1 each Continuous. 1 each 0   • Continuous Blood Gluc Sensor (Dexcom G6 Sensor) Every 10 (Ten) Days. 6 each 3   • furosemide (Lasix) 20 MG tablet Take 1 tablet by mouth Daily. 3 tablet 0   • hydrocortisone (ANUSOL-HC) 25 MG suppository Insert 25 mg into the rectum 2 (Two) Times a Day As Needed for Hemorrhoids. (Patient not taking: Reported on 4/19/2023)     • SITagliptin (Januvia) 25 MG tablet Take 1 tablet by mouth Daily. 90 tablet 1     No facility-administered medications prior to visit.       No opioid medication identified on active medication list. I have reviewed chart for other potential  high risk medication/s and harmful drug interactions in the elderly.          Aspirin is on active medication list. Aspirin use is indicated based on review of current medical condition/s. Pros and cons of this therapy have been discussed today. Benefits of this medication outweigh  "potential harm.  Patient has been encouraged to continue taking this medication.  .      Patient Active Problem List   Diagnosis   • Bladder neoplasm of uncertain malignant potential   • Spinal stenosis in cervical region   • BMI 34.0-34.9,adult   • Former smoker   • Cervical radiculopathy   • Malignant neoplasm of overlapping sites of bladder   • Bladder cancer   • Cervical spondylosis without myelopathy   • Hypertension   • Sleep apnea   • Type 2 diabetes mellitus with hyperglycemia, without long-term current use of insulin   • Joseph's esophagus without dysplasia   • Family history of esophageal cancer   • History of colonic polyps     Advance Care Planning  Advance Directive is not on file.  ACP discussion was held with the patient during this visit. Patient does not have an advance directive, information provided.       Objective    Vitals:    23 0825   BP: 128/76   BP Location: Left arm   Patient Position: Sitting   Cuff Size: Adult   Pulse: 55   Temp: 97.3 °F (36.3 °C)   TempSrc: Temporal   SpO2: 97%   Weight: 110 kg (243 lb)   Height: 177.8 cm (70\")   PainSc: 0-No pain     Estimated body mass index is 34.87 kg/m² as calculated from the following:    Height as of this encounter: 177.8 cm (70\").    Weight as of this encounter: 110 kg (243 lb).    BMI is >= 30 and <35. (Class 1 Obesity). The following options were offered after discussion;: exercise counseling/recommendations and nutrition counseling/recommendations      Does the patient have evidence of cognitive impairment? No      Lab Results   Component Value Date    CHLPL 141 2023    TRIG 786 (H) 2023    HDL 25 (L) 2023    LDL 17 2023    VLDL 99 (H) 2023    HGBA1C 6.40 (H) 2023            HEALTH RISK ASSESSMENT    Smoking Status:  Social History     Tobacco Use   Smoking Status Former   • Types: Cigarettes   • Quit date:    • Years since quittin.3   Smokeless Tobacco Never     Alcohol Consumption:  Social " History     Substance and Sexual Activity   Alcohol Use No     Fall Risk Screen:    GEOFFADI Fall Risk Assessment was completed, and patient is at LOW risk for falls.Assessment completed on:2023    Depression Screenin/19/2023     8:31 AM   PHQ-2/PHQ-9 Depression Screening   Little Interest or Pleasure in Doing Things 0-->not at all   Feeling Down, Depressed or Hopeless 0-->not at all   PHQ-9: Brief Depression Severity Measure Score 0       Health Habits and Functional and Cognitive Screenin/19/2023     8:32 AM   Functional & Cognitive Status   Do you have difficulty preparing food and eating? No   Do you have difficulty bathing yourself, getting dressed or grooming yourself? No   Do you have difficulty using the toilet? No   Do you have difficulty moving around from place to place? No   Do you have trouble with steps or getting out of a bed or a chair? No   Current Diet Well Balanced Diet   Dental Exam Up to date   Eye Exam Up to date   Exercise (times per week) 2 times per week   Current Exercises Include Yard Work   Do you need help using the phone?  No   Are you deaf or do you have serious difficulty hearing?  Yes   Do you need help with transportation? No   Do you need help shopping? No   Do you need help preparing meals?  No   Do you need help with housework?  No   Do you need help with laundry? No   Do you need help taking your medications? No   Do you need help managing money? No   Do you ever drive or ride in a car without wearing a seat belt? No   Have you felt unusual stress, anger or loneliness in the last month? No   Who do you live with? Spouse   If you need help, do you have trouble finding someone available to you? No   Have you been bothered in the last four weeks by sexual problems? No   Do you have difficulty concentrating, remembering or making decisions? Yes       Age-appropriate Screening Schedule:  Refer to the list below for future screening recommendations based on  patient's age, sex and/or medical conditions. Orders for these recommended tests are listed in the plan section. The patient has been provided with a written plan.    Health Maintenance   Topic Date Due   • Pneumococcal Vaccine 65+ (2 - PPSV23 if available, else PCV20) 12/29/2016   • DIABETIC EYE EXAM  04/15/2022   • URINE MICROALBUMIN  07/26/2023   • INFLUENZA VACCINE  08/01/2023   • HEMOGLOBIN A1C  10/17/2023   • LIPID PANEL  04/17/2024   • ANNUAL WELLNESS VISIT  04/19/2024   • DIABETIC FOOT EXAM  04/19/2024   • TDAP/TD VACCINES (2 - Td or Tdap) 10/23/2027   • COLORECTAL CANCER SCREENING  08/30/2031   • HEPATITIS C SCREENING  Completed   • COVID-19 Vaccine  Completed   • AAA SCREEN (ONE-TIME)  Completed   • ZOSTER VACCINE  Completed              Assessment & Plan   CMS Preventative Services Quick Reference  Risk Factors Identified During Encounter  Immunizations Discussed/Encouraged: Prevnar 20 (Pneumococcal 20-valent conjugate)  - Patient thinks he has had.  Will reach out to health department.    The above risks/problems have been discussed with the patient.  Follow up actions/plans if indicated are seen below in the Assessment/Plan Section.  Pertinent information has been shared with the patient in the After Visit Summary.    Diagnoses and all orders for this visit:    1. BMI 34.0-34.9,adult (Primary)    2. Primary hypertension  -     LDL cholesterol, direct; Future  -     Lipid panel; Future  -     Comprehensive metabolic panel; Future    3. Type 2 diabetes mellitus with hyperglycemia, without long-term current use of insulin  -     LDL cholesterol, direct; Future  -     Lipid panel; Future  -     Comprehensive metabolic panel; Future    4. Joseph's esophagus without dysplasia    5. Mixed hyperlipidemia  -     LDL cholesterol, direct; Future  -     Lipid panel; Future  -     Comprehensive metabolic panel; Future              Result Review :           Follow Up:   Return in about 2 weeks (around 5/3/2023), or  "if symptoms worsen or fail to improve, for Recheck.     An After Visit Summary and PPPS were made available to the patient.                         RAY Lion MD, FACP, FH      Electronically signed by Daquan Lion MD, 04/19/23, 9:18 AM CDT.    Additional E&M Note during same encounter follows:  Patient has multiple medical problems which are significant and separately identifiable that require additional work above and beyond the Medicare Wellness Visit.      Chief Complaint  Medicare Wellness-subsequent    Subjective        HPI  Chung Bradley is also being seen today for BP, DM.        Objective   Vitals:    04/19/23 0825   BP: 128/76   BP Location: Left arm   Patient Position: Sitting   Cuff Size: Adult   Pulse: 55   Temp: 97.3 °F (36.3 °C)   TempSrc: Temporal   SpO2: 97%   Weight: 110 kg (243 lb)   Height: 177.8 cm (70\")   PainSc: 0-No pain     Physical Exam  Vitals reviewed.   Constitutional:       Appearance: He is obese.   HENT:      Mouth/Throat:      Mouth: Mucous membranes are dry.      Pharynx: Oropharynx is clear.   Eyes:      General: No scleral icterus.     Conjunctiva/sclera: Conjunctivae normal.   Cardiovascular:      Rate and Rhythm: Normal rate and regular rhythm.      Heart sounds: No murmur heard.  Pulmonary:      Effort: Pulmonary effort is normal. No respiratory distress.   Skin:     General: Skin is warm and dry.   Neurological:      General: No focal deficit present.      Mental Status: He is alert and oriented to person, place, and time.   Psychiatric:         Mood and Affect: Mood normal.         Behavior: Behavior normal.           The following data was reviewed by: Daquan Lion MD on 04/19/2023:  CMP        10/11/2022    07:08 12/29/2022    10:56 4/17/2023    08:11   CMP   Glucose   217     Glucose  213         BUN 24   28      21     Creatinine 1.12   1.30      1.16     Sodium  137      140     Potassium  3.9      4.1     Chloride  104      102     Calcium  9.2      " 9.1     Total Protein   6.1     Albumin   4.1     Globulin   2.0     Total Bilirubin   0.4     Alkaline Phosphatase   70     AST (SGOT)   19     ALT (SGPT)   21     BUN/Creatinine Ratio   18.1     Anion Gap  9             This result is from an external source.     CBC w/diff        7/26/2022    12:39 4/17/2023    08:11   CBC w/Diff   WBC 8.12   7.10     RBC 5.31   4.90     Hemoglobin 15.6   14.7     Hematocrit 46.5   43.4     MCV 87.6   88.6     MCH 29.4   30.0     MCHC 33.5   33.9     RDW 15.4   14.9     Platelets 162   148     Neutrophil Rel % 74.6   69.9     Lymphocyte Rel % 15.8   20.8     Monocyte Rel % 6.7   5.9     Eosinophil Rel % 1.7   2.1     Basophil Rel % 0.6   0.7       Lipid Panel        7/26/2022    12:39 9/12/2022    10:07 4/17/2023    08:11   Lipid Panel   Total Cholesterol 168   103   141     Triglycerides 860   384   786     HDL Cholesterol 26   25   25     VLDL Cholesterol CANCELED   54   99     LDL Cholesterol  CANCELED   24   17       TSH        7/26/2022    12:39 4/17/2023    08:11   TSH   TSH 2.200   1.740       A1C Last 3 Results        7/26/2022    12:39 4/17/2023    08:11   HGBA1C Last 3 Results   Hemoglobin A1C 7.00   6.40       Microalbumin        7/26/2022    12:39   Microalbumin   Microalbumin, Urine 1,667.4       PSA        7/26/2022    12:39   PSA   PSA <0.014                  Assessment and Plan   Diagnoses and all orders for this visit:    1. BMI 34.0-34.9,adult (Primary)    2. Primary hypertension  -     LDL cholesterol, direct; Future  -     Lipid panel; Future  -     Comprehensive metabolic panel; Future    3. Type 2 diabetes mellitus with hyperglycemia, without long-term current use of insulin  -     LDL cholesterol, direct; Future  -     Lipid panel; Future  -     Comprehensive metabolic panel; Future    4. Joseph's esophagus without dysplasia    5. Mixed hyperlipidemia  -     LDL cholesterol, direct; Future  -     Lipid panel; Future  -     Comprehensive metabolic panel;  Future      For the patient's blood pressure.  The patient is on low doses of 6 medications.  Patient is noted to have had swelling when amlodipine was increased from 5 mg to 10 mg.  The patient is on clonidine 0.1 mg 3 times a day, hydralazine 10 mg 3 times a day, losartan 50 mg, metoprolol 25 mg, chlorthalidone 25 mg.  Discussed with him that we can definitely optimize his blood pressure medications.  He is discussing and complaining of significant fluctuation in his blood pressure.  I think that this is most likely related to his clonidine and hydralazine as they are very quick acting medications, and when they were off, they are noted to have significant fluctuation in these blood pressure readings.  I discussed with him to get me random blood pressure readings and monitor these.  I discussed with him that his blood pressure may be elevated for a little while after stopping the clonidine.  I also discussed with him that if his blood pressure is consistently less than 130 that we can discontinue the hydralazine as well.  I will follow-up with the patient in 2 weeks to monitor this little bit closely.  I would really like to simplify his blood pressure regimen, as the way that he is currently taking, the regimen he is on is going to cause significant fluctuation as well as the very difficult to maintain.  Patient also self treats and adjust his medication sometimes if he is having low blood pressures he does not take medications.  I told him that if he was going to skip the medication, I would recommend skipping the hydralazine.    What a plan to do, discontinue the clonidine.  If patient's blood pressure is okay off clonidine, we will discontinue the hydralazine.  If we need to adjust any medications, I will likely either increase his losartan to 100 mg, or switch his metoprolol succinate over to carvedilol which has a little bit better alpha effect when compared to metoprolol while still providing adequate beta  effect.      Patient has very severe hypertriglyceridemia.  Recommended that he take fish oil likely 1000 mg twice a day will need to be needed.  Patient's triglycerides have improved over recent years, but then subsequently worsened recently.  This severely highly elevated triglycerides will impact the calculation of his LDL cholesterol which is noted to be severely low.  Patient has also a very low HDL cholesterol.  At a future visit I am going to check an LDL direct measurement.  Patient is also noted to be on lovastatin.    Patient on omeprazole.  Tolerates it well.  Needs to continue this given his history of Joseph's esophagus.    Recommended Voltaren gel for his arthritis pain.       Follow Up   Return in about 2 weeks (around 5/3/2023), or if symptoms worsen or fail to improve, for Recheck.  Patient was given instructions and counseling regarding his condition or for health maintenance advice. Please see specific information pulled into the AVS if appropriate.

## 2023-04-20 PROBLEM — K22.70 BARRETT'S ESOPHAGUS WITHOUT DYSPLASIA: Status: ACTIVE | Noted: 2018-01-25

## 2023-04-24 ENCOUNTER — TELEPHONE (OUTPATIENT)
Dept: INTERNAL MEDICINE | Facility: CLINIC | Age: 73
End: 2023-04-24
Payer: MEDICARE

## 2023-04-24 NOTE — TELEPHONE ENCOUNTER
"Wife called stating pt has been feeling \"funny\" since stopping Clonidine.  No other specific complaints.  BP has been higher - 171/81 P-73 today, 180/71 P-70 yesterday.  Discussed with Dr. Lion, who says the \"funny\" feeling he is having is withdrawals from the Clonidine and is to be expected.  He is happy to see him in the office if they prefer.  He can resume Clonidine at one daily or bid and we can wean slower.  Wife/pt voice understanding and will do as instructed.  Did not wish to come in today since getting an explanation/reassurance about the way he was feeling.  He has a FU scheduled for 5/3.  "

## 2023-05-11 ENCOUNTER — OFFICE VISIT (OUTPATIENT)
Dept: INTERNAL MEDICINE | Facility: CLINIC | Age: 73
End: 2023-05-11
Payer: MEDICARE

## 2023-05-11 VITALS
HEIGHT: 70 IN | SYSTOLIC BLOOD PRESSURE: 140 MMHG | HEART RATE: 84 BPM | TEMPERATURE: 97.3 F | DIASTOLIC BLOOD PRESSURE: 68 MMHG | OXYGEN SATURATION: 96 % | BODY MASS INDEX: 33.87 KG/M2 | WEIGHT: 236.6 LBS

## 2023-05-11 DIAGNOSIS — I10 PRIMARY HYPERTENSION: Primary | ICD-10-CM

## 2023-05-11 DIAGNOSIS — E11.65 TYPE 2 DIABETES MELLITUS WITH HYPERGLYCEMIA, WITHOUT LONG-TERM CURRENT USE OF INSULIN: ICD-10-CM

## 2023-05-11 DIAGNOSIS — E66.09 CLASS 1 OBESITY DUE TO EXCESS CALORIES WITH SERIOUS COMORBIDITY AND BODY MASS INDEX (BMI) OF 33.0 TO 33.9 IN ADULT: ICD-10-CM

## 2023-05-11 DIAGNOSIS — N18.2 STAGE 2 CHRONIC KIDNEY DISEASE: ICD-10-CM

## 2023-05-11 PROBLEM — K22.70 BARRETT'S ESOPHAGUS WITHOUT DYSPLASIA: Status: RESOLVED | Noted: 2018-01-25 | Resolved: 2023-05-11

## 2023-05-11 RX ORDER — LOSARTAN POTASSIUM 50 MG/1
50 TABLET ORAL 2 TIMES DAILY
Qty: 60 TABLET | Refills: 0 | Status: SHIPPED | OUTPATIENT
Start: 2023-05-11

## 2023-05-11 NOTE — PATIENT INSTRUCTIONS
- Losartan 100 mg (take 2 tablets of the 50 mg)    - Monitor for 10 days if still elevated (>140) call office.  We will consider switching metoprolol to carvedilol or increase hydralazine.

## 2023-05-11 NOTE — PROGRESS NOTES
"      Chief Complaint  Hypertension (2 week f/u ) and Diabetes (A1c done 04/17/2023 - 6.4%)    Subjective        Chung Bradley presents to [facility]    HPI  Patient here for follow-up.  The patient overall is doing well.      Reviewed his BP log.    As expected, patients BP crept up after stopping clonidine.  It has settled down but still elevated.     Glucose much better.      Review of Systems    Objective   Vital Signs:  /68 (BP Location: Left arm, Patient Position: Sitting, Cuff Size: Adult)   Pulse 84   Temp 97.3 °F (36.3 °C) (Temporal)   Ht 177.8 cm (70\")   Wt 107 kg (236 lb 9.6 oz)   SpO2 96%   BMI 33.95 kg/m²   Estimated body mass index is 33.95 kg/m² as calculated from the following:    Height as of this encounter: 177.8 cm (70\").    Weight as of this encounter: 107 kg (236 lb 9.6 oz).      Physical Exam  Vitals reviewed.   HENT:      Mouth/Throat:      Mouth: Mucous membranes are dry.      Pharynx: Oropharynx is clear.   Eyes:      General: No scleral icterus.     Conjunctiva/sclera: Conjunctivae normal.   Cardiovascular:      Rate and Rhythm: Normal rate and regular rhythm.   Pulmonary:      Effort: Pulmonary effort is normal. No respiratory distress.   Skin:     General: Skin is warm and dry.      Coloration: Skin is not pale.   Neurological:      General: No focal deficit present.      Mental Status: He is alert and oriented to person, place, and time.   Psychiatric:         Mood and Affect: Mood normal.         Behavior: Behavior normal.                   Assessment and Plan   Diagnoses and all orders for this visit:    1. Primary hypertension (Primary)  -     losartan (COZAAR) 50 MG tablet; Take 1 tablet by mouth 2 (Two) Times a Day.  Dispense: 60 tablet; Refill: 0    2. Stage 2 chronic kidney disease  -     losartan (COZAAR) 50 MG tablet; Take 1 tablet by mouth 2 (Two) Times a Day.  Dispense: 60 tablet; Refill: 0    3. Type 2 diabetes mellitus with " hyperglycemia, without long-term current use of insulin    4. Class 1 obesity due to excess calories with serious comorbidity and body mass index (BMI) of 33.0 to 33.9 in adult          - Losartan 100 mg (take 2 tablets of the 50 mg)    - Monitor for 10 days if still elevated (>140) call office.  We will consider switching metoprolol to carvedilol or increase hydralazine.      His metoprolol is 12.5 mg BID of metoprolol succinate which is not going to have a lot of BP effect.     Continue to hold clonidine.      My goal is to simplify patients regimen as he was on low dose of numerous medications.  If does well then maybe can come off the hydralazine (since 3 times a day medication).    Continue current regimen for diabetes.  Well controlled.  Improvement.  Kidney function stable.    Increase activity work on weight.      Result Review :  The following data was reviewed by: Daquan Lion MD on 05/11/2023:  CMP          10/11/2022    07:08 12/29/2022    10:56 4/17/2023    08:11   CMP   Glucose   217     Glucose  213         BUN 24   28      21     Creatinine 1.12   1.30      1.16     Sodium  137      140     Potassium  3.9      4.1     Chloride  104      102     Calcium  9.2      9.1     Total Protein   6.1     Albumin   4.1     Globulin   2.0     Total Bilirubin   0.4     Alkaline Phosphatase   70     AST (SGOT)   19     ALT (SGPT)   21     BUN/Creatinine Ratio   18.1     Anion Gap  9             This result is from an external source.     CBC          7/26/2022    12:39 4/17/2023    08:11   CBC   WBC 8.12   7.10     RBC 5.31   4.90     Hemoglobin 15.6   14.7     Hematocrit 46.5   43.4     MCV 87.6   88.6     MCH 29.4   30.0     MCHC 33.5   33.9     RDW 15.4   14.9     Platelets 162   148       TSH          7/26/2022    12:39 4/17/2023    08:11   TSH   TSH 2.200   1.740       Renal Profile          10/11/2022    07:08 12/29/2022    10:56 4/17/2023    08:11   Renal Profile   BUN 24   28      21     Creatinine  1.12   1.30      1.16         This result is from an external source.     BMP          10/11/2022    07:08 12/29/2022    10:56 4/17/2023    08:11   BMP   Glucose  213         BUN 24   28      21     Creatinine 1.12   1.30      1.16     Sodium  137      140     Potassium  3.9      4.1     Chloride  104      102     CO2  24      26.2     Calcium  9.2      9.1         This result is from an external source.     A1C Last 3 Results          7/26/2022    12:39 4/17/2023    08:11   HGBA1C Last 3 Results   Hemoglobin A1C 7.00   6.40       Microalbumin          7/26/2022    12:39   Microalbumin   Microalbumin, Urine 1,667.4                       Follow Up   Return in about 1 month (around 6/11/2023), or if symptoms worsen or fail to improve.  Patient was given instructions and counseling regarding his condition or for health maintenance advice. Please see specific information pulled into the AVS if appropriate.       RAY Lion MD, FACP, FHM      Electronically signed by Daquan Lion MD, 05/11/23, 4:03 PM CDT.

## 2023-05-14 PROBLEM — E66.811 CLASS 1 OBESITY DUE TO EXCESS CALORIES WITH SERIOUS COMORBIDITY AND BODY MASS INDEX (BMI) OF 33.0 TO 33.9 IN ADULT: Status: ACTIVE | Noted: 2023-05-14

## 2023-05-14 PROBLEM — E66.09 CLASS 1 OBESITY DUE TO EXCESS CALORIES WITH SERIOUS COMORBIDITY AND BODY MASS INDEX (BMI) OF 33.0 TO 33.9 IN ADULT: Status: ACTIVE | Noted: 2023-05-14

## 2023-05-21 DIAGNOSIS — I10 ESSENTIAL HYPERTENSION: ICD-10-CM

## 2023-05-22 RX ORDER — CHLORTHALIDONE 25 MG/1
TABLET ORAL
Qty: 90 TABLET | Refills: 3 | Status: SHIPPED | OUTPATIENT
Start: 2023-05-22

## 2023-06-06 ENCOUNTER — TELEPHONE (OUTPATIENT)
Dept: INTERNAL MEDICINE | Facility: CLINIC | Age: 73
End: 2023-06-06
Payer: MEDICARE

## 2023-06-06 RX ORDER — CARVEDILOL 3.12 MG/1
3.12 TABLET ORAL 2 TIMES DAILY WITH MEALS
Qty: 60 TABLET | Refills: 2 | Status: SHIPPED | OUTPATIENT
Start: 2023-06-06

## 2023-06-06 NOTE — TELEPHONE ENCOUNTER
Pt dropped of home BP readings.  At 5/11 OV, you increased his Losartan to 100mg daily, held Clonidine, and said next step would be to change Metoprolol to Coreg or increase Hydralazine.  Hydralazine dose is 10mg tid currently.

## 2023-06-13 ENCOUNTER — OFFICE VISIT (OUTPATIENT)
Dept: INTERNAL MEDICINE | Facility: CLINIC | Age: 73
End: 2023-06-13
Payer: MEDICARE

## 2023-06-13 VITALS
DIASTOLIC BLOOD PRESSURE: 60 MMHG | BODY MASS INDEX: 33.64 KG/M2 | OXYGEN SATURATION: 98 % | HEART RATE: 74 BPM | HEIGHT: 70 IN | TEMPERATURE: 97.1 F | WEIGHT: 235 LBS | SYSTOLIC BLOOD PRESSURE: 158 MMHG

## 2023-06-13 DIAGNOSIS — E11.65 TYPE 2 DIABETES MELLITUS WITH HYPERGLYCEMIA, WITHOUT LONG-TERM CURRENT USE OF INSULIN: ICD-10-CM

## 2023-06-13 DIAGNOSIS — I10 PRIMARY HYPERTENSION: Primary | ICD-10-CM

## 2023-06-13 DIAGNOSIS — N18.2 STAGE 2 CHRONIC KIDNEY DISEASE: ICD-10-CM

## 2023-06-13 DIAGNOSIS — E66.09 CLASS 1 OBESITY DUE TO EXCESS CALORIES WITH SERIOUS COMORBIDITY AND BODY MASS INDEX (BMI) OF 33.0 TO 33.9 IN ADULT: ICD-10-CM

## 2023-06-13 NOTE — PROGRESS NOTES
"      Chief Complaint  Hypertension (1 month follow up )    Subjective        Chung Bradley presents to [facility]    HPI    Patient here for follow-up for the above problems.  Review of Systems    Objective   Vital Signs:  /60 (BP Location: Left arm, Patient Position: Sitting, Cuff Size: Adult)   Pulse 74   Temp 97.1 °F (36.2 °C) (Temporal)   Ht 177.8 cm (70\")   Wt 107 kg (235 lb)   SpO2 98%   BMI 33.72 kg/m²   Estimated body mass index is 33.72 kg/m² as calculated from the following:    Height as of this encounter: 177.8 cm (70\").    Weight as of this encounter: 107 kg (235 lb).      Physical Exam  Vitals reviewed.   Constitutional:       Appearance: He is obese. He is not ill-appearing.   Cardiovascular:      Rate and Rhythm: Normal rate and regular rhythm.   Pulmonary:      Effort: Pulmonary effort is normal. No respiratory distress.   Skin:     General: Skin is warm and dry.   Neurological:      Mental Status: He is alert.                   Assessment and Plan   Diagnoses and all orders for this visit:    1. Primary hypertension (Primary)    2. Type 2 diabetes mellitus with hyperglycemia, without long-term current use of insulin    3. Class 1 obesity due to excess calories with serious comorbidity and body mass index (BMI) of 33.0 to 33.9 in adult    4. Stage 2 chronic kidney disease      Patient's blood pressure ranges more consistent.  He is pulse has been consistent.  Blood pressures are still higher than we would like them especially with a history of kidney disease.  Patient has a history of significant kidney disease and has settled to where he has been stable with relatively normal kidneys for a while.  Patient is within stage II chronic kidney disease.    Patient's blood sugars have been relatively stable.  Patient has no significant issues.  Patient does not think that he has had any low sugars.    Patient's weight consistent, recommended continue work on " weight loss.    For the patient's blood pressure, I would have him double up on his carvedilol for a while.  If patient's pulse is not too low, the patient's blood pressure is controlled, we will continue this for now.  My overall goal is to try to get him off of the hydralazine with adequate blood pressure control.  3 times daily medications are difficult for compliance as well as causing a lot of fluctuation within the blood pressure.    Result Review :                        Follow Up   Return in about 3 months (around 9/13/2023), or if symptoms worsen or fail to improve, for Recheck.  Patient was given instructions and counseling regarding his condition or for health maintenance advice. Please see specific information pulled into the AVS if appropriate.       RAY Lion MD, FACP, M      Electronically signed by Daquan Lion MD, 06/13/23, 3:04 PM CDT.

## 2023-07-10 PROBLEM — J01.90 ACUTE BACTERIAL SINUSITIS: Status: ACTIVE | Noted: 2023-07-10

## 2023-07-10 PROBLEM — B96.89 ACUTE BACTERIAL SINUSITIS: Status: ACTIVE | Noted: 2023-07-10

## 2023-07-28 DIAGNOSIS — E11.65 TYPE 2 DIABETES MELLITUS WITH HYPERGLYCEMIA, WITHOUT LONG-TERM CURRENT USE OF INSULIN: ICD-10-CM

## 2023-07-28 RX ORDER — GLIPIZIDE 5 MG/1
TABLET, FILM COATED, EXTENDED RELEASE ORAL
Qty: 180 TABLET | Refills: 0 | Status: SHIPPED | OUTPATIENT
Start: 2023-07-28

## 2023-08-10 NOTE — PROGRESS NOTES
Subjective:      Cephus Smoke is a68 y.o. male  Chief Complaint   Patient presents with    Endoscopy       HPI  PCP: Clyde Ramirez MD  Pt made an appt. Has barretts esophagus. Is due for surveillance EGD. On prilosec 20mg daily. He has no breakthrough heartburn/dyspepsia. His father had gastric cancer. No lower GI complaints. Due for screening colonoscopy. Has a personal hx of colon polyps. Family HX:  Father had gastric cancer  Pt denies family hx of colon polyps, colon CA, inflammatory bowel dx, and esophageal CA. Past Medical History:   Diagnosis Date    [facility] rest [facility])     Russell esophagus     Cancer ([facility] [address]     Bladder    Constipation     CVD (cardiovascular disease)     Diabetes ([facility] [address]     ED (erectile dysfunction)     Gout     Hemorrhoid     HLD (hyperlipidemia)     HTN (hypertension)     Neuropathy     Sleep apnea     Thyroid disease     Thyroid Fever          Past Surgical History:   Procedure Laterality Date    ABDOMINAL EXPLORATION SURGERY      APPENDECTOMY      BLADDER SURGERY      2 tumors removed    CARDIAC CATHETERIZATION      CATARACT REMOVAL      Bilateral w/ lens implants.  CHOLECYSTECTOMY      COLONOSCOPY  02/2010    [place]:  HP,  5yr recall    COLONOSCOPY  11/2015    [place]:  normal 5y recall    FEMUR SURGERY      [place] EGD TRANSORAL BIOPSY SINGLE/MULTIPLE N/A 01/25/2018    Dr Cruz-Active chronic esophagogastritis--3 yr recall    UPPER GASTROINTESTINAL ENDOSCOPY  2/2010 3/2011    UPPER GASTROINTESTINAL ENDOSCOPY  11/13/2014    Dr. Darling English: dewey neg, MODERATE CHRONIC ESOPHAGOGASTRITIS.  3y recall       Social History     Socioeconomic History    Marital status:      Spouse name: None    Number of children: None    Years of education: None    Highest education level: None   Occupational History    None   Tobacco Use    Smoking status: Former Smoker     Start date: 8/21/1996    Smokeless tobacco: Never Used   Substance and Sexual Activity    Alcohol use: No    Drug use: No    Sexual activity: None   Other Topics Concern    None   Social History Narrative    None     Social Determinants of Health     Financial Resource Strain:     Difficulty of Paying Living Expenses:    Food Insecurity:     Worried About Running Out of Food in the Last Year:     [address] in the Last Year:    Transportation Needs:     Lack of Transportation (Medical):  Lack of Transportation (Non-Medical):    Physical Activity:     Days of Exercise per Week:     Minutes of Exercise per Session:    Stress:     Feeling of Stress :    Social Connections:     Frequency of Communication with Friends and Family:     Frequency of Social Gatherings with Friends and Family:     Attends Islam Services:     Active Member of Clubs or Organizations:     Attends Club or Organization Meetings:     Marital Status:    Intimate Partner Violence:     Fear of Current or Ex-Partner:     Emotionally Abused:     Physically Abused:     Sexually Abused: Allergies   Allergen Reactions    Pcn [Penicillins] Rash       Current Outpatient Medications   Medication Sig Dispense Refill    chlorthalidone (HYGROTON) 25 MG tablet TAKE 1 TABLET BY MOUTH ONCE DAILY      glipiZIDE (GLUCOTROL XL) 2.5 MG extended release tablet TAKE 1 TABLET BY MOUTH TWICE DAILY      amLODIPine (NORVASC) 5 MG tablet TAKE 1 TABLET BY MOUTH ONCE DAILY      metoprolol tartrate (LOPRESSOR) 25 MG tablet TAKE 1 TABLET BY MOUTH ONCE DAILY AT NIGHT      hydrALAZINE (APRESOLINE) 10 MG tablet TAKE 1 TABLET BY MOUTH THREE TIMES DAILY      cloNIDine (CATAPRES) 0.1 MG tablet       fluticasone (FLONASE) 50 MCG/ACT nasal spray 1 spray by Nasal route 2 times daily as needed      colchicine (COLCRYS) 0.6 MG tablet Take 0.6 mg by mouth daily      fenofibrate (TRICOR) 145 MG tablet Take 145 mg by mouth daily.  allopurinol (ZYLOPRIM) 300 MG tablet Take 300 mg by mouth daily.       aspirin 81 MG tablet Take 81 mg by mouth daily.  gabapentin (NEURONTIN) 400 MG capsule Take 400 mg by mouth daily. Take 6 caps daily      OMEPRAZOLE PO Take 20 mg by mouth 2 times daily       lovastatin (MEVACOR) 40 MG tablet Take 20 mg by mouth nightly        No current facility-administered medications for this visit. Review of Systems   Constitutional: Negative for appetite change, fatigue, fever and unexpected weight change. HENT: Positive for hearing loss. Negative for sore throat, trouble swallowing and voice change. Respiratory: Negative for cough and shortness of breath. Cardiovascular: Negative for chest pain, palpitations and leg swelling. Gastrointestinal: Negative for abdominal distention, abdominal pain, anal bleeding, blood in stool, constipation, diarrhea, nausea, rectal pain and vomiting. Genitourinary: Negative for hematuria. Musculoskeletal: Positive for arthralgias, back pain and neck pain. Neurological: Negative for dizziness, weakness, light-headedness and headaches. Psychiatric/Behavioral: Negative for dysphoric mood and sleep disturbance. The patient is not nervous/anxious. All other systems reviewed and are negative. Objective:     Physical Exam  Vitals and nursing note reviewed. Constitutional:       Appearance: He is well-developed. Comments: BP (!) 150/60   Pulse 57   Ht 5' 10\" (1.778 m)   Wt 243 lb (110.2 kg)   SpO2 98%   BMI 34.87 kg/m²    Eyes:      General: No scleral icterus. Conjunctiva/sclera: Conjunctivae normal.      Pupils: Pupils are equal, round, and reactive to light. Neck:      Thyroid: No thyromegaly. Cardiovascular:      Rate and Rhythm: Normal rate and regular rhythm. Heart sounds: Normal heart sounds. No murmur heard. No friction rub. No gallop. Pulmonary:      Effort: Pulmonary effort is normal. No respiratory distress. Breath sounds: Normal breath sounds.    Abdominal:      General: Bowel sounds are normal. There is no distension. Palpations: Abdomen is soft. Tenderness: There is no abdominal tenderness. There is no rebound. Musculoskeletal:         General: No deformity. Normal range of motion. Cervical back: Normal range of motion and neck supple. Skin:     Coloration: Skin is not pale. Neurological:      Mental Status: He is alert and oriented to person, place, and time. Cranial Nerves: No cranial nerve deficit. Psychiatric:         Judgment: Judgment normal.           Assessment:       Diagnosis Orders   1. Russell's esophagus without dysplasia  ESOPHAGOSCOPY / EGD   2. History of colonic polyps  COLONOSCOPY W/ OR W/O BIOPSY   3. Chronic heartburn  ESOPHAGOSCOPY / EGD         Plan:      1. Schedule outpatient endoscopy with barretts bx. Patient advised no Aspirin, Fish Oil, Vit E or NSAIDs 5 (five) days before procedure. Follow-up Visit: per Dr. Joanie Guerrero  Pt Education:   Risks, benefits, and alternatives to endoscopy were discussed. Patient voices understanding of risks of, but not limited to, perforation, bleeding, and infection. The risk of perforation is increased with esophageal dilatation. All questions answered to patient's satisfaction. Patient is agreable to proceed. 2. Schedule outpatient colonoscopy. Patient advised no Aspirin, Fish Oil, Vit E or NSAIDs 5 (five) days before procedure. Follow-up Visit: per Dr Joanie Guerrero  Pt education:  Risks, benefits, and alternatives to colonoscopy were discussed. Risks of colonoscopy include, but are not limited to, perforation, bleeding, and infection. We discussed that the risk for perforation is 1-3 in 5,000  at the time of colonoscopy;   and 1-2% risk of bleeding post-polypectomy. All questions answered to the satisfaction of the patient. Pt is agreeable to proceed.   3. Time spent 30 min No

## 2023-08-24 ENCOUNTER — TELEPHONE (OUTPATIENT)
Dept: INTERNAL MEDICINE | Facility: CLINIC | Age: 73
End: 2023-08-24
Payer: MEDICARE

## 2023-08-24 DIAGNOSIS — I10 PRIMARY HYPERTENSION: ICD-10-CM

## 2023-08-24 RX ORDER — CARVEDILOL 6.25 MG/1
3.12 TABLET ORAL 2 TIMES DAILY WITH MEALS
Qty: 60 TABLET | Refills: 2
Start: 2023-08-24

## 2023-08-24 NOTE — TELEPHONE ENCOUNTER
Wife/patient called stating patient's HR is running in the upper 40's.  BP is ok - running 130's-140's/60's.  Carvedilol was increased at last visit.  Discussed with Dr. Lion, who says to decrease Carvedilol 6.25mg to 1/2 bid.  Continue to monitor BP and HR and call if BP goes up or HR does not improve.  Pt voiced understanding.

## 2023-09-05 ENCOUNTER — PRE-ADMISSION TESTING (OUTPATIENT)
Dept: PREADMISSION TESTING | Facility: HOSPITAL | Age: 73
End: 2023-09-05
Payer: MEDICARE

## 2023-09-05 VITALS
OXYGEN SATURATION: 97 % | HEART RATE: 50 BPM | RESPIRATION RATE: 16 BRPM | BODY MASS INDEX: 34.88 KG/M2 | WEIGHT: 230.16 LBS | HEIGHT: 68 IN | DIASTOLIC BLOOD PRESSURE: 69 MMHG | SYSTOLIC BLOOD PRESSURE: 165 MMHG

## 2023-09-05 LAB
ANION GAP SERPL CALCULATED.3IONS-SCNC: 12 MMOL/L (ref 5–15)
BUN SERPL-MCNC: 14 MG/DL (ref 8–23)
BUN/CREAT SERPL: 17.1 (ref 7–25)
CALCIUM SPEC-SCNC: 8.7 MG/DL (ref 8.6–10.5)
CHLORIDE SERPL-SCNC: 106 MMOL/L (ref 98–107)
CO2 SERPL-SCNC: 24 MMOL/L (ref 22–29)
CREAT SERPL-MCNC: 0.82 MG/DL (ref 0.76–1.27)
DEPRECATED RDW RBC AUTO: 49.4 FL (ref 37–54)
EGFRCR SERPLBLD CKD-EPI 2021: 91.6 ML/MIN/1.73
ERYTHROCYTE [DISTWIDTH] IN BLOOD BY AUTOMATED COUNT: 15.4 % (ref 12.3–15.4)
GLUCOSE SERPL-MCNC: 177 MG/DL (ref 65–99)
HCT VFR BLD AUTO: 41 % (ref 37.5–51)
HGB BLD-MCNC: 13.3 G/DL (ref 13–17.7)
MCH RBC QN AUTO: 28.7 PG (ref 26.6–33)
MCHC RBC AUTO-ENTMCNC: 32.4 G/DL (ref 31.5–35.7)
MCV RBC AUTO: 88.4 FL (ref 79–97)
PLATELET # BLD AUTO: 143 10*3/MM3 (ref 140–450)
PMV BLD AUTO: 9.3 FL (ref 6–12)
POTASSIUM SERPL-SCNC: 3.6 MMOL/L (ref 3.5–5.2)
RBC # BLD AUTO: 4.64 10*6/MM3 (ref 4.14–5.8)
SODIUM SERPL-SCNC: 142 MMOL/L (ref 136–145)
WBC NRBC COR # BLD: 6.6 10*3/MM3 (ref 3.4–10.8)

## 2023-09-05 PROCEDURE — 36415 COLL VENOUS BLD VENIPUNCTURE: CPT

## 2023-09-05 PROCEDURE — 93005 ELECTROCARDIOGRAM TRACING: CPT

## 2023-09-05 PROCEDURE — 80048 BASIC METABOLIC PNL TOTAL CA: CPT

## 2023-09-05 PROCEDURE — 85027 COMPLETE CBC AUTOMATED: CPT

## 2023-09-05 RX ORDER — HYDRALAZINE HYDROCHLORIDE 10 MG/1
10 TABLET, FILM COATED ORAL 3 TIMES DAILY
COMMUNITY

## 2023-09-05 NOTE — DISCHARGE INSTRUCTIONS
Before you come to the hospital        Arrival time: AS DIRECTED BY OFFICE     YOU MAY TAKE THE FOLLOWING MEDICATION(S) THE MORNING OF SURGERY WITH A SIP OF WATER: Carvedilol, Gabapentin    ****Hold Losartan for 24 hours prior to surgery****           ALL OTHER HOME MEDICATION CHECK WITH YOUR PHYSICIAN (especially if   you are taking diabetes medicines or blood thinners)    Do not take any Erectile Dysfunction medications (EX: CIALIS, VIAGRA) 24 hours prior to surgery.      If you were given and instructed to use a germ- killing soap, use as directed the night before surgery and again the morning of surgery or as directed by your surgeon. (Use one-half of the bottle with each shower.)   See attached information for How to Use Chlorhexidine for Bathing if applicable.            Eating and drinking restrictions prior to scheduled arrival time    2 Hours before arrival time STOP   Drinking Clear liquids (water, black coffee-NO CREAM,  apple juice-no pulp)      8 Hours before arrival time STOP   All food, full liquids, and dairy products    (It is extremely important that you follow these guidelines to prevent delay or cancelation of your procedure)     Clear Liquids  Water and flavored water                                                                      Clear Fruit juices, such as cranberry juice and apple juice.  Black coffee (NO cream of any kind, including powdered).  Plain tea  Clear bouillon or broth.  Flavored gelatin.  Soda.  Gatorade or Powerade.  Full liquid examples  Juices that have pulp.  Frozen ice pops that contain fruit pieces.  Coffee with creamer  Milk.  Yogurt.                MANAGING PAIN AFTER SURGERY    We know you are probably wondering what your pain will be like after surgery.  Following surgery it is unrealistic to expect you will not have pain.   Pain is how our bodies let us know that something is wrong or cautions us to be careful.  That said, our goal is to make your pain  tolerable.    Methods we may use to treat your pain include (oral or IV medications, PCAs, epidurals, nerve blocks, etc.)   While some procedures require IV pain medications for a short time after surgery, transitioning to pain medications by mouth allows for better management of pain.   Your nurse will encourage you to take oral pain medications whenever possible.  IV medications work almost immediately, but only last a short while.  Taking medications by mouth allows for a more constant level of medication in your blood stream for a longer period of time.      Once your pain is out of control it is harder to get back under control.  It is important you are aware when your next dose of pain medication is due.  If you are admitted, your nurse may write the time of your next dose on the white board in your room to help you remember.      We are interested in your pain and encourage you to inform us about aggravating factors during your visit.   Many times a simple repositioning every few hours can make a big difference.    If your physician says it is okay, do not let your pain prevent you from getting out of bed. Be sure to call your nurse for assistance prior to getting up so you do not fall.      Before surgery, please decide your tolerable pain goal.  These faces help describe the pain ratings we use on a 0-10 scale.   Be prepared to tell us your goal and whether or not you take pain or anxiety medications at home.          Preparing for Surgery  Preparing for surgery is an important part of your care. It can make things go more smoothly and help you avoid complications. The steps leading up to surgery may vary among hospitals. Follow all instructions given to you by your health care providers. Ask questions if you do not understand something. Talk about any concerns that you have.  Here are some questions to consider asking before your surgery:  If my surgery is not an emergency (is elective), when would be the  best time to have the surgery?  What arrangements do I need to make for work, home, or school?  What will my recovery be like? How long will it be before I can return to normal activities?  Will I need to prepare my home? Will I need to arrange care for me or my children?  Should I expect to have pain after surgery? What are my pain management options? Are there nonmedical options that I can try for pain?  Tell a health care provider about:  Any allergies you have.  All medicines you are taking, including vitamins, herbs, eye drops, creams, and over-the-counter medicines.  Any problems you or family members have had with anesthetic medicines.  Any blood disorders you have.  Any surgeries you have had.  Any medical conditions you have.  Whether you are pregnant or may be pregnant.  What are the risks?  The risks and complications of surgery depend on the specific procedure that you have. Discuss all the risks with your health care providers before your surgery. Ask about common surgical complications, which may include:  Infection.  Bleeding or a need for blood replacement (transfusion).  Allergic reactions to medicines.  Damage to surrounding nerves, tissues, or structures.  A blood clot.  Scarring.  Failure of the surgery to correct the problem.  Follow these instructions before the procedure:  Several days or weeks before your procedure  You may have a physical exam by your primary health care provider to make sure it is safe for you to have surgery.  You may have testing. This may include a chest X-ray, blood and urine tests, electrocardiogram (ECG), or other testing.  Ask your health care provider about:  Changing or stopping your regular medicines. This is especially important if you are taking diabetes medicines or blood thinners.  Taking medicines such as aspirin and ibuprofen. These medicines can thin your blood. Do not take these medicines unless your health care provider tells you to take them.  Taking  over-the-counter medicines, vitamins, herbs, and supplements.  Do not use any products that contain nicotine or tobacco, such as cigarettes and e-cigarettes. If you need help quitting, ask your health care provider.  Avoid alcohol.  Ask your health care provider if there are exercises you can do to prepare for surgery.  Eat a healthy diet.   Plan to have someone 18 years of age or older to take you home from the hospital. We will need to verify your ride on the morning of surgery if you are being discharged home on the same day. Tell your ride to be expecting a call from the hospital prior to your procedure.   Plan to have a responsible adult care for you for at least 24 hours after you leave the hospital or clinic. This is important.  The day before your procedure  You may be given antibiotic medicine to take by mouth to help prevent infection. Take it as told by your health care provider.  You may be asked to shower with a germ-killing soap.  Follow instructions from your health care provider about eating and drinking restrictions. This includes gum, mints and hard candy.  Pack comfortable clothes according to your procedure.   The day of your procedure  You may need to take another shower with a germ-killing soap before you leave home in the morning.  With a small sip of water, take only the medicines that you are told to take.  Remove all jewelry including rings.   Leave anything you consider valuable at home except hearing aids if needed.  You do not need to bring your home medications into the hospital.   Do not wear any makeup, nail polish, powder, deodorant, lotion, hair accessories, or anything on your skin or body except your clothes.  If you will be staying in the hospital, bring a case to hold your glasses, contacts, or dentures. You may also want to bring your robe and non-skid footwear.       (Do not use denture adhesives since you will be asked to remove them during  surgery).   If you wear oxygen at  home, bring it with you the day of surgery.  If instructed by your health care provider, bring your sleep apnea device with you on the day of your surgery (if this applies to you).  You may want to leave your suitcase and sleep apnea device in the car until after surgery.   Arrive at the hospital as scheduled.  Bring a friend or family member with you who can help to answer questions and be present while you meet with your health care provider.  At the hospital  When you arrive at the hospital:  Go to registration located at the main entrance of the hospital. You will be registered and given a beeper and a sticker sheet. Take the stickers to the Outpatient nurses desk and place in the black tray. This is to notify staff that you have arrived. Then return to the lobby to wait.   When your beeper lights up and vibrates proceed through the double doors, under the stairs, and a member of the Outpatient Surgery staff will escort you to your preoperative room.  You may have to wear compression sleeves. These help to prevent blood clots and reduce swelling in your legs.  An IV may be inserted into one of your veins.              In the operating room, you may be given one or more of the following:        A medicine to help you relax (sedative).        A medicine to numb the area (local anesthetic).        A medicine to make you fall asleep (general anesthetic).        A medicine that is injected into an area of your body to numb everything below the                      injection site (regional anesthetic).  You may be given an antibiotic through your IV to help prevent infection.  Your surgical site will be marked or identified.    Contact a health care provider if you:  Develop a fever of more than 100.4°F (38°C) or other feelings of illness during the 48 hours before your surgery.  Have symptoms that get worse.  Have questions or concerns about your surgery.  Summary  Preparing for surgery can make the procedure go more  smoothly and lower your risk of complications.  Before surgery, make a list of questions and concerns to discuss with your surgeon. Ask about the risks and possible complications.  In the days or weeks before your surgery, follow all instructions from your health care provider. You may need to stop smoking, avoid alcohol, follow eating restrictions, and change or stop your regular medicines.  Contact your surgeon if you develop a fever or other signs of illness during the few days before your surgery.  This information is not intended to replace advice given to you by your health care provider. Make sure you discuss any questions you have with your health care provider.  Document Revised: 12/21/2018 Document Reviewed: 10/23/2018  [employer] Patient Education © 2021 [employer]

## 2023-09-06 LAB
QT INTERVAL: 480 MS
QTC INTERVAL: 433 MS

## 2023-09-11 ENCOUNTER — ANESTHESIA (OUTPATIENT)
Dept: PERIOP | Facility: HOSPITAL | Age: 73
End: 2023-09-11
Payer: MEDICARE

## 2023-09-11 ENCOUNTER — ANESTHESIA EVENT (OUTPATIENT)
Dept: PERIOP | Facility: HOSPITAL | Age: 73
End: 2023-09-11
Payer: MEDICARE

## 2023-09-11 ENCOUNTER — HOSPITAL ENCOUNTER (OUTPATIENT)
Facility: HOSPITAL | Age: 73
Setting detail: HOSPITAL OUTPATIENT SURGERY
Discharge: HOME | End: 2023-09-11
Attending: ORTHOPAEDIC SURGERY | Admitting: ORTHOPAEDIC SURGERY
Payer: MEDICARE

## 2023-09-11 VITALS
HEART RATE: 57 BPM | OXYGEN SATURATION: 95 % | SYSTOLIC BLOOD PRESSURE: 155 MMHG | DIASTOLIC BLOOD PRESSURE: 65 MMHG | TEMPERATURE: 97.4 F | RESPIRATION RATE: 14 BRPM

## 2023-09-11 DIAGNOSIS — S46.012A TRAUMATIC COMPLETE TEAR OF LEFT ROTATOR CUFF, INITIAL ENCOUNTER: Primary | ICD-10-CM

## 2023-09-11 LAB
GLUCOSE BLDC GLUCOMTR-MCNC: 131 MG/DL (ref 70–130)
GLUCOSE BLDC GLUCOMTR-MCNC: 163 MG/DL (ref 70–130)

## 2023-09-11 PROCEDURE — 25010000002 MIDAZOLAM PER 1 MG

## 2023-09-11 PROCEDURE — 25010000002 CEFAZOLIN PER 500 MG: Performed by: NURSE ANESTHETIST, CERTIFIED REGISTERED

## 2023-09-11 PROCEDURE — C1713 ANCHOR/SCREW BN/BN,TIS/BN: HCPCS | Performed by: ORTHOPAEDIC SURGERY

## 2023-09-11 PROCEDURE — 82948 REAGENT STRIP/BLOOD GLUCOSE: CPT

## 2023-09-11 PROCEDURE — 25010000002 EPINEPHRINE PER 0.1 MG: Performed by: ORTHOPAEDIC SURGERY

## 2023-09-11 PROCEDURE — 25010000002 ONDANSETRON PER 1 MG: Performed by: NURSE ANESTHETIST, CERTIFIED REGISTERED

## 2023-09-11 PROCEDURE — 25010000002 ROPIVACAINE PER 1 MG: Performed by: ANESTHESIOLOGY

## 2023-09-11 PROCEDURE — 25010000002 FENTANYL CITRATE (PF) 50 MCG/ML SOLUTION: Performed by: NURSE ANESTHETIST, CERTIFIED REGISTERED

## 2023-09-11 PROCEDURE — 25010000002 PROPOFOL 10 MG/ML EMULSION: Performed by: NURSE ANESTHETIST, CERTIFIED REGISTERED

## 2023-09-11 PROCEDURE — 25010000002 FENTANYL CITRATE (PF) 50 MCG/ML SOLUTION

## 2023-09-11 DEVICE — SUT TIGERLINK W/SUT TP 1.3MM WHT/BLU: Type: IMPLANTABLE DEVICE | Site: SHOULDER | Status: FUNCTIONAL

## 2023-09-11 DEVICE — SYS SUT/ANCH BIOCOMP SPEEDBRIDGE KNOTLSS SCORPION/NDL 4.75MM: Type: IMPLANTABLE DEVICE | Site: SHOULDER | Status: FUNCTIONAL

## 2023-09-11 DEVICE — SUT TPE TIGERLINK W/CLS/LP 1.7MM WHT/BLK: Type: IMPLANTABLE DEVICE | Site: SHOULDER | Status: FUNCTIONAL

## 2023-09-11 DEVICE — SUT/ANCH BIOCOMP SWIVELOCK 3.9X17.9MM: Type: IMPLANTABLE DEVICE | Site: SHOULDER | Status: FUNCTIONAL

## 2023-09-11 RX ORDER — HYDROCODONE BITARTRATE AND ACETAMINOPHEN 7.5; 325 MG/1; MG/1
1 TABLET ORAL EVERY 4 HOURS PRN
Qty: 60 TABLET | Refills: 0 | Status: SHIPPED | OUTPATIENT
Start: 2023-09-11 | End: 2023-09-25

## 2023-09-11 RX ORDER — NALOXONE HYDROCHLORIDE 4 MG/.1ML
SPRAY NASAL
Qty: 2 EACH | Refills: 0 | Status: SHIPPED | OUTPATIENT
Start: 2023-09-11

## 2023-09-11 RX ORDER — PROPOFOL 10 MG/ML
VIAL (ML) INTRAVENOUS AS NEEDED
Status: DISCONTINUED | OUTPATIENT
Start: 2023-09-11 | End: 2023-09-11 | Stop reason: SURG

## 2023-09-11 RX ORDER — FENTANYL CITRATE 50 UG/ML
50 INJECTION, SOLUTION INTRAMUSCULAR; INTRAVENOUS ONCE
Status: COMPLETED | OUTPATIENT
Start: 2023-09-11 | End: 2023-09-11

## 2023-09-11 RX ORDER — LIDOCAINE HYDROCHLORIDE 10 MG/ML
0.5 INJECTION, SOLUTION EPIDURAL; INFILTRATION; INTRACAUDAL; PERINEURAL ONCE AS NEEDED
Status: DISCONTINUED | OUTPATIENT
Start: 2023-09-11 | End: 2023-09-11 | Stop reason: HOSPADM

## 2023-09-11 RX ORDER — SODIUM CHLORIDE 0.9 % (FLUSH) 0.9 %
3 SYRINGE (ML) INJECTION AS NEEDED
Status: DISCONTINUED | OUTPATIENT
Start: 2023-09-11 | End: 2023-09-11 | Stop reason: HOSPADM

## 2023-09-11 RX ORDER — FENTANYL CITRATE 50 UG/ML
INJECTION, SOLUTION INTRAMUSCULAR; INTRAVENOUS AS NEEDED
Status: DISCONTINUED | OUTPATIENT
Start: 2023-09-11 | End: 2023-09-11 | Stop reason: SURG

## 2023-09-11 RX ORDER — FENTANYL CITRATE 50 UG/ML
25 INJECTION, SOLUTION INTRAMUSCULAR; INTRAVENOUS
Status: DISCONTINUED | OUTPATIENT
Start: 2023-09-11 | End: 2023-09-11 | Stop reason: HOSPADM

## 2023-09-11 RX ORDER — HYDROCODONE BITARTRATE AND ACETAMINOPHEN 10; 325 MG/1; MG/1
1 TABLET ORAL EVERY 4 HOURS PRN
Status: DISCONTINUED | OUTPATIENT
Start: 2023-09-11 | End: 2023-09-11 | Stop reason: HOSPADM

## 2023-09-11 RX ORDER — CEFAZOLIN SODIUM 1 G/3ML
INJECTION, POWDER, FOR SOLUTION INTRAMUSCULAR; INTRAVENOUS AS NEEDED
Status: DISCONTINUED | OUTPATIENT
Start: 2023-09-11 | End: 2023-09-11 | Stop reason: SURG

## 2023-09-11 RX ORDER — FENTANYL CITRATE 50 UG/ML
INJECTION, SOLUTION INTRAMUSCULAR; INTRAVENOUS
Status: COMPLETED
Start: 2023-09-11 | End: 2023-09-11

## 2023-09-11 RX ORDER — SODIUM CHLORIDE, SODIUM LACTATE, POTASSIUM CHLORIDE, CALCIUM CHLORIDE 600; 310; 30; 20 MG/100ML; MG/100ML; MG/100ML; MG/100ML
100 INJECTION, SOLUTION INTRAVENOUS CONTINUOUS
Status: DISCONTINUED | OUTPATIENT
Start: 2023-09-11 | End: 2023-09-11 | Stop reason: HOSPADM

## 2023-09-11 RX ORDER — LIDOCAINE HYDROCHLORIDE 20 MG/ML
INJECTION, SOLUTION EPIDURAL; INFILTRATION; INTRACAUDAL; PERINEURAL AS NEEDED
Status: DISCONTINUED | OUTPATIENT
Start: 2023-09-11 | End: 2023-09-11 | Stop reason: SURG

## 2023-09-11 RX ORDER — ROPIVACAINE HYDROCHLORIDE 5 MG/ML
INJECTION, SOLUTION EPIDURAL; INFILTRATION; PERINEURAL
Status: COMPLETED | OUTPATIENT
Start: 2023-09-11 | End: 2023-09-11

## 2023-09-11 RX ORDER — DOCUSATE SODIUM 100 MG/1
100 CAPSULE, LIQUID FILLED ORAL 2 TIMES DAILY
Qty: 60 CAPSULE | Refills: 1 | Status: SHIPPED | OUTPATIENT
Start: 2023-09-11

## 2023-09-11 RX ORDER — ACETAMINOPHEN 500 MG
1000 TABLET ORAL ONCE
Status: COMPLETED | OUTPATIENT
Start: 2023-09-11 | End: 2023-09-11

## 2023-09-11 RX ORDER — SODIUM CHLORIDE 0.9 % (FLUSH) 0.9 %
3 SYRINGE (ML) INJECTION EVERY 12 HOURS SCHEDULED
Status: DISCONTINUED | OUTPATIENT
Start: 2023-09-11 | End: 2023-09-11 | Stop reason: HOSPADM

## 2023-09-11 RX ORDER — SODIUM CHLORIDE 9 MG/ML
INJECTION, SOLUTION INTRAVENOUS CONTINUOUS PRN
Status: DISCONTINUED | OUTPATIENT
Start: 2023-09-11 | End: 2023-09-11 | Stop reason: SURG

## 2023-09-11 RX ORDER — SODIUM CHLORIDE 0.9 % (FLUSH) 0.9 %
3-10 SYRINGE (ML) INJECTION AS NEEDED
Status: DISCONTINUED | OUTPATIENT
Start: 2023-09-11 | End: 2023-09-11 | Stop reason: HOSPADM

## 2023-09-11 RX ORDER — SODIUM CHLORIDE 9 MG/ML
40 INJECTION, SOLUTION INTRAVENOUS AS NEEDED
Status: DISCONTINUED | OUTPATIENT
Start: 2023-09-11 | End: 2023-09-11 | Stop reason: HOSPADM

## 2023-09-11 RX ORDER — ONDANSETRON 2 MG/ML
INJECTION INTRAMUSCULAR; INTRAVENOUS AS NEEDED
Status: DISCONTINUED | OUTPATIENT
Start: 2023-09-11 | End: 2023-09-11 | Stop reason: SURG

## 2023-09-11 RX ORDER — SODIUM CHLORIDE, SODIUM LACTATE, POTASSIUM CHLORIDE, CALCIUM CHLORIDE 600; 310; 30; 20 MG/100ML; MG/100ML; MG/100ML; MG/100ML
1000 INJECTION, SOLUTION INTRAVENOUS CONTINUOUS
Status: DISCONTINUED | OUTPATIENT
Start: 2023-09-11 | End: 2023-09-11 | Stop reason: HOSPADM

## 2023-09-11 RX ORDER — HYDROCODONE BITARTRATE AND ACETAMINOPHEN 5; 325 MG/1; MG/1
1 TABLET ORAL ONCE AS NEEDED
Status: DISCONTINUED | OUTPATIENT
Start: 2023-09-11 | End: 2023-09-11 | Stop reason: HOSPADM

## 2023-09-11 RX ORDER — IBUPROFEN 600 MG/1
600 TABLET ORAL ONCE AS NEEDED
Status: DISCONTINUED | OUTPATIENT
Start: 2023-09-11 | End: 2023-09-11 | Stop reason: HOSPADM

## 2023-09-11 RX ORDER — EPINEPHRINE 1 MG/ML
INJECTION, SOLUTION, CONCENTRATE INTRAVENOUS AS NEEDED
Status: DISCONTINUED | OUTPATIENT
Start: 2023-09-11 | End: 2023-09-11 | Stop reason: HOSPADM

## 2023-09-11 RX ORDER — ASPIRIN 81 MG/1
81 TABLET ORAL 2 TIMES DAILY
Qty: 42 TABLET | Refills: 0 | Status: SHIPPED | OUTPATIENT
Start: 2023-09-11 | End: 2023-10-02

## 2023-09-11 RX ORDER — MAGNESIUM HYDROXIDE 1200 MG/15ML
LIQUID ORAL AS NEEDED
Status: DISCONTINUED | OUTPATIENT
Start: 2023-09-11 | End: 2023-09-11 | Stop reason: HOSPADM

## 2023-09-11 RX ORDER — ONDANSETRON 4 MG/1
4 TABLET, FILM COATED ORAL EVERY 8 HOURS PRN
Qty: 20 TABLET | Refills: 1 | Status: SHIPPED | OUTPATIENT
Start: 2023-09-11

## 2023-09-11 RX ORDER — ONDANSETRON 2 MG/ML
4 INJECTION INTRAMUSCULAR; INTRAVENOUS ONCE AS NEEDED
Status: DISCONTINUED | OUTPATIENT
Start: 2023-09-11 | End: 2023-09-11 | Stop reason: HOSPADM

## 2023-09-11 RX ORDER — MIDAZOLAM HYDROCHLORIDE 1 MG/ML
1 INJECTION INTRAMUSCULAR; INTRAVENOUS ONCE
Status: COMPLETED | OUTPATIENT
Start: 2023-09-11 | End: 2023-09-11

## 2023-09-11 RX ORDER — MIDAZOLAM HYDROCHLORIDE 1 MG/ML
INJECTION INTRAMUSCULAR; INTRAVENOUS
Status: COMPLETED
Start: 2023-09-11 | End: 2023-09-11

## 2023-09-11 RX ORDER — NEOSTIGMINE METHYLSULFATE 5 MG/5 ML
SYRINGE (ML) INTRAVENOUS AS NEEDED
Status: DISCONTINUED | OUTPATIENT
Start: 2023-09-11 | End: 2023-09-11 | Stop reason: SURG

## 2023-09-11 RX ORDER — FLUMAZENIL 0.1 MG/ML
0.2 INJECTION INTRAVENOUS AS NEEDED
Status: DISCONTINUED | OUTPATIENT
Start: 2023-09-11 | End: 2023-09-11 | Stop reason: HOSPADM

## 2023-09-11 RX ORDER — NALOXONE HCL 0.4 MG/ML
0.4 VIAL (ML) INJECTION AS NEEDED
Status: DISCONTINUED | OUTPATIENT
Start: 2023-09-11 | End: 2023-09-11 | Stop reason: HOSPADM

## 2023-09-11 RX ORDER — CYCLOBENZAPRINE HCL 10 MG
10 TABLET ORAL 3 TIMES DAILY PRN
Qty: 30 TABLET | Refills: 0 | Status: SHIPPED | OUTPATIENT
Start: 2023-09-11 | End: 2023-09-25

## 2023-09-11 RX ORDER — DROPERIDOL 2.5 MG/ML
0.62 INJECTION, SOLUTION INTRAMUSCULAR; INTRAVENOUS ONCE AS NEEDED
Status: DISCONTINUED | OUTPATIENT
Start: 2023-09-11 | End: 2023-09-11 | Stop reason: HOSPADM

## 2023-09-11 RX ORDER — VECURONIUM BROMIDE 1 MG/ML
INJECTION, POWDER, LYOPHILIZED, FOR SOLUTION INTRAVENOUS AS NEEDED
Status: DISCONTINUED | OUTPATIENT
Start: 2023-09-11 | End: 2023-09-11 | Stop reason: SURG

## 2023-09-11 RX ORDER — LABETALOL HYDROCHLORIDE 5 MG/ML
5 INJECTION, SOLUTION INTRAVENOUS
Status: DISCONTINUED | OUTPATIENT
Start: 2023-09-11 | End: 2023-09-11 | Stop reason: HOSPADM

## 2023-09-11 RX ADMIN — FENTANYL CITRATE 50 MCG: 50 INJECTION, SOLUTION INTRAMUSCULAR; INTRAVENOUS at 09:15

## 2023-09-11 RX ADMIN — GLYCOPYRROLATE 0.4 MG: 0.2 INJECTION INTRAMUSCULAR; INTRAVENOUS at 09:23

## 2023-09-11 RX ADMIN — FENTANYL CITRATE 50 MCG: 50 INJECTION, SOLUTION INTRAMUSCULAR; INTRAVENOUS at 09:06

## 2023-09-11 RX ADMIN — PROPOFOL 150 MG: 10 INJECTION, EMULSION INTRAVENOUS at 09:07

## 2023-09-11 RX ADMIN — CEFAZOLIN 200 MG: 330 INJECTION, POWDER, FOR SOLUTION INTRAMUSCULAR; INTRAVENOUS at 09:13

## 2023-09-11 RX ADMIN — LIDOCAINE HYDROCHLORIDE 100 MG: 20 INJECTION, SOLUTION EPIDURAL; INFILTRATION; INTRACAUDAL; PERINEURAL at 09:06

## 2023-09-11 RX ADMIN — FENTANYL CITRATE 50 MCG: 50 INJECTION, SOLUTION INTRAMUSCULAR; INTRAVENOUS at 08:51

## 2023-09-11 RX ADMIN — MIDAZOLAM HYDROCHLORIDE 1 MG: 1 INJECTION INTRAMUSCULAR; INTRAVENOUS at 08:52

## 2023-09-11 RX ADMIN — VECURONIUM BROMIDE 7 MG: 1 INJECTION, POWDER, LYOPHILIZED, FOR SOLUTION INTRAVENOUS at 09:07

## 2023-09-11 RX ADMIN — CEFAZOLIN 1800 MG: 330 INJECTION, POWDER, FOR SOLUTION INTRAMUSCULAR; INTRAVENOUS at 09:19

## 2023-09-11 RX ADMIN — ONDANSETRON 4 MG: 2 INJECTION INTRAMUSCULAR; INTRAVENOUS at 10:42

## 2023-09-11 RX ADMIN — SODIUM CHLORIDE: 900 INJECTION INTRAVENOUS at 10:49

## 2023-09-11 RX ADMIN — Medication 4 MG: at 11:01

## 2023-09-11 RX ADMIN — SODIUM CHLORIDE, POTASSIUM CHLORIDE, SODIUM LACTATE AND CALCIUM CHLORIDE 1000 ML: 600; 310; 30; 20 INJECTION, SOLUTION INTRAVENOUS at 08:14

## 2023-09-11 RX ADMIN — ROPIVACAINE HYDROCHLORIDE 20 ML: 5 INJECTION, SOLUTION EPIDURAL; INFILTRATION; PERINEURAL at 08:53

## 2023-09-11 RX ADMIN — ACETAMINOPHEN 1000 MG: 500 TABLET, FILM COATED ORAL at 08:56

## 2023-09-11 RX ADMIN — GLYCOPYRROLATE 0.4 MG: 0.2 INJECTION INTRAMUSCULAR; INTRAVENOUS at 11:00

## 2023-09-11 RX ADMIN — MIDAZOLAM HYDROCHLORIDE 1 MG: 1 INJECTION, SOLUTION INTRAMUSCULAR; INTRAVENOUS at 08:52

## 2023-09-11 NOTE — OP NOTE
Patient Name: Winnie  : 1950  MRN: [id number]    DATE of SURGERY: 2023    SURGEON: Justen To MD    ASSISTANT: None    PREOPERATIVE DIAGNOSIS:  Left shoulder acute traumatic complete rotator cuff tear  Left shoulder proximal biceps tendon tearing  Left shoulder primary osteoarthritis acromioclavicular joint  Left shoulder subacromial impingement syndrome    POSTOPERATIVE DIAGNOSIS:  Left shoulder acute traumatic complete rotator cuff tear  Left shoulder proximal biceps tendon tearing  Left shoulder primary osteoarthritis acromioclavicular joint  Left shoulder subacromial impingement syndrome    PROCEDURE PERFORMED:  Left shoulder arthroscopic rotator cuff repair  Left shoulder arthroscopic biceps tenotomy, labral debridement  Left shoulder arthroscopic distal clavicle excision  Left shoulder arthroscopic subacromial decompression    IMPLANTS: Arthrex 4.75 mm Bio-SwiveLock anchor x 4     ANESTHESIA USED: General endotracheal anesthesia, interscalene block    ESTIMATED BLOOD LOSS: minimal    DRAINS: none     COMPLICATIONS: none    SPECIMENS: none    OPERATIVE INDICATIONS: This patient is a 73 y.o. male who presented to my clinic with complaints of progressive shoulder pain after a traumatic injury to the shoulder.  An MRI was obtained which showed the above-named diagnoses. Pain was not relieved with conservative treatment consisting of anti-inflammatories, corticosteroid injections, physical therapy.  Due to progressive pain and loss of function, surgical evaluation was discussed and the patient wished to proceed understanding risks, benefits, and alternatives. The surgical indications were to relieve pain, improve function, and prevent future disability in regards to the shoulder pathology dictated in the above diagnoses.  Risks included, but were not limited to, that of anesthesia, bleeding, infection, pain, damage to local structures, need for further surgery, failure of repair, stiffness,  failure of implants, and loss of function.      OPERATIVE FINDINGS:  1) Exam under anesthesia:  Full passive ROM, joint stable without laxity, skin intact  2) Glenohumeral Joint:       A) Chondral surfaces: Grade 2 change       B) Labrum: tear of the superior labrum, unstable to probing, positive peel-back sign       C) Biceps:  Synovitis, hypertrophy, partial tearing       D) Rotator Cuff: Complete full thickness tear supraspinatus, crescent shaped, tendon/bone quality good, adequate excursion  3) Subacromial space:         A) Large amount of dense vascular bursa         B) Type III acromion, hypertrophic coracoacromial ligament         C) Bursal surface rotator cuff:  Complete tear as above         D) AC joint:  Hypertrophic with decreased joint space, osteophytes    PROCEDURE IN DETAIL:  The patient was seen in the preoperative holding room, once again the informed consent was reviewed with the patient and signed.  The site of surgery was marked with the patient's agreement.  After being transported to the operating room, a timeout was performed identifying the correct patient as well as the operative site.  Dose of appropriate IV antibiotics were given prior to incision.  The patient was positioned in the beach chair position, all bony prominences were protected and a sterile prep and drape was performed.  A standard posterior arthroscopy portal was established and an outside-in technique was utilized to establish an anterior portal within the rotator interval.  An arthroscopic probe was inserted and a thorough exam of the glenohumeral joint was performed.    Attention was first turned to the biceps-labral complex and the probe was used to identify a SLAP lesion of the superior glenoid that was unstable to probing.  A ramp test was performed on the biceps tendon as well identifying synovitis and hypertrophy of the tendon.  A biceps tenotomy was then performed at the biceps-labral anchor with a cautery device.   The surrounding labrum was debrided with a shaver and the probe was reinserted showing the remaining labrum to be stable.    Attention was then turned to the intraarticular portion of the rotator cuff.  A probe was used to identify a complete tear of the rotator cuff and a shaver was used to debride the tendon.       The arthroscope was then transitioned to the subacromial space and an outside-in technique was used to establish a lateral portal.  The arthroscopic shaver was introduced in the subacromial space and a complete bursectomy was performed with this device.  Given the known high concentration of MSC's in the bursal tissue, a filter was applied to the arthroscopic shaver to collect the tissue for later reimplantation to help biologically augment the repair and support healing.       Attention was turned to the anterior and lateral edge of the acromion where soft tissue was removed with a cautery device.    Hypermobility of the lateral edge of the acromion was noted given the presence of a os acromiale.  Due to a prominent acromial spur causing impingement, an acromioplasty was performed with an arthroscopic elia converting this to a flat type 1 morphology taking care not to destabilize the os acromiale fragment.  The coracoacromial ligament was incised with a cautery device completing the subacromial decompression.     Attention was then turned to the acromioclavicular joint where a cautery device was utilized to remove soft tissue from the distal end of the clavicle revealing decreased joint space and osteophyte formation.  An arthroscopic elia was introduced through the anterior portal removing the entire articular portion of the distal clavicle.  Less than 1 cm of bone was removed, preserving the posterior and superior ligaments to prevent instability of the joint.  The arthroscope was transitioned anteriorly noting the decompression to be adequate.     Attention was then turned to the rotator cuff tear  which was visualized from the intraarticular space.  The tendon and greater tuberosity footprint were debrided followed by shuttling FiberTape and FiberWire suture into the tear site with a suture passing device.  Fixation was then performed to the greater tuberosity footprint with a 4.75 mm BioSwiveLock anchor in knotless fashion.  A total of 4 anchors were utilized in the repair.  The repair appeared to be anatomic.       Free fluid was suctioned from the shoulder after direct arthroscopic confirmation demonstrated that a hollow bore smooth tip curved needle was placed under the edge of the bio inductive patch at the repair interface.  After complete removal of the fluid, the harvested bursal MSC's were injected back into the shoulder at the repair interface to further biologically augment the repair.     Portals were then closed with nylon due to the patient's intolerance to Mastisol and Steri-Strips and a sterile dressing was applied followed by a sling.  The patient was awakened from anesthesia, transported to the recovery room in stable condition.     POSTOP PLAN:    1) Discharge home with family  2) Follow up in 2 weeks for a clinical check  3) Follow the following protocol: Large/Massive RCR  SAD  DCE - we we will initiate therapy at the 4-week postoperative carrie

## 2023-09-11 NOTE — ANESTHESIA PROCEDURE NOTES
Airway  Urgency: elective    Date/Time: 9/11/2023 9:11 AM  Airway not difficult    General Information and Staff    Patient location during procedure: OR  CRNA/CAA: Vladimir Rosa CRNA    Indications and Patient Condition  Indications for airway management: airway protection    Preoxygenated: yes  Mask difficulty assessment: 0 - not attempted    Final Airway Details  Final airway type: endotracheal airway      Successful airway: ETT  Cuffed: yes   Successful intubation technique: direct laryngoscopy  Blade: Pavan  Blade size: 3.5  ETT size (mm): 7.5  Cormack-Lehane Classification: grade I - full view of glottis  Placement verified by: chest auscultation and capnometry   Cuff volume (mL): 8  Measured from: gums  ETT/EBT to gums (cm): 22  Number of attempts at approach: 1  Assessment: lips, teeth, and gum same as pre-op and atraumatic intubation

## 2023-09-11 NOTE — H&P
Pt Name: Chung Bradley  MRN: [id number]  YOB: 1950  Date: 9/11/2023      HPI: 73 y.o. year old male with a known rotator cuff tear of the left shoulder. Chronic pain and weakness have been nonresponsive to conservative treatments, not limited to, NSAIDs, corticosteroid injections, and physical therapy.      Past Medical/Surgical History:   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    Cancer     bladder    Degenerative arthritis of cervical spine with nerve compression     per patient report    Diabetes mellitus     Gutierrez catheter in place     GERD (gastroesophageal reflux disease)     Hearing aid worn     Hypertension     Impaired hearing     without hearing aids can barely hear anything    Neuropathy     Rotator cuff injury     left    Sleep apnea     wears cpap     Past Surgical History:   Procedure Laterality Date    CATARACT EXTRACTION W/ INTRAOCULAR LENS IMPLANT      CHOLECYSTECTOMY      CYSTECTOMY      2017    EXPLORATORY LAPAROTOMY      LEG SURGERY      TRANSURETHRAL RESECTION OF BLADDER TUMOR Bilateral 07/25/2017    Procedure: CYSTOSCOPY TRANSURETHRAL RESECTION OF BLADDER TUMOR & POSSIBLE BILATERAL RETROGRADE URETEROPYELOGRAMS;  Surgeon: Chris Esparza MD;  Location: [facility];  Service:     TRANSURETHRAL RESECTION OF BLADDER TUMOR N/A 11/03/2017    Procedure: CYSTOSCOPY TRANSURETHRAL RESECTION OF BLADDER TUMOR ;  Surgeon: Chris Esparza MD;  Location:  [facility] OR;  Service:     TRANSURETHRAL RESECTION OF BLADDER TUMOR N/A 09/10/2018    Procedure: CYSTOSCOPY TRANSURETHRAL RESECTION OF LARGE BLADDER TUMOR, CLOT EVACUATION, AND FULGURATION;  Surgeon: Chris Esparza MD;  Location:  [facility] OR;  Service: Urology       Social History:   Smoking: none  Alcohol: occasional/infrequent    Medications:   Current Facility-Administered Medications   Medication Dose Route Frequency Provider Last Rate Last Admin     acetaminophen (TYLENOL) tablet 1,000 mg  1,000 mg Oral Once Maxine López MD        ceFAZolin 2000 mg IVPB in 100 mL NS (MBP)  2,000 mg Intravenous Once Justen To MD        fentaNYL citrate (PF) (SUBLIMAZE) injection 50 mcg  50 mcg Intravenous Once Maxine López MD        lactated ringers infusion 1,000 mL  1,000 mL Intravenous Continuous Justen To MD 25 mL/hr at 09/11/23 0814 1,000 mL at 09/11/23 0814    lactated ringers infusion  100 mL/hr Intravenous Continuous Maxine López MD        lidocaine PF 1% (XYLOCAINE) injection 0.5 mL  0.5 mL Intradermal Once PRN Justen To MD        midazolam (VERSED) injection 1 mg  1 mg Intravenous Once Maxine López MD        sodium chloride 0.9 % flush 3 mL  3 mL Intravenous PRN Justen To MD        sodium chloride 0.9 % flush 3 mL  3 mL Intravenous Q12H Maxine López MD        sodium chloride 0.9 % flush 3-10 mL  3-10 mL Intravenous PRN Maxine López MD        sodium chloride 0.9 % infusion 40 mL  40 mL Intravenous PRN Maxine López MD           Allergies:   Allergies   Allergen Reactions    Penicillins Rash     POSSIBLY NOT THE CAUSE       Review of systems:     * Constitutional: negative     * HEENT: negative     * Skin: negative     * Cardiac: negative     * Respiratory: negative     * GI: negative     * : negative     * Neuro: negative     * CNS: negative     * Extremities: negative     * Endocrine: negative     Physical Exam:   /65 (BP Location: Left arm, Patient Position: Sitting)   Pulse (!) 44   Temp 97.4 °F (36.3 °C) (Temporal)   Resp 17   SpO2 93%     - General: normal development and appearance     - HEENT: normocephalic, CALOS     - Skin: pink, warm, normal tone     - Neck: supple, no masses,     - Chest: no rales or wheezes, normal expansion     - Heart: RRR, no murmurs/gallops     - Abdomen: soft, nondistended, nontender, normal sounds     - Vascular: normal pulses,  color, tone     - Pysch/Neuro: normal affect, mood, alert and oriented     - Musculoskeletal: Physical exam of the patient's left shoulder shows the skin is clean, dry, and intact. No deformities, lesions, or erythema noted. Neurovascularly intact distally and laterally. Tenderness to palpation laterally. Decreased range of motion and weakness secondary to pain.      Impression: Rotator cuff tear of the left shoulder.      Surgical Plan: Arthroscopic rotator cuff repair of the left shoulder.      Electronically signed by Justen To MD on 9/11/2023 at 08:46 CDT

## 2023-09-11 NOTE — ANESTHESIA POSTPROCEDURE EVALUATION
Patient: Chung Bradley    Procedure Summary       Date: 09/11/23 Room / Location:  [facility]  /  [facility]    Anesthesia Start: 0901 Anesthesia Stop: 1113    Procedure: LEFT SHOULDER ROTATOR CUFF REPAIR, ACROMIOPLASTY AND DISTAL CLAVICLE EXCISION (Left: Shoulder) Diagnosis: (S46.012A)    Surgeons: Justen To MD Provider: Vladimir Rosa CRNA    Anesthesia Type: general with block ASA Status: 3            Anesthesia Type: general with block    Vitals  Vitals Value Taken Time   /68 09/11/23 1213   Temp 97.4 °F (36.3 °C) 09/11/23 1200   Pulse 57 09/11/23 1206   Resp 14 09/11/23 1200   SpO2 91 % 09/11/23 1206   Vitals shown include unvalidated device data.        Post Anesthesia Care and Evaluation    Patient location during evaluation: PACU  Patient participation: complete - patient participated  Level of consciousness: awake and alert  Pain management: adequate    Airway patency: patent  Anesthetic complications: No anesthetic complications    Cardiovascular status: acceptable  Respiratory status: acceptable  Hydration status: acceptable    Comments: Blood pressure 155/65, pulse 57, temperature 97.4 °F (36.3 °C), temperature source Temporal, resp. rate 14, SpO2 95 %.    Pt discharged from PACU based on pedrito score >8

## 2023-09-11 NOTE — ANESTHESIA PREPROCEDURE EVALUATION
Anesthesia Evaluation     no history of anesthetic complications:   NPO Solid Status: > 8 hours  NPO Liquid Status: > 8 hours           Airway   Mallampati: III  Dental    (+) edentulous    Pulmonary    (+) a smoker (quit 1997) Former,sleep apnea on CPAP  Cardiovascular   Exercise tolerance: good (4-7 METS)    (+) hypertension      Neuro/Psych  (-) seizures, TIA, CVA, no Parkinson's disease  GI/Hepatic/Renal/Endo    (+) obesity, renal disease CRI, diabetes mellitus type 2    Musculoskeletal     Abdominal    Substance History      OB/GYN          Other   arthritis,   history of cancer (bladder cancer) remission                    Anesthesia Plan    ASA 3     general with block     intravenous induction     Anesthetic plan, risks, benefits, and alternatives have been provided, discussed and informed consent has been obtained with: patient.      CODE STATUS:

## 2023-09-11 NOTE — ANESTHESIA PROCEDURE NOTES
Peripheral Block    Pre-sedation assessment completed: 9/11/2023 8:46 AM    Patient reassessed immediately prior to procedure    Patient location during procedure: pre-op  Start time: 9/11/2023 8:53 AM  Stop time: 9/11/2023 8:54 AM  Reason for block: procedure for pain, at surgeon's request, post-op pain management and Request by Dr. To  Performed by  Anesthesiologist: Maxine López MD  Preanesthetic Checklist  Completed: patient identified, IV checked, site marked, risks and benefits discussed, surgical consent, monitors and equipment checked, pre-op evaluation and timeout performed  Prep:  Pt Position: supine  Sterile barriers:gloves  Prep: ChloraPrep  Patient monitoring: blood pressure monitoring, continuous pulse oximetry and EKG  Procedure    Sedation: yes  Performed under: local infiltration  Guidance:ultrasound guided and Brachial plexus identified and local anesthetic seen surrounding nerves    ULTRASOUND INTERPRETATION.  Using ultrasound guidance a 20 G gauge needle was placed in close proximity to the nerve, at which point, under ultrasound guidance anesthetic was injected in the area of the nerve and spread of the anesthesia was seen on ultrasound in close proximity thereto.  There were no abnormalities seen on ultrasound; a digital image was taken; and the patient tolerated the procedure with no complications. Images:still images obtained (picture printed and placed in patients chart)    Laterality:left  Block Type:supraclavicular  Injection Technique:single-shot  Needle Type:echogenic  Needle Gauge:20 G      Medications Used: ropivacaine (NAROPIN) injection 0.5 % - Injection   20 mL - 9/11/2023 8:53:00 AM      Post Assessment  Injection Assessment: negative aspiration for heme, no paresthesia on injection and incremental injection  Patient Tolerance:comfortable throughout block  Complications:no

## 2023-09-11 NOTE — DISCHARGE INSTRUCTIONS
UPPER EXTREMITY POST-OP INSTRUCTIONS - DR. TURCIOS    POSTOP APPOINTMENT    At this appointment, you will be seen by the clinical staff for a wound check, dressing change, and you will be given the next phase in your post-operative protocol.    Post-op Care: Please follow these instructions closely!    Sling use: The sling is provided for your comfort and to ensure proper healing of your repair following surgery. Please  place the abduction pillow under your arm. Your surgery requires that you wear the sling:  __X__ At all times except bathing, dressing, and therapy. Also wear the sling during sleep.    IMPORTANT PHONE NUMBERS:   For emergencies, please call [phone]   You may reach Dr. Turcios and clinical staff at [phone]- M-M 8:00 am-5:00 pm   After 5pm or on the weekends, please call [phone] for the doctor on call.   Call immediately if you have any of the following symptoms:     Elevated temperature above 101 degrees for more than 48 hours after surgery     Persistent drainage from wound     Severe pain around surgical site     Any signs of infection    Dressings: Keep dressing/splint intact unless instructed otherwise below. SOME DRAINAGE IS NORMAL!     DO NOT touch or apply ointment to the incision.     DO NOT remove the steri-strips over the incisions (if you have steri-strips). They will         generally fall off on their own or can be removed 2 weeks after surgery.     If you have yellow gauze and it comes off, do not worry about it. Leave them off.    Signs of infection that warrant a phone call to our clinical line:     o Excessive drainage or redness     o Red streaking coming away from the incision  o Increased pain  o Increased temperature above 101 degrees    Sutures: If sutures are black, they will be removed at your postop visit. All others will dissolve on their own.    Bathing:  _X_ You may remove your superficial cotton dressings leaving your Steri-Strips in place until follow-up and begin  showering on Thursday, the third day after surgery.  Water may cascade over your dressings, but do not submerge your dressings or incisions.    Physical Therapy: Your physical therapy status will be discussed with you postoperatively and at your first post-op appointment. Some injuries will not require physical therapy.    Medications: You will be discharged with the appropriate medications following your surgery. Fill these at the pharmacy and take them as directed on the label. Not all of the medications below may be prescribed. Occasionally, other medications may be prescribed with specific instructions.    Percocet/Lortab (oxycodone/hydrocodone with tylenol) - Pain Medication, will cause drowsiness     o Take 1-2 tablets every 4-6 hours. DO NOT EXCEED 4,000mg of Tylenol in 24 hours.  **DO NOT MIX WITH ALCOHOL, DRIVE WHILE TAKING, OR TAKE with extra TYLENOL**    Zofran (Ondansetron) or Phenergan - Anti-nausea medication, will cause drowsiness    **If you are running low on pain medications, please notify us if you need a refill 24-48 hours prior to when you run out, so we can make arrangements to refill the prescription for you if we determine is necessary    What to expect after a Nerve Block  Nerve blocks administered to block pain affect many types of nerves, including those nerves that control movement, pain, and normal sensation.  Following a nerve block, you may notice some bruising at the site where the block was given.  You may experience sensations such as:  numbness of the affected area or limb, tingling, heaviness (that is the limb feels heavy to you), weakness or inability to move the affected arm or leg, or a feeling as if your arm or leg has “fallen asleep”.    A nerve block can last from 9-18 hours depending on the medications used.  Certain medications can last up to 72 hours, your anesthesiologist may have discussed this with you pre-op. Usually the weakness wears off first followed by the  tingling and heaviness.  As the block wears off, you may begin to notice pain; however, this sequence of events may occur in any order.  Typically, you will be able to move your limb before you will feel it.  Once a nerve block begins to wear off, the effects are usually completely gone within 60 minutes.    If you experience continued side effects that you believe are block related, please call your healthcare provider.  Please see block-specific instructions below.      Instructions for any block involving the shoulder or arm  If you have had any kind of shoulder/arm block, you will go home with your arm in a sling.  Wear the sling until the block has completely worn off or as directed by your doctor.  You may be required to wear it for a longer period of time per your surgeon’s recommendations.  I you have had a shoulder/arm block; it is a good idea to sleep on a recliner with pillows under your arm.    Note:  If you have severe or prolonged shortness of breath, please seek medical assistance as soon as possible.    Protection of a “blocked” arm (limb)  After a nerve block, you cannot feel pain, pressure, or extremes of temperature in the affected limb.  And because of this, your blocked limb is at more risk for injury.  For example, it is possible to burn your limb on an extremely hot surface without feeling it.  When resting, it is important to reposition your limb periodically to avoid prolonged pressure on it.  This may require the use of pillows and padding.  While sleeping, you should avoid rolling onto the affected limb or putting too much pressure on it.  If you have a cast or tight dressing, check the color of your fingers of the affected limb.  Call your surgeon if they look discolored (that is, dusky, dark colored)  Use caution in cold weather.  Cover your limb appropriately to protect it from the cold.  Pain Management  Your surgeon will give you a prescription for pain medication.  Begin taking this  before the nerve block wears off.  Bear in mind that sometimes the block can wear off in the middle of the night.

## 2023-09-19 RX ORDER — HYDRALAZINE HYDROCHLORIDE 10 MG/1
10 TABLET, FILM COATED ORAL 3 TIMES DAILY
Qty: 270 TABLET | Refills: 3 | Status: SHIPPED | OUTPATIENT
Start: 2023-09-19

## 2023-09-19 NOTE — TELEPHONE ENCOUNTER
Caller: Eloina Bradley    Relationship: Emergency Contact    Best call back number: [phone]     Requested Prescriptions:   Requested Prescriptions     Pending Prescriptions Disp Refills    hydrALAZINE (APRESOLINE) 10 MG tablet       Sig: Take 1 tablet by mouth 3 (Three) Times a Day.        Pharmacy where request should be sent: [facility] [address] [place] [address] [phone] [facility] [phone]      Last office visit with prescribing clinician: 7/10/2023   Last telemedicine visit with prescribing clinician: Visit date not found   Next office visit with prescribing clinician: 9/25/2023     Additional details provided by patient: HAS 2 LEFT    Does the patient have less than a 3 day supply:  [x] Yes  [] No    Would you like a call back once the refill request has been completed: [] Yes [x] No    If the office needs to give you a call back, can they leave a voicemail: [] Yes [x] No    Kevin Dwyer Rep   09/19/23 10:11 CDT

## 2023-09-25 ENCOUNTER — OFFICE VISIT (OUTPATIENT)
Dept: INTERNAL MEDICINE | Facility: CLINIC | Age: 73
End: 2023-09-25

## 2023-09-25 VITALS
SYSTOLIC BLOOD PRESSURE: 154 MMHG | OXYGEN SATURATION: 96 % | RESPIRATION RATE: 18 BRPM | DIASTOLIC BLOOD PRESSURE: 74 MMHG | HEART RATE: 60 BPM | BODY MASS INDEX: 34.07 KG/M2 | TEMPERATURE: 97.8 F | HEIGHT: 69 IN | WEIGHT: 230 LBS

## 2023-09-25 DIAGNOSIS — E11.65 TYPE 2 DIABETES MELLITUS WITH HYPERGLYCEMIA, WITHOUT LONG-TERM CURRENT USE OF INSULIN: ICD-10-CM

## 2023-09-25 DIAGNOSIS — I10 PRIMARY HYPERTENSION: ICD-10-CM

## 2023-09-25 DIAGNOSIS — E66.09 CLASS 1 OBESITY DUE TO EXCESS CALORIES WITH SERIOUS COMORBIDITY AND BODY MASS INDEX (BMI) OF 33.0 TO 33.9 IN ADULT: ICD-10-CM

## 2023-09-25 DIAGNOSIS — N18.2 STAGE 2 CHRONIC KIDNEY DISEASE: ICD-10-CM

## 2023-09-25 DIAGNOSIS — E78.2 MIXED HYPERLIPIDEMIA: Primary | ICD-10-CM

## 2023-09-25 DIAGNOSIS — G47.33 OBSTRUCTIVE SLEEP APNEA SYNDROME: ICD-10-CM

## 2023-09-25 RX ORDER — CARVEDILOL 3.12 MG/1
3.12 TABLET ORAL 2 TIMES DAILY WITH MEALS
Qty: 60 TABLET | Refills: 2 | Status: SHIPPED | OUTPATIENT
Start: 2023-09-25

## 2023-09-25 RX ORDER — GLIPIZIDE 5 MG/1
5 TABLET, FILM COATED, EXTENDED RELEASE ORAL DAILY
Qty: 180 TABLET | Refills: 0
Start: 2023-09-25

## 2023-09-25 NOTE — PROGRESS NOTES
"      Chief Complaint  Hypertension (3 month follow-up.)    Subjective        Chung Bradley presents to [facility]    HPI  Patient seen for the above problems.  Please see assessment plan for further HPI components.    Review of Systems    Objective   Vital Signs:  /74 (BP Location: Left arm, Patient Position: Sitting, Cuff Size: Adult)   Pulse 60   Temp 97.8 °F (36.6 °C) (Infrared)   Resp 18   Ht 175.3 cm (69\")   Wt 104 kg (230 lb)   SpO2 96%   BMI 33.97 kg/m²   Estimated body mass index is 33.97 kg/m² as calculated from the following:    Height as of this encounter: 175.3 cm (69\").    Weight as of this encounter: 104 kg (230 lb).      Physical Exam   General Appearance:  awake, alert, oriented, in no acute distress, cooperative, and obese  Patient is in no acute distress.  Patient is doing well.  Patient is still in the sling from his left shoulder surgery.  Patient has new dentures/implants.  Patient is having no respiratory distress, and nonlabored breathing.  Patient ambulating well.  Patient appears as though he is lost a little bit of weight.  Patient has no significant lower extremity edema.               Assessment and Plan   Diagnoses and all orders for this visit:    1. Mixed hyperlipidemia (Primary)    2. Type 2 diabetes mellitus with hyperglycemia, without long-term current use of insulin  -     glipizide (GLUCOTROL XL) 5 MG ER tablet; Take 1 tablet by mouth Daily.  Dispense: 180 tablet; Refill: 0    3. Primary hypertension  -     carvedilol (Coreg) 3.125 MG tablet; Take 1 tablet by mouth 2 (Two) Times a Day With Meals.  Dispense: 60 tablet; Refill: 2    4. Stage 2 chronic kidney disease    5. Class 1 obesity due to excess calories with serious comorbidity and body mass index (BMI) of 33.0 to 33.9 in adult    6. Obstructive sleep apnea syndrome        Patient is here for follow-up on his hypertension, diabetes, chronic kidney disease, obesity, " REYNALDO.    Patient has a history of mixed hyperlipidemia.  Patient has elevated LDL, and hypertriglyceridemia in the past.  [facility], the patient's LDL was at goal, however his triglycerides are significantly elevated.  Patient has been monitoring his diet and trying to do and improve diet cutting out certain fatty foods.  Patient's triglycerides are still with elevated.  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  Omega-3 fish oil can help with this, consider fenofibrate.  Continue to monitor, no changes at the present time.  Patient is also noted to be on lovastatin.  Continue this for now.    Patient's kidney function appears to be stable on his most recent [facility].  Creatinine has maintained.  Continue to monitor.  I will likely check this at my next visit with him.    Since the patient had his teeth pulled and got dentures/implants, the patient has lost weight.  He indicates that on his scales he has lost between 10 to 15 pounds.  This has been shown to improve his hemoglobin A1c, which is noted to be 5.9.  Patient is having some low blood sugars before dinner, and elevated blood sugars in the morning.  Suspect that the patient's glipizide in the morning is likely adequate for his control.  The hypoglycemia in the morning is either related to increased food at night or Somogyi effect.  Given his lower blood sugars at night, I think cutting back to the glipizide once a day should be adequate.  If we need to add back the second dose we can only do this in the future.  With hemoglobin A1c of 5.9, we do not necessarily need the second dose, as his A1c target would be between 6-1/2 and 7 if able to maintain without difficulty.  Patient is having hyperglycemia and hypoglycemia.    Continue to monitor diabetes.    For the patient's hypertension, the patient indicates that [facility] has ran out of hydralazine, and [facility] also did not have it.  Patient has  found some [facility].  However since he ran out of the hydralazine and his blood pressure is increasing, he went ahead and increased from 3.125 mg of carvedilol to 6.25 mg.  This is noted to drop his heart rate less than 60.  Patient is tolerating this at the present time.  Would rather the patient get back on the hydralazine, and subsequently go back to 3.125 mg of the carvedilol.  The patient has 6.25 mg tablets at home, he does not like cutting the pills in half, I will put the patient back to 3.125 mg tablets.  He can continue this regimen until he gets the hydralazine.  Patient's blood pressure is not at goal at present, but suspect that once we get back on the hydralazine, we may be able to make some small adjustments and get the patient at goal.  Goal is less than 130/80.    [facility], the patient's TSH is normal.  Hemoglobin A1c was noted be 5.9.  Creatinine and electrolytes were noted to be stable.  Lipid profile was noted to be consistent with previous lipid profiles.    Result Review :                   BMI is >= 30 and <35. (Class 1 Obesity). The following options were offered after discussion;: exercise counseling/recommendations and nutrition counseling/recommendations            Follow Up   Return in about 3 months (around 12/25/2023), or if symptoms worsen or fail to improve, for Recheck.  Patient was given instructions and counseling regarding his condition or for health maintenance advice. Please see specific information pulled into the AVS if appropriate.       RAY Lion MD, FACP, FHM      Electronically signed by Daquan Lion MD, 09/25/23, 6:54 PM CDT.

## 2023-10-19 DIAGNOSIS — I10 ESSENTIAL HYPERTENSION: ICD-10-CM

## 2023-10-19 RX ORDER — AMLODIPINE BESYLATE 5 MG/1
5 TABLET ORAL DAILY
Qty: 90 TABLET | Refills: 3 | Status: SHIPPED | OUTPATIENT
Start: 2023-10-19

## 2023-10-23 RX ORDER — LOVASTATIN 20 MG/1
20 TABLET ORAL NIGHTLY
Qty: 90 TABLET | Refills: 3 | Status: SHIPPED | OUTPATIENT
Start: 2023-10-23

## 2023-10-25 RX ORDER — LOVASTATIN 20 MG/1
20 TABLET ORAL NIGHTLY
Qty: 90 TABLET | Refills: 3 | OUTPATIENT
Start: 2023-10-25

## 2023-10-25 NOTE — TELEPHONE ENCOUNTER
Caller: Eloina Bradley    Relationship: Emergency Contact    Best call back number: [phone]     Requested Prescriptions:   Requested Prescriptions     Pending Prescriptions Disp Refills    lovastatin (MEVACOR) 20 MG tablet 90 tablet 3     Sig: Take 1 tablet by mouth Every Night.        Pharmacy where request should be sent: [facility] [address] [place] [address] [phone] [facility] [phone]      Last office visit with prescribing clinician: 9/25/2023   Last telemedicine visit with prescribing clinician: Visit date not found   Next office visit with prescribing clinician: 1/3/2024     Additional details provided by patient: CALLER STATED THAT THE PATIENT HAS 2 DAYS    Does the patient have less than a 3 day supply:  [x] Yes  [] No    Would you like a call back once the refill request has been completed: [] Yes [x] No    If the office needs to give you a call back, can they leave a voicemail: [] Yes [x] No    Kevin Flores Rep   10/25/23 08:14 CDT

## 2023-11-20 ENCOUNTER — OFFICE VISIT (OUTPATIENT)
Dept: INTERNAL MEDICINE | Facility: CLINIC | Age: 73
End: 2023-11-20
Payer: MEDICARE

## 2023-11-20 VITALS
DIASTOLIC BLOOD PRESSURE: 70 MMHG | SYSTOLIC BLOOD PRESSURE: 170 MMHG | TEMPERATURE: 97.5 F | HEART RATE: 67 BPM | HEIGHT: 69 IN | OXYGEN SATURATION: 95 % | WEIGHT: 234 LBS | BODY MASS INDEX: 34.66 KG/M2

## 2023-11-20 DIAGNOSIS — E11.65 TYPE 2 DIABETES MELLITUS WITH HYPERGLYCEMIA, WITHOUT LONG-TERM CURRENT USE OF INSULIN: ICD-10-CM

## 2023-11-20 DIAGNOSIS — B96.89 ACUTE BACTERIAL SINUSITIS: Primary | ICD-10-CM

## 2023-11-20 DIAGNOSIS — J01.90 ACUTE BACTERIAL SINUSITIS: Primary | ICD-10-CM

## 2023-11-20 DIAGNOSIS — I10 ESSENTIAL HYPERTENSION: ICD-10-CM

## 2023-11-20 LAB — HBA1C MFR BLD: 5.9 % (ref 4.5–5.7)

## 2023-11-20 RX ORDER — GLIPIZIDE 5 MG/1
5 TABLET, FILM COATED, EXTENDED RELEASE ORAL DAILY
Start: 2023-11-20

## 2023-11-20 RX ORDER — AMLODIPINE BESYLATE 10 MG/1
10 TABLET ORAL DAILY
Qty: 90 TABLET | Refills: 1 | Status: SHIPPED | OUTPATIENT
Start: 2023-11-20

## 2023-11-20 RX ORDER — CETIRIZINE HYDROCHLORIDE 10 MG/1
10 TABLET ORAL DAILY
Qty: 30 TABLET | Refills: 1 | Status: SHIPPED | OUTPATIENT
Start: 2023-11-20

## 2023-11-20 RX ORDER — TRIAMCINOLONE ACETONIDE 40 MG/ML
40 INJECTION, SUSPENSION INTRA-ARTICULAR; INTRAMUSCULAR ONCE
Status: COMPLETED | OUTPATIENT
Start: 2023-11-20 | End: 2023-11-20

## 2023-11-20 RX ORDER — AZITHROMYCIN 250 MG/1
TABLET, FILM COATED ORAL
Qty: 6 TABLET | Refills: 0 | Status: SHIPPED | OUTPATIENT
Start: 2023-11-20

## 2023-11-20 RX ADMIN — TRIAMCINOLONE ACETONIDE 40 MG: 40 INJECTION, SUSPENSION INTRA-ARTICULAR; INTRAMUSCULAR at 13:55

## 2023-11-20 NOTE — PROGRESS NOTES
"    Chief Complaint  Sinus Drainage (Pt has experiencing sinus drainage for 3-4 days./Otc Mucinex, nasal spray, and chloraseptic spray.)    Subjective        Chung Bradley presents to [facility]  History of Present Illness    Objective   Vital Signs:  /70 (BP Location: Right arm, Patient Position: Sitting, Cuff Size: Adult)   Pulse 67   Temp 97.5 °F (36.4 °C) (Infrared)   Ht 175.3 cm (69\")   Wt 106 kg (234 lb)   SpO2 95%   BMI 34.56 kg/m²   Estimated body mass index is 34.56 kg/m² as calculated from the following:    Height as of this encounter: 175.3 cm (69\").    Weight as of this encounter: 106 kg (234 lb).       Physical Exam  Constitutional:       Appearance: He is obese.      Comments: Seen and discussed with his wife.   HENT:      Head: Normocephalic and atraumatic.      Right Ear: Ear canal normal.      Left Ear: Ear canal normal.      Ears:      Comments: Perhaps some minor fluid posterior to the TMs.     Nose: Congestion present.      Mouth/Throat:      Mouth: Mucous membranes are moist.      Pharynx: Posterior oropharyngeal erythema present. No oropharyngeal exudate.   Eyes:      Conjunctiva/sclera: Conjunctivae normal.      Pupils: Pupils are equal, round, and reactive to light.   Cardiovascular:      Rate and Rhythm: Normal rate and regular rhythm.      Heart sounds: Normal heart sounds.   Pulmonary:      Effort: Pulmonary effort is normal. No respiratory distress.      Breath sounds: Normal breath sounds.   Musculoskeletal:         General: No swelling.      Cervical back: Neck supple.   Skin:     General: Skin is warm and dry.      Findings: No rash.   Neurological:      General: No focal deficit present.      Mental Status: He is alert and oriented to person, place, and time.   Psychiatric:         Mood and Affect: Mood normal.         Behavior: Behavior normal.         Thought Content: Thought content normal.         Judgment: Judgment normal.       Result " Review :  Hemoglobin A1c was last checked on 4/17/2023 was 6.40.  We will repeat in the office today.    Last BMP was in September.  Renal function normal with creatinine 0.82.         Assessment and Plan   Diagnoses and all orders for this visit:    1. Acute bacterial sinusitis (Primary)  -     triamcinolone acetonide (KENALOG-40) injection 40 mg  -     azithromycin (Zithromax Z-Cade) 250 MG tablet; Take 2 tablets by mouth on day 1, then 1 tablet daily on days 2-5  Dispense: 6 tablet; Refill: 0  -     cetirizine (zyrTEC) 10 MG tablet; Take 1 tablet by mouth Daily.  Dispense: 30 tablet; Refill: 1    2. Type 2 diabetes mellitus with hyperglycemia, without long-term current use of insulin  -     glipizide (GLUCOTROL XL) 5 MG ER tablet; Take 1 tablet by mouth Daily.  -     POC Glycated Hemoglobin, Total    3. Essential hypertension  -     amLODIPine (NORVASC) 10 MG tablet; Take 1 tablet by mouth Daily.  Dispense: 90 tablet; Refill: 1    Regular patient of Dr. Lion.  Presents today for problem visit.    He has been having sinus drainage, nasal congestion, and a nonproductive cough for the last 3-4 days.  He denies fever.  No sweats or chills.  No loss of taste or smell.  No sick contacts.  His wife is present with him and well.    Elected not to swab for influenza or COVID-19.  Will treat as an acute bacterial sinusitis.  One-time IM Kenalog in the office today.  Treat with azithromycin.  Encourage continued use of Flonase.  We will also have him add Zyrtec.    Understands that Kenalog can transiently increase his blood sugar.  Continue Glucotrol XL.  We did repeat a hemoglobin A1c in the office today, which has improved to 5.9 from 6.4 in April.    His blood pressure has been running more and controlled.  170/70 in the office today.  Plan to increase his amlodipine to 10 mg daily for now.  He will continue to self monitor.  He does admit that it has been running high.    His next appointment is with Dr. Lion on  1/3/2024.  Knows that he can call back or return sooner if problems.       Follow Up   Return for Next scheduled follow up.  Patient was given instructions and counseling regarding his condition or for health maintenance advice. Please see specific information pulled into the AVS if appropriate.

## 2023-11-29 DIAGNOSIS — E11.65 TYPE 2 DIABETES MELLITUS WITH HYPERGLYCEMIA, WITHOUT LONG-TERM CURRENT USE OF INSULIN: ICD-10-CM

## 2023-11-29 RX ORDER — GLIPIZIDE 5 MG/1
5 TABLET, FILM COATED, EXTENDED RELEASE ORAL 2 TIMES DAILY
Qty: 180 TABLET | Refills: 3 | Status: SHIPPED | OUTPATIENT
Start: 2023-11-29

## 2023-12-20 ENCOUNTER — OFFICE VISIT (OUTPATIENT)
Dept: INTERNAL MEDICINE | Facility: CLINIC | Age: 73
End: 2023-12-20
Payer: MEDICARE

## 2023-12-20 VITALS
DIASTOLIC BLOOD PRESSURE: 74 MMHG | HEIGHT: 69 IN | TEMPERATURE: 97.9 F | BODY MASS INDEX: 35.43 KG/M2 | SYSTOLIC BLOOD PRESSURE: 168 MMHG | HEART RATE: 57 BPM | OXYGEN SATURATION: 96 % | WEIGHT: 239.2 LBS

## 2023-12-20 DIAGNOSIS — N26.1 ATROPHY OF RIGHT KIDNEY: ICD-10-CM

## 2023-12-20 DIAGNOSIS — E66.01 CLASS 2 SEVERE OBESITY DUE TO EXCESS CALORIES WITH SERIOUS COMORBIDITY AND BODY MASS INDEX (BMI) OF 35.0 TO 35.9 IN ADULT: ICD-10-CM

## 2023-12-20 DIAGNOSIS — I10 ESSENTIAL HYPERTENSION: ICD-10-CM

## 2023-12-20 DIAGNOSIS — T46.1X5A ADVERSE EFFECT OF AMLODIPINE: ICD-10-CM

## 2023-12-20 DIAGNOSIS — E11.65 TYPE 2 DIABETES MELLITUS WITH HYPERGLYCEMIA, WITHOUT LONG-TERM CURRENT USE OF INSULIN: ICD-10-CM

## 2023-12-20 DIAGNOSIS — I10 PRIMARY HYPERTENSION: ICD-10-CM

## 2023-12-20 DIAGNOSIS — N18.2 STAGE 2 CHRONIC KIDNEY DISEASE: Primary | ICD-10-CM

## 2023-12-20 PROBLEM — E66.812 CLASS 2 SEVERE OBESITY DUE TO EXCESS CALORIES WITH SERIOUS COMORBIDITY AND BODY MASS INDEX (BMI) OF 35.0 TO 35.9 IN ADULT: Status: ACTIVE | Noted: 2023-12-20

## 2023-12-20 PROBLEM — C67.8 MALIGNANT NEOPLASM OF OVERLAPPING SITES OF BLADDER: Status: RESOLVED | Noted: 2017-11-02 | Resolved: 2023-12-20

## 2023-12-20 PROBLEM — E66.811 CLASS 1 OBESITY DUE TO EXCESS CALORIES WITH SERIOUS COMORBIDITY AND BODY MASS INDEX (BMI) OF 33.0 TO 33.9 IN ADULT: Status: RESOLVED | Noted: 2023-05-14 | Resolved: 2023-12-20

## 2023-12-20 PROBLEM — C67.9 BLADDER CANCER: Status: RESOLVED | Noted: 2018-09-10 | Resolved: 2023-12-20

## 2023-12-20 PROBLEM — E66.09 CLASS 1 OBESITY DUE TO EXCESS CALORIES WITH SERIOUS COMORBIDITY AND BODY MASS INDEX (BMI) OF 33.0 TO 33.9 IN ADULT: Status: RESOLVED | Noted: 2023-05-14 | Resolved: 2023-12-20

## 2023-12-20 RX ORDER — HYDRALAZINE HYDROCHLORIDE 25 MG/1
25 TABLET, FILM COATED ORAL 3 TIMES DAILY
Qty: 270 TABLET | Refills: 1 | Status: SHIPPED | OUTPATIENT
Start: 2023-12-20

## 2023-12-20 RX ORDER — AMLODIPINE BESYLATE 5 MG/1
5 TABLET ORAL DAILY
Start: 2023-12-20 | End: 2023-12-20 | Stop reason: SDUPTHER

## 2023-12-20 RX ORDER — AMLODIPINE BESYLATE 5 MG/1
5 TABLET ORAL DAILY
Qty: 90 TABLET | Refills: 2 | Status: SHIPPED | OUTPATIENT
Start: 2023-12-20

## 2023-12-20 NOTE — PROGRESS NOTES
"      Chief Complaint  Hypertension (Blood pressure has been running high, his amlodipine was changed and his readings have been all over the place.) and Diabetes    Subjective        Chung Bradley presents to [facility]    HPI  Patient here for the above problems.  See Assessment and Plan for further HPI components.        Review of Systems    Objective   Vital Signs:  /74 (BP Location: Left arm, Patient Position: Sitting, Cuff Size: Adult)   Pulse 57   Temp 97.9 °F (36.6 °C) (Temporal)   Ht 175.3 cm (69.02\")   Wt 109 kg (239 lb 3.2 oz)   SpO2 96%   BMI 35.31 kg/m²   Estimated body mass index is 35.31 kg/m² as calculated from the following:    Height as of this encounter: 175.3 cm (69.02\").    Weight as of this encounter: 109 kg (239 lb 3.2 oz).      Physical Exam  Vitals reviewed.   Constitutional:       Appearance: He is obese. He is not ill-appearing.   Cardiovascular:      Rate and Rhythm: Normal rate and regular rhythm.   Neurological:      General: No focal deficit present.      Mental Status: He is alert and oriented to person, place, and time.   Psychiatric:         Mood and Affect: Mood normal.         Behavior: Behavior normal.                       Assessment and Plan   Diagnoses and all orders for this visit:    1. Stage 2 chronic kidney disease (Primary)    2. Primary hypertension    3. Essential hypertension  -     Discontinue: amLODIPine (NORVASC) 5 MG tablet; Take 1 tablet by mouth Daily.  -     hydrALAZINE (APRESOLINE) 25 MG tablet; Take 1 tablet by mouth 3 (Three) Times a Day.  Dispense: 270 tablet; Refill: 1  -     amLODIPine (NORVASC) 5 MG tablet; Take 1 tablet by mouth Daily.  Dispense: 90 tablet; Refill: 2    4. Type 2 diabetes mellitus with hyperglycemia, without long-term current use of insulin    5. Atrophy of right kidney    6. Class 2 severe obesity due to excess calories with serious comorbidity and body mass index (BMI) of 35.0 to 35.9 in " adult    7. Adverse effect of amlodipine      Patient has hypertension.  BP is not at goal  The patient's BP goal is < 130/80.  Recommend ambulatory BP monitoring after patient has taken medication.  Recommend varying the times of the blood pressure readings.  Patient is currently on amlodipine, carvedilol, hydralazine, losartan.  Recommend we make changes as above.  Patient does not tolerate amlodipine greater than 5 mg.  Patient gets significant lower extremity edema.    Patient has been eating poorly.  Weight has gone up.  Suspect that the patient's blood sugars are also going up.  Recommended the patient work on dialing in the diet.  Patient indicates that he will do better after the holidays.    Patient has CKD stage II.  We can consider spironolactone or karendia.  These both would help with blood pressure as well.  I will have to look back to see if the patient is ever tried losartan.  Patient noted on CT scan that he had recently while at [place] to have atrophy of the right kidney.        Result Review :                   Class 2 Severe Obesity (BMI >=35 and <=39.9). Obesity-related health conditions include the following: hypertension, diabetes mellitus, dyslipidemias, osteoarthritis, and lower extremity venous stasis disease. Obesity is worsening. BMI is is above average; BMI management plan is completed. We discussed portion control and increasing exercise.            Follow Up   Return in about 4 months (around 4/20/2024), or if symptoms worsen or fail to improve, for Recheck, Medicare Wellness.  Patient was given instructions and counseling regarding his condition or for health maintenance advice. Please see specific information pulled into the AVS if appropriate.       RAY Lion MD, FACP, [facility], [facility]      Electronically signed by Daquan Lion MD, 12/20/23, 12:25 PM CST.

## 2024-01-11 ENCOUNTER — OFFICE VISIT (OUTPATIENT)
Dept: INTERNAL MEDICINE | Facility: CLINIC | Age: 74
End: 2024-01-11
Payer: MEDICARE

## 2024-01-11 VITALS
DIASTOLIC BLOOD PRESSURE: 70 MMHG | HEART RATE: 62 BPM | HEIGHT: 69 IN | OXYGEN SATURATION: 98 % | WEIGHT: 238 LBS | BODY MASS INDEX: 35.25 KG/M2 | TEMPERATURE: 97.8 F | SYSTOLIC BLOOD PRESSURE: 134 MMHG

## 2024-01-11 DIAGNOSIS — I10 PRIMARY HYPERTENSION: Primary | ICD-10-CM

## 2024-01-11 DIAGNOSIS — E11.22 TYPE 2 DIABETES MELLITUS WITH STAGE 2 CHRONIC KIDNEY DISEASE, WITHOUT LONG-TERM CURRENT USE OF INSULIN: ICD-10-CM

## 2024-01-11 DIAGNOSIS — N18.2 STAGE 2 CHRONIC KIDNEY DISEASE: ICD-10-CM

## 2024-01-11 DIAGNOSIS — U07.1 COVID-19 VIRUS INFECTION: ICD-10-CM

## 2024-01-11 DIAGNOSIS — E66.01 CLASS 2 SEVERE OBESITY DUE TO EXCESS CALORIES WITH SERIOUS COMORBIDITY AND BODY MASS INDEX (BMI) OF 35.0 TO 35.9 IN ADULT: ICD-10-CM

## 2024-01-11 DIAGNOSIS — N18.2 TYPE 2 DIABETES MELLITUS WITH STAGE 2 CHRONIC KIDNEY DISEASE, WITHOUT LONG-TERM CURRENT USE OF INSULIN: ICD-10-CM

## 2024-01-11 DIAGNOSIS — J06.9 UPPER RESPIRATORY INFECTION, ACUTE: ICD-10-CM

## 2024-01-11 LAB
EXPIRATION DATE: ABNORMAL
EXPIRATION DATE: NORMAL
FLUAV AG NPH QL: NEGATIVE
FLUBV AG NPH QL: NEGATIVE
INTERNAL CONTROL: ABNORMAL
INTERNAL CONTROL: NORMAL
Lab: ABNORMAL
Lab: NORMAL
SARS-COV-2 AG UPPER RESP QL IA.RAPID: DETECTED

## 2024-01-11 RX ORDER — METHYLPREDNISOLONE SODIUM SUCCINATE 40 MG/ML
40 INJECTION, POWDER, LYOPHILIZED, FOR SOLUTION INTRAMUSCULAR; INTRAVENOUS ONCE
Status: COMPLETED | OUTPATIENT
Start: 2024-01-11 | End: 2024-01-11

## 2024-01-11 RX ADMIN — METHYLPREDNISOLONE SODIUM SUCCINATE 40 MG: 40 INJECTION, POWDER, LYOPHILIZED, FOR SOLUTION INTRAMUSCULAR; INTRAVENOUS at 16:23

## 2024-01-11 NOTE — PROGRESS NOTES
"      Chief Complaint  sinus infection (Noticed about 2 days ago /Slightly productive cough/)    Subjective        Chung Bradley presents to [facility]    HPI  Patient here for the above problems.  See Assessment and Plan for further HPI components.        Review of Systems    Objective   Vital Signs:  /70 (BP Location: Left arm, Patient Position: Sitting, Cuff Size: Adult)   Pulse 62   Temp 97.8 °F (36.6 °C) (Temporal)   Ht 175.3 cm (69\")   Wt 108 kg (238 lb)   SpO2 98%   BMI 35.15 kg/m²   Estimated body mass index is 35.15 kg/m² as calculated from the following:    Height as of this encounter: 175.3 cm (69\").    Weight as of this encounter: 108 kg (238 lb).      Physical Exam  Vitals reviewed.   Constitutional:       Appearance: He is obese.   HENT:      Right Ear: Tympanic membrane and ear canal normal.      Left Ear: Tympanic membrane and ear canal normal.      Nose: Mucosal edema and rhinorrhea present.      Right Turbinates: Enlarged.      Left Turbinates: Enlarged.      Mouth/Throat:      Pharynx: Posterior oropharyngeal erythema present. No pharyngeal swelling, oropharyngeal exudate or uvula swelling.   Cardiovascular:      Rate and Rhythm: Normal rate and regular rhythm.   Pulmonary:      Effort: Pulmonary effort is normal. No respiratory distress.   Neurological:      General: No focal deficit present.      Mental Status: He is alert and oriented to person, place, and time.   Psychiatric:         Mood and Affect: Mood normal.         Behavior: Behavior normal.                       Assessment and Plan   Diagnoses and all orders for this visit:    1. Primary hypertension (Primary)    2. Upper respiratory infection, acute  -     methylPREDNISolone sodium succinate (SOLU-Medrol) injection 40 mg  -     POC Influenza A / B  -     POCT DIPTI SARS-CoV-2 Antigen CAM    3. COVID-19 virus infection    4. Stage 2 chronic kidney disease    5. Class 2 severe obesity due to " excess calories with serious comorbidity and body mass index (BMI) of 35.0 to 35.9 in adult    6. Type 2 diabetes mellitus with stage 2 chronic kidney disease, without long-term current use of insulin    Other orders  -     Nirmatrelvir & Ritonavir, 300mg/100mg, (PAXLOVID) 20 x 150 MG & 10 x 100MG tablet therapy pack tablet; Take 3 tablets by mouth 2 (Two) Times a Day for 5 days.  Dispense: 30 tablet; Refill: 0      Patient has chronic kidney disease, type 2 diabetes, hypertension, and obesity.  Patient's COVID-19 was positive.  Reviewed the test myself it was a point-of-care test.  Patient qualifies as high risk based on the above categories.  Patient's renal function does not require dose adjustment of the Paxlovid.  I will give the patient a steroid injection today, this will make his blood sugars go up, but I think that they will be manageable.  Patient has been vaccinated to COVID-19.    With his history of chronic kidney disease, encourage hydration.  Avoid excessive NSAIDs.    Patient's blood pressure is a little bit better controlled, likely can go up to 50 on the hydralazine, but will hold for now given his current illness.  Continue to monitor blood pressures.  I have a follow-up appointment with him in a few months.    Recommend monitoring his blood sugar while he is ill, it can cause spikes.    Influenza negative for A and B      Result Review :                   Class 2 Severe Obesity (BMI >=35 and <=39.9). Obesity-related health conditions include the following: hypertension, diabetes mellitus, dyslipidemias, and osteoarthritis. Obesity is unchanged. BMI is is above average; BMI management plan is completed. We discussed portion control and increasing exercise.            Follow Up   No follow-ups on file.  Patient was given instructions and counseling regarding his condition or for health maintenance advice. Please see specific information pulled into the AVS if appropriate.       RAY Lion MD,  FACP, M      Electronically signed by Daquan Lion MD, 01/11/24, 5:33 PM CST.

## 2024-01-22 ENCOUNTER — OFFICE VISIT (OUTPATIENT)
Dept: INTERNAL MEDICINE | Facility: CLINIC | Age: 74
End: 2024-01-22
Payer: MEDICARE

## 2024-01-22 VITALS
OXYGEN SATURATION: 96 % | TEMPERATURE: 97.3 F | BODY MASS INDEX: 34.8 KG/M2 | HEIGHT: 69 IN | SYSTOLIC BLOOD PRESSURE: 150 MMHG | HEART RATE: 69 BPM | DIASTOLIC BLOOD PRESSURE: 68 MMHG | WEIGHT: 235 LBS

## 2024-01-22 DIAGNOSIS — J01.90 ACUTE BACTERIAL SINUSITIS: Primary | ICD-10-CM

## 2024-01-22 DIAGNOSIS — B96.89 ACUTE BACTERIAL SINUSITIS: Primary | ICD-10-CM

## 2024-01-22 DIAGNOSIS — I10 ESSENTIAL HYPERTENSION: ICD-10-CM

## 2024-01-22 DIAGNOSIS — E11.65 TYPE 2 DIABETES MELLITUS WITH HYPERGLYCEMIA, WITHOUT LONG-TERM CURRENT USE OF INSULIN: ICD-10-CM

## 2024-01-22 DIAGNOSIS — R05.2 SUBACUTE COUGH: ICD-10-CM

## 2024-01-22 DIAGNOSIS — Z86.16 HISTORY OF COVID-19: ICD-10-CM

## 2024-01-22 PROCEDURE — 1159F MED LIST DOCD IN RCRD: CPT | Performed by: INTERNAL MEDICINE

## 2024-01-22 PROCEDURE — 99214 OFFICE O/P EST MOD 30 MIN: CPT | Performed by: INTERNAL MEDICINE

## 2024-01-22 PROCEDURE — 3077F SYST BP >= 140 MM HG: CPT | Performed by: INTERNAL MEDICINE

## 2024-01-22 PROCEDURE — 3078F DIAST BP <80 MM HG: CPT | Performed by: INTERNAL MEDICINE

## 2024-01-22 PROCEDURE — 1160F RVW MEDS BY RX/DR IN RCRD: CPT | Performed by: INTERNAL MEDICINE

## 2024-01-22 RX ORDER — CEFDINIR 300 MG/1
300 CAPSULE ORAL 2 TIMES DAILY
Qty: 14 CAPSULE | Refills: 0 | Status: SHIPPED | OUTPATIENT
Start: 2024-01-22

## 2024-01-22 RX ORDER — BENZONATATE 100 MG/1
100 CAPSULE ORAL 3 TIMES DAILY PRN
Qty: 30 CAPSULE | Refills: 1 | Status: SHIPPED | OUTPATIENT
Start: 2024-01-22

## 2024-01-22 NOTE — PROGRESS NOTES
"    Chief Complaint  Cough (Ongoing; pt tested positive for covid 01/11), Wheezing, and Nasal Congestion    Subjective        Chung Bradley presents to [facility]  Cough  Associated symptoms include wheezing.   Wheezing   Associated symptoms include coughing.   See below.     Objective   Vital Signs:  /68 (BP Location: Left arm, Patient Position: Sitting, Cuff Size: Adult)   Pulse 69   Temp 97.3 °F (36.3 °C) (Infrared)   Ht 175.3 cm (69\")   Wt 107 kg (235 lb)   SpO2 96%   BMI 34.70 kg/m²   Estimated body mass index is 34.7 kg/m² as calculated from the following:    Height as of this encounter: 175.3 cm (69\").    Weight as of this encounter: 107 kg (235 lb).       Physical Exam  Constitutional:       Comments: Seen and discussed with his wife.   HENT:      Head: Normocephalic and atraumatic.      Ears:      Comments: Bilateral hearing aids were removed.  No cerumen.  No erythema or effusion.     Nose: Congestion and rhinorrhea present.      Mouth/Throat:      Mouth: Mucous membranes are moist.      Pharynx: Posterior oropharyngeal erythema present. No oropharyngeal exudate.   Eyes:      Conjunctiva/sclera: Conjunctivae normal.      Pupils: Pupils are equal, round, and reactive to light.   Cardiovascular:      Rate and Rhythm: Normal rate and regular rhythm.      Heart sounds: Normal heart sounds.   Pulmonary:      Effort: Pulmonary effort is normal. No respiratory distress.      Breath sounds: Normal breath sounds.   Musculoskeletal:         General: No swelling.   Skin:     General: Skin is warm and dry.      Findings: No rash.   Neurological:      General: No focal deficit present.      Mental Status: He is alert and oriented to person, place, and time.   Psychiatric:         Mood and Affect: Mood normal.         Behavior: Behavior normal.         Thought Content: Thought content normal.         Judgment: Judgment normal.        Result Review :  Creatinine on 12/19 was " 1.16.  Baseline serum creatinine usually on the order of 1.2.-1.3.     Hemoglobin A1c was 5.9 on 11/20 after being 6.4 in April 2023.         Assessment and Plan   Diagnoses and all orders for this visit:    1. Acute bacterial sinusitis (Primary)  -     cefdinir (OMNICEF) 300 MG capsule; Take 1 capsule by mouth 2 (Two) Times a Day.  Dispense: 14 capsule; Refill: 0    2. Subacute cough  -     benzonatate (Tessalon Perles) 100 MG capsule; Take 1 capsule by mouth 3 (Three) Times a Day As Needed for Cough.  Dispense: 30 capsule; Refill: 1    3. History of COVID-19 (1/11/24)    4. Essential hypertension    5. Type 2 diabetes mellitus with hyperglycemia, without long-term current use of insulin    Regular patient of Dr. Lion.  Presents today for problem visit.  I am familiar with him.  I saw him for problem visit in November 2023.      He saw Dr. Lion on 1/11.  He tested positive for COVID-19.  He was treated with Paxlovid.  He states that he overall has improved, but continues to have a nagging cough.  He continues with sinus/chest congestion.  Has had some sinus headache.  No ongoing fever.  No sweats or chills.  No shortness of breath.    He has not been on systemic steroids in quite some time.  He received a dose of IM methylprednisolone in July 2023 and on 1/11/2024.  He received a dose of IM triamcinolone in November 2023.  I would rather not treat him with any steroids today and he understands.    Will prescribe a course of oral Omnicef.  He has Flonase at home, but has not been using it.  I asked him to use it for the next several days at least.  He is reluctant to use it long-term because he feels like it causes him to have epistaxis every now and then.  Will send in a prescription for Tessalon.    His blood pressure is not at goal today.  Recent changes were made for this.  He states that it has been running better at home.  Will hold off on increasing therapy for now.    Continue glipizide.  Hemoglobin  A1c was better in November.    Next appointment is with Dr. Lion in April.         Follow Up   Return for Next scheduled follow up.  Patient was given instructions and counseling regarding his condition or for health maintenance advice. Please see specific information pulled into the AVS if appropriate.      RAY Neri DO       Electronically signed by PACO Neri DO, 01/22/24, 9:42 AM CST.

## 2024-02-14 DIAGNOSIS — I10 ESSENTIAL HYPERTENSION: ICD-10-CM

## 2024-02-14 RX ORDER — CHLORTHALIDONE 25 MG/1
25 TABLET ORAL DAILY
Qty: 90 TABLET | Refills: 3 | Status: SHIPPED | OUTPATIENT
Start: 2024-02-14

## 2024-02-22 DIAGNOSIS — I10 PRIMARY HYPERTENSION: ICD-10-CM

## 2024-02-22 RX ORDER — CARVEDILOL 3.12 MG/1
3.12 TABLET ORAL 2 TIMES DAILY WITH MEALS
Qty: 180 TABLET | Refills: 3 | Status: SHIPPED | OUTPATIENT
Start: 2024-02-22

## 2024-02-22 NOTE — TELEPHONE ENCOUNTER
Caller: Eloina Bradley    Relationship: Emergency Contact    Best call back number:     Requested Prescriptions:   Requested Prescriptions     Pending Prescriptions Disp Refills    carvedilol (Coreg) 3.125 MG tablet 60 tablet 2     Sig: Take 1 tablet by mouth 2 (Two) Times a Day With Meals.        Pharmacy where request should be sent: [facility] [address] [place] [address] [phone] [facility] [phone]      Last office visit with prescribing clinician: 1/11/2024   Last telemedicine visit with prescribing clinician: Visit date not found   Next office visit with prescribing clinician: 4/22/2024     Additional details provided by patient: ONLY HAS A COUPLE LEFT     Does the patient have less than a 3 day supply:  [x] Yes  [] No    Would you like a call back once the refill request has been completed: [x] Yes [] No    If the office needs to give you a call back, can they leave a voicemail: [x] Yes [] No    Kevin Martinez   02/22/24 13:51 CST

## 2024-04-17 ENCOUNTER — LAB (OUTPATIENT)
Dept: INTERNAL MEDICINE | Facility: CLINIC | Age: 74
End: 2024-04-17
Payer: MEDICARE

## 2024-04-17 DIAGNOSIS — Z79.899 ENCOUNTER FOR LONG-TERM CURRENT USE OF MEDICATION: ICD-10-CM

## 2024-04-17 DIAGNOSIS — I10 ESSENTIAL HYPERTENSION: Primary | ICD-10-CM

## 2024-04-17 DIAGNOSIS — E11.65 TYPE 2 DIABETES MELLITUS WITH HYPERGLYCEMIA, WITHOUT LONG-TERM CURRENT USE OF INSULIN: ICD-10-CM

## 2024-04-18 LAB
ALBUMIN SERPL-MCNC: 4.3 G/DL (ref 3.5–5.2)
ALBUMIN/CREAT UR: 2058 MG/G CREAT (ref 0–29)
ALBUMIN/GLOB SERPL: 2 G/DL
ALP SERPL-CCNC: 74 U/L (ref 39–117)
ALT SERPL-CCNC: 17 U/L (ref 1–41)
APPEARANCE UR: ABNORMAL
AST SERPL-CCNC: 23 U/L (ref 1–40)
BACTERIA #/AREA URNS HPF: ABNORMAL /HPF
BASOPHILS # BLD AUTO: 0.05 10*3/MM3 (ref 0–0.2)
BASOPHILS NFR BLD AUTO: 0.8 % (ref 0–1.5)
BILIRUB SERPL-MCNC: 0.6 MG/DL (ref 0–1.2)
BILIRUB UR QL STRIP: NEGATIVE
BUN SERPL-MCNC: 25 MG/DL (ref 8–23)
BUN/CREAT SERPL: 21.7 (ref 7–25)
CALCIUM SERPL-MCNC: 9.4 MG/DL (ref 8.6–10.5)
CASTS URNS MICRO: ABNORMAL
CHLORIDE SERPL-SCNC: 103 MMOL/L (ref 98–107)
CHOLEST SERPL-MCNC: 149 MG/DL (ref 0–200)
CO2 SERPL-SCNC: 25.7 MMOL/L (ref 22–29)
COLOR UR: YELLOW
CREAT SERPL-MCNC: 1.15 MG/DL (ref 0.76–1.27)
CREAT UR-MCNC: 56.3 MG/DL
CRYSTALS URNS MICRO: ABNORMAL
EGFRCR SERPLBLD CKD-EPI 2021: 66.4 ML/MIN/1.73
EOSINOPHIL # BLD AUTO: 0.12 10*3/MM3 (ref 0–0.4)
EOSINOPHIL NFR BLD AUTO: 1.9 % (ref 0.3–6.2)
EPI CELLS #/AREA URNS HPF: ABNORMAL /HPF
ERYTHROCYTE [DISTWIDTH] IN BLOOD BY AUTOMATED COUNT: 15 % (ref 12.3–15.4)
GLOBULIN SER CALC-MCNC: 2.1 GM/DL
GLUCOSE SERPL-MCNC: 149 MG/DL (ref 65–99)
GLUCOSE UR QL STRIP: NEGATIVE
HBA1C MFR BLD: 6.1 % (ref 4.8–5.6)
HCT VFR BLD AUTO: 42.2 % (ref 37.5–51)
HDLC SERPL-MCNC: 32 MG/DL (ref 40–60)
HGB BLD-MCNC: 14.1 G/DL (ref 13–17.7)
HGB UR QL STRIP: ABNORMAL
IMM GRANULOCYTES # BLD AUTO: 0.05 10*3/MM3 (ref 0–0.05)
IMM GRANULOCYTES NFR BLD AUTO: 0.8 % (ref 0–0.5)
KETONES UR QL STRIP: NEGATIVE
LDLC SERPL CALC-MCNC: 48 MG/DL (ref 0–100)
LEUKOCYTE ESTERASE UR QL STRIP: ABNORMAL
LYMPHOCYTES # BLD AUTO: 1.2 10*3/MM3 (ref 0.7–3.1)
LYMPHOCYTES NFR BLD AUTO: 18.5 % (ref 19.6–45.3)
MCH RBC QN AUTO: 29.3 PG (ref 26.6–33)
MCHC RBC AUTO-ENTMCNC: 33.4 G/DL (ref 31.5–35.7)
MCV RBC AUTO: 87.7 FL (ref 79–97)
MICROALBUMIN UR-MCNC: 1158.6 UG/ML
MONOCYTES # BLD AUTO: 0.38 10*3/MM3 (ref 0.1–0.9)
MONOCYTES NFR BLD AUTO: 5.9 % (ref 5–12)
NEUTROPHILS # BLD AUTO: 4.67 10*3/MM3 (ref 1.7–7)
NEUTROPHILS NFR BLD AUTO: 72.1 % (ref 42.7–76)
NITRITE UR QL STRIP: NEGATIVE
NRBC BLD AUTO-RTO: 0 /100 WBC (ref 0–0.2)
PH UR STRIP: >=9 [PH] (ref 5–8)
PLATELET # BLD AUTO: 148 10*3/MM3 (ref 140–450)
POTASSIUM SERPL-SCNC: 4 MMOL/L (ref 3.5–5.2)
PROT SERPL-MCNC: 6.4 G/DL (ref 6–8.5)
PROT UR QL STRIP: ABNORMAL
RBC # BLD AUTO: 4.81 10*6/MM3 (ref 4.14–5.8)
RBC #/AREA URNS HPF: ABNORMAL /HPF
SODIUM SERPL-SCNC: 140 MMOL/L (ref 136–145)
SP GR UR STRIP: 1.02 (ref 1–1.03)
TRIGL SERPL-MCNC: 466 MG/DL (ref 0–150)
TSH SERPL DL<=0.005 MIU/L-ACNC: 1.89 UIU/ML (ref 0.27–4.2)
UROBILINOGEN UR STRIP-MCNC: ABNORMAL MG/DL
VLDLC SERPL CALC-MCNC: 69 MG/DL (ref 5–40)
WBC # BLD AUTO: 6.47 10*3/MM3 (ref 3.4–10.8)
WBC #/AREA URNS HPF: ABNORMAL /HPF

## 2024-04-22 ENCOUNTER — OFFICE VISIT (OUTPATIENT)
Dept: INTERNAL MEDICINE | Facility: CLINIC | Age: 74
End: 2024-04-22
Payer: MEDICARE

## 2024-04-22 VITALS
WEIGHT: 241.8 LBS | BODY MASS INDEX: 35.81 KG/M2 | HEIGHT: 69 IN | HEART RATE: 66 BPM | OXYGEN SATURATION: 95 % | TEMPERATURE: 97.5 F | DIASTOLIC BLOOD PRESSURE: 66 MMHG | SYSTOLIC BLOOD PRESSURE: 136 MMHG

## 2024-04-22 DIAGNOSIS — N18.2 STAGE 2 CHRONIC KIDNEY DISEASE: Primary | ICD-10-CM

## 2024-04-22 DIAGNOSIS — E11.65 TYPE 2 DIABETES MELLITUS WITH HYPERGLYCEMIA, WITHOUT LONG-TERM CURRENT USE OF INSULIN: ICD-10-CM

## 2024-04-22 DIAGNOSIS — T46.1X5A ADVERSE EFFECT OF AMLODIPINE: ICD-10-CM

## 2024-04-22 DIAGNOSIS — I10 ESSENTIAL HYPERTENSION: ICD-10-CM

## 2024-04-22 DIAGNOSIS — Z23 NEED FOR PNEUMOCOCCAL 20-VALENT CONJUGATE VACCINATION: ICD-10-CM

## 2024-04-22 DIAGNOSIS — I87.2 VENOUS INSUFFICIENCY: ICD-10-CM

## 2024-04-22 DIAGNOSIS — E66.01 CLASS 2 SEVERE OBESITY DUE TO EXCESS CALORIES WITH SERIOUS COMORBIDITY AND BODY MASS INDEX (BMI) OF 35.0 TO 35.9 IN ADULT: ICD-10-CM

## 2024-04-22 PROBLEM — J01.90 ACUTE BACTERIAL SINUSITIS: Status: RESOLVED | Noted: 2023-07-10 | Resolved: 2024-04-22

## 2024-04-22 PROBLEM — B96.89 ACUTE BACTERIAL SINUSITIS: Status: RESOLVED | Noted: 2023-07-10 | Resolved: 2024-04-22

## 2024-04-22 PROCEDURE — G0009 ADMIN PNEUMOCOCCAL VACCINE: HCPCS | Performed by: INTERNAL MEDICINE

## 2024-04-22 PROCEDURE — 1159F MED LIST DOCD IN RCRD: CPT | Performed by: INTERNAL MEDICINE

## 2024-04-22 PROCEDURE — 90677 PCV20 VACCINE IM: CPT | Performed by: INTERNAL MEDICINE

## 2024-04-22 PROCEDURE — 99213 OFFICE O/P EST LOW 20 MIN: CPT | Performed by: INTERNAL MEDICINE

## 2024-04-22 PROCEDURE — 3078F DIAST BP <80 MM HG: CPT | Performed by: INTERNAL MEDICINE

## 2024-04-22 PROCEDURE — G0439 PPPS, SUBSEQ VISIT: HCPCS | Performed by: INTERNAL MEDICINE

## 2024-04-22 PROCEDURE — 1160F RVW MEDS BY RX/DR IN RCRD: CPT | Performed by: INTERNAL MEDICINE

## 2024-04-22 PROCEDURE — 3075F SYST BP GE 130 - 139MM HG: CPT | Performed by: INTERNAL MEDICINE

## 2024-04-22 PROCEDURE — 1170F FXNL STATUS ASSESSED: CPT | Performed by: INTERNAL MEDICINE

## 2024-04-22 PROCEDURE — 3044F HG A1C LEVEL LT 7.0%: CPT | Performed by: INTERNAL MEDICINE

## 2024-04-22 RX ORDER — GLIPIZIDE 5 MG/1
5 TABLET, FILM COATED, EXTENDED RELEASE ORAL DAILY
Start: 2024-04-22

## 2024-04-22 NOTE — PROGRESS NOTES
The ABCs of the Annual Wellness Visit  Subsequent Medicare Wellness Visit    Chief Complaint   Patient presents with    Medicare Wellness-subsequent     Labs printed    Skin Lesion     Patient complains of skin lesion located on the L thumb x 1 month.  States it is a hard knot.   Denies pain.     Pain     Patient complains of pain located on the RLQ x 1 month.   States it is like a burning/stinging sensation under the skin.     Insomnia     Patient complains of insomnia - states he will either wake up multiple times throughout the night or wake up extremely early and stay up throughout the morning.     Diabetic Foot Exam       Subjective    History of Present Illness:  Chung Bradley is a 73 y.o. male who presents for a Subsequent Medicare Wellness Visit.    The following portions of the patient's history were reviewed and   updated as appropriate: allergies, current medications, past family history, past medical history, past social history, past surgical history and problem list.    Compared to one year ago, the patient feels his physical   health is the same.    Compared to one year ago, the patient feels his mental   health is the same.    Recent Hospitalizations:  He was not admitted to the hospital during the last year.       Current Medical Providers:  Patient Care Team:  Daquan Lion MD as PCP - General (Internal Medicine)  Chris Esparza MD as Consulting Physician (Urology)    Outpatient Medications Prior to Visit   Medication Sig Dispense Refill    allopurinol (ZYLOPRIM) 300 MG tablet Take 1 tablet by mouth Daily.      amLODIPine (NORVASC) 5 MG tablet Take 1 tablet by mouth Daily. 90 tablet 2    ASPIRIN 81 PO Take  by mouth.      carvedilol (Coreg) 3.125 MG tablet Take 1 tablet by mouth 2 (Two) Times a Day With Meals. 180 tablet 3    cetirizine (zyrTEC) 10 MG tablet Take 1 tablet by mouth Daily. 30 tablet 1    chlorthalidone (HYGROTON) 25 MG tablet Take 1 tablet by mouth Daily. 90 tablet 3     cyanocobalamin (VITAMIN B-12) 1000 MCG tablet Take 1 tablet by mouth Daily.      fluticasone (FLONASE) 50 MCG/ACT nasal spray 1 spray into the nostril(s) as directed by provider 2 (Two) Times a Day As Needed for Allergies. 1 bottle 3    gabapentin (NEURONTIN) 400 MG capsule Take 2 capsules by mouth Every 8 (Eight) Hours. Take 2 capsules by mouth in the am, two capsules by mouth in the afternoon, and 2 capsules by mouth in the pm.     Patient is taking 2 capsules in the AM, 2 capsules in the afternoon, 1 capsule in the PM      hydrALAZINE (APRESOLINE) 25 MG tablet Take 1 tablet by mouth 3 (Three) Times a Day. 270 tablet 1    losartan (COZAAR) 50 MG tablet Take 1 tablet by mouth twice daily 180 tablet 3    lovastatin (MEVACOR) 20 MG tablet TAKE 1 TABLET BY MOUTH ONCE DAILY AT NIGHT 90 tablet 3    Nystatin powder Apply  topically to the appropriate area as directed As Needed.      omeprazole (priLOSEC) 20 MG capsule Take 1 capsule by mouth 2 (Two) Times a Day.      glipizide (GLUCOTROL XL) 5 MG ER tablet Take 1 tablet by mouth twice daily 180 tablet 3    benzonatate (Tessalon Perles) 100 MG capsule Take 1 capsule by mouth 3 (Three) Times a Day As Needed for Cough. 30 capsule 1    cefdinir (OMNICEF) 300 MG capsule Take 1 capsule by mouth 2 (Two) Times a Day. 14 capsule 0     No facility-administered medications prior to visit.       No opioid medication identified on active medication list. I have reviewed chart for other potential  high risk medication/s and harmful drug interactions in the elderly.        Aspirin is on active medication list. Aspirin use is indicated based on review of current medical condition/s. Pros and cons of this therapy have been discussed today. Benefits of this medication outweigh potential harm.  Patient has been encouraged to continue taking this medication.  .      Patient Active Problem List   Diagnosis    Bladder neoplasm of uncertain malignant potential    Spinal stenosis in cervical  "region    Former smoker    Cervical radiculopathy    Cervical spondylosis without myelopathy    Essential hypertension    Sleep apnea    Type 2 diabetes mellitus with hyperglycemia, without long-term current use of insulin    Joseph's esophagus without dysplasia    Family history of esophageal cancer    History of colonic polyps    Stage 2 chronic kidney disease    Traumatic complete tear of left rotator cuff    Atrophy of right kidney    Adverse effect of amlodipine - Does not tolerate >5 mg due to edema    Class 2 severe obesity due to excess calories with serious comorbidity and body mass index (BMI) of 35.0 to 35.9 in adult    Venous insufficiency     Advance Care Planning  Advance Directive is not on file.  ACP discussion was held with the patient during this visit. Patient has an advance directive (not in EMR), copy requested.       Objective    Vitals:    24 0747   BP: 136/66  Comment: Taken BP med   BP Location: Left arm   Patient Position: Sitting   Cuff Size: Adult   Pulse: 66   Temp: 97.5 °F (36.4 °C)   TempSrc: Infrared   SpO2: 95%   Weight: 110 kg (241 lb 12.8 oz)   Height: 175.3 cm (69\")   PainSc:   4   PainLoc: Abdomen  Comment: RLQ     Estimated body mass index is 35.71 kg/m² as calculated from the following:    Height as of this encounter: 175.3 cm (69\").    Weight as of this encounter: 110 kg (241 lb 12.8 oz).           Does the patient have evidence of cognitive impairment? No      Lab Results   Component Value Date    CHLPL 149 2024    TRIG 466 (H) 2024    HDL 32 (L) 2024    LDL 48 2024    VLDL 69 (H) 2024    HGBA1C 6.10 (H) 2024            HEALTH RISK ASSESSMENT    Smoking Status:  Social History     Tobacco Use   Smoking Status Former    Current packs/day: 0.00    Types: Cigarettes    Quit date:     Years since quittin.3   Smokeless Tobacco Never     Alcohol Consumption:  Social History     Substance and Sexual Activity   Alcohol Use No "     Fall Risk Screen:    STEADI Fall Risk Assessment was completed, and patient is at LOW risk for falls.Assessment completed on:2024    Depression Screenin/22/2024     7:52 AM   PHQ-2/PHQ-9 Depression Screening   Little Interest or Pleasure in Doing Things 0-->not at all   Feeling Down, Depressed or Hopeless 0-->not at all   PHQ-9: Brief Depression Severity Measure Score 0       Health Habits and Functional and Cognitive Screenin/22/2024     7:53 AM   Functional & Cognitive Status   Do you have difficulty preparing food and eating? No   Do you have difficulty bathing yourself, getting dressed or grooming yourself? No   Do you have difficulty using the toilet? No   Do you have difficulty moving around from place to place? No   Do you have trouble with steps or getting out of a bed or a chair? No   Current Diet Well Balanced Diet   Dental Exam Up to date   Eye Exam Up to date   Exercise (times per week) 3 times per week   Current Exercises Include Yard Work   Do you need help using the phone?  No   Are you deaf or do you have serious difficulty hearing?  Yes   Do you need help to go to places out of walking distance? No   Do you need help shopping? No   Do you need help preparing meals?  No   Do you need help with housework?  No   Do you need help with laundry? No   Do you need help taking your medications? No   Do you need help managing money? No   Do you ever drive or ride in a car without wearing a seat belt? No   Have you felt unusual stress, anger or loneliness in the last month? No   Who do you live with? Spouse   If you need help, do you have trouble finding someone available to you? No   Have you been bothered in the last four weeks by sexual problems? No   Do you have difficulty concentrating, remembering or making decisions? Yes       Age-appropriate Screening Schedule:  Refer to the list below for future screening recommendations based on patient's age, sex and/or medical conditions.  Orders for these recommended tests are listed in the plan section. The patient has been provided with a written plan.    Health Maintenance   Topic Date Due    RSV Vaccine - Adults (1 - 1-dose 60+ series) Never done    Pneumococcal Vaccine 65+ (2 of 2 - PPSV23 or PCV20) 02/23/2016    DIABETIC FOOT EXAM  04/19/2024    INFLUENZA VACCINE  08/01/2024    HEMOGLOBIN A1C  10/17/2024    DIABETIC EYE EXAM  01/02/2025    BMI FOLLOWUP  01/11/2025    LIPID PANEL  04/17/2025    URINE MICROALBUMIN  04/17/2025    ANNUAL WELLNESS VISIT  04/22/2025    TDAP/TD VACCINES (2 - Td or Tdap) 10/23/2027    COLORECTAL CANCER SCREENING  08/30/2031    HEPATITIS C SCREENING  Completed    COVID-19 Vaccine  Completed    AAA SCREEN (ONE-TIME)  Completed    ZOSTER VACCINE  Completed              Assessment & Plan   CMS Preventative Services Quick Reference  Risk Factors Identified During Encounter  Immunizations Discussed/Encouraged: Prevnar 20 (Pneumococcal 20-valent conjugate) and RSV (Respiratory Syncytial Virus)  Dental Screening Recommended  Vision Screening Recommended  The above risks/problems have been discussed with the patient.  Follow up actions/plans if indicated are seen below in the Assessment/Plan Section.  Pertinent information has been shared with the patient in the After Visit Summary.    Diagnoses and all orders for this visit:    1. Stage 2 chronic kidney disease (Primary)    2. Type 2 diabetes mellitus with hyperglycemia, without long-term current use of insulin  -     glipizide (GLUCOTROL XL) 5 MG ER tablet; Take 1 tablet by mouth Daily.    3. Essential hypertension    4. Class 2 severe obesity due to excess calories with serious comorbidity and body mass index (BMI) of 35.0 to 35.9 in adult    5. Adverse effect of amlodipine - Does not tolerate >5 mg due to edema    6. Need for pneumococcal 20-valent conjugate vaccination  -     Pneumococcal Conjugate Vaccine 20-Valent (PCV20)    7. Venous insufficiency      Recommend at  least annual dental and vision screening.  Recommend annual influenza vaccination  Recommend a varied diet and appropriate portion sizes.   CDC recommendations for physical activity:  At least 150 minutes a week (for example, 30 minutes a day, 5 days a week) of moderate-intensity activity such as brisk walking. Or can consider 75 minutes a week of vigorous-intensity activity such as hiking, jogging, or running.  At least 2 days a week of activities that strengthen muscles.  Plus activities to improve balance.    PCV 20 today    Recommend RSV at a pharmacy.    Result Review :           Follow Up:   Return in about 3 months (around 7/22/2024), or if symptoms worsen or fail to improve.     An After Visit Summary and PPPS were made available to the patient.                         RAY Lion MD, FACP, [facility]      Electronically signed by Daquan Lion MD, 04/22/24, 8:05 AM CDT.    Additional E&M Note during same encounter follows:  Patient has multiple medical problems which are significant and separately identifiable that require additional work above and beyond the Medicare Wellness Visit.      Chief Complaint  Medicare Wellness-subsequent (Labs printed), Skin Lesion (Patient complains of skin lesion located on the L thumb x 1 month./States it is a hard knot. /Denies pain. ), Pain (Patient complains of pain located on the RLQ x 1 month. /States it is like a burning/stinging sensation under the skin. ), Insomnia (Patient complains of insomnia - states he will either wake up multiple times throughout the night or wake up extremely early and stay up throughout the morning. ), and Diabetic Foot Exam     Subjective        HPI  Chung Bradley is also being seen today for above issues.  Patient here for the above problems.  See Assessment and Plan for further HPI components.        Objective   Vitals:    04/22/24 0747   BP: 136/66  Comment: Taken BP med   BP Location: Left arm   Patient Position: Sitting   Cuff  "Size: Adult   Pulse: 66   Temp: 97.5 °F (36.4 °C)   TempSrc: Infrared   SpO2: 95%   Weight: 110 kg (241 lb 12.8 oz)   Height: 175.3 cm (69\")   PainSc:   4   PainLoc: Abdomen  Comment: RLQ     Physical Exam  Vitals and nursing note reviewed.   Constitutional:       Appearance: He is not ill-appearing.   Eyes:      General: No scleral icterus.     Conjunctiva/sclera: Conjunctivae normal.   Pulmonary:      Effort: Pulmonary effort is normal. No respiratory distress.   Feet:      Comments: Diabetic Foot Exam Performed and Monofilament Test Performed    - Venous stasis changes  - nails okay  Monofilament okay  Mild deformities in feet      Neurological:      General: No focal deficit present.      Mental Status: He is alert and oriented to person, place, and time.   Psychiatric:         Mood and Affect: Mood normal.         Behavior: Behavior normal.                             Assessment and Plan   Diagnoses and all orders for this visit:    1. Stage 2 chronic kidney disease (Primary)    2. Type 2 diabetes mellitus with hyperglycemia, without long-term current use of insulin  -     glipizide (GLUCOTROL XL) 5 MG ER tablet; Take 1 tablet by mouth Daily.    3. Essential hypertension    4. Class 2 severe obesity due to excess calories with serious comorbidity and body mass index (BMI) of 35.0 to 35.9 in adult    5. Adverse effect of amlodipine - Does not tolerate >5 mg due to edema    6. Need for pneumococcal 20-valent conjugate vaccination  -     Pneumococcal Conjugate Vaccine 20-Valent (PCV20)    7. Venous insufficiency      Patient's blood pressure log was reviewed.  The patient's blood pressure has been better controlled.  Has been more stable.    The patient indicates that he has been having some intermittent hyper as well as hypoglycemia.  We have previously cut his glipizide back to daily.  I have made the chart reflect this.  Patient is intermittently taking some doses in the evening of his sugars.  Recommend that " he does not have to do this.  His hemoglobin A1c is noted to be 6.1%.  I have discussed with him that I would like him to add in some fruit to his diet to avoid hypoglycemia.  The patient enjoys fruit, but has significantly cut back on eating it due to worry about his blood sugar.  He has been having such good control over his blood pressure and blood sugar, I think a little bit of fruit may be worthwhile for him.  Patient does an excellent job with his diet.      Patient has venous insufficiency.  This is gravity dependent.  Worse at the end of the day.  Better in AM.  Recommend low salt diet.  Recommend elevation of lower extremities.  Recommend compression stockings as tolerated.  If persists, then we may need to consider secondary causes or other treatment.      Nothing abnormal on exam around his ostomy and urostomy.  Patient has a little bit of pulling I suspect is causing his discomfort.    Patient asked about weight loss medications, do not think that it would be worthwhile to do a GLP-1 as his A1c is controlled with minimal medication.  Patient is also had significant abdominal surgeries, I think the risk would outweigh the benefit at the present time given his adequately controlled diabetes.         Follow Up   Return in about 3 months (around 7/22/2024), or if symptoms worsen or fail to improve.  Patient was given instructions and counseling regarding his condition or for health maintenance advice. Please see specific information pulled into the AVS if appropriate.

## 2024-05-06 DIAGNOSIS — E11.65 TYPE 2 DIABETES MELLITUS WITH HYPERGLYCEMIA, WITHOUT LONG-TERM CURRENT USE OF INSULIN: ICD-10-CM

## 2024-05-06 RX ORDER — GLIPIZIDE 5 MG/1
5 TABLET, FILM COATED, EXTENDED RELEASE ORAL DAILY
Qty: 90 TABLET | Refills: 3 | Status: SHIPPED | OUTPATIENT
Start: 2024-05-06

## 2024-06-14 DIAGNOSIS — I10 ESSENTIAL HYPERTENSION: ICD-10-CM

## 2024-06-14 NOTE — TELEPHONE ENCOUNTER
Caller: Eloina Bradley    Relationship: Emergency Contact    Best call back number: [phone]     Requested Prescriptions:   Requested Prescriptions     Pending Prescriptions Disp Refills    hydrALAZINE (APRESOLINE) 25 MG tablet 270 tablet 1     Sig: Take 1 tablet by mouth 3 (Three) Times a Day.        Pharmacy where request should be sent: [facility] [address] [place] [address] [phone] [facility] [phone]     Last office visit with prescribing clinician: 4/22/2024   Last telemedicine visit with prescribing clinician: Visit date not found   Next office visit with prescribing clinician: 7/8/2024     Additional details provided by patient: COMPLETELY OUT     Does the patient have less than a 3 day supply:  [x] Yes  [] No    Would you like a call back once the refill request has been completed: [] Yes [] No    If the office needs to give you a call back, can they leave a voicemail: [] Yes [] No    Kevin Domingo   06/14/24 11:11 CDT

## 2024-06-15 DIAGNOSIS — I10 ESSENTIAL HYPERTENSION: ICD-10-CM

## 2024-06-17 RX ORDER — HYDRALAZINE HYDROCHLORIDE 25 MG/1
25 TABLET, FILM COATED ORAL 3 TIMES DAILY
Qty: 270 TABLET | Refills: 1 | Status: SHIPPED | OUTPATIENT
Start: 2024-06-17

## 2024-06-17 RX ORDER — HYDRALAZINE HYDROCHLORIDE 25 MG/1
25 TABLET, FILM COATED ORAL 3 TIMES DAILY
Qty: 270 TABLET | Refills: 0 | OUTPATIENT
Start: 2024-06-17

## 2024-06-18 DIAGNOSIS — I10 ESSENTIAL HYPERTENSION: ICD-10-CM

## 2024-06-18 DIAGNOSIS — N18.2 STAGE 2 CHRONIC KIDNEY DISEASE: ICD-10-CM

## 2024-06-18 DIAGNOSIS — I10 PRIMARY HYPERTENSION: ICD-10-CM

## 2024-06-18 RX ORDER — HYDRALAZINE HYDROCHLORIDE 25 MG/1
25 TABLET, FILM COATED ORAL 3 TIMES DAILY
Qty: 270 TABLET | Refills: 1 | OUTPATIENT
Start: 2024-06-18

## 2024-06-18 RX ORDER — LOSARTAN POTASSIUM 50 MG/1
50 TABLET ORAL 2 TIMES DAILY
Qty: 180 TABLET | Refills: 3 | Status: SHIPPED | OUTPATIENT
Start: 2024-06-18

## 2024-06-18 NOTE — TELEPHONE ENCOUNTER
Caller:     Eloina Bradley        Relationship:     Best call back number:     [phone]      Requested Prescriptions:     losartan (COZAAR) 50 MG tablet          hydrALAZINE (APRESOLINE) 25 MG tablet  25 mg, 3 Times Daily       Pharmacy where request should be sent:  [facility] [address] [place] [address] [phone]  - [phone]  [phone]     Last office visit with prescribing clinician: 4/22/2024   Last telemedicine visit with prescribing clinician: Visit date not found   Next office visit with prescribing clinician: 7/8/2024     Additional details provided by patient: SHE STATES THEY REQUESTED HYDRALAZINE 5 DAYS AGO AND IT IS NOT AT THE PHARMACY -REQUESTING CALL BACK     Does the patient have less than a 3 day supply:  [x] Yes  [] No    Would you like a call back once the refill request has been completed: [x] Yes [] No    If the office needs to give you a call back, can they leave a voicemail: [] Yes [x] No    Kevin De La Rosa Rep   06/18/24 13:14 CDT

## 2024-07-08 ENCOUNTER — OFFICE VISIT (OUTPATIENT)
Dept: INTERNAL MEDICINE | Facility: CLINIC | Age: 74
End: 2024-07-08
Payer: MEDICARE

## 2024-07-08 VITALS
SYSTOLIC BLOOD PRESSURE: 156 MMHG | DIASTOLIC BLOOD PRESSURE: 58 MMHG | WEIGHT: 243 LBS | TEMPERATURE: 98.2 F | HEIGHT: 69 IN | HEART RATE: 67 BPM | BODY MASS INDEX: 35.99 KG/M2 | OXYGEN SATURATION: 98 %

## 2024-07-08 DIAGNOSIS — E66.01 CLASS 2 SEVERE OBESITY DUE TO EXCESS CALORIES WITH SERIOUS COMORBIDITY AND BODY MASS INDEX (BMI) OF 35.0 TO 35.9 IN ADULT: ICD-10-CM

## 2024-07-08 DIAGNOSIS — M62.830 SPASM OF LEFT TRAPEZIUS MUSCLE: ICD-10-CM

## 2024-07-08 DIAGNOSIS — I10 ESSENTIAL HYPERTENSION: ICD-10-CM

## 2024-07-08 DIAGNOSIS — N18.2 STAGE 2 CHRONIC KIDNEY DISEASE: ICD-10-CM

## 2024-07-08 DIAGNOSIS — I87.2 VENOUS INSUFFICIENCY: Primary | ICD-10-CM

## 2024-07-08 DIAGNOSIS — E11.65 TYPE 2 DIABETES MELLITUS WITH HYPERGLYCEMIA, WITHOUT LONG-TERM CURRENT USE OF INSULIN: ICD-10-CM

## 2024-07-08 PROCEDURE — 3044F HG A1C LEVEL LT 7.0%: CPT | Performed by: INTERNAL MEDICINE

## 2024-07-08 PROCEDURE — 1125F AMNT PAIN NOTED PAIN PRSNT: CPT | Performed by: INTERNAL MEDICINE

## 2024-07-08 PROCEDURE — G2211 COMPLEX E/M VISIT ADD ON: HCPCS | Performed by: INTERNAL MEDICINE

## 2024-07-08 PROCEDURE — 3078F DIAST BP <80 MM HG: CPT | Performed by: INTERNAL MEDICINE

## 2024-07-08 PROCEDURE — 1159F MED LIST DOCD IN RCRD: CPT | Performed by: INTERNAL MEDICINE

## 2024-07-08 PROCEDURE — 99214 OFFICE O/P EST MOD 30 MIN: CPT | Performed by: INTERNAL MEDICINE

## 2024-07-08 PROCEDURE — 1160F RVW MEDS BY RX/DR IN RCRD: CPT | Performed by: INTERNAL MEDICINE

## 2024-07-08 PROCEDURE — 3077F SYST BP >= 140 MM HG: CPT | Performed by: INTERNAL MEDICINE

## 2024-07-08 RX ORDER — TIZANIDINE HYDROCHLORIDE 4 MG/1
4 CAPSULE, GELATIN COATED ORAL 3 TIMES DAILY PRN
Qty: 30 CAPSULE | Refills: 0 | Status: SHIPPED | OUTPATIENT
Start: 2024-07-08

## 2024-07-08 RX ORDER — HYDRALAZINE HYDROCHLORIDE 50 MG/1
50 TABLET, FILM COATED ORAL 3 TIMES DAILY
Qty: 270 TABLET | Refills: 1 | Status: SHIPPED | OUTPATIENT
Start: 2024-07-08

## 2024-07-08 NOTE — PROGRESS NOTES
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  See Assessment and Plan for further HPI components.      Review of Systems    Objective   Vital Signs:  /58 (BP Location: Left arm, Patient Position: Sitting, Cuff Size: Adult)   Pulse 67   Temp 98.2 °F (36.8 °C) (Temporal)   Ht 175.3 cm (69\")   Wt 110 kg (243 lb)   SpO2 98%   BMI 35.88 kg/m²   Estimated body mass index is 35.88 kg/m² as calculated from the following:    Height as of this encounter: 175.3 cm (69\").    Weight as of this encounter: 110 kg (243 lb).      Physical Exam  Vitals and nursing note reviewed.   Constitutional:       Appearance: He is obese. He is not ill-appearing.   Eyes:      General: No scleral icterus.     Conjunctiva/sclera: Conjunctivae normal.   Pulmonary:      Effort: Pulmonary effort is normal. No respiratory distress.   Musculoskeletal:        Arms:       Right lower leg: Edema (pitting) present.      Left lower leg: Edema (pitting) present.   Skin:     Comments: Venous insufficiency changes   Neurological:      General: No focal deficit present.      Mental Status: He is alert and oriented to person, place, and time.   Psychiatric:         Mood and Affect: Mood normal.         Behavior: Behavior normal.                       Assessment and Plan   Diagnoses and all orders for this visit:    1. Venous insufficiency (Primary)    2. Essential hypertension  -     hydrALAZINE (APRESOLINE) 50 MG tablet; Take 1 tablet by mouth " 3 (Three) Times a Day.  Dispense: 270 tablet; Refill: 1    3. Class 2 severe obesity due to excess calories with serious comorbidity and body mass index (BMI) of 35.0 to 35.9 in adult    4. Stage 2 chronic kidney disease    5. Spasm of left trapezius muscle  -     TiZANidine (ZANAFLEX) 4 MG capsule; Take 1 capsule by mouth 3 (Three) Times a Day As Needed for Muscle Spasms.  Dispense: 30 capsule; Refill: 0    6. Type 2 diabetes mellitus with hyperglycemia, without long-term current use of insulin    Other orders  -     RSVPreF3 Vac Recomb Adjuvanted (AREXVY) 120 MCG/0.5ML reconstituted suspension injection; Inject 0.5 mL into the appropriate muscle as directed by prescriber 1 (One) Time for 1 dose.  Dispense: 0.5 mL; Refill: 0        Patient has venous insufficiency.  This is gravity dependent.  Worse at the end of the day.  Better in AM.  Recommend low salt diet.  Recommend elevation of lower extremities.  Recommend compression stockings as tolerated.  If persists, then we may need to consider secondary causes or other treatment.      Stop amlodipine.  Increase hydralazine.  Patient has venous insufficiency that's worsening.  BP has been adequately controlled at home.  Stopping amlodipine will hopefully help with lower extremity swelling.  Compression socks.  Elevation.  Lower salt.    RSV vaccine sent.    Patient worked under RV.  Patient having some pain in neck.  Spasm present.  Will trial zanaflex.    Patient kidney function last was stable.  Avoid excessive NSAIDs.        Patient has a BMI > 30.  Obesity increases risks of diseases such as diabetes, coronary artery disease, hypertension, sleep apnea, hyperlipidemia, arthritis, and stroke.  Recommend focusing on portion control and making healthy diet choices.  Avoid fast food.  Avoid calorie beverages including sweet tea, juice, soft drinks, and alcohol.  Recommend increasing your activity and starting a regular exercise program. Can consider utilizing calorie  counting apps such as [employer].        For DM, too early to check A1c.  Patient interested in GLP-1 for obesity and DM.  Recommended checking with insurance to see coverage.      Result Review :                   Class 2 Severe Obesity (BMI >=35 and <=39.9). Obesity-related health conditions include the following: hypertension, diabetes mellitus, dyslipidemias, osteoarthritis, and lower extremity venous stasis disease. Obesity is unchanged. BMI is is above average; BMI management plan is completed. We discussed portion control and increasing exercise.            Follow Up   Return in about 3 months (around 10/8/2024), or if symptoms worsen or fail to improve, for Med Check, BP Check, Recheck, follow up for above problems. Longitudinal care..  Patient was given instructions and counseling regarding his condition or for health maintenance advice. Please see specific information pulled into the AVS if appropriate.       RAY Lion MD, FACP, [facility]      Electronically signed by Daquan Lion MD, 07/08/24, 12:27 PM CDT.

## 2024-09-17 ENCOUNTER — OFFICE VISIT (OUTPATIENT)
Dept: INTERNAL MEDICINE | Facility: CLINIC | Age: 74
End: 2024-09-17
Payer: MEDICARE

## 2024-09-17 VITALS
HEART RATE: 62 BPM | TEMPERATURE: 96.7 F | BODY MASS INDEX: 35.99 KG/M2 | DIASTOLIC BLOOD PRESSURE: 70 MMHG | SYSTOLIC BLOOD PRESSURE: 142 MMHG | WEIGHT: 243 LBS | OXYGEN SATURATION: 98 % | HEIGHT: 69 IN

## 2024-09-17 DIAGNOSIS — I87.2 VENOUS INSUFFICIENCY: ICD-10-CM

## 2024-09-17 DIAGNOSIS — E66.01 CLASS 2 SEVERE OBESITY DUE TO EXCESS CALORIES WITH SERIOUS COMORBIDITY AND BODY MASS INDEX (BMI) OF 35.0 TO 35.9 IN ADULT: ICD-10-CM

## 2024-09-17 DIAGNOSIS — E11.65 TYPE 2 DIABETES MELLITUS WITH HYPERGLYCEMIA, WITHOUT LONG-TERM CURRENT USE OF INSULIN: ICD-10-CM

## 2024-09-17 DIAGNOSIS — I10 ESSENTIAL HYPERTENSION: ICD-10-CM

## 2024-09-17 DIAGNOSIS — N18.2 STAGE 2 CHRONIC KIDNEY DISEASE: ICD-10-CM

## 2024-09-17 DIAGNOSIS — R06.09 DYSPNEA ON EXERTION: Primary | ICD-10-CM

## 2024-09-17 PROCEDURE — 3077F SYST BP >= 140 MM HG: CPT | Performed by: INTERNAL MEDICINE

## 2024-09-17 PROCEDURE — 1126F AMNT PAIN NOTED NONE PRSNT: CPT | Performed by: INTERNAL MEDICINE

## 2024-09-17 PROCEDURE — 99214 OFFICE O/P EST MOD 30 MIN: CPT | Performed by: INTERNAL MEDICINE

## 2024-09-17 PROCEDURE — G2211 COMPLEX E/M VISIT ADD ON: HCPCS | Performed by: INTERNAL MEDICINE

## 2024-09-17 PROCEDURE — 3078F DIAST BP <80 MM HG: CPT | Performed by: INTERNAL MEDICINE

## 2024-09-26 ENCOUNTER — HOSPITAL ENCOUNTER (OUTPATIENT)
Dept: CARDIOLOGY | Facility: HOSPITAL | Age: 74
Discharge: HOME | End: 2024-09-26
Admitting: INTERNAL MEDICINE
Payer: MEDICARE

## 2024-09-26 DIAGNOSIS — R06.09 DYSPNEA ON EXERTION: ICD-10-CM

## 2024-09-26 PROCEDURE — 93350 STRESS TTE ONLY: CPT

## 2024-09-26 PROCEDURE — 25010000002 DOBUTAMINE 250 MG/20ML SOLUTION: Performed by: INTERNAL MEDICINE

## 2024-09-26 PROCEDURE — 25010000002 ATROPINE SULFATE: Performed by: INTERNAL MEDICINE

## 2024-09-26 PROCEDURE — 93017 CV STRESS TEST TRACING ONLY: CPT

## 2024-09-26 PROCEDURE — 25510000001 PERFLUTREN 6.52 MG/ML SUSPENSION: Performed by: INTERNAL MEDICINE

## 2024-09-26 RX ORDER — METOPROLOL TARTRATE 1 MG/ML
5 INJECTION, SOLUTION INTRAVENOUS ONCE
Status: DISCONTINUED | OUTPATIENT
Start: 2024-09-26 | End: 2024-09-27 | Stop reason: HOSPADM

## 2024-09-26 RX ORDER — DOBUTAMINE HYDROCHLORIDE 100 MG/100ML
10-50 INJECTION INTRAVENOUS
Status: DISCONTINUED | OUTPATIENT
Start: 2024-09-26 | End: 2024-09-26

## 2024-09-26 RX ADMIN — ATROPINE SULFATE 0.3 MG: 0.1 INJECTION INTRAVENOUS at 13:06

## 2024-09-26 RX ADMIN — DEXTROSE MONOHYDRATE 10 MCG/KG/MIN: 5 INJECTION, SOLUTION INTRAVENOUS at 12:54

## 2024-09-26 RX ADMIN — PERFLUTREN 8.48 MG: 6.52 INJECTION, SUSPENSION INTRAVENOUS at 12:32

## 2024-09-27 LAB
BH CV STRESS BP STAGE 1: NORMAL
BH CV STRESS DURATION MIN STAGE 1: 2
BH CV STRESS DURATION SEC STAGE 1: 35
BH CV STRESS GRADE STAGE 1: 10
BH CV STRESS HR STAGE 1: 100
BH CV STRESS METS STAGE 1: 5
BH CV STRESS PROTOCOL 1: NORMAL
BH CV STRESS PROTOCOL 2 BP STAGE 1: NORMAL
BH CV STRESS PROTOCOL 2 BP STAGE 2: NORMAL
BH CV STRESS PROTOCOL 2 BP STAGE 3: 133
BH CV STRESS PROTOCOL 2 DOSE DOBUTAMINE STAGE 1: 10
BH CV STRESS PROTOCOL 2 DOSE DOBUTAMINE STAGE 2: 20
BH CV STRESS PROTOCOL 2 DOSE DOBUTAMINE STAGE 3: 30
BH CV STRESS PROTOCOL 2 DURATION MIN STAGE 1: 3
BH CV STRESS PROTOCOL 2 DURATION MIN STAGE 2: 3
BH CV STRESS PROTOCOL 2 DURATION MIN STAGE 3: 1
BH CV STRESS PROTOCOL 2 DURATION SEC STAGE 1: 0
BH CV STRESS PROTOCOL 2 DURATION SEC STAGE 2: 0
BH CV STRESS PROTOCOL 2 DURATION SEC STAGE 3: 30
BH CV STRESS PROTOCOL 2 HR STAGE 1: 62
BH CV STRESS PROTOCOL 2 HR STAGE 2: 72
BH CV STRESS PROTOCOL 2 HR STAGE 3: NORMAL
BH CV STRESS PROTOCOL 2 STAGE 1: 1
BH CV STRESS PROTOCOL 2 STAGE 2: 2
BH CV STRESS PROTOCOL 2 STAGE 3: 3
BH CV STRESS PROTOCOL 2: NORMAL
BH CV STRESS RECOVERY BP: NORMAL MMHG
BH CV STRESS RECOVERY HR: 77 BPM
BH CV STRESS SPEED STAGE 1: 1.7
BH CV STRESS STAGE 1: 1
MAXIMAL PREDICTED HEART RATE: 144 BPM
PERCENT MAX PREDICTED HR: 92.36 %
STRESS BASELINE BP: NORMAL MMHG
STRESS BASELINE HR: 61 BPM
STRESS PERCENT HR: 109 %
STRESS POST EXERCISE DUR MIN: 7 MIN
STRESS POST EXERCISE DUR SEC: 30 SEC
STRESS POST PEAK BP: NORMAL MMHG
STRESS POST PEAK HR: 133 BPM
STRESS TARGET HR: 122 BPM

## 2024-10-09 ENCOUNTER — OFFICE VISIT (OUTPATIENT)
Dept: CARDIOLOGY | Facility: CLINIC | Age: 74
End: 2024-10-09
Payer: MEDICARE

## 2024-10-09 VITALS
HEART RATE: 62 BPM | DIASTOLIC BLOOD PRESSURE: 60 MMHG | WEIGHT: 241 LBS | HEIGHT: 69 IN | SYSTOLIC BLOOD PRESSURE: 150 MMHG | OXYGEN SATURATION: 98 % | BODY MASS INDEX: 35.7 KG/M2

## 2024-10-09 DIAGNOSIS — R94.39 ABNORMAL STRESS TEST: Primary | ICD-10-CM

## 2024-10-09 DIAGNOSIS — I10 ESSENTIAL HYPERTENSION: ICD-10-CM

## 2024-10-09 DIAGNOSIS — R68.89 EXERCISE INTOLERANCE: ICD-10-CM

## 2024-10-09 DIAGNOSIS — E11.65 TYPE 2 DIABETES MELLITUS WITH HYPERGLYCEMIA, WITHOUT LONG-TERM CURRENT USE OF INSULIN: ICD-10-CM

## 2024-10-09 PROCEDURE — 93000 ELECTROCARDIOGRAM COMPLETE: CPT | Performed by: INTERNAL MEDICINE

## 2024-10-09 PROCEDURE — 1159F MED LIST DOCD IN RCRD: CPT | Performed by: INTERNAL MEDICINE

## 2024-10-09 PROCEDURE — 1160F RVW MEDS BY RX/DR IN RCRD: CPT | Performed by: INTERNAL MEDICINE

## 2024-10-09 PROCEDURE — 3078F DIAST BP <80 MM HG: CPT | Performed by: INTERNAL MEDICINE

## 2024-10-09 PROCEDURE — 3077F SYST BP >= 140 MM HG: CPT | Performed by: INTERNAL MEDICINE

## 2024-10-09 PROCEDURE — 99204 OFFICE O/P NEW MOD 45 MIN: CPT | Performed by: INTERNAL MEDICINE

## 2024-10-09 RX ORDER — NITROGLYCERIN 0.4 MG/1
0.8 TABLET SUBLINGUAL
OUTPATIENT
Start: 2024-10-09

## 2024-10-09 RX ORDER — SODIUM CHLORIDE 0.9 % (FLUSH) 0.9 %
10 SYRINGE (ML) INJECTION AS NEEDED
OUTPATIENT
Start: 2024-10-09

## 2024-10-09 RX ORDER — SODIUM CHLORIDE 0.9 % (FLUSH) 0.9 %
10 SYRINGE (ML) INJECTION EVERY 12 HOURS SCHEDULED
OUTPATIENT
Start: 2024-10-09

## 2024-10-09 RX ORDER — METOPROLOL TARTRATE 25 MG/1
100 TABLET, FILM COATED ORAL ONCE
OUTPATIENT
Start: 2024-10-09

## 2024-10-09 RX ORDER — NITROGLYCERIN 0.4 MG/1
0.4 TABLET SUBLINGUAL
OUTPATIENT
Start: 2024-10-09 | End: 2024-10-09

## 2024-10-09 RX ORDER — METOPROLOL TARTRATE 1 MG/ML
5 INJECTION, SOLUTION INTRAVENOUS
OUTPATIENT
Start: 2024-10-09

## 2024-10-09 RX ORDER — METOPROLOL TARTRATE 25 MG/1
150 TABLET, FILM COATED ORAL ONCE
OUTPATIENT
Start: 2024-10-09

## 2024-10-09 RX ORDER — METOPROLOL TARTRATE 25 MG/1
25 TABLET, FILM COATED ORAL ONCE
OUTPATIENT
Start: 2024-10-09

## 2024-10-09 RX ORDER — METOPROLOL TARTRATE 25 MG/1
50 TABLET, FILM COATED ORAL ONCE
OUTPATIENT
Start: 2024-10-09

## 2024-10-09 RX ORDER — METOPROLOL TARTRATE 50 MG
50 TABLET ORAL
OUTPATIENT
Start: 2024-10-09

## 2024-10-09 RX ORDER — SODIUM CHLORIDE 9 MG/ML
40 INJECTION, SOLUTION INTRAVENOUS AS NEEDED
OUTPATIENT
Start: 2024-10-09 | End: 2024-10-09

## 2024-10-09 RX ORDER — METOPROLOL TARTRATE 25 MG/1
200 TABLET, FILM COATED ORAL ONCE
OUTPATIENT
Start: 2024-10-09 | End: 2024-10-09

## 2024-10-09 NOTE — H&P (VIEW-ONLY)
[facility] - CARDIOLOGY  New Patient Initial Outpatient Evaluation    Primary Care Physician: Daquan Lion MD    Subjective     Chief Complaint: Abnormal stress test    History of Present Illness  The patient is a 74-year-old male who presents as a new patient referral for an abnormal stress test. He is accompanied by his wife.    He reports experiencing fatigue and shortness of breath during physical activities such as walking downhill and climbing stairs. These symptoms have been present for approximately 6 months to a year. He attributes this to his age and weight. He also mentions experiencing leg weakness during his stress test.     He had seen Dr. Lion on 09/17/2024, reporting easy fatigue and exertional dyspnea. Dr. Irving was managing all of his cardiac risk factors and ordered a stress test, which was performed on 09/26/2024. The test was interpreted by another cardiologist as abnormal, showing hypokinesis of the mid anterior apical anterior and mid anteroseptal walls.    He has been on multiple medications for blood pressure management. His blood pressure readings at home range from 130 to 155 systolic. He reports no history of heart attack or known heart conditions. He quit smoking years ago. He does not experience chest discomfort, heaviness, tightness, or pressure. He recalls undergoing a heart catheterization in 2007 or 2008, which did not reveal any significant findings. He also mentions that he was given medication to reduce fluid retention at that time.    He is on glipizide for diabetes and lovastatin for cholesterol. He has a history of shoulder surgery, knee surgery, and foot surgery.      Review of Systems   Constitutional: Positive for malaise/fatigue.   Cardiovascular:  Positive for dyspnea on exertion. Negative for chest pain, claudication, leg swelling, near-syncope, orthopnea, palpitations, paroxysmal nocturnal dyspnea and syncope.   Respiratory:  Negative for shortness of breath.     Hematologic/Lymphatic: Does not bruise/bleed easily.        Otherwise complete ROS reviewed and negative except as mentioned in the HPI.      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    Cancer     bladder    Degenerative arthritis of cervical spine with nerve compression     per patient report    Diabetes mellitus     Gutierrez catheter in place     GERD (gastroesophageal reflux disease)     Hearing aid worn     Hyperlipidemia     Hypertension     Impaired hearing     without hearing aids can barely hear anything    Neuropathy     Pulmonary embolism     Rotator cuff injury     left    Sleep apnea     wears cpap       Past Surgical History:  Past Surgical History:   Procedure Laterality Date    CARDIAC CATHETERIZATION      CATARACT EXTRACTION W/ INTRAOCULAR LENS IMPLANT      CHOLECYSTECTOMY      CYSTECTOMY      2017    EXPLORATORY LAPAROTOMY      LEG SURGERY      SHOULDER ARTHROSCOPY W/ ROTATOR CUFF REPAIR Left 09/11/2023    Procedure: LEFT SHOULDER ROTATOR CUFF REPAIR, ACROMIOPLASTY AND DISTAL CLAVICLE EXCISION;  Surgeon: Justen To MD;  Location: [facility] OR;  Service: Orthopedics;  Laterality: Left;    TRANSURETHRAL RESECTION OF BLADDER TUMOR Bilateral 07/25/2017    Procedure: CYSTOSCOPY TRANSURETHRAL RESECTION OF BLADDER TUMOR & POSSIBLE BILATERAL RETROGRADE URETEROPYELOGRAMS;  Surgeon: Chris Esparza MD;  Location: [facility] OR;  Service:     TRANSURETHRAL RESECTION OF BLADDER TUMOR N/A 11/03/2017    Procedure: CYSTOSCOPY TRANSURETHRAL RESECTION OF BLADDER TUMOR ;  Surgeon: Chris Esparza MD;  Location: [facility] OR;  Service:     TRANSURETHRAL RESECTION OF BLADDER TUMOR N/A 09/10/2018    Procedure: CYSTOSCOPY TRANSURETHRAL RESECTION OF LARGE BLADDER TUMOR, CLOT EVACUATION, AND FULGURATION;  Surgeon: Chris Esparza MD;  Location:  [facility] OR;  Service: Urology       Family History: family  history includes Arthritis in his mother; Cancer in his brother, father, and sister; Heart disease in his mother and sister; Hypertension in his father and mother.    Social History:  reports that he quit smoking about 27 years ago. His smoking use included cigarettes. He has never used smokeless tobacco. He reports that he does not drink alcohol and does not use drugs.    Medications:  Prior to Admission medications    Medication Sig Start Date End Date Taking? Authorizing Provider   allopurinol (ZYLOPRIM) 300 MG tablet Take 1 tablet by mouth Daily.   Yes Sanket Marrero MD   Ascorbic Acid (VITAMIN C PO) Take  by mouth.   Yes Sanket Marrero MD   ASPIRIN 81 PO Take  by mouth.   Yes Sanket Marrero MD   carvedilol (Coreg) 3.125 MG tablet Take 1 tablet by mouth 2 (Two) Times a Day With Meals. 2/22/24  Yes Daquan Lion MD   cetirizine (zyrTEC) 10 MG tablet Take 1 tablet by mouth Daily. 11/20/23  Yes PACO Neri DO   chlorthalidone (HYGROTON) 25 MG tablet Take 1 tablet by mouth Daily. 2/14/24  Yes Daquan Lion MD   Cholecalciferol (D3 ADULT PO) Take  by mouth.   Yes Sanket Marrero MD   Cyanocobalamin (B-12 PO) Take  by mouth.   Yes Sanket Marrero MD   cyanocobalamin (VITAMIN B-12) 1000 MCG tablet Take 1 tablet by mouth Daily.   Yes Sanket Marrero MD   fluticasone (FLONASE) 50 MCG/ACT nasal spray 1 spray into the nostril(s) as directed by provider 2 (Two) Times a Day As Needed for Allergies. 10/19/20  Yes Fidel Yousif MD   gabapentin (NEURONTIN) 400 MG capsule Take 2 capsules by mouth Every 8 (Eight) Hours. Take 2 capsules by mouth in the am, two capsules by mouth in the afternoon, and 2 capsules by mouth in the pm.     Patient is taking 2 capsules in the AM, 2 capsules in the afternoon, 1 capsule in the PM   Yes Sanket Marrero MD   glipizide (GLUCOTROL XL) 5 MG ER tablet Take 1 tablet by mouth Daily. 5/6/24  Yes Daquan Lion MD  "  hydrALAZINE (APRESOLINE) 50 MG tablet Take 1 tablet by mouth 3 (Three) Times a Day. 7/8/24  Yes Daquan Lion MD   losartan (COZAAR) 50 MG tablet Take 1 tablet by mouth 2 (Two) Times a Day. 6/18/24  Yes Daquan Lion MD   lovastatin (MEVACOR) 20 MG tablet TAKE 1 TABLET BY MOUTH ONCE DAILY AT NIGHT 10/23/23  Yes Daquan Lion MD   MAGNESIUM PO Take  by mouth.   Yes ProviderSanket MD   Multiple Vitamins-Minerals (ZINC PO) Take  by mouth.   Yes ProviderSanket MD   Nystatin powder Apply  topically to the appropriate area as directed As Needed.   Yes Sanket Marrero MD   omeprazole (priLOSEC) 20 MG capsule Take 1 capsule by mouth 2 (Two) Times a Day.   Yes Sanket Marrero MD   TiZANidine (ZANAFLEX) 4 MG capsule Take 1 capsule by mouth 3 (Three) Times a Day As Needed for Muscle Spasms.  Patient not taking: Reported on 10/9/2024 7/8/24   Daquan Lion MD     Allergies:  Allergies   Allergen Reactions    Penicillins Rash     POSSIBLY NOT THE CAUSE       Objective     Vital Signs: /60   Pulse 62   Ht 175.3 cm (69\")   Wt 109 kg (241 lb)   SpO2 98%   BMI 35.59 kg/m²     Vitals and nursing note reviewed.   Constitutional:       General: Not in acute distress.     Appearance: Not in distress.   Neck:      Vascular: No JVD or JVR. JVD normal.   Pulmonary:      Effort: Pulmonary effort is normal.      Breath sounds: Normal breath sounds.   Cardiovascular:      Normal rate. Regular rhythm.      Murmurs: There is no murmur.      No gallop.  No rub.   Pulses:     Intact distal pulses.   Edema:     Peripheral edema absent.   Skin:     General: Skin is warm and dry.   Neurological:      Mental Status: Alert, oriented to person, place, and time and oriented to person, place and time.       Physical Exam      Results Reviewed:  Results  Laboratory Studies  A1c is 6.3. LDL cholesterol is 32.          ECG 12 Lead    Date/Time: 10/9/2024 10:10 AM  Performed by: Cassius Mosqueda, " MD    Authorized by: Cassius Mosqueda MD  Comparison: compared with previous ECG from 9/5/2023  Similar to previous ECG  Rhythm: sinus rhythm  BPM: 62  Conduction: right bundle branch block and 1st degree AV block  Q waves: II, III and aVF    Other findings: left ventricular hypertrophy and poor R wave progression    Clinical impression: abnormal EKG            Lab Results   Component Value Date    TRIG 466 (H) 04/17/2024    HDL 32 (L) 04/17/2024    VLDL 69 (H) 04/17/2024     Lab Results   Component Value Date    HGBA1C 6.10 (H) 04/17/2024       Results for orders placed during the hospital encounter of 09/26/24    Adult Stress Echo W/ Cont or Stress Agent if Necessary Per Protocol    Interpretation Summary    Poor functional capacity, patient was changed to dobutamine due to inability to reach target heart rate with exercise.    Clinically and electrically negative.    Left ventricular systolic function is normal at rest.  There is stress-induced hypokinesis of the mid anterior, apical anterior and mid anteroseptal walls.    Abnormal stress echo consistent with an intermediate risk study for myocardial ischemia.    Independent review of imaging, my interpretation:  -As the stress echocardiogram reported above was interpreted by different cardiologist, I personally reviewed all the images with my interpretation to follow: Normal LV size and function at rest.  With dobutamine infusion, the anterior wall is not well-visualized but does not appear to demonstrate equal contractility to the inferior wall, so I do agree with the assessment provided.    Assessment / Plan        Problem List Items Addressed This Visit          Cardiac and Vasculature    Essential hypertension       Endocrine and Metabolic    Type 2 diabetes mellitus with hyperglycemia, without long-term current use of insulin     Other Visit Diagnoses       Abnormal stress test    -  Primary    Relevant Orders    CT Angiogram Coronary    CT Angiogram  Coronary-Cardiology Interpretation    No Caffeine or Nicotine 4 Hours Prior to CTA Appointment    Nothing to Eat or Drink 4 Hours Prior to CTA Appointment    Do Not Take Phosphodiasterase Inhibitors in the 72 Hours Prior to Coronary CTA    No Metoprolol Prescription Needed    ECG 12 Lead    Exercise intolerance        Relevant Orders    CT Angiogram Coronary    CT Angiogram Coronary-Cardiology Interpretation    No Caffeine or Nicotine 4 Hours Prior to CTA Appointment    Nothing to Eat or Drink 4 Hours Prior to CTA Appointment    Do Not Take Phosphodiasterase Inhibitors in the 72 Hours Prior to Coronary CTA    No Metoprolol Prescription Needed    ECG 12 Lead            Assessment & Plan  1. Abnormal stress test, with easy fatigability and exertional dyspnea: New problems, further workup suggested.  The stress test showed hypokinesis of the mid anterior apical anterior and mid anteroseptal walls, raising concerns about potential heart issues. Despite the patient's good cholesterol levels (LDL at 32) and controlled A1c (6.3), further investigation is warranted due to his symptoms of easy fatigue and exertional dyspnea.   -A coronary CT angiogram will be performed; if this shows any flow-limiting abnormalities, a heart catheterization may be necessary. The procedure and its implications were explained in detail..  On the other hand, this shows no obstructive disease, this will render the stress test a false positive no further workup will be warranted.    2. Hypertension.  He is currently on four blood pressure medications, including carvedilol, but his blood pressure readings at home range from 130 to 160 mmHg. He experienced fluid retention, leading to the discontinuation of one medication. Continued monitoring and adjustment of his blood pressure regimen by Dr. Lion are recommended.    3. Diabetes Mellitus.  His A1c is well-controlled at 6.3. He is currently on glipizide. Continued monitoring and adherence to his  medication regimen are advised.        Transcribed from ambient dictation for Cassius Mosqueda MD by Cassius Mosqueda MD.  10/09/24   10:55 CDT    Patient or patient representative verbalized consent to the visit recording.

## 2024-10-09 NOTE — PROGRESS NOTES
[facility] - CARDIOLOGY  New Patient Initial Outpatient Evaluation    Primary Care Physician: Daquan Lion MD    Subjective     Chief Complaint: Abnormal stress test    History of Present Illness  The patient is a 74-year-old male who presents as a new patient referral for an abnormal stress test. He is accompanied by his wife.    He reports experiencing fatigue and shortness of breath during physical activities such as walking downhill and climbing stairs. These symptoms have been present for approximately 6 months to a year. He attributes this to his age and weight. He also mentions experiencing leg weakness during his stress test.     He had seen Dr. Lion on 09/17/2024, reporting easy fatigue and exertional dyspnea. Dr. Irving was managing all of his cardiac risk factors and ordered a stress test, which was performed on 09/26/2024. The test was interpreted by another cardiologist as abnormal, showing hypokinesis of the mid anterior apical anterior and mid anteroseptal walls.    He has been on multiple medications for blood pressure management. His blood pressure readings at home range from 130 to 155 systolic. He reports no history of heart attack or known heart conditions. He quit smoking years ago. He does not experience chest discomfort, heaviness, tightness, or pressure. He recalls undergoing a heart catheterization in 2007 or 2008, which did not reveal any significant findings. He also mentions that he was given medication to reduce fluid retention at that time.    He is on glipizide for diabetes and lovastatin for cholesterol. He has a history of shoulder surgery, knee surgery, and foot surgery.      Review of Systems   Constitutional: Positive for malaise/fatigue.   Cardiovascular:  Positive for dyspnea on exertion. Negative for chest pain, claudication, leg swelling, near-syncope, orthopnea, palpitations, paroxysmal nocturnal dyspnea and syncope.   Respiratory:  Negative for shortness of breath.     Hematologic/Lymphatic: Does not bruise/bleed easily.        Otherwise complete ROS reviewed and negative except as mentioned in the HPI.      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    Cancer     bladder    Degenerative arthritis of cervical spine with nerve compression     per patient report    Diabetes mellitus     Gutierrez catheter in place     GERD (gastroesophageal reflux disease)     Hearing aid worn     Hyperlipidemia     Hypertension     Impaired hearing     without hearing aids can barely hear anything    Neuropathy     Pulmonary embolism     Rotator cuff injury     left    Sleep apnea     wears cpap       Past Surgical History:  Past Surgical History:   Procedure Laterality Date    CARDIAC CATHETERIZATION      CATARACT EXTRACTION W/ INTRAOCULAR LENS IMPLANT      CHOLECYSTECTOMY      CYSTECTOMY      2017    EXPLORATORY LAPAROTOMY      LEG SURGERY      SHOULDER ARTHROSCOPY W/ ROTATOR CUFF REPAIR Left 09/11/2023    Procedure: LEFT SHOULDER ROTATOR CUFF REPAIR, ACROMIOPLASTY AND DISTAL CLAVICLE EXCISION;  Surgeon: Justen To MD;  Location: [facility] OR;  Service: Orthopedics;  Laterality: Left;    TRANSURETHRAL RESECTION OF BLADDER TUMOR Bilateral 07/25/2017    Procedure: CYSTOSCOPY TRANSURETHRAL RESECTION OF BLADDER TUMOR & POSSIBLE BILATERAL RETROGRADE URETEROPYELOGRAMS;  Surgeon: Chris Esparza MD;  Location: [facility] OR;  Service:     TRANSURETHRAL RESECTION OF BLADDER TUMOR N/A 11/03/2017    Procedure: CYSTOSCOPY TRANSURETHRAL RESECTION OF BLADDER TUMOR ;  Surgeon: Chris Esparza MD;  Location: [facility] OR;  Service:     TRANSURETHRAL RESECTION OF BLADDER TUMOR N/A 09/10/2018    Procedure: CYSTOSCOPY TRANSURETHRAL RESECTION OF LARGE BLADDER TUMOR, CLOT EVACUATION, AND FULGURATION;  Surgeon: Chris Esparza MD;  Location:  [facility] OR;  Service: Urology       Family History: family  history includes Arthritis in his mother; Cancer in his brother, father, and sister; Heart disease in his mother and sister; Hypertension in his father and mother.    Social History:  reports that he quit smoking about 27 years ago. His smoking use included cigarettes. He has never used smokeless tobacco. He reports that he does not drink alcohol and does not use drugs.    Medications:  Prior to Admission medications    Medication Sig Start Date End Date Taking? Authorizing Provider   allopurinol (ZYLOPRIM) 300 MG tablet Take 1 tablet by mouth Daily.   Yes Sanket Marrero MD   Ascorbic Acid (VITAMIN C PO) Take  by mouth.   Yes Sanket Marrero MD   ASPIRIN 81 PO Take  by mouth.   Yes Sanket Marrero MD   carvedilol (Coreg) 3.125 MG tablet Take 1 tablet by mouth 2 (Two) Times a Day With Meals. 2/22/24  Yes Daquan Lion MD   cetirizine (zyrTEC) 10 MG tablet Take 1 tablet by mouth Daily. 11/20/23  Yes PACO Neri DO   chlorthalidone (HYGROTON) 25 MG tablet Take 1 tablet by mouth Daily. 2/14/24  Yes Daquan Lion MD   Cholecalciferol (D3 ADULT PO) Take  by mouth.   Yes Sanket Marrero MD   Cyanocobalamin (B-12 PO) Take  by mouth.   Yes Sanket Marrero MD   cyanocobalamin (VITAMIN B-12) 1000 MCG tablet Take 1 tablet by mouth Daily.   Yes Sanket Marrero MD   fluticasone (FLONASE) 50 MCG/ACT nasal spray 1 spray into the nostril(s) as directed by provider 2 (Two) Times a Day As Needed for Allergies. 10/19/20  Yes Fidel Yousif MD   gabapentin (NEURONTIN) 400 MG capsule Take 2 capsules by mouth Every 8 (Eight) Hours. Take 2 capsules by mouth in the am, two capsules by mouth in the afternoon, and 2 capsules by mouth in the pm.     Patient is taking 2 capsules in the AM, 2 capsules in the afternoon, 1 capsule in the PM   Yes Sanket Marrero MD   glipizide (GLUCOTROL XL) 5 MG ER tablet Take 1 tablet by mouth Daily. 5/6/24  Yes Daquan Lion MD  "  hydrALAZINE (APRESOLINE) 50 MG tablet Take 1 tablet by mouth 3 (Three) Times a Day. 7/8/24  Yes Daquan Lion MD   losartan (COZAAR) 50 MG tablet Take 1 tablet by mouth 2 (Two) Times a Day. 6/18/24  Yes Daquan Lion MD   lovastatin (MEVACOR) 20 MG tablet TAKE 1 TABLET BY MOUTH ONCE DAILY AT NIGHT 10/23/23  Yes Daquan Lion MD   MAGNESIUM PO Take  by mouth.   Yes ProviderSanket MD   Multiple Vitamins-Minerals (ZINC PO) Take  by mouth.   Yes ProviderSanket MD   Nystatin powder Apply  topically to the appropriate area as directed As Needed.   Yes Sanket Marrero MD   omeprazole (priLOSEC) 20 MG capsule Take 1 capsule by mouth 2 (Two) Times a Day.   Yes Sanket Marrero MD   TiZANidine (ZANAFLEX) 4 MG capsule Take 1 capsule by mouth 3 (Three) Times a Day As Needed for Muscle Spasms.  Patient not taking: Reported on 10/9/2024 7/8/24   Daquan Lion MD     Allergies:  Allergies   Allergen Reactions    Penicillins Rash     POSSIBLY NOT THE CAUSE       Objective     Vital Signs: /60   Pulse 62   Ht 175.3 cm (69\")   Wt 109 kg (241 lb)   SpO2 98%   BMI 35.59 kg/m²     Vitals and nursing note reviewed.   Constitutional:       General: Not in acute distress.     Appearance: Not in distress.   Neck:      Vascular: No JVD or JVR. JVD normal.   Pulmonary:      Effort: Pulmonary effort is normal.      Breath sounds: Normal breath sounds.   Cardiovascular:      Normal rate. Regular rhythm.      Murmurs: There is no murmur.      No gallop.  No rub.   Pulses:     Intact distal pulses.   Edema:     Peripheral edema absent.   Skin:     General: Skin is warm and dry.   Neurological:      Mental Status: Alert, oriented to person, place, and time and oriented to person, place and time.       Physical Exam      Results Reviewed:  Results  Laboratory Studies  A1c is 6.3. LDL cholesterol is 32.          ECG 12 Lead    Date/Time: 10/9/2024 10:10 AM  Performed by: Cassius Mosqueda, " MD    Authorized by: Cassius Mosqueda MD  Comparison: compared with previous ECG from 9/5/2023  Similar to previous ECG  Rhythm: sinus rhythm  BPM: 62  Conduction: right bundle branch block and 1st degree AV block  Q waves: II, III and aVF    Other findings: left ventricular hypertrophy and poor R wave progression    Clinical impression: abnormal EKG            Lab Results   Component Value Date    TRIG 466 (H) 04/17/2024    HDL 32 (L) 04/17/2024    VLDL 69 (H) 04/17/2024     Lab Results   Component Value Date    HGBA1C 6.10 (H) 04/17/2024       Results for orders placed during the hospital encounter of 09/26/24    Adult Stress Echo W/ Cont or Stress Agent if Necessary Per Protocol    Interpretation Summary    Poor functional capacity, patient was changed to dobutamine due to inability to reach target heart rate with exercise.    Clinically and electrically negative.    Left ventricular systolic function is normal at rest.  There is stress-induced hypokinesis of the mid anterior, apical anterior and mid anteroseptal walls.    Abnormal stress echo consistent with an intermediate risk study for myocardial ischemia.    Independent review of imaging, my interpretation:  -As the stress echocardiogram reported above was interpreted by different cardiologist, I personally reviewed all the images with my interpretation to follow: Normal LV size and function at rest.  With dobutamine infusion, the anterior wall is not well-visualized but does not appear to demonstrate equal contractility to the inferior wall, so I do agree with the assessment provided.    Assessment / Plan        Problem List Items Addressed This Visit          Cardiac and Vasculature    Essential hypertension       Endocrine and Metabolic    Type 2 diabetes mellitus with hyperglycemia, without long-term current use of insulin     Other Visit Diagnoses       Abnormal stress test    -  Primary    Relevant Orders    CT Angiogram Coronary    CT Angiogram  Coronary-Cardiology Interpretation    No Caffeine or Nicotine 4 Hours Prior to CTA Appointment    Nothing to Eat or Drink 4 Hours Prior to CTA Appointment    Do Not Take Phosphodiasterase Inhibitors in the 72 Hours Prior to Coronary CTA    No Metoprolol Prescription Needed    ECG 12 Lead    Exercise intolerance        Relevant Orders    CT Angiogram Coronary    CT Angiogram Coronary-Cardiology Interpretation    No Caffeine or Nicotine 4 Hours Prior to CTA Appointment    Nothing to Eat or Drink 4 Hours Prior to CTA Appointment    Do Not Take Phosphodiasterase Inhibitors in the 72 Hours Prior to Coronary CTA    No Metoprolol Prescription Needed    ECG 12 Lead            Assessment & Plan  1. Abnormal stress test, with easy fatigability and exertional dyspnea: New problems, further workup suggested.  The stress test showed hypokinesis of the mid anterior apical anterior and mid anteroseptal walls, raising concerns about potential heart issues. Despite the patient's good cholesterol levels (LDL at 32) and controlled A1c (6.3), further investigation is warranted due to his symptoms of easy fatigue and exertional dyspnea.   -A coronary CT angiogram will be performed; if this shows any flow-limiting abnormalities, a heart catheterization may be necessary. The procedure and its implications were explained in detail..  On the other hand, this shows no obstructive disease, this will render the stress test a false positive no further workup will be warranted.    2. Hypertension.  He is currently on four blood pressure medications, including carvedilol, but his blood pressure readings at home range from 130 to 160 mmHg. He experienced fluid retention, leading to the discontinuation of one medication. Continued monitoring and adjustment of his blood pressure regimen by Dr. Lion are recommended.    3. Diabetes Mellitus.  His A1c is well-controlled at 6.3. He is currently on glipizide. Continued monitoring and adherence to his  medication regimen are advised.        Transcribed from ambient dictation for Cassius Mosqueda MD by Cassius Mosqueda MD.  10/09/24   10:55 CDT    Patient or patient representative verbalized consent to the visit recording.

## 2024-10-17 ENCOUNTER — OFFICE VISIT (OUTPATIENT)
Dept: INTERNAL MEDICINE | Facility: CLINIC | Age: 74
End: 2024-10-17
Payer: MEDICARE

## 2024-10-17 VITALS
DIASTOLIC BLOOD PRESSURE: 64 MMHG | HEART RATE: 68 BPM | BODY MASS INDEX: 36.14 KG/M2 | OXYGEN SATURATION: 97 % | WEIGHT: 244 LBS | SYSTOLIC BLOOD PRESSURE: 140 MMHG | TEMPERATURE: 97.8 F | HEIGHT: 69 IN

## 2024-10-17 DIAGNOSIS — E78.2 MIXED HYPERLIPIDEMIA: ICD-10-CM

## 2024-10-17 DIAGNOSIS — E66.812 CLASS 2 SEVERE OBESITY DUE TO EXCESS CALORIES WITH SERIOUS COMORBIDITY AND BODY MASS INDEX (BMI) OF 36.0 TO 36.9 IN ADULT: ICD-10-CM

## 2024-10-17 DIAGNOSIS — I10 ESSENTIAL HYPERTENSION: Primary | ICD-10-CM

## 2024-10-17 DIAGNOSIS — E11.65 TYPE 2 DIABETES MELLITUS WITH HYPERGLYCEMIA, WITHOUT LONG-TERM CURRENT USE OF INSULIN: ICD-10-CM

## 2024-10-17 DIAGNOSIS — E66.01 CLASS 2 SEVERE OBESITY DUE TO EXCESS CALORIES WITH SERIOUS COMORBIDITY AND BODY MASS INDEX (BMI) OF 36.0 TO 36.9 IN ADULT: ICD-10-CM

## 2024-10-17 DIAGNOSIS — N18.2 STAGE 2 CHRONIC KIDNEY DISEASE: ICD-10-CM

## 2024-10-17 LAB — HBA1C MFR BLD: 6.1 % (ref 4.5–5.7)

## 2024-10-17 PROCEDURE — 99214 OFFICE O/P EST MOD 30 MIN: CPT | Performed by: INTERNAL MEDICINE

## 2024-10-17 PROCEDURE — 3077F SYST BP >= 140 MM HG: CPT | Performed by: INTERNAL MEDICINE

## 2024-10-17 PROCEDURE — 3078F DIAST BP <80 MM HG: CPT | Performed by: INTERNAL MEDICINE

## 2024-10-17 PROCEDURE — 3044F HG A1C LEVEL LT 7.0%: CPT | Performed by: INTERNAL MEDICINE

## 2024-10-17 PROCEDURE — 83036 HEMOGLOBIN GLYCOSYLATED A1C: CPT | Performed by: INTERNAL MEDICINE

## 2024-10-17 PROCEDURE — G2211 COMPLEX E/M VISIT ADD ON: HCPCS | Performed by: INTERNAL MEDICINE

## 2024-10-17 PROCEDURE — 1126F AMNT PAIN NOTED NONE PRSNT: CPT | Performed by: INTERNAL MEDICINE

## 2024-10-17 RX ORDER — SEMAGLUTIDE 0.68 MG/ML
INJECTION, SOLUTION SUBCUTANEOUS
Qty: 3 ML | Refills: 0 | Status: SHIPPED | OUTPATIENT
Start: 2024-10-17 | End: 2024-12-11

## 2024-10-18 NOTE — PROGRESS NOTES
"      Chief Complaint  Hypertension (3 month follow up ) and Diabetes (A1c: 6.1)    Subjective        Chung Bradley presents to [facility] PRIMARY CARE    HPI    Patient here for the above problems.  See Assessment and Plan for further HPI components.      Review of Systems    Objective   Vital Signs:  /64 (BP Location: Left arm, Patient Position: Sitting, Cuff Size: Adult)   Pulse 68   Temp 97.8 °F (36.6 °C) (Temporal)   Ht 175.3 cm (69\")   Wt 111 kg (244 lb)   SpO2 97%   BMI 36.03 kg/m²   Estimated body mass index is 36.03 kg/m² as calculated from the following:    Height as of this encounter: 175.3 cm (69\").    Weight as of this encounter: 111 kg (244 lb).      Physical Exam  Vitals and nursing note reviewed.   Constitutional:       Appearance: He is obese. He is not ill-appearing.   Eyes:      General: No scleral icterus.     Conjunctiva/sclera: Conjunctivae normal.   Pulmonary:      Effort: Pulmonary effort is normal. No respiratory distress.   Neurological:      General: No focal deficit present.      Mental Status: He is alert and oriented to person, place, and time.   Psychiatric:         Mood and Affect: Mood normal.         Behavior: Behavior normal.                         Assessment and Plan   Diagnoses and all orders for this visit:    1. Essential hypertension (Primary)  -     CBC & Differential; Future  -     Comprehensive Metabolic Panel; Future  -     Urinalysis With Microscopic - Urine, Clean Catch; Future  -     Semaglutide,0.25 or 0.5MG/DOS, (Ozempic, 0.25 or 0.5 MG/DOSE,) 2 MG/3ML solution pen-injector; Inject 0.25 mg under the skin into the appropriate area as directed 1 (One) Time Per Week for 28 days, THEN 0.5 mg 1 (One) Time Per Week for 28 days.  Dispense: 3 mL; Refill: 0    2. Type 2 diabetes mellitus with hyperglycemia, without long-term current use of insulin  -     POC Glycated Hemoglobin, Total  -     Microalbumin / Creatinine Urine Ratio - Urine, Clean " Catch; Future  -     Hemoglobin A1c; Future  -     Semaglutide,0.25 or 0.5MG/DOS, (Ozempic, 0.25 or 0.5 MG/DOSE,) 2 MG/3ML solution pen-injector; Inject 0.25 mg under the skin into the appropriate area as directed 1 (One) Time Per Week for 28 days, THEN 0.5 mg 1 (One) Time Per Week for 28 days.  Dispense: 3 mL; Refill: 0    3. Stage 2 chronic kidney disease  -     CBC & Differential; Future  -     Comprehensive Metabolic Panel; Future  -     Urinalysis With Microscopic - Urine, Clean Catch; Future  -     Lipid Panel; Future  -     TSH Rfx On Abnormal To Free T4; Future  -     Microalbumin / Creatinine Urine Ratio - Urine, Clean Catch; Future  -     Hemoglobin A1c; Future    4. Mixed hyperlipidemia  -     Lipid Panel; Future  -     TSH Rfx On Abnormal To Free T4; Future  -     Semaglutide,0.25 or 0.5MG/DOS, (Ozempic, 0.25 or 0.5 MG/DOSE,) 2 MG/3ML solution pen-injector; Inject 0.25 mg under the skin into the appropriate area as directed 1 (One) Time Per Week for 28 days, THEN 0.5 mg 1 (One) Time Per Week for 28 days.  Dispense: 3 mL; Refill: 0    5. Class 2 severe obesity due to excess calories with serious comorbidity and body mass index (BMI) of 36.0 to 36.9 in adult  -     Semaglutide,0.25 or 0.5MG/DOS, (Ozempic, 0.25 or 0.5 MG/DOSE,) 2 MG/3ML solution pen-injector; Inject 0.25 mg under the skin into the appropriate area as directed 1 (One) Time Per Week for 28 days, THEN 0.5 mg 1 (One) Time Per Week for 28 days.  Dispense: 3 mL; Refill: 0      Patient has diabetes.  Current A1c is 6.1.  6.1  Based on patient's last A1c they are currently at goal.. The patient's A1c goal is < 7.0%  Recommend routine monitoring of blood sugar. Avoid hypoglycemia. Recommend ADA diet and avoidance of excess carbohydrates. Patient is currently on glipizide.  Recommend that we make changes as above.  .  Recommend at least annual eye examination.  Recommend at least annual diabetic foot examination.  Recommend checking self checks of  feet routinely.  Patient has the following diabetic complications: obesity and nephropathy with chronic kidney disease.    Continue to monitor.  Patient's last diabetic nephropathy evaluation was:  Creatinine, Urine (mg/dL)   Date/Time Value   04/17/2024 0728 56.3     Microalbumin, Urine (ug/mL)   Date/Time Value   04/17/2024 0728 1,158.6     Protein (no units)   Date/Time Value   04/17/2024 0728 See below: (A)     Protein, POC (mg/dL)   Date/Time Value   09/10/2018 0900 300 mg/dL (A)     A1C Last 3 Results          11/20/2023    13:58 4/17/2024    07:28 10/17/2024    08:49   HGBA1C Last 3 Results   Hemoglobin A1C 5.9  6.10  6.1       Cut back on glipizide to daily    Patient has a BMI > 30.  Obesity increases risks of diseases such as diabetes, coronary artery disease, hypertension, sleep apnea, hyperlipidemia, arthritis, and stroke.  Recommend focusing on portion control and making healthy diet choices.  Avoid fast food.  Avoid calorie beverages including sweet tea, juice, soft drinks, and alcohol.  Recommend increasing your activity and starting a regular exercise program. Can consider utilizing calorie counting apps such as [employer].        Patient has hypertension.  BP is not at goal  The patient's BP goal is < 130/80.  Recommend ambulatory BP monitoring after patient has taken medication.  Recommend varying the times of the blood pressure readings.  Patient is currently on chlorthalidone, hydralazine 50 mg, losartan 50 mg BID.  Recommend we continue current regimen.    Continue to monitor.  If patients loses weight with ozempic, may be able to reduce BP medications.      Patient has hyperlipidemia.  Patient is currently on  lower intensity statin .  Patient is at goal  Recommend we continue current regimen.    Recommend increased physical activity.  Recommend a diet that focuses on consumption of fruits, vegetables, fiber, and monounsaturated fats and minimizes intake of saturated and trans fats, simple  carbohydrates, and red meats. Examples of heart-healthy diets include the DASH (Dietary Approaches to Stop Hypertension) and Mediterranean diets.  Continue to monitor.    Important for adequate BP and diabetes control based on chronic kidney disease.    Patient had abnormal stress test and very poor exercise tolerance.  Patient due for CT coronaries soon with cardiology.  Patient has seen Dr. Mosqueda.       Result Review :  The following data was reviewed by: Daquan Lion MD on 10/17/2024:  CMP          12/19/2023    12:49 4/17/2024    07:28   CMP   Glucose  149    Glucose 101        BUN 27     25    Creatinine 1.16     1.15    Sodium 140     140    Potassium 4.3     4.0    Chloride 103     103    Calcium 9.2     9.4    Total Protein  6.4    Albumin  4.3    Globulin  2.1    Total Bilirubin  0.6    Alkaline Phosphatase  74    AST (SGOT)  23    ALT (SGPT)  17    BUN/Creatinine Ratio  21.7    Anion Gap 13           Details          This result is from an external source.             CBC w/diff          4/17/2024    07:28   CBC w/Diff   WBC 6.47    RBC 4.81    Hemoglobin 14.1    Hematocrit 42.2    MCV 87.7    MCH 29.3    MCHC 33.4    RDW 15.0    Platelets 148    Neutrophil Rel % 72.1    Lymphocyte Rel % 18.5    Monocyte Rel % 5.9    Eosinophil Rel % 1.9    Basophil Rel % 0.8      Lipid Panel          4/17/2024    07:28   Lipid Panel   Total Cholesterol 149    Triglycerides 466    HDL Cholesterol 32    VLDL Cholesterol 69    LDL Cholesterol  48      A1C Last 3 Results          11/20/2023    13:58 4/17/2024    07:28 10/17/2024    08:49   HGBA1C Last 3 Results   Hemoglobin A1C 5.9  6.10  6.1      Microalbumin          4/17/2024    07:28   Microalbumin   Microalbumin, Urine 1,158.6      Adult Stress Echo W/ Cont or Stress Agent if Necessary Per Protocol (09/26/2024 16:14)                  Class 2 Severe Obesity (BMI >=35 and <=39.9). Obesity-related health conditions include the following: hypertension, diabetes  mellitus, dyslipidemias, and osteoarthritis. Obesity is unchanged. BMI is is above average; BMI management plan is completed. We discussed portion control, increasing exercise, and pharmacologic options including GLP-1 .            Follow Up   Return in about 2 months (around 12/17/2024), or if symptoms worsen or fail to improve.  Patient was given instructions and counseling regarding his condition or for health maintenance advice. Please see specific information pulled into the AVS if appropriate.       RAY Lion MD, FACP, [facility]      Electronically signed by Daquan Lion MD, 10/17/24, 7:43 PM CDT.

## 2024-10-25 ENCOUNTER — HOSPITAL ENCOUNTER (OUTPATIENT)
Dept: CT IMAGING | Facility: HOSPITAL | Age: 74
Discharge: HOME | End: 2024-10-25
Payer: MEDICARE

## 2024-10-25 VITALS
TEMPERATURE: 97.5 F | SYSTOLIC BLOOD PRESSURE: 147 MMHG | HEART RATE: 49 BPM | OXYGEN SATURATION: 93 % | DIASTOLIC BLOOD PRESSURE: 66 MMHG | BODY MASS INDEX: 35.1 KG/M2 | HEIGHT: 69 IN | RESPIRATION RATE: 16 BRPM | WEIGHT: 236.99 LBS

## 2024-10-25 DIAGNOSIS — R94.39 ABNORMAL STRESS TEST: ICD-10-CM

## 2024-10-25 DIAGNOSIS — R68.89 EXERCISE INTOLERANCE: ICD-10-CM

## 2024-10-25 LAB
CREAT SERPL-MCNC: 1.03 MG/DL (ref 0.76–1.27)
EGFRCR SERPLBLD CKD-EPI 2021: 75.3 ML/MIN/1.73

## 2024-10-25 PROCEDURE — 25510000001 IOPAMIDOL PER 1 ML: Performed by: INTERNAL MEDICINE

## 2024-10-25 PROCEDURE — 82565 ASSAY OF CREATININE: CPT | Performed by: INTERNAL MEDICINE

## 2024-10-25 PROCEDURE — 75574 CT ANGIO HRT W/3D IMAGE: CPT

## 2024-10-25 RX ORDER — METOPROLOL TARTRATE 50 MG
50 TABLET ORAL ONCE
Status: COMPLETED | OUTPATIENT
Start: 2024-10-25 | End: 2024-10-25

## 2024-10-25 RX ORDER — SODIUM CHLORIDE 0.9 % (FLUSH) 0.9 %
10 SYRINGE (ML) INJECTION AS NEEDED
Status: DISCONTINUED | OUTPATIENT
Start: 2024-10-25 | End: 2024-10-26 | Stop reason: HOSPADM

## 2024-10-25 RX ORDER — SODIUM CHLORIDE 0.9 % (FLUSH) 0.9 %
10 SYRINGE (ML) INJECTION EVERY 12 HOURS SCHEDULED
Status: DISCONTINUED | OUTPATIENT
Start: 2024-10-25 | End: 2024-10-26 | Stop reason: HOSPADM

## 2024-10-25 RX ORDER — METOPROLOL TARTRATE 50 MG
50 TABLET ORAL
Status: DISCONTINUED | OUTPATIENT
Start: 2024-10-25 | End: 2024-10-26 | Stop reason: HOSPADM

## 2024-10-25 RX ORDER — METOPROLOL TARTRATE 100 MG
200 TABLET ORAL ONCE
Status: COMPLETED | OUTPATIENT
Start: 2024-10-25 | End: 2024-10-25

## 2024-10-25 RX ORDER — NITROGLYCERIN 0.4 MG/1
0.4 TABLET SUBLINGUAL
Status: COMPLETED | OUTPATIENT
Start: 2024-10-25 | End: 2024-10-25

## 2024-10-25 RX ORDER — SODIUM CHLORIDE 9 MG/ML
40 INJECTION, SOLUTION INTRAVENOUS AS NEEDED
Status: DISPENSED | OUTPATIENT
Start: 2024-10-25 | End: 2024-10-25

## 2024-10-25 RX ORDER — IOPAMIDOL 755 MG/ML
100 INJECTION, SOLUTION INTRAVASCULAR
Status: COMPLETED | OUTPATIENT
Start: 2024-10-25 | End: 2024-10-25

## 2024-10-25 RX ORDER — METOPROLOL TARTRATE 100 MG
100 TABLET ORAL ONCE
Status: COMPLETED | OUTPATIENT
Start: 2024-10-25 | End: 2024-10-25

## 2024-10-25 RX ORDER — METOPROLOL TARTRATE 25 MG/1
25 TABLET, FILM COATED ORAL ONCE
Status: COMPLETED | OUTPATIENT
Start: 2024-10-25 | End: 2024-10-25

## 2024-10-25 RX ORDER — METOPROLOL TARTRATE 1 MG/ML
5 INJECTION, SOLUTION INTRAVENOUS
Status: DISCONTINUED | OUTPATIENT
Start: 2024-10-25 | End: 2024-10-26 | Stop reason: HOSPADM

## 2024-10-25 RX ORDER — NITROGLYCERIN 0.4 MG/1
0.8 TABLET SUBLINGUAL
Status: COMPLETED | OUTPATIENT
Start: 2024-10-25 | End: 2024-10-25

## 2024-10-25 RX ADMIN — NITROGLYCERIN 0.8 MG: 0.4 TABLET SUBLINGUAL at 14:42

## 2024-10-25 RX ADMIN — METOPROLOL TARTRATE 50 MG: 50 TABLET, FILM COATED ORAL at 11:53

## 2024-10-25 RX ADMIN — IOPAMIDOL 100 ML: 755 INJECTION, SOLUTION INTRAVENOUS at 15:13

## 2024-10-27 ENCOUNTER — HOSPITAL ENCOUNTER (OUTPATIENT)
Dept: CT IMAGING | Facility: HOSPITAL | Age: 74
Discharge: HOME | End: 2024-10-27
Admitting: HOSPITALIST
Payer: MEDICARE

## 2024-10-27 DIAGNOSIS — R93.1 ABNORMAL FINDINGS DIAGNOSTIC IMAGING OF HEART AND CORONARY CIRCULATION: Primary | ICD-10-CM

## 2024-10-27 DIAGNOSIS — R93.1 ABNORMAL FINDINGS DIAGNOSTIC IMAGING OF HEART AND CORONARY CIRCULATION: ICD-10-CM

## 2024-10-27 PROCEDURE — 75580 N-INVAS EST C FFR SW ALY CTA: CPT

## 2024-10-29 ENCOUNTER — TELEPHONE (OUTPATIENT)
Dept: CARDIOLOGY | Facility: CLINIC | Age: 74
End: 2024-10-29

## 2024-10-29 ENCOUNTER — TELEPHONE (OUTPATIENT)
Age: 74
End: 2024-10-29

## 2024-10-29 NOTE — TELEPHONE ENCOUNTER
Pt called to see about information about a number that was not giving to the Pt.   Please contact the Pt.

## 2024-10-29 NOTE — TELEPHONE ENCOUNTER
Caller: Eloina Bradley    Relationship: Emergency Contact    Best call back number: [phone]     Caller requesting test results: RENNY BRADLEY    What test was performed: CT ANGIOGRAM    When was the test performed: 10.25.2024    Where was the test performed: [facility]    Additional notes: PT AND HIS WIFE JUST WANT A CB TO DISCUSS RESULTS

## 2024-10-31 RX ORDER — LOVASTATIN 20 MG/1
20 TABLET ORAL NIGHTLY
Qty: 90 TABLET | Refills: 3 | Status: SHIPPED | OUTPATIENT
Start: 2024-10-31

## 2024-10-31 NOTE — TELEPHONE ENCOUNTER
Caller: Eloina Bradley    Relationship: Emergency Contact    Best call back number: [phone]     Requested Prescriptions:   Requested Prescriptions     Pending Prescriptions Disp Refills    lovastatin (MEVACOR) 20 MG tablet 90 tablet 3     Sig: Take 1 tablet by mouth Every Night.        Pharmacy where request should be sent: [facility] [address] [place] [address] [phone] [facility] [phone] FX     Last office visit with prescribing clinician: 10/17/2024   Last telemedicine visit with prescribing clinician: Visit date not found   Next office visit with prescribing clinician: 12/17/2024     Additional details provided by patient: PATIENT'S WIFE STATED PATIENT HAS 2 OR 3 TABLETS LEFT OF THIS MEDICATION.    Does the patient have less than a 3 day supply:  [x] Yes  [] No    Kevin Vaca Rep   10/31/24 10:04 CDT

## 2024-11-04 ENCOUNTER — PREP FOR SURGERY (OUTPATIENT)
Dept: OTHER | Facility: HOSPITAL | Age: 74
End: 2024-11-04
Payer: MEDICARE

## 2024-11-04 ENCOUNTER — TELEPHONE (OUTPATIENT)
Dept: CARDIOLOGY | Facility: CLINIC | Age: 74
End: 2024-11-04
Payer: MEDICARE

## 2024-11-04 DIAGNOSIS — R94.39 ABNORMAL STRESS TEST: Primary | ICD-10-CM

## 2024-11-04 DIAGNOSIS — R93.89 ABNORMAL FINDINGS ON DIAGNOSTIC IMAGING OF CARDIOVASCULAR SYSTEM: ICD-10-CM

## 2024-11-04 RX ORDER — SODIUM CHLORIDE 9 MG/ML
40 INJECTION, SOLUTION INTRAVENOUS AS NEEDED
Status: CANCELLED | OUTPATIENT
Start: 2024-11-04

## 2024-11-04 RX ORDER — SODIUM CHLORIDE 0.9 % (FLUSH) 0.9 %
10 SYRINGE (ML) INJECTION AS NEEDED
Status: CANCELLED | OUTPATIENT
Start: 2024-11-04

## 2024-11-04 RX ORDER — SODIUM CHLORIDE 0.9 % (FLUSH) 0.9 %
3 SYRINGE (ML) INJECTION EVERY 12 HOURS SCHEDULED
Status: CANCELLED | OUTPATIENT
Start: 2024-11-04

## 2024-11-04 RX ORDER — ASPIRIN 81 MG/1
81 TABLET ORAL DAILY
Status: CANCELLED | OUTPATIENT
Start: 2024-11-05

## 2024-11-04 RX ORDER — ASPIRIN 81 MG/1
324 TABLET, CHEWABLE ORAL ONCE
Status: CANCELLED | OUTPATIENT
Start: 2024-11-04 | End: 2024-11-04

## 2024-11-04 NOTE — TELEPHONE ENCOUNTER
Caller: Eloina Bradley    Relationship to patient: Emergency Contact    Best call back number: [phone]     Chief complaint: N/A    Type of visit: HEART CATH    Requested date: ASAP WANTING TO GO OUT OF TOWN AND CAN'T DO TIL AFTER THEY HAVE THIS DONE      If rescheduling, when is the original appointment: N/A     Additional notes:HAD AN ANGIOGRAM AND THEY WERE TOLD THAT THEY ARE SUPPOSED TO HAVE A HEART CATH AND SOMEONE WOULD CALL THEM REGARDING SCHEDULING IT AND THEY HAVE NOT RECEIVED A CALL YET

## 2024-11-04 NOTE — TELEPHONE ENCOUNTER
"Per prior conversation with patient on 10/28: \"He is amenable to have cath, but prefers to wait until the week of 11/11 because of a planned trip the prior weekend.\" Lula Vu MA    The patient is contacted and states his hunting trip has been rescheduled, so he would like to have the cath this week or 11/20. I told him as soon as Dr Mosqueda places the order someone from scheduling will contact him to set up a date/time. He voiced understanding. Lula Vu MA    "

## 2024-11-05 ENCOUNTER — HOSPITAL ENCOUNTER (OUTPATIENT)
Facility: HOSPITAL | Age: 74
Discharge: HOME | End: 2024-11-06
Attending: INTERNAL MEDICINE | Admitting: INTERNAL MEDICINE
Payer: MEDICARE

## 2024-11-05 DIAGNOSIS — Z95.5 S/P DRUG ELUTING CORONARY STENT PLACEMENT: Primary | ICD-10-CM

## 2024-11-05 DIAGNOSIS — R93.89 ABNORMAL FINDINGS ON DIAGNOSTIC IMAGING OF CARDIOVASCULAR SYSTEM: ICD-10-CM

## 2024-11-05 DIAGNOSIS — R94.39 ABNORMAL STRESS TEST: ICD-10-CM

## 2024-11-05 LAB
ANION GAP SERPL CALCULATED.3IONS-SCNC: 11 MMOL/L (ref 5–15)
BUN SERPL-MCNC: 25 MG/DL (ref 8–23)
BUN/CREAT SERPL: 25.5 (ref 7–25)
CALCIUM SPEC-SCNC: 9.4 MG/DL (ref 8.6–10.5)
CHLORIDE SERPL-SCNC: 106 MMOL/L (ref 98–107)
CO2 SERPL-SCNC: 25 MMOL/L (ref 22–29)
CREAT SERPL-MCNC: 0.98 MG/DL (ref 0.76–1.27)
DEPRECATED RDW RBC AUTO: 51.5 FL (ref 37–54)
EGFRCR SERPLBLD CKD-EPI 2021: 79.9 ML/MIN/1.73
ERYTHROCYTE [DISTWIDTH] IN BLOOD BY AUTOMATED COUNT: 15.9 % (ref 12.3–15.4)
GLUCOSE SERPL-MCNC: 102 MG/DL (ref 65–99)
HCT VFR BLD AUTO: 44.3 % (ref 37.5–51)
HGB BLD-MCNC: 13.8 G/DL (ref 13–17.7)
MCH RBC QN AUTO: 27.4 PG (ref 26.6–33)
MCHC RBC AUTO-ENTMCNC: 31.2 G/DL (ref 31.5–35.7)
MCV RBC AUTO: 88.1 FL (ref 79–97)
PLATELET # BLD AUTO: 145 10*3/MM3 (ref 140–450)
PMV BLD AUTO: 9.2 FL (ref 6–12)
POTASSIUM SERPL-SCNC: 4.2 MMOL/L (ref 3.5–5.2)
RBC # BLD AUTO: 5.03 10*6/MM3 (ref 4.14–5.8)
SODIUM SERPL-SCNC: 142 MMOL/L (ref 136–145)
WBC NRBC COR # BLD AUTO: 6.67 10*3/MM3 (ref 3.4–10.8)

## 2024-11-05 PROCEDURE — C1887 CATHETER, GUIDING: HCPCS | Performed by: INTERNAL MEDICINE

## 2024-11-05 PROCEDURE — 25010000002 HEPARIN (PORCINE) 2000-0.9 UNIT/L-% SOLUTION: Performed by: INTERNAL MEDICINE

## 2024-11-05 PROCEDURE — 99152 MOD SED SAME PHYS/QHP 5/>YRS: CPT | Performed by: INTERNAL MEDICINE

## 2024-11-05 PROCEDURE — 93458 L HRT ARTERY/VENTRICLE ANGIO: CPT | Performed by: INTERNAL MEDICINE

## 2024-11-05 PROCEDURE — 25010000002 LIDOCAINE 2% SOLUTION: Performed by: INTERNAL MEDICINE

## 2024-11-05 PROCEDURE — C1769 GUIDE WIRE: HCPCS | Performed by: INTERNAL MEDICINE

## 2024-11-05 PROCEDURE — 99153 MOD SED SAME PHYS/QHP EA: CPT | Performed by: INTERNAL MEDICINE

## 2024-11-05 PROCEDURE — 85027 COMPLETE CBC AUTOMATED: CPT | Performed by: INTERNAL MEDICINE

## 2024-11-05 PROCEDURE — 25010000002 BIVALIRUDIN TRIFLUOROACETATE 250 MG RECONSTITUTED SOLUTION 1 EACH VIAL: Performed by: INTERNAL MEDICINE

## 2024-11-05 PROCEDURE — 25010000002 MIDAZOLAM HCL (PF) 5 MG/5ML SOLUTION: Performed by: INTERNAL MEDICINE

## 2024-11-05 PROCEDURE — 93005 ELECTROCARDIOGRAM TRACING: CPT | Performed by: INTERNAL MEDICINE

## 2024-11-05 PROCEDURE — C9600 PERC DRUG-EL COR STENT SING: HCPCS

## 2024-11-05 PROCEDURE — C1760 CLOSURE DEV, VASC: HCPCS | Performed by: INTERNAL MEDICINE

## 2024-11-05 PROCEDURE — 25010000002 FENTANYL CITRATE (PF) 50 MCG/ML SOLUTION: Performed by: INTERNAL MEDICINE

## 2024-11-05 PROCEDURE — C1874 STENT, COATED/COV W/DEL SYS: HCPCS | Performed by: INTERNAL MEDICINE

## 2024-11-05 PROCEDURE — C1894 INTRO/SHEATH, NON-LASER: HCPCS | Performed by: INTERNAL MEDICINE

## 2024-11-05 PROCEDURE — 25010000002 DIPHENHYDRAMINE PER 50 MG: Performed by: INTERNAL MEDICINE

## 2024-11-05 PROCEDURE — 25510000001 IOPAMIDOL PER 1 ML: Performed by: INTERNAL MEDICINE

## 2024-11-05 PROCEDURE — 25810000003 SODIUM CHLORIDE 0.9 % SOLUTION: Performed by: INTERNAL MEDICINE

## 2024-11-05 PROCEDURE — 92928 PRQ TCAT PLMT NTRAC ST 1 LES: CPT | Performed by: INTERNAL MEDICINE

## 2024-11-05 PROCEDURE — 25010000002 HEPARIN (PORCINE) 1000-0.9 UT/500ML-% SOLUTION: Performed by: INTERNAL MEDICINE

## 2024-11-05 PROCEDURE — 80048 BASIC METABOLIC PNL TOTAL CA: CPT | Performed by: INTERNAL MEDICINE

## 2024-11-05 DEVICE — XIENCE SKYPOINT™ EVEROLIMUS ELUTING CORONARY STENT SYSTEM 2.50 MM X 18 MM / RAPID-EXCHANGE
Type: IMPLANTABLE DEVICE | Status: FUNCTIONAL
Brand: XIENCE SKYPOINT™

## 2024-11-05 RX ORDER — SODIUM CHLORIDE 0.9 % (FLUSH) 0.9 %
3 SYRINGE (ML) INJECTION EVERY 12 HOURS SCHEDULED
Status: DISCONTINUED | OUTPATIENT
Start: 2024-11-05 | End: 2024-11-05

## 2024-11-05 RX ORDER — ACETAMINOPHEN 325 MG/1
650 TABLET ORAL EVERY 4 HOURS PRN
Status: DISCONTINUED | OUTPATIENT
Start: 2024-11-05 | End: 2024-11-06 | Stop reason: HOSPADM

## 2024-11-05 RX ORDER — HEPARIN SODIUM 200 [USP'U]/100ML
INJECTION, SOLUTION INTRAVENOUS
Status: DISCONTINUED | OUTPATIENT
Start: 2024-11-05 | End: 2024-11-05 | Stop reason: HOSPADM

## 2024-11-05 RX ORDER — CHLORTHALIDONE 25 MG/1
25 TABLET ORAL DAILY
Status: DISCONTINUED | OUTPATIENT
Start: 2024-11-06 | End: 2024-11-06 | Stop reason: HOSPADM

## 2024-11-05 RX ORDER — LIDOCAINE HYDROCHLORIDE 20 MG/ML
INJECTION, SOLUTION INFILTRATION; PERINEURAL
Status: DISCONTINUED | OUTPATIENT
Start: 2024-11-05 | End: 2024-11-05 | Stop reason: HOSPADM

## 2024-11-05 RX ORDER — ASPIRIN 81 MG/1
TABLET, CHEWABLE ORAL
Status: COMPLETED
Start: 2024-11-05 | End: 2024-11-05

## 2024-11-05 RX ORDER — PANTOPRAZOLE SODIUM 40 MG/1
40 TABLET, DELAYED RELEASE ORAL
Status: DISCONTINUED | OUTPATIENT
Start: 2024-11-06 | End: 2024-11-06 | Stop reason: HOSPADM

## 2024-11-05 RX ORDER — SODIUM CHLORIDE 9 MG/ML
100 INJECTION, SOLUTION INTRAVENOUS CONTINUOUS
Status: DISPENSED | OUTPATIENT
Start: 2024-11-05 | End: 2024-11-05

## 2024-11-05 RX ORDER — ATORVASTATIN CALCIUM 40 MG/1
40 TABLET, FILM COATED ORAL NIGHTLY
Status: DISCONTINUED | OUTPATIENT
Start: 2024-11-05 | End: 2024-11-06 | Stop reason: HOSPADM

## 2024-11-05 RX ORDER — SODIUM CHLORIDE 9 MG/ML
40 INJECTION, SOLUTION INTRAVENOUS AS NEEDED
Status: DISCONTINUED | OUTPATIENT
Start: 2024-11-05 | End: 2024-11-05

## 2024-11-05 RX ORDER — DIPHENHYDRAMINE HYDROCHLORIDE 50 MG/ML
INJECTION INTRAMUSCULAR; INTRAVENOUS
Status: DISCONTINUED | OUTPATIENT
Start: 2024-11-05 | End: 2024-11-05 | Stop reason: HOSPADM

## 2024-11-05 RX ORDER — HYDRALAZINE HYDROCHLORIDE 50 MG/1
50 TABLET, FILM COATED ORAL 3 TIMES DAILY
Status: DISCONTINUED | OUTPATIENT
Start: 2024-11-05 | End: 2024-11-06 | Stop reason: HOSPADM

## 2024-11-05 RX ORDER — FLUTICASONE PROPIONATE 50 MCG
1 SPRAY, SUSPENSION (ML) NASAL 2 TIMES DAILY PRN
Status: DISCONTINUED | OUTPATIENT
Start: 2024-11-06 | End: 2024-11-06 | Stop reason: HOSPADM

## 2024-11-05 RX ORDER — ASPIRIN 81 MG/1
81 TABLET ORAL DAILY
Status: DISCONTINUED | OUTPATIENT
Start: 2024-11-06 | End: 2024-11-05 | Stop reason: SDUPTHER

## 2024-11-05 RX ORDER — CARVEDILOL 3.12 MG/1
3.12 TABLET ORAL 2 TIMES DAILY WITH MEALS
Status: DISCONTINUED | OUTPATIENT
Start: 2024-11-05 | End: 2024-11-06 | Stop reason: HOSPADM

## 2024-11-05 RX ORDER — SODIUM CHLORIDE 0.9 % (FLUSH) 0.9 %
10 SYRINGE (ML) INJECTION AS NEEDED
Status: DISCONTINUED | OUTPATIENT
Start: 2024-11-05 | End: 2024-11-05

## 2024-11-05 RX ORDER — FENTANYL CITRATE 50 UG/ML
INJECTION, SOLUTION INTRAMUSCULAR; INTRAVENOUS
Status: DISCONTINUED | OUTPATIENT
Start: 2024-11-05 | End: 2024-11-05 | Stop reason: HOSPADM

## 2024-11-05 RX ORDER — ASPIRIN 81 MG/1
81 TABLET ORAL DAILY
Status: DISCONTINUED | OUTPATIENT
Start: 2024-11-06 | End: 2024-11-06 | Stop reason: HOSPADM

## 2024-11-05 RX ORDER — GABAPENTIN 400 MG/1
800 CAPSULE ORAL EVERY 8 HOURS SCHEDULED
Status: DISCONTINUED | OUTPATIENT
Start: 2024-11-05 | End: 2024-11-06 | Stop reason: HOSPADM

## 2024-11-05 RX ORDER — ALLOPURINOL 300 MG/1
300 TABLET ORAL DAILY
Status: DISCONTINUED | OUTPATIENT
Start: 2024-11-06 | End: 2024-11-06 | Stop reason: HOSPADM

## 2024-11-05 RX ORDER — MIDAZOLAM HYDROCHLORIDE 5 MG/5ML
INJECTION, SOLUTION INTRAMUSCULAR; INTRAVENOUS
Status: DISCONTINUED | OUTPATIENT
Start: 2024-11-05 | End: 2024-11-05 | Stop reason: HOSPADM

## 2024-11-05 RX ORDER — CLOPIDOGREL BISULFATE 75 MG/1
75 TABLET ORAL DAILY
Status: DISCONTINUED | OUTPATIENT
Start: 2024-11-06 | End: 2024-11-06 | Stop reason: HOSPADM

## 2024-11-05 RX ORDER — ASPIRIN 81 MG/1
324 TABLET, CHEWABLE ORAL ONCE
Status: COMPLETED | OUTPATIENT
Start: 2024-11-05 | End: 2024-11-05

## 2024-11-05 RX ORDER — CETIRIZINE HYDROCHLORIDE 10 MG/1
10 TABLET ORAL DAILY
Status: DISCONTINUED | OUTPATIENT
Start: 2024-11-06 | End: 2024-11-06 | Stop reason: HOSPADM

## 2024-11-05 RX ORDER — NITROGLYCERIN 0.4 MG/1
0.4 TABLET SUBLINGUAL
Status: DISCONTINUED | OUTPATIENT
Start: 2024-11-05 | End: 2024-11-06 | Stop reason: HOSPADM

## 2024-11-05 RX ORDER — CLOPIDOGREL BISULFATE 75 MG/1
300 TABLET ORAL ONCE
Status: COMPLETED | OUTPATIENT
Start: 2024-11-05 | End: 2024-11-05

## 2024-11-05 RX ORDER — LOSARTAN POTASSIUM 50 MG/1
50 TABLET ORAL 2 TIMES DAILY
Status: DISCONTINUED | OUTPATIENT
Start: 2024-11-05 | End: 2024-11-06 | Stop reason: HOSPADM

## 2024-11-05 RX ORDER — IOPAMIDOL 755 MG/ML
INJECTION, SOLUTION INTRAVASCULAR
Status: DISCONTINUED | OUTPATIENT
Start: 2024-11-05 | End: 2024-11-05 | Stop reason: HOSPADM

## 2024-11-05 RX ADMIN — SODIUM CHLORIDE 100 ML/HR: 9 INJECTION, SOLUTION INTRAVENOUS at 18:38

## 2024-11-05 RX ADMIN — CARVEDILOL 3.12 MG: 3.12 TABLET, FILM COATED ORAL at 18:38

## 2024-11-05 RX ADMIN — ASPIRIN 324 MG: 81 TABLET, CHEWABLE ORAL at 13:25

## 2024-11-05 RX ADMIN — LOSARTAN POTASSIUM 50 MG: 50 TABLET, FILM COATED ORAL at 21:04

## 2024-11-05 RX ADMIN — GABAPENTIN 800 MG: 400 CAPSULE ORAL at 21:04

## 2024-11-05 RX ADMIN — SODIUM CHLORIDE 324 ML: 900 INJECTION, SOLUTION INTRAVENOUS at 13:25

## 2024-11-05 RX ADMIN — HYDRALAZINE HYDROCHLORIDE 50 MG: 50 TABLET ORAL at 18:39

## 2024-11-05 RX ADMIN — CLOPIDOGREL BISULFATE 300 MG: 75 TABLET ORAL at 21:04

## 2024-11-05 RX ADMIN — ATORVASTATIN CALCIUM 40 MG: 40 TABLET, FILM COATED ORAL at 21:04

## 2024-11-05 NOTE — PROCEDURES
"Please see procedural report under \"results\" tab.    No complications noted.  Unable to advance sheath wire through the radial artery despite 3 separate sticks, using ultrasound guidance.  Therefore, sheath was never inserted into the radial artery and access was obtained using ultrasound guidance the right common femoral artery.  Angiography found a eccentric 60-70% mid LAD stenosis to correspond into the area of abnormality on the CT FFR.  No other obstructive disease noted.  Mid LAD was directly stented with a 2.5 x 18 mm drug-eluting stent, with good result.  Hemostasis achieved with 6 Amharic Perclose.  Patient will be observed overnight with telemetry, with 2 hours bedrest.    Anticipate discharge home tomorrow with dual antiplatelet therapy needed for 6 months.    Electronically signed by Cassius Mosqueda MD, 11/05/24, 4:33 PM CST.          "

## 2024-11-05 NOTE — Clinical Note
Transition from radial access attempt to groin access. Right radial site manual pressure applied and covered with gauze and Tegaderm dressing. No bleeding or hematoma

## 2024-11-05 NOTE — Clinical Note
[address]  PRIMARY CARE [facility]    NAME: Linette Hanna  AGE: 23 y o  SEX: female  : 2001     DATE: 3/6/2022     Assessment and Plan:     Problem List Items Addressed This Visit        Endocrine    IFG (impaired fasting glucose)    Relevant Orders    CBC and differential    Comprehensive metabolic panel    Lipid panel    TSH, 3rd generation with Free T4 reflex       Cardiovascular and Mediastinum    Migraine without aura and without status migrainosus, not intractable     A new diagnosis the the patient had history of migraine in teenager and she feel it coming back  Discussed with the patient headache can be mixed between migraine with knee with congestion sinus headache recommend will hydration sleeping hygiene which check her TSH CBC BMP and recommend to the patient to do headache diary Tylenol for the pain recommend start magnesium supplement  - Maintain good sleep hygiene  Going to bed and waking up at consistent times, avoiding excessive daytime naps, avoiding caffeinated beverages in the evening, avoid excessive stimulation in the evening and generally using bed primarily for sleeping  One hour before bedtime would recommend turning lights down lower, decreasing your activity (may read quietly, listen to music at a low volume)  When you get into bed, should eliminate all technology (no texting, emailing, playing with your phone, iPad or tablet in bed)  - Maintain good hydration  Drink  2L of fluid a day (4 typical small water bottles)  - Maintain good nutrition  In particular don't skip meals and eat balanced meals regularly           Relevant Orders    CBC and differential    Comprehensive metabolic panel    Lipid panel    TSH, 3rd generation with Free T4 reflex       Other    Encounter for well adult exam with abnormal findings - Primary     Advice and education were given regarding nutrition, aerobic exercises, weight catheter advanced into LV. bearing exercises, cardiovascular risk reduction, fall risk reduction, and age appropriate supplements  The patient was counseled regarding instructions for management, risk factor reductions, prognosis, risks and benefits of treatment options, patient and family education, and importance of compliance with treatment  Class 1 obesity due to excess calories without serious comorbidity with body mass index (BMI) of 31 0 to 31 9 in adult     Chronic uncontrolled BMI 31 07 discussed the patient portion control low carb low-fat diet and increased physical activity         Relevant Orders    CBC and differential    Comprehensive metabolic panel    Lipid panel    TSH, 3rd generation with Free T4 reflex    Nasal congestion     Asymptomatic with the sinus pressure on the forehead and the paranasal area recommend Flonase nasal spray 2 spray each nostril proper use the a demonstrate to the patient and the will hydration will re-evaluate         Relevant Medications    fluticasone (FLONASE) 50 mcg/act nasal spray      Other Visit Diagnoses     Need for Tdap vaccination        Relevant Orders    TDAP VACCINE GREATER THAN OR EQUAL TO 6YO IM (Completed)          Immunizations and preventive care screenings were discussed with patient today  Appropriate education was printed on patient's after visit summary  Counseling:  Alcohol/drug use: discussed moderation in alcohol intake, the recommendations for healthy alcohol use, and avoidance of illicit drug use  Dental Health: discussed importance of regular tooth brushing, flossing, and dental visits  Injury prevention: discussed safety/seat belts, safety helmets, smoke detectors, carbon dioxide detectors, and smoking near bedding or upholstery  Sexual health: discussed sexually transmitted diseases, partner selection, use of condoms, avoidance of unintended pregnancy, and contraceptive alternatives    · Exercise: the importance of regular exercise/physical activity was discussed  Recommend exercise 3-5 times per week for at least 30 minutes  BMI Counseling: Body mass index is 31 07 kg/m²  The BMI is above normal  Nutrition recommendations include decreasing portion sizes, decreasing fast food intake and limiting drinks that contain sugar  Exercise recommendations include exercising 3-5 times per week  No pharmacotherapy was ordered  Rationale for BMI follow-up plan is due to patient being overweight or obese  Depression Screening and Follow-up Plan: Patient was screened for depression during today's encounter  They screened negative with a PHQ-2 score of 0  No follow-ups on file  Chief Complaint:     Chief Complaint   Patient presents with    Physical Exam    Headache     f/u migraines      History of Present Illness:     Adult Annual Physical   Patient here for a comprehensive physical exam  The patient reports problems - Headache  Diet and Physical Activity  · Diet/Nutrition: no special diet  · Exercise: 1-2 times a week on average  Depression Screening  PHQ-2/9 Depression Screening    Little interest or pleasure in doing things: 0 - not at all  Feeling down, depressed, or hopeless: 0 - not at all  PHQ-2 Score: 0  PHQ-2 Interpretation: Negative depression screen       General Health  · Sleep: sleeps well  · Hearing: normal - bilateral   · Vision: no vision problems  · Dental: regular dental visits  /GYN Health  · Last menstrual period: since Depo  · Contraceptive method: injectable contraception  · History of STDs?: no      Review of Systems:     Review of Systems   Constitutional: Negative for activity change, appetite change, chills, fatigue and fever  HENT: Positive for congestion and postnasal drip  Negative for ear pain, sinus pressure, sinus pain and sore throat  Eyes: Negative for pain, discharge, redness, itching and visual disturbance     Respiratory: Negative for cough, chest tightness, shortness of breath and stridor  Cardiovascular: Negative for chest pain, palpitations and leg swelling  Gastrointestinal: Positive for nausea  Negative for abdominal pain, blood in stool, constipation, diarrhea and vomiting  Genitourinary: Negative for dysuria, flank pain, frequency and hematuria  Musculoskeletal: Negative for arthralgias, back pain, joint swelling and neck pain  Skin: Negative for color change, pallor and rash  Neurological: Positive for headaches  Negative for dizziness, tremors, seizures, syncope, weakness and numbness  Hematological: Does not bruise/bleed easily  All other systems reviewed and are negative       Past Medical History:     Past Medical History:   Diagnosis Date    BMI 28 0-28 9,adult 4/17/2019    Exposure to COVID-19 virus 12/23/2020      Past Surgical History:     Past Surgical History:   Procedure Laterality Date    WISDOM TOOTH EXTRACTION Bilateral 03/01/2019      Social History:     Social History     Socioeconomic History    Marital status: Single     Spouse name: None    Number of children: None    Years of education: None    Highest education level: None   Occupational History    None   Tobacco Use    Smoking status: Never Smoker    Smokeless tobacco: Never Used   Vaping Use    Vaping Use: Never used   Substance and Sexual Activity    Alcohol use: Yes     Comment: social    Drug use: Never    Sexual activity: Yes     Partners: Male     Birth control/protection: Injection   Other Topics Concern    None   Social History Narrative    School name - [employer]    Grade level - college     Grades earned - B's     Takes naps - NO    Doesn't sleep with parents     Tv/day - 3      Social Determinants of Health     Financial Resource Strain: Not on file   Food Insecurity: Not on file   Transportation Needs: Not on file   Physical Activity: Not on file   Stress: Not on file   Social Connections: Not on file   Intimate Partner Violence: Not on file   Housing Stability: Not on file      Family History:     Family History   Problem Relation Age of Onset    Hypertension Father     Hyperlipidemia Father     Hyperlipidemia Mother     Asthma Brother     Heart disease Maternal Grandfather       Current Medications:     Current Outpatient Medications   Medication Sig Dispense Refill    fluticasone (FLONASE) 50 mcg/act nasal spray 2 sprays into each nostril daily 16 g 0    medroxyPROGESTERone acetate (DEPO-PROVERA SYRINGE) 150 mg/mL injection Inject 1 mL (150 mg total) into a muscle every 3 (three) months 1 mL 0    Multiple Vitamin (MULTI-VITAMIN PO) Take 1 tablet daily       No current facility-administered medications for this visit  Allergies: Allergies   Allergen Reactions    Ascorbate - Food Allergy Anaphylaxis     Cranberries   Cranberries     Cranberry - Food Allergy Anaphylaxis and Swelling     Tongue gets swollen  Physical Exam:     /90   Pulse 90   Temp 99 3 °F (37 4 °C) (Tympanic)   Ht 5' 4" (1 626 m)   Wt 82 1 kg (181 lb)   SpO2 99%   Breastfeeding No   BMI 31 07 kg/m²     Physical Exam  Constitutional:       General: She is not in acute distress  Appearance: She is well-developed and normal weight  She is not toxic-appearing or diaphoretic  HENT:      Head: Normocephalic and atraumatic  Right Ear: Tympanic membrane, ear canal and external ear normal  There is no impacted cerumen  Left Ear: Tympanic membrane, ear canal and external ear normal  There is no impacted cerumen  Nose: Nose normal  No congestion or rhinorrhea  Mouth/Throat:      Mouth: Mucous membranes are moist       Pharynx: Oropharynx is clear  No oropharyngeal exudate or posterior oropharyngeal erythema  Eyes:      General: No scleral icterus  Right eye: No discharge  Left eye: No discharge  Conjunctiva/sclera: Conjunctivae normal       Pupils: Pupils are equal, round, and reactive to light     Neck:      Thyroid: No thyromegaly  Cardiovascular:      Rate and Rhythm: Normal rate and regular rhythm  Pulses: Normal pulses  Heart sounds: Normal heart sounds  No murmur heard  No friction rub  Pulmonary:      Effort: Pulmonary effort is normal  No respiratory distress  Breath sounds: Normal breath sounds  No wheezing or rales  Chest:      Chest wall: No tenderness  Abdominal:      General: There is no distension  Palpations: Abdomen is soft  There is no mass  Tenderness: There is no abdominal tenderness  Hernia: No hernia is present  There is no hernia in the left inguinal area  Genitourinary:     Labia:         Right: No rash  Left: No rash  Vagina: No foreign body  No vaginal discharge, tenderness or bleeding  Cervix: No cervical motion tenderness  Adnexa:         Right: No mass or tenderness  Left: No mass or tenderness  Musculoskeletal:         General: Normal range of motion  Cervical back: Normal range of motion  No rigidity  Lymphadenopathy:      Cervical: No cervical adenopathy  Skin:     General: Skin is warm  Coloration: Skin is not pale  Findings: No erythema or rash  Neurological:      Mental Status: She is alert and oriented to person, place, and time  Sensory: No sensory deficit  Motor: No weakness or abnormal muscle tone        Gait: Gait normal       Deep Tendon Reflexes: Reflexes normal    Psychiatric:         Mood and Affect: Mood normal          Behavior: Behavior normal           Francisco J Coleman MD   [address] [place] [address]

## 2024-11-05 NOTE — INTERVAL H&P NOTE
H&P reviewed. The patient was examined and there are no changes to the H&P.      CT-FFR was abnormal, suggesting severe LAD and possibly severe RCA disease.  Cardiac catheterization was recommended, and patient agreed to proceed.    I discussed cardiac catheterization, the procedure, risks (including bleeding, infection, vascular damage [including minor oozing, bruising, bleeding, and up to and including the need for vascular surgery], contrast reaction, renal failure, respiratory failure, heart attack, stroke, arrhythmia and even death), benefits, and alternatives and the patient has voiced understanding and is willing to proceed.

## 2024-11-06 VITALS
BODY MASS INDEX: 33.93 KG/M2 | RESPIRATION RATE: 16 BRPM | TEMPERATURE: 98 F | SYSTOLIC BLOOD PRESSURE: 162 MMHG | HEART RATE: 60 BPM | OXYGEN SATURATION: 97 % | DIASTOLIC BLOOD PRESSURE: 94 MMHG | HEIGHT: 70 IN | WEIGHT: 237 LBS

## 2024-11-06 DIAGNOSIS — E78.5 HYPERLIPIDEMIA, UNSPECIFIED HYPERLIPIDEMIA TYPE: Primary | ICD-10-CM

## 2024-11-06 LAB
ANION GAP SERPL CALCULATED.3IONS-SCNC: 10 MMOL/L (ref 5–15)
BUN SERPL-MCNC: 22 MG/DL (ref 8–23)
BUN/CREAT SERPL: 21.4 (ref 7–25)
CALCIUM SPEC-SCNC: 8.8 MG/DL (ref 8.6–10.5)
CHLORIDE SERPL-SCNC: 104 MMOL/L (ref 98–107)
CHOLEST SERPL-MCNC: 98 MG/DL (ref 0–200)
CO2 SERPL-SCNC: 27 MMOL/L (ref 22–29)
CREAT SERPL-MCNC: 1.03 MG/DL (ref 0.76–1.27)
DEPRECATED RDW RBC AUTO: 52.2 FL (ref 37–54)
EGFRCR SERPLBLD CKD-EPI 2021: 75.3 ML/MIN/1.73
ERYTHROCYTE [DISTWIDTH] IN BLOOD BY AUTOMATED COUNT: 16.1 % (ref 12.3–15.4)
GLUCOSE SERPL-MCNC: 151 MG/DL (ref 65–99)
HBA1C MFR BLD: 6.3 % (ref 4.8–5.6)
HCT VFR BLD AUTO: 43.5 % (ref 37.5–51)
HDLC SERPL-MCNC: 23 MG/DL (ref 40–60)
HGB BLD-MCNC: 13.5 G/DL (ref 13–17.7)
LDL/HDL RATIO NULL: ABNORMAL
LDLC SERPL CALC-MCNC: 21 MG/DL (ref 0–100)
MCH RBC QN AUTO: 27.5 PG (ref 26.6–33)
MCHC RBC AUTO-ENTMCNC: 31 G/DL (ref 31.5–35.7)
MCV RBC AUTO: 88.6 FL (ref 79–97)
PLATELET # BLD AUTO: 141 10*3/MM3 (ref 140–450)
PMV BLD AUTO: 9.3 FL (ref 6–12)
POTASSIUM SERPL-SCNC: 3.7 MMOL/L (ref 3.5–5.2)
RBC # BLD AUTO: 4.91 10*6/MM3 (ref 4.14–5.8)
SODIUM SERPL-SCNC: 141 MMOL/L (ref 136–145)
TRIGL SERPL-MCNC: 383 MG/DL (ref 0–150)
VLDLC SERPL-MCNC: 54 MG/DL (ref 5–40)
WBC NRBC COR # BLD AUTO: 7.34 10*3/MM3 (ref 3.4–10.8)

## 2024-11-06 PROCEDURE — 85027 COMPLETE CBC AUTOMATED: CPT | Performed by: INTERNAL MEDICINE

## 2024-11-06 PROCEDURE — 80061 LIPID PANEL: CPT | Performed by: INTERNAL MEDICINE

## 2024-11-06 PROCEDURE — 83036 HEMOGLOBIN GLYCOSYLATED A1C: CPT | Performed by: INTERNAL MEDICINE

## 2024-11-06 PROCEDURE — 99214 OFFICE O/P EST MOD 30 MIN: CPT | Performed by: INTERNAL MEDICINE

## 2024-11-06 PROCEDURE — 80048 BASIC METABOLIC PNL TOTAL CA: CPT | Performed by: INTERNAL MEDICINE

## 2024-11-06 RX ORDER — ATORVASTATIN CALCIUM 40 MG/1
40 TABLET, FILM COATED ORAL NIGHTLY
Qty: 90 TABLET | Refills: 3 | Status: SHIPPED | OUTPATIENT
Start: 2024-11-06

## 2024-11-06 RX ORDER — CLOPIDOGREL BISULFATE 75 MG/1
75 TABLET ORAL DAILY
Qty: 90 TABLET | Refills: 1 | Status: SHIPPED | OUTPATIENT
Start: 2024-11-06

## 2024-11-06 RX ORDER — NITROGLYCERIN 0.4 MG/1
0.4 TABLET SUBLINGUAL
Qty: 15 TABLET | Refills: 12 | Status: SHIPPED | OUTPATIENT
Start: 2024-11-06

## 2024-11-06 RX ADMIN — HYDRALAZINE HYDROCHLORIDE 50 MG: 50 TABLET ORAL at 08:56

## 2024-11-06 RX ADMIN — CETIRIZINE HYDROCHLORIDE 10 MG: 10 TABLET, FILM COATED ORAL at 08:56

## 2024-11-06 RX ADMIN — PANTOPRAZOLE SODIUM 40 MG: 40 TABLET, DELAYED RELEASE ORAL at 05:34

## 2024-11-06 RX ADMIN — LOSARTAN POTASSIUM 50 MG: 50 TABLET, FILM COATED ORAL at 08:57

## 2024-11-06 RX ADMIN — ASPIRIN 81 MG: 81 TABLET, COATED ORAL at 08:56

## 2024-11-06 RX ADMIN — CARVEDILOL 3.12 MG: 3.12 TABLET, FILM COATED ORAL at 08:56

## 2024-11-06 RX ADMIN — GABAPENTIN 800 MG: 400 CAPSULE ORAL at 05:34

## 2024-11-06 RX ADMIN — CHLORTHALIDONE 25 MG: 25 TABLET ORAL at 08:57

## 2024-11-06 RX ADMIN — CLOPIDOGREL BISULFATE 75 MG: 75 TABLET ORAL at 08:56

## 2024-11-06 RX ADMIN — ALLOPURINOL 300 MG: 300 TABLET ORAL at 08:56

## 2024-11-06 NOTE — PLAN OF CARE
Problem: Adult Inpatient Plan of Care  Goal: Plan of Care Review  Outcome: Progressing  Flowsheets (Taken 11/6/2024 0404)  Outcome Evaluation: A&OX4. Adlib. VSS. CPAP at night. Underwent heart cath 11/05/2024. RT radial and RT groin site clean, dry, and intact. No c/o chest pain at this time. Safety maintained. Will continue to monitor. SB/SR 49-81 BBB, 1st degree AVB, PVCs.  Goal: Patient-Specific Goal (Individualized)  Outcome: Progressing  Goal: Absence of Hospital-Acquired Illness or Injury  Outcome: Progressing  Intervention: Identify and Manage Fall Risk  Recent Flowsheet Documentation  Taken 11/6/2024 0400 by Mikayla Caba RN  Safety Promotion/Fall Prevention: safety round/check completed  Taken 11/6/2024 0200 by Mikayla Caba RN  Safety Promotion/Fall Prevention: safety round/check completed  Taken 11/6/2024 0000 by Mikayla Caba RN  Safety Promotion/Fall Prevention: safety round/check completed  Taken 11/5/2024 2200 by Mikayla Caba RN  Safety Promotion/Fall Prevention: safety round/check completed  Taken 11/5/2024 2100 by Mikayla Caba RN  Safety Promotion/Fall Prevention: safety round/check completed  Taken 11/5/2024 2000 by Mikayla Caba RN  Safety Promotion/Fall Prevention: safety round/check completed  Intervention: Prevent Skin Injury  Recent Flowsheet Documentation  Taken 11/5/2024 2000 by Mikayla Caba RN  Body Position: position changed independently  Intervention: Prevent and Manage VTE (Venous Thromboembolism) Risk  Recent Flowsheet Documentation  Taken 11/5/2024 2000 by Mikayla Caba RN  VTE Prevention/Management: (see mar) other (see comments)  Intervention: Prevent Infection  Recent Flowsheet Documentation  Taken 11/5/2024 2000 by Mikayla Caba RN  Infection Prevention:   single patient room provided   rest/sleep promoted  Goal: Optimal Comfort and Wellbeing  Outcome: Progressing  Intervention: Provide Person-Centered Care  Recent Flowsheet Documentation  Taken 11/5/2024 2000 by  Mikayla Caba, RN  Trust Relationship/Rapport:   care explained   questions answered   questions encouraged  Goal: Readiness for Transition of Care  Outcome: Progressing   Goal Outcome Evaluation:              Outcome Evaluation: A&OX4. Adlib. VSS. CPAP at night. Underwent heart cath 11/05/2024. RT radial and RT groin site clean, dry, and intact. No c/o chest pain at this time. Safety maintained. Will continue to monitor. SB/SR 49-81 BBB, 1st degree AVB, PVCs.

## 2024-11-06 NOTE — DISCHARGE SUMMARY
[facility] DISCHARGE    Date of Discharge:  11/6/2024    Discharge Diagnosis:   1.  Status post drug-eluting coronary artery stent placement    Presenting Problem/History of Present Illness  74-year-old scheduled for elective diagnostic coronary angiography, after initially having a stress echocardiogram for exertional dyspnea and fatigue that was abnormal, showing stress-induced hypokinesis in the mid anterior apical anterior and anteroseptal walls.  This was followed with a CT coronary angiogram showing hemodynamically significant CT FFR findings in the mid LAD.    Hospital Course  Catheterization did show an eccentric 60 and 70% stenosis in the mid LAD corresponding to the area of abnormality on the CT FFR findings.  This was successfully directly stented with a 2.5 x 18 mm drug-eluting stent, with good result.  Procedure did have to be accomplished via a femoral approach after failed attempts at radial access (despite even using ultrasound to visualize puncture of the vessel on 3 separate occasions, cannot advance the wire much at all beyond the tip of the micropuncture needle, suggesting significant tortuosity of the vessel proximal to the wrist).    Patient tolerated the procedure well and had no complications or other new symptoms overnight.  He has been discharged home with plans for dual antiplatelet therapy for 6 months, with clopidogrel being added to his daily aspirin regimen.  I have referred him to cardiac rehab, which could start as soon as 2 weeks from now.  Groin precautions were reviewed, which need to remain in place for 1 week.    Procedures Performed  Procedure(s):  Coronary angiography  Percutaneous Coronary Intervention  11/05 1632 Note By: Cassius Mosqueda MD    Consults:   Consults       No orders found for last 30 day(s).            Pertinent Test Results:   Lab Results   Component Value Date    GLUCOSE 151 (H) 11/06/2024    BUN 22 11/06/2024    CREATININE 1.03  11/06/2024     11/06/2024    K 3.7 11/06/2024     11/06/2024    CALCIUM 8.8 11/06/2024    PROTEINTOT 6.3 02/02/2019    ALBUMIN 4.3 04/17/2024    ALT 17 04/17/2024    AST 23 04/17/2024    ALKPHOS 74 04/17/2024    BILITOT 0.6 04/17/2024    GLOB 2.4 02/02/2019    AGRATIO 1.6 02/02/2019    BCR 21.4 11/06/2024    ANIONGAP 10.0 11/06/2024    EGFR 75.3 11/06/2024       See cath report for full details    Condition on Discharge: Stable    Physical Exam at Discharge    Vital Signs  Temp:  [97.1 °F (36.2 °C)-98.1 °F (36.7 °C)] 98 °F (36.7 °C)  Heart Rate:  [54-91] 60  Resp:  [11-18] 16  BP: (141-171)/(57-94) 162/94    Physical Exam:  Vitals and nursing note reviewed.   Constitutional:       General: Not in acute distress.     Appearance: Not in distress.   Neck:      Vascular: No JVD or JVR. JVD normal.   Pulmonary:      Effort: Pulmonary effort is normal.      Breath sounds: Normal breath sounds.   Cardiovascular:      Normal rate. Regular rhythm.      Murmurs: There is no murmur.      No gallop.  No rub.   Pulses:     Intact distal pulses.   Edema:     Peripheral edema absent.   Skin:     General: Skin is warm and dry.   Neurological:      Mental Status: Alert, oriented to person, place, and time and oriented to person, place and time.         Discharge Disposition  Home or Self Care    Discharge Medications     Discharge Medications        New Medications        Instructions Start Date   atorvastatin 40 MG tablet  Commonly known as: LIPITOR   40 mg, Oral, Nightly      clopidogrel 75 MG tablet  Commonly known as: PLAVIX   75 mg, Oral, Daily      nitroglycerin 0.4 MG SL tablet  Commonly known as: NITROSTAT   0.4 mg, Sublingual, Every 5 Minutes PRN, Take no more than 3 doses in 15 minutes.             Continue These Medications        Instructions Start Date   allopurinol 300 MG tablet  Commonly known as: ZYLOPRIM   300 mg, Daily      ASPIRIN 81 PO   Take  by mouth.      B-12 PO   Take  by mouth.      carvedilol  3.125 MG tablet  Commonly known as: Coreg   3.125 mg, Oral, 2 Times Daily With Meals      cetirizine 10 MG tablet  Commonly known as: zyrTEC   10 mg, Oral, Daily      chlorthalidone 25 MG tablet  Commonly known as: HYGROTON   25 mg, Oral, Daily      cyanocobalamin 1000 MCG tablet  Commonly known as: VITAMIN B-12   1,000 mcg, Daily      D3 ADULT PO   Take  by mouth.      fluticasone 50 MCG/ACT nasal spray  Commonly known as: FLONASE   1 spray, Nasal, 2 Times Daily PRN      gabapentin 400 MG capsule  Commonly known as: NEURONTIN   800 mg, Every 8 Hours Scheduled      glipizide 5 MG ER tablet  Commonly known as: GLUCOTROL XL   5 mg, Oral, Daily      hydrALAZINE 50 MG tablet  Commonly known as: APRESOLINE   50 mg, Oral, 3 Times Daily      losartan 50 MG tablet  Commonly known as: COZAAR   50 mg, Oral, 2 Times Daily      MAGNESIUM PO   Take  by mouth.      Nystatin powder   As Needed      omeprazole 20 MG capsule  Commonly known as: priLOSEC   20 mg, 2 Times Daily      Ozempic (0.25 or 0.5 MG/DOSE) 2 MG/3ML solution pen-injector  Generic drug: Semaglutide(0.25 or 0.5MG/DOS)   Inject 0.25 mg under the skin into the appropriate area as directed 1 (One) Time Per Week for 28 days, THEN 0.5 mg 1 (One) Time Per Week for 28 days.   Start Date: October 17, 2024     TiZANidine 4 MG capsule  Commonly known as: ZANAFLEX   4 mg, Oral, 3 Times Daily PRN      VITAMIN C PO   Take  by mouth.      ZINC PO   Take  by mouth.               Discharge Diet:  heart healthy    Activity at Discharge:     No lifting greater than 5 pounds for 7 days (this is the weight of a gallon of milk)  No heavy or strenuous pushing or pulling.  No tub baths, hot tubs, swimming pools, or submerging groin underwater for 1 week.  Wash groin site daily with antibacterial soap and water. Rinse and keep clean and dry.  No lotions, powders, or topical ointments to site. Keep open to air and dry.  Call our office with any concerns or if you notice redness, swelling,  drainage, warmth, or pain at the site.      Follow-up Appointments  Future Appointments   Date Time Provider Department Center   11/11/2024  9:45 AM Daquan Lion MD [facility]   12/4/2024  1:00 PM Marisabel Armijo APRN [facility]   12/17/2024  1:30 PM Daquan Lion MD [facility]   4/16/2025  8:00 AM [facility] [place] [facility]   4/23/2025  9:00 AM Daquan Lion MD [facility]     Additional Instructions for the Follow-ups that You Need to Schedule       Ambulatory Referral to Cardiac Rehab   As directed              Test Results Pending at Discharge: none    23 minutes spent on day of discharge       Cassius Mosqueda MD  11/06/24  10:41 CST               Statement Selected

## 2024-11-06 NOTE — PLAN OF CARE
Goal Outcome Evaluation:  Plan of Care Reviewed With: patient        Progress: improving  Outcome Evaluation: Patient admitted from [facility] post heart cath and stent to LAD. Right radial and right groin dressings c/d/i, ppp. Bedrest. No c/o pain. Sats WNL on 2 liters, CPAP qhs. Wife at bedside.

## 2024-11-06 NOTE — DISCHARGE INSTRUCTIONS
Dr Mosqueda' post-cath groin instructions:    No lifting greater than 5 pounds for 7 days (this is the weight of a gallon of milk)  No heavy or strenuous pushing or pulling.  No tub baths, hot tubs, swimming pools, or submerging groin underwater for 1 week.  Wash groin site daily with antibacterial soap and water. Rinse and keep clean and dry.  No lotions, powders, or topical ointments to site. Keep open to air and dry.  Call our office with any concerns or if you notice redness, swelling, drainage, warmth, or pain at the site.    PLEASE STOP OTHER CHOLESTEROL MEDICATION. TAKE ONLY THE ATORVASTATIN PRESCRIBED AT DISCHARGE.

## 2024-11-11 ENCOUNTER — OFFICE VISIT (OUTPATIENT)
Dept: INTERNAL MEDICINE | Facility: CLINIC | Age: 74
End: 2024-11-11
Payer: MEDICARE

## 2024-11-11 VITALS
DIASTOLIC BLOOD PRESSURE: 68 MMHG | OXYGEN SATURATION: 97 % | BODY MASS INDEX: 34.36 KG/M2 | HEIGHT: 70 IN | WEIGHT: 240 LBS | SYSTOLIC BLOOD PRESSURE: 146 MMHG | HEART RATE: 69 BPM | TEMPERATURE: 98.4 F

## 2024-11-11 DIAGNOSIS — E11.65 TYPE 2 DIABETES MELLITUS WITH HYPERGLYCEMIA, WITHOUT LONG-TERM CURRENT USE OF INSULIN: ICD-10-CM

## 2024-11-11 DIAGNOSIS — E66.811 CLASS 1 OBESITY DUE TO EXCESS CALORIES WITH SERIOUS COMORBIDITY AND BODY MASS INDEX (BMI) OF 34.0 TO 34.9 IN ADULT: ICD-10-CM

## 2024-11-11 DIAGNOSIS — I87.2 VENOUS INSUFFICIENCY: ICD-10-CM

## 2024-11-11 DIAGNOSIS — I10 ESSENTIAL HYPERTENSION: Primary | ICD-10-CM

## 2024-11-11 DIAGNOSIS — E66.09 CLASS 1 OBESITY DUE TO EXCESS CALORIES WITH SERIOUS COMORBIDITY AND BODY MASS INDEX (BMI) OF 34.0 TO 34.9 IN ADULT: ICD-10-CM

## 2024-11-11 DIAGNOSIS — S49.91XD INJURY OF RIGHT SHOULDER, SUBSEQUENT ENCOUNTER: ICD-10-CM

## 2024-11-11 DIAGNOSIS — N18.2 STAGE 2 CHRONIC KIDNEY DISEASE: ICD-10-CM

## 2024-11-11 DIAGNOSIS — Z95.5 S/P DRUG ELUTING CORONARY STENT PLACEMENT: ICD-10-CM

## 2024-11-11 PROBLEM — R93.89 ABNORMAL FINDINGS ON DIAGNOSTIC IMAGING OF CARDIOVASCULAR SYSTEM: Status: RESOLVED | Noted: 2024-11-04 | Resolved: 2024-11-11

## 2024-11-11 PROBLEM — R94.39 ABNORMAL STRESS TEST: Status: RESOLVED | Noted: 2024-11-04 | Resolved: 2024-11-11

## 2024-11-11 PROBLEM — E66.812 CLASS 2 SEVERE OBESITY DUE TO EXCESS CALORIES WITH SERIOUS COMORBIDITY AND BODY MASS INDEX (BMI) OF 36.0 TO 36.9 IN ADULT: Status: RESOLVED | Noted: 2023-12-20 | Resolved: 2024-11-11

## 2024-11-11 PROBLEM — E66.01 CLASS 2 SEVERE OBESITY DUE TO EXCESS CALORIES WITH SERIOUS COMORBIDITY AND BODY MASS INDEX (BMI) OF 36.0 TO 36.9 IN ADULT: Status: RESOLVED | Noted: 2023-12-20 | Resolved: 2024-11-11

## 2024-11-11 PROCEDURE — 3077F SYST BP >= 140 MM HG: CPT | Performed by: INTERNAL MEDICINE

## 2024-11-11 PROCEDURE — 99214 OFFICE O/P EST MOD 30 MIN: CPT | Performed by: INTERNAL MEDICINE

## 2024-11-11 PROCEDURE — G2211 COMPLEX E/M VISIT ADD ON: HCPCS | Performed by: INTERNAL MEDICINE

## 2024-11-11 PROCEDURE — 3078F DIAST BP <80 MM HG: CPT | Performed by: INTERNAL MEDICINE

## 2024-11-11 PROCEDURE — 1126F AMNT PAIN NOTED NONE PRSNT: CPT | Performed by: INTERNAL MEDICINE

## 2024-11-11 NOTE — PROGRESS NOTES
"      Chief Complaint  Hospital Follow Up Visit (Stent placement /D/c on  11/6/24/), Hypertension (BP has been running lower since getting stent and feels bad when it gets lower), discuss if flu and covid shots are ok to have , and discuss ozempic    Subjective        Chung Bradley presents to [facility]    HPI    Patient here for the above problems.  See Assessment and Plan for further HPI components.      Not a TCM visit as no one made contact within 48 hours of discharge from the hospital.     Review of Systems    Objective   Vital Signs:  /68 (BP Location: Left arm, Patient Position: Sitting, Cuff Size: Adult)   Pulse 69   Temp 98.4 °F (36.9 °C) (Temporal)   Ht 177.8 cm (70\")   Wt 109 kg (240 lb)   SpO2 97%   BMI 34.44 kg/m²   Estimated body mass index is 34.44 kg/m² as calculated from the following:    Height as of this encounter: 177.8 cm (70\").    Weight as of this encounter: 109 kg (240 lb).      Physical Exam  Vitals and nursing note reviewed.   Constitutional:       Appearance: He is not ill-appearing.   Eyes:      General: No scleral icterus.     Conjunctiva/sclera: Conjunctivae normal.   Pulmonary:      Effort: Pulmonary effort is normal. No respiratory distress.   Neurological:      General: No focal deficit present.      Mental Status: He is alert and oriented to person, place, and time.   Psychiatric:         Mood and Affect: Mood normal.         Behavior: Behavior normal.                       Assessment and Plan   Diagnoses and all orders for this visit:    1. Essential hypertension (Primary)    2. S/P drug eluting coronary stent placement    3. Venous insufficiency    4. Class 1 obesity due to excess calories with serious comorbidity and body mass index (BMI) of 34.0 to 34.9 in adult    5. Type 2 diabetes mellitus with hyperglycemia, without long-term current use of insulin    6. Stage 2 chronic kidney disease    7. Injury of right shoulder, subsequent " encounter        Patient had heart cath with Dr. Mosqueda.  patient was found to have single-vessel coronary artery disease with severe focal stenosis of the mid LAD corresponding to the area of abnormality on CT FFR.  FFR was 0.79.  Patient had successful PCI with a DC.  Patient had normal LVEF and normal EDP.  Patient is to continue aspirin indefinitely and is going to be on Plavix per the note without interruption for 6 months.  Patient has been having some right shoulder issue and he is concerned that he has a right rotator cuff tear.  Discussed with him that he likely needs to withhold any surgical intervention if this is the case for 6 months as he does not necessarily need to have surgical intervention while on dual antiplatelet therapy and he cannot hold his aspirin even after the 6 months.    History of Present Illness  The patient presents for a hospital follow-up visit. He is accompanied by an adult female.    He reports a 70% blockage in one of his arteries, which was treated with a stent. He mentions that he had difficulty with the placement of his stent, which was initially attempted through his arm but ultimately placed in his groin. He experienced some bleeding from the site, which required a change in his urostomy bag. He has been taking aspirin regularly and has been advised not to miss a dose. He is currently on Plavix and atorvastatin.    He has been monitoring his blood pressure, which has been slightly lower than usual. He mentions that he has been eating dinner late, around 9:30 PM or 10:00 PM, and has noticed a decrease in his blood pressure to 116 systolic before meals. He has been adjusting his medication accordingly, skipping his blood pressure pill at dinner when his blood pressure is low. However, he notes that his blood pressure increased to 132 systolic the following morning after taking his medication at night. He also mentions that his blood pressure was 166 systolic this morning. He  usually takes his first dose of medication between 6:00 AM and 8:00 AM. He also reports frequent urination and has been adjusting his meal times to accommodate his medication schedule.    He has not yet started taking Ozempic and is planning to start it after a week. He also mentions that he is running low on glipizide and is considering getting a refill. He is also interested in getting the influenza and COVID-19 vaccines.    He is having shoulder surgery with Dr. To. He thinks he tore his rotator cuff. He plans to have x-rays taken and then proceed with the necessary treatment. He is uncertain about the timeline for this surgery, as they want to wait for 6 months.      Assessment & Plan  1. Post-stent placement follow-up.  His blood pressure readings have been variable, ranging from 116 to 166 mmHg. He was advised not to make any significant changes to his blood pressure medication regimen as the current readings are generally satisfactory. He was instructed to continue monitoring his blood pressure and report any significant changes. The importance of maintaining aspirin therapy post-stent placement was emphasized, and he was informed that Plavix should not be interrupted for at least 6 months. He was also advised to hold Ozempic for a week if shoulder surgery is scheduled.    2. Diabetes Mellitus.  His A1c has increased slightly from 6.1 to 6.3. He was advised to continue his current diabetes management regimen. He was reminded to ensure his glipizide prescription is for the XL version to avoid rapid drops in blood sugar levels. He was also informed that he can start Ozempic after his upcoming trip and should hold it if he undergoes shoulder surgery.    3. Hyperlipidemia.  The rationale for switching from lovstatin to atorvastatin was discussed, emphasizing the need for a high-intensity statin due to his cardiovascular disease. He was advised to continue atorvastatin as prescribed.    4. Health  Maintenance.  Influenza and COVID-19 vaccines were recommended. He was advised to get both vaccines but to receive them in different arms to monitor for any injection site reactions.      5. Right shoulder pain   Possible rotator cuff injury.  Upcoming visit with OI.    6.  Hypertension  Patient BP a little lower after stent placement.  But overall trend is better compared to his normal.  Continue current regimen.      Result Review :  The following data was reviewed by: Daquan Lion MD on 11/11/2024:   Latest Reference Range & Units 11/05/24 12:47 11/06/24 04:28   Sodium 136 - 145 mmol/L 142 141   Potassium 3.5 - 5.2 mmol/L 4.2 3.7   Chloride 98 - 107 mmol/L 106 104   CO2 22.0 - 29.0 mmol/L 25.0 27.0   Anion Gap 5.0 - 15.0 mmol/L 11.0 10.0   BUN 8 - 23 mg/dL 25 (H) 22   Creatinine 0.76 - 1.27 mg/dL 0.98 1.03   BUN/Creatinine Ratio 7.0 - 25.0  25.5 (H) 21.4   eGFR >60.0 mL/min/1.73 79.9 75.3   Glucose 65 - 99 mg/dL 102 (H) 151 (H)   Calcium 8.6 - 10.5 mg/dL 9.4 8.8   Hemoglobin A1C 4.80 - 5.60 %  6.30 (H)   Total Cholesterol 0 - 200 mg/dL  98   HDL Cholesterol 40 - 60 mg/dL  23 (L)   LDL Cholesterol  0 - 100 mg/dL  21   VLDL Cholesterol 5 - 40 mg/dL  54 (H)   Triglycerides 0 - 150 mg/dL  383 (H)   LDL/HDL Ratio   Unable to Calculate   WBC 3.40 - 10.80 10*3/mm3 6.67 7.34   RBC 4.14 - 5.80 10*6/mm3 5.03 4.91   Hemoglobin 13.0 - 17.7 g/dL 13.8 13.5   Hematocrit 37.5 - 51.0 % 44.3 43.5   Platelets 140 - 450 10*3/mm3 145 141   RDW 12.3 - 15.4 % 15.9 (H) 16.1 (H)   MCV 79.0 - 97.0 fL 88.1 88.6   MCH 26.6 - 33.0 pg 27.4 27.5   MCHC 31.5 - 35.7 g/dL 31.2 (L) 31.0 (L)   MPV 6.0 - 12.0 fL 9.2 9.3   RDW-SD 37.0 - 54.0 fl 51.5 52.2   (H): Data is abnormally high  (L): Data is abnormally low    Reviewed heart cath as well                              Follow Up   No follow-ups on file.  Patient was given instructions and counseling regarding his condition or for health maintenance advice. Please see specific information  pulled into the AVS if appropriate.       RAY Lion MD, FACP, FHM      Electronically signed by Daquan Lion MD, 11/11/24, 5:15 PM CST.    Patient or patient representative verbalized consent for the use of Ambient Listening during the visit with  Daquan Lion MD for chart documentation. 11/11/2024  17:20 CST

## 2024-11-14 LAB
QT INTERVAL: 428 MS
QTC INTERVAL: 455 MS

## 2024-11-26 LAB
QT INTERVAL: 428 MS
QTC INTERVAL: 455 MS

## 2024-12-04 ENCOUNTER — OFFICE VISIT (OUTPATIENT)
Dept: CARDIOLOGY | Facility: CLINIC | Age: 74
End: 2024-12-04
Payer: MEDICARE

## 2024-12-04 VITALS
HEIGHT: 70 IN | BODY MASS INDEX: 34.22 KG/M2 | DIASTOLIC BLOOD PRESSURE: 78 MMHG | HEART RATE: 74 BPM | SYSTOLIC BLOOD PRESSURE: 132 MMHG | WEIGHT: 239 LBS | OXYGEN SATURATION: 95 %

## 2024-12-04 DIAGNOSIS — I25.10 CORONARY ARTERY DISEASE INVOLVING NATIVE CORONARY ARTERY OF NATIVE HEART WITHOUT ANGINA PECTORIS: Primary | ICD-10-CM

## 2024-12-04 DIAGNOSIS — E11.65 TYPE 2 DIABETES MELLITUS WITH HYPERGLYCEMIA, WITHOUT LONG-TERM CURRENT USE OF INSULIN: ICD-10-CM

## 2024-12-04 DIAGNOSIS — I10 ESSENTIAL HYPERTENSION: ICD-10-CM

## 2024-12-04 NOTE — PROGRESS NOTES
"    Subjective:     Encounter Date:12/04/2024      Patient ID: Chung Bradley is a 74 y.o. male.    Chief Complaint: follow up CAD s/p PCI  History of Present Illness    The patient establish care with Dr. Mosqueda for exertional dyspnea and fatigue.  He had an abnormal stress echocardiogram followed by CTA of the coronary arteries showing hemodynamically significant CT-FFR findings of the mid LAD.  Therefore he underwent cardiac catheterization in early November showing 60 to 70% stenosis in the mid LAD.  This was successfully treated with 1 drug-eluting stent.  He was noted to have a normal LVEF and LVEDP.  He was discharged home in stable condition on dual antiplatelet therapy with aspirin and Plavix as well as other guideline directed medical therapy.    Today the patient reports he is feeling well.  He states his energy level might be mildly improved after stent placement.  He states \"I have a little bit more juice.\"  He is otherwise without complaint.  He denies any chest discomfort, shortness of breath or palpitations.  He states his blood pressure was \"all over the place\"  for a couple of weeks after PCI but this has since normalized.  He is having some orthopedic issues and may need surgery.  He understands these will need to wait until he can hold Plavix after early May.    The following portions of the patient's history were reviewed and updated as appropriate: allergies, current medications, past family history, past medical history, past social history, past surgical history and problem list.    Review of Systems   Constitutional: Positive for malaise/fatigue.   Cardiovascular:  Negative for chest pain, claudication, dyspnea on exertion, leg swelling, near-syncope, orthopnea, palpitations, paroxysmal nocturnal dyspnea and syncope.   Respiratory:  Negative for cough and shortness of breath.    Hematologic/Lymphatic: Does not bruise/bleed easily.   Musculoskeletal:  Negative for falls.   Gastrointestinal:  " Negative for bloating.   Neurological:  Negative for dizziness, light-headedness and weakness.       Allergies   Allergen Reactions    Penicillins Rash     POSSIBLY NOT THE CAUSE       Current Outpatient Medications:     allopurinol (ZYLOPRIM) 300 MG tablet, Take 1 tablet by mouth Daily., Disp: , Rfl:     Ascorbic Acid (VITAMIN C PO), Take  by mouth., Disp: , Rfl:     ASPIRIN 81 PO, Take  by mouth., Disp: , Rfl:     atorvastatin (LIPITOR) 40 MG tablet, Take 1 tablet by mouth Every Night., Disp: 90 tablet, Rfl: 3    carvedilol (Coreg) 3.125 MG tablet, Take 1 tablet by mouth 2 (Two) Times a Day With Meals., Disp: 180 tablet, Rfl: 3    cetirizine (zyrTEC) 10 MG tablet, Take 1 tablet by mouth Daily., Disp: 30 tablet, Rfl: 1    chlorthalidone (HYGROTON) 25 MG tablet, Take 1 tablet by mouth Daily., Disp: 90 tablet, Rfl: 3    Cholecalciferol (D3 ADULT PO), Take  by mouth., Disp: , Rfl:     clopidogrel (PLAVIX) 75 MG tablet, Take 1 tablet by mouth Daily., Disp: 90 tablet, Rfl: 1    Cyanocobalamin (B-12 PO), Take  by mouth., Disp: , Rfl:     cyanocobalamin (VITAMIN B-12) 1000 MCG tablet, Take 1 tablet by mouth Daily., Disp: , Rfl:     fluticasone (FLONASE) 50 MCG/ACT nasal spray, 1 spray into the nostril(s) as directed by provider 2 (Two) Times a Day As Needed for Allergies., Disp: 1 bottle, Rfl: 3    gabapentin (NEURONTIN) 400 MG capsule, Take 2 capsules by mouth Every 8 (Eight) Hours. Take 2 capsules by mouth in the am, two capsules by mouth in the afternoon, and 2 capsules by mouth in the pm.   Patient is taking 2 capsules in the AM, 2 capsules in the afternoon, 1 capsule in the PM, Disp: , Rfl:     glipizide (GLUCOTROL XL) 5 MG ER tablet, Take 1 tablet by mouth Daily., Disp: 90 tablet, Rfl: 3    hydrALAZINE (APRESOLINE) 50 MG tablet, Take 1 tablet by mouth 3 (Three) Times a Day., Disp: 270 tablet, Rfl: 1    losartan (COZAAR) 50 MG tablet, Take 1 tablet by mouth 2 (Two) Times a Day., Disp: 180 tablet, Rfl: 3    MAGNESIUM  "PO, Take  by mouth., Disp: , Rfl:     Multiple Vitamins-Minerals (ZINC PO), Take  by mouth., Disp: , Rfl:     nitroglycerin (NITROSTAT) 0.4 MG SL tablet, Place 1 tablet under the tongue Every 5 (Five) Minutes As Needed for Chest Pain (Systolic BP Greater Than 100). Take no more than 3 doses in 15 minutes., Disp: 15 tablet, Rfl: 12    Nystatin powder, Apply  topically to the appropriate area as directed As Needed., Disp: , Rfl:     omeprazole (priLOSEC) 20 MG capsule, Take 1 capsule by mouth 2 (Two) Times a Day., Disp: , Rfl:     Semaglutide,0.25 or 0.5MG/DOS, (Ozempic, 0.25 or 0.5 MG/DOSE,) 2 MG/3ML solution pen-injector, Inject 0.25 mg under the skin into the appropriate area as directed 1 (One) Time Per Week for 28 days, THEN 0.5 mg 1 (One) Time Per Week for 28 days., Disp: 3 mL, Rfl: 0    TiZANidine (ZANAFLEX) 4 MG capsule, Take 1 capsule by mouth 3 (Three) Times a Day As Needed for Muscle Spasms., Disp: 30 capsule, Rfl: 0         Objective:    /78   Pulse 74   Ht 177.8 cm (70\")   Wt 108 kg (239 lb)   SpO2 95%   BMI 34.29 kg/m²        Vitals and nursing note reviewed.   Constitutional:       General: Not in acute distress.     Appearance: Well-developed and not in distress. Not diaphoretic.   Neck:      Vascular: No JVD.   Pulmonary:      Effort: Pulmonary effort is normal. No respiratory distress.      Breath sounds: Normal breath sounds.   Cardiovascular:      Normal rate. Regular rhythm.      Murmurs: There is no murmur.   Edema:     Peripheral edema absent.   Abdominal:      Tenderness: There is no abdominal tenderness.   Skin:     General: Skin is warm and dry.   Neurological:      Mental Status: Alert and oriented to person, place, and time.         Lab Review:   Lab Results   Component Value Date    CHOL 98 11/06/2024    CHLPL 149 04/17/2024    TRIG 383 (H) 11/06/2024    HDL 23 (L) 11/06/2024    LDL 21 11/06/2024        Lab Results   Component Value Date    HGBA1C 6.30 (H) 11/06/2024        Lab " Results   Component Value Date    GLUCOSE 151 (H) 11/06/2024    BUN 22 11/06/2024    CREATININE 1.03 11/06/2024     11/06/2024    K 3.7 11/06/2024     11/06/2024    CALCIUM 8.8 11/06/2024    PROTEINTOT 6.3 02/02/2019    ALBUMIN 4.3 04/17/2024    ALT 17 04/17/2024    AST 23 04/17/2024    ALKPHOS 74 04/17/2024    BILITOT 0.6 04/17/2024    GLOB 2.4 02/02/2019    AGRATIO 1.6 02/02/2019    BCR 21.4 11/06/2024    ANIONGAP 10.0 11/06/2024    EGFR 75.3 11/06/2024              ECG 12 Lead    Date/Time: 12/4/2024 2:24 PM  Performed by: Marisabel Armijo APRN    Authorized by: Marisabel Armijo APRN  Rhythm: sinus rhythm  Ectopy: atrial premature contractions and infrequent PVCs  BPM: 74  Conduction: non-specific intraventricular conduction delay    Clinical impression: abnormal EKG          Results for orders placed during the hospital encounter of 09/26/24    Adult Stress Echo W/ Cont or Stress Agent if Necessary Per Protocol    Interpretation Summary    Poor functional capacity, patient was changed to dobutamine due to inability to reach target heart rate with exercise.    Clinically and electrically negative.    Left ventricular systolic function is normal at rest.  There is stress-induced hypokinesis of the mid anterior, apical anterior and mid anteroseptal walls.    Abnormal stress echo consistent with an intermediate risk study for myocardial ischemia.    11/2024 cath report:  Impression:  1. Single vessel coronary artery disease, with severe focal stenosis of the mid LAD corresponding with area of abnormality on CT FFR (0.79)  2. Successful percutaneous coronary intervention the mid-LAD using a 2.5x18 Xience drug-eluting stent.  3. Normal LVEF with normal EDP     Plan:   1. 2 hours bedrest  2. Continue aspirin 81mg daily indefinitely  3. Will transition to clopidogrel tonight with a 300 mg p.o. dose x 1, and start 75 mg daily tomorrow. Will plan total duration of dual antiplatelet therapy, without interruption,  to be 6 months.   4. Check lipid panel in the a.m. and change lovastatin to high intensity statin, regardless of lipid results   5. Cardiac rehab referral.  6. Follow-up with me in 4 weeks, and PCP in 1-2 weeks.    Cassius Mosqueda MD     Assessment:      Problem List Items Addressed This Visit          Cardiac and Vasculature    Essential hypertension    Coronary artery disease involving native coronary artery of native heart without angina pectoris - Primary       Endocrine and Metabolic    Type 2 diabetes mellitus with hyperglycemia, without long-term current use of insulin       Plan:     1.  Coronary artery disease: Established problem, stable.  No angina.  He had mild improvement in his energy level after LAD PCI in November.    -Continue aspirin 81 mg daily indefinitely without interruption  -Continue Plavix 75 mg daily without interruption through early May 2025  -Continue beta-blocker, high intensity statin  -Not requiring as needed sublingual nitroglycerin    2.  Hypertension: Well-controlled.  Continue current medical therapy and continue to follow with PCP.    3.  Hyperlipidemia: Well-controlled with an LDL of 21.  Continue high intensity statin.    Follow-up with Dr. Mosqueda in 6 months, call sooner with symptoms or concerns.

## 2024-12-05 NOTE — TELEPHONE ENCOUNTER
Caller: Eloina Bradley    Relationship: Emergency Contact    Best call back number:  [phone]      Requested Prescriptions:   Requested Prescriptions     Pending Prescriptions Disp Refills   • cloNIDine (CATAPRES) 0.1 MG tablet 90 tablet 0     Sig: Take 1 tablet by mouth Every Night.        Pharmacy where request should be sent: [facility] [address] [place] [address] [phone] [facility] [phone]      Additional details provided by patient: has 4 left. Would like to  tomorrow.     Does the patient have less than a 3 day supply:  [] Yes  [] No    Kevin Saunders   07/14/22 15:52 CDT          Please see below and advise.

## 2024-12-11 ENCOUNTER — OFFICE VISIT (OUTPATIENT)
Age: 74
End: 2024-12-11

## 2024-12-11 VITALS — WEIGHT: 232 LBS | BODY MASS INDEX: 33.21 KG/M2 | HEIGHT: 70 IN

## 2024-12-11 DIAGNOSIS — M17.12 PRIMARY OSTEOARTHRITIS OF LEFT KNEE: Primary | ICD-10-CM

## 2024-12-11 RX ORDER — TRIAMCINOLONE ACETONIDE 40 MG/ML
40 INJECTION, SUSPENSION INTRA-ARTICULAR; INTRAMUSCULAR ONCE
Status: COMPLETED | OUTPATIENT
Start: 2024-12-11 | End: 2024-12-11

## 2024-12-11 RX ORDER — BUPIVACAINE HYDROCHLORIDE 5 MG/ML
4 INJECTION, SOLUTION PERINEURAL ONCE
Status: COMPLETED | OUTPATIENT
Start: 2024-12-11 | End: 2024-12-11

## 2024-12-11 RX ADMIN — BUPIVACAINE HYDROCHLORIDE 20 MG: 5 INJECTION, SOLUTION PERINEURAL at 13:35

## 2024-12-11 RX ADMIN — TRIAMCINOLONE ACETONIDE 40 MG: 40 INJECTION, SUSPENSION INTRA-ARTICULAR; INTRAMUSCULAR at 13:35

## 2024-12-11 ASSESSMENT — ENCOUNTER SYMPTOMS
GASTROINTESTINAL NEGATIVE: 1
EYES NEGATIVE: 1
ALLERGIC/IMMUNOLOGIC NEGATIVE: 1
RESPIRATORY NEGATIVE: 1

## 2024-12-11 NOTE — PROGRESS NOTES
Ang Villegas (:  1950) is a 74 y.o. male,Established patient, here for evaluation of the following chief complaint(s):  Follow-up         Assessment & Plan  Primary osteoarthritis of left knee     In light of the patient's recent stent placement and current Plavix treatment, he recognizes that he is not a feasible candidate to consider moving forward with total knee replacement at this time.  As a result, in an effort to provide him meaningful relief of his symptoms currently, we discussed repeat corticosteroid injection therapy which I think is reasonable.  The risks and benefits of the procedure were explained to the patient including the risk for infection, nerve and artery damage, continued pain, continued decreased range of motion, paresthesias, paralysis, loss of limb, loss of life and the need for surgery, and the patient conveyed their understanding and willingness to proceed.  Under aseptic technique, the left knee was injected using a 21-gauge needle with a solution of 40 mg / 1 cc of Kenalog corticosteroid and 4 cc of half percent bupivacaine local anesthetic.  The patient tolerated the procedure very well.  It is explained to the patient that this injection may be repeated as frequently as every 3 months or less frequently if their symptoms will allow.  They convey their understanding.  Orders:    BUPivacaine (MARCAINE) 0.5 % injection 20 mg    triamcinolone acetonide (KENALOG-40) injection 40 mg    DRAIN/INJECT LARGE JOINT/BURSA  PLAN:  I will see the patient back in 3 months to evaluate his progress.    Return in about 3 months (around 3/11/2025).       Subjective   The patient is a pleasant 74-year-old gentleman who I have seen for multiple issues as well as chronic and recurrent left knee pain stemming from advancing tricompartmental osteoarthritis.  Ultimately he had plan to move forward with left total knee replacement, but based on his cardiac history, he was deemed an unreasonable

## 2024-12-11 NOTE — ASSESSMENT & PLAN NOTE
In light of the patient's recent stent placement and current Plavix treatment, he recognizes that he is not a feasible candidate to consider moving forward with total knee replacement at this time.  As a result, in an effort to provide him meaningful relief of his symptoms currently, we discussed repeat corticosteroid injection therapy which I think is reasonable.  The risks and benefits of the procedure were explained to the patient including the risk for infection, nerve and artery damage, continued pain, continued decreased range of motion, paresthesias, paralysis, loss of limb, loss of life and the need for surgery, and the patient conveyed their understanding and willingness to proceed.  Under aseptic technique, the left knee was injected using a 21-gauge needle with a solution of 40 mg / 1 cc of Kenalog corticosteroid and 4 cc of half percent bupivacaine local anesthetic.  The patient tolerated the procedure very well.  It is explained to the patient that this injection may be repeated as frequently as every 3 months or less frequently if their symptoms will allow.  They convey their understanding.  Orders:    BUPivacaine (MARCAINE) 0.5 % injection 20 mg    triamcinolone acetonide (KENALOG-40) injection 40 mg    DRAIN/INJECT LARGE JOINT/BURSA  PLAN:  I will see the patient back in 3 months to evaluate his progress.

## 2024-12-17 ENCOUNTER — OFFICE VISIT (OUTPATIENT)
Dept: INTERNAL MEDICINE | Facility: CLINIC | Age: 74
End: 2024-12-17
Payer: MEDICARE

## 2024-12-17 VITALS
OXYGEN SATURATION: 95 % | SYSTOLIC BLOOD PRESSURE: 126 MMHG | WEIGHT: 235 LBS | HEIGHT: 70 IN | DIASTOLIC BLOOD PRESSURE: 72 MMHG | TEMPERATURE: 98.3 F | HEART RATE: 72 BPM | BODY MASS INDEX: 33.64 KG/M2

## 2024-12-17 DIAGNOSIS — E66.811 CLASS 1 OBESITY DUE TO EXCESS CALORIES WITH SERIOUS COMORBIDITY AND BODY MASS INDEX (BMI) OF 33.0 TO 33.9 IN ADULT: ICD-10-CM

## 2024-12-17 DIAGNOSIS — I10 ESSENTIAL HYPERTENSION: ICD-10-CM

## 2024-12-17 DIAGNOSIS — I25.10 CORONARY ARTERY DISEASE INVOLVING NATIVE CORONARY ARTERY OF NATIVE HEART WITHOUT ANGINA PECTORIS: ICD-10-CM

## 2024-12-17 DIAGNOSIS — E66.09 CLASS 1 OBESITY DUE TO EXCESS CALORIES WITH SERIOUS COMORBIDITY AND BODY MASS INDEX (BMI) OF 33.0 TO 33.9 IN ADULT: ICD-10-CM

## 2024-12-17 DIAGNOSIS — E11.65 TYPE 2 DIABETES MELLITUS WITH HYPERGLYCEMIA, WITHOUT LONG-TERM CURRENT USE OF INSULIN: Primary | ICD-10-CM

## 2024-12-17 DIAGNOSIS — Z95.5 S/P DRUG ELUTING CORONARY STENT PLACEMENT: ICD-10-CM

## 2024-12-17 PROCEDURE — 3078F DIAST BP <80 MM HG: CPT | Performed by: INTERNAL MEDICINE

## 2024-12-17 PROCEDURE — 1126F AMNT PAIN NOTED NONE PRSNT: CPT | Performed by: INTERNAL MEDICINE

## 2024-12-17 PROCEDURE — 1160F RVW MEDS BY RX/DR IN RCRD: CPT | Performed by: INTERNAL MEDICINE

## 2024-12-17 PROCEDURE — 1159F MED LIST DOCD IN RCRD: CPT | Performed by: INTERNAL MEDICINE

## 2024-12-17 PROCEDURE — 3074F SYST BP LT 130 MM HG: CPT | Performed by: INTERNAL MEDICINE

## 2024-12-17 PROCEDURE — 99214 OFFICE O/P EST MOD 30 MIN: CPT | Performed by: INTERNAL MEDICINE

## 2024-12-17 PROCEDURE — G2211 COMPLEX E/M VISIT ADD ON: HCPCS | Performed by: INTERNAL MEDICINE

## 2024-12-19 NOTE — PROGRESS NOTES
"      Chief Complaint  Weight Check (Doing well on ozempic- had one issue with stomach but that lasted only a day and never bother him again- was same time he had elevated BS ), discuss medication (Wants your opinion on using the mucinex liquid as opposed to the pills ), Diabetes (Had some issue with slightly elevated BS so took a glipizide and that seemed to level it back out ), Hypertension (Has had some issues with BP), and is there a specific rsv vac you like better than the other     Subjective        Chung Bradley presents to [facility]    HPI    Patient here for the above problems.  See Assessment and Plan for further HPI components.      Review of Systems    Objective   Vital Signs:  /72 (BP Location: Left arm, Patient Position: Sitting, Cuff Size: Adult)   Pulse 72   Temp 98.3 °F (36.8 °C) (Temporal)   Ht 177.8 cm (70\")   Wt 107 kg (235 lb)   SpO2 95%   BMI 33.72 kg/m²   Estimated body mass index is 33.72 kg/m² as calculated from the following:    Height as of this encounter: 177.8 cm (70\").    Weight as of this encounter: 107 kg (235 lb).      Physical Exam  Vitals and nursing note reviewed.   Constitutional:       Appearance: He is obese. He is not ill-appearing.   Eyes:      General: No scleral icterus.     Conjunctiva/sclera: Conjunctivae normal.   Pulmonary:      Effort: Pulmonary effort is normal. No respiratory distress.   Neurological:      General: No focal deficit present.      Mental Status: He is alert and oriented to person, place, and time.   Psychiatric:         Mood and Affect: Mood normal.         Behavior: Behavior normal.                       Assessment and Plan   Diagnoses and all orders for this visit:    1. Type 2 diabetes mellitus with hyperglycemia, without long-term current use of insulin (Primary)  -     Semaglutide,0.25 or 0.5MG/DOS, (OZEMPIC) 2 MG/3ML solution pen-injector; Inject 0.5 mg under the skin into the appropriate area as " directed 1 (One) Time Per Week.  Dispense: 3 mL; Refill: 0    2. Class 1 obesity due to excess calories with serious comorbidity and body mass index (BMI) of 33.0 to 33.9 in adult  -     Semaglutide,0.25 or 0.5MG/DOS, (OZEMPIC) 2 MG/3ML solution pen-injector; Inject 0.5 mg under the skin into the appropriate area as directed 1 (One) Time Per Week.  Dispense: 3 mL; Refill: 0    3. Essential hypertension    4. Coronary artery disease involving native coronary artery of native heart without angina pectoris    5. S/P drug eluting coronary stent placement        History of Present Illness  The patient presents for evaluation of diabetes and hypertension.    He has been monitoring his blood glucose levels for the past 2 weeks. He experienced a leg cramp on Thursday, which he attributes to elevated blood glucose levels of 190 in the morning and 248 at night. After taking glipizide, his blood glucose level dropped to 82 in the morning and 99 at night, but it fletcher again to 211 the following night. Over the past 2 to 3 days, his blood glucose level decreased to 217, prompting him to take glipizide. After administering his injection yesterday, his blood glucose level was 145 this morning. He is uncertain if the injection caused the spike in his blood glucose level last Thursday, as it did not have any effect for the rest of the week. He is scheduled to increase his dose to 1.5 on Monday. He has lost approximately 5 pounds and reports that the injection occasionally causes stomach weakness. He has been advised to avoid greasy or fried foods since starting Ozempic.    Three weeks ago, he experienced a significant increase in his blood pressure, reaching 181 systolic. His blood pressure readings were 197/73 before taking his medication and 176 systolic two hours post-medication. He reports no changes in his lifestyle or travel habits during this period. He underwent a heart catheterization and started Ozempic around this time.  His blood pressure has since stabilized, with occasional readings as low as 119 and 116.    MEDICATIONS  Current: glipizide, Ozempic      Assessment & Plan  1. Diabetes Mellitus.  His blood glucose levels have been fluctuating, with recent readings ranging from 82 to 248 mg/dL. He has been using glipizide to manage these fluctuations. It was explained that illness can cause cortisol release, leading to elevated blood glucose levels. He is advised to continue monitoring his blood glucose levels closely and to report any significant changes. He is also encouraged to make lifestyle modifications, such as reducing portion sizes and avoiding high-carbohydrate foods like bread, pasta, and potatoes. He is currently on Ozempic and will increase the dose to 1.5 mg on Monday. He should notify the office after administering the third injection of the current dose so that a prescription for the 1 mg dose can be sent.    2. Hypertension.  His blood pressure has been unstable, with occasional spikes to 181 mmHg. He is advised to continue monitoring his blood pressure 3-5 times a week at different times to identify any trends. He should avoid Mucinex DM due to its potential to elevate blood pressure. If his blood pressure remains elevated for a week, further intervention may be necessary.    3. Obesity    Patient has a BMI > 30.  Obesity increases risks of diseases such as diabetes, coronary artery disease, hypertension, sleep apnea, hyperlipidemia, arthritis, and stroke.  Recommend focusing on portion control and making healthy diet choices.  Avoid fast food.  Avoid calorie beverages including sweet tea, juice, soft drinks, and alcohol.  Recommend increasing your activity and starting a regular exercise program. Can consider utilizing calorie counting apps such as [employer].  Continue lowering intake.  Continue semiglutide, titrate dose up.      4.  CAD s/p DC  Patient had DC placed.  Patient on DAPT.   Monitor BP  Follow-up  with cardiology      PROCEDURE  The patient underwent a heart catheterization and stent placement approximately three weeks ago.        Result Review :                   BMI is >= 30 and <35. (Class 1 Obesity). The following options were offered after discussion;: exercise counseling/recommendations and nutrition counseling/recommendations            Follow Up   Return in about 3 months (around 3/17/2025) for follow up for above problems. Longitudinal care..  Patient was given instructions and counseling regarding his condition or for health maintenance advice. Please see specific information pulled into the AVS if appropriate.       RAY Lion MD, FACP, [facility]      Electronically signed by Daquan Lion MD, 12/19/24, 11:38 AM CST.    Patient or patient representative verbalized consent for the use of Ambient Listening during the visit with  Daquan Lion MD for chart documentation. 12/19/2024  11:39 CST

## 2024-12-30 DIAGNOSIS — I10 ESSENTIAL HYPERTENSION: ICD-10-CM

## 2024-12-31 RX ORDER — HYDRALAZINE HYDROCHLORIDE 50 MG/1
50 TABLET, FILM COATED ORAL 3 TIMES DAILY
Qty: 270 TABLET | Refills: 1 | Status: SHIPPED | OUTPATIENT
Start: 2024-12-31

## 2025-01-22 RX ORDER — SEMAGLUTIDE 1.34 MG/ML
1 INJECTION, SOLUTION SUBCUTANEOUS WEEKLY
Qty: 3 ML | Refills: 0 | Status: SHIPPED | OUTPATIENT
Start: 2025-01-22

## 2025-01-22 NOTE — TELEPHONE ENCOUNTER
Caller: Eloina Bradley    Relationship: Emergency Contact    Best call back number: [phone]      What is the best time to reach you: ANY    Who are you requesting to speak with (clinical staff, provider,  specific staff member): DR. MILES    What was the call regarding: PT'S WIFE CALLED TO LET DR. MILES KNOW THAT PT IS DOWN TO 3 NEEDLES NOW FOR OZEMPIC. PT WAS TOLD TO LET DR. MILES KNOW SO WE CAN REORDER SINCE THERE CAN BE SOME ISSUES WITH THE PHARMACY. ALSO MENTIONED ABOUT  RENEWING IT FROM A 0.5 TO 1. PLEASE CALL BACK WITH ADVISEMENT.

## 2025-02-03 ENCOUNTER — OFFICE VISIT (OUTPATIENT)
Dept: INTERNAL MEDICINE | Facility: CLINIC | Age: 75
End: 2025-02-03
Payer: MEDICARE

## 2025-02-03 VITALS
OXYGEN SATURATION: 97 % | HEART RATE: 74 BPM | HEIGHT: 70 IN | SYSTOLIC BLOOD PRESSURE: 130 MMHG | BODY MASS INDEX: 33.07 KG/M2 | TEMPERATURE: 97.6 F | DIASTOLIC BLOOD PRESSURE: 60 MMHG | WEIGHT: 231 LBS

## 2025-02-03 DIAGNOSIS — E66.811 CLASS 1 OBESITY DUE TO EXCESS CALORIES WITH SERIOUS COMORBIDITY AND BODY MASS INDEX (BMI) OF 33.0 TO 33.9 IN ADULT: ICD-10-CM

## 2025-02-03 DIAGNOSIS — I10 ESSENTIAL HYPERTENSION: Primary | ICD-10-CM

## 2025-02-03 DIAGNOSIS — R23.4 SCAB: ICD-10-CM

## 2025-02-03 DIAGNOSIS — E11.65 TYPE 2 DIABETES MELLITUS WITH HYPERGLYCEMIA, WITHOUT LONG-TERM CURRENT USE OF INSULIN: ICD-10-CM

## 2025-02-03 DIAGNOSIS — E66.09 CLASS 1 OBESITY DUE TO EXCESS CALORIES WITH SERIOUS COMORBIDITY AND BODY MASS INDEX (BMI) OF 33.0 TO 33.9 IN ADULT: ICD-10-CM

## 2025-02-03 DIAGNOSIS — L57.0 ACTINIC KERATOSIS: ICD-10-CM

## 2025-02-03 PROCEDURE — 1126F AMNT PAIN NOTED NONE PRSNT: CPT | Performed by: INTERNAL MEDICINE

## 2025-02-03 PROCEDURE — 3075F SYST BP GE 130 - 139MM HG: CPT | Performed by: INTERNAL MEDICINE

## 2025-02-03 PROCEDURE — 3078F DIAST BP <80 MM HG: CPT | Performed by: INTERNAL MEDICINE

## 2025-02-03 PROCEDURE — 99214 OFFICE O/P EST MOD 30 MIN: CPT | Performed by: INTERNAL MEDICINE

## 2025-02-03 PROCEDURE — G2211 COMPLEX E/M VISIT ADD ON: HCPCS | Performed by: INTERNAL MEDICINE

## 2025-02-03 NOTE — PROGRESS NOTES
"      Chief Complaint  wound on heal (Left foot back side of left heal/Had about 3 weeks started as a scrape )    Subjective        Chung Bradley presents to [facility]    HPI    Patient here for the above problems.  See Assessment and Plan for further HPI components.      Review of Systems    Objective   Vital Signs:  /60 (BP Location: Left arm, Patient Position: Sitting, Cuff Size: Adult)   Pulse 74   Temp 97.6 °F (36.4 °C) (Temporal)   Ht 177.8 cm (70\")   Wt 105 kg (231 lb)   SpO2 97%   BMI 33.15 kg/m²   Estimated body mass index is 33.15 kg/m² as calculated from the following:    Height as of this encounter: 177.8 cm (70\").    Weight as of this encounter: 105 kg (231 lb).      Physical Exam  Vitals and nursing note reviewed.   Constitutional:       Appearance: He is obese. He is not ill-appearing.   Eyes:      General: No scleral icterus.     Conjunctiva/sclera: Conjunctivae normal.   Pulmonary:      Effort: Pulmonary effort is normal. No respiratory distress.   Musculoskeletal:        Feet:    Feet:      Comments: Red, scabbed area  Erythema mild, blanches well  No signs of infeciton  Neurological:      General: No focal deficit present.      Mental Status: He is alert and oriented to person, place, and time.   Psychiatric:         Mood and Affect: Mood normal.         Behavior: Behavior normal.                     Assessment and Plan   Diagnoses and all orders for this visit:    1. Essential hypertension (Primary)    2. Type 2 diabetes mellitus with hyperglycemia, without long-term current use of insulin    3. Class 1 obesity due to excess calories with serious comorbidity and body mass index (BMI) of 33.0 to 33.9 in adult    4. Scab of left heel    5. Actinic keratosis        History of Present Illness  The patient presents for evaluation of a foot blister.    Approximately 3 weeks ago, he experienced a minor injury to his foot due to a slip on a metal ramp. The " incident resulted in a scrape but no bleeding. Despite the passage of time, the wound has not fully healed and remains sensitive to touch. He reports no discomfort from his footwear.    He has recently initiated a regimen of Ozempic 1 mg, which he tolerates well. He maintains adequate hydration and consumes 2 cups of coffee in the morning, followed by water throughout the day. Occasionally, he indulges in zero-sugar root beer or 7-Up.    He has a spot on the side of his hip that was cut 2 years ago by Dr. Davila. A sample was sent for analysis, and it was determined to be non-cancerous. Recently, the spot has been getting bigger and burst open, releasing a thick white substance. He had an appointment scheduled for this morning, but it was canceled due to the doctor's illness. He has another appointment on Friday. He also has little spots around his body that are not moles, but Dr. Davila freezes them off when they reappear. He goes for yearly check-ups.    MEDICATIONS  Current: Ozempic      Assessment & Plan  1. Foot blister. Scab of left heel, previous scrape/abrasion  The blister is healing well with no signs of infection. The scab is expected to detach soon, revealing healthy tissue underneath. The surrounding redness blanches appropriately upon pressure release. He is advised to wear socks for additional padding to prevent further irritation. Daily foot inspections are recommended to monitor for any new ulcers or calluses.  Recommend all diabetics to wear socks.     2. Diabetes mellitus. obesity  He has recently started Ozempic 1 mg and is tolerating it well first dose was today. He reports occasional weakness but no significant adverse effects. He is advised to maintain adequate hydration and continue monitoring his blood sugar levels. The importance of wearing socks to prevent foot ulcers was emphasized.  Weight has crept down some.  He is doing well this weight loss.  Will check A1c at next visit.     Patient has  a BMI > 30.  Obesity increases risks of diseases such as diabetes, coronary artery disease, hypertension, sleep apnea, hyperlipidemia, arthritis, and stroke.  Recommend focusing on portion control and making healthy diet choices.  Avoid fast food.  Avoid calorie beverages including sweet tea, juice, soft drinks, and alcohol.  Recommend increasing your activity and starting a regular exercise program. Can consider utilizing calorie counting apps such as [employer].      3. Actinic keratosis.  He has a follow-up appointment scheduled for Friday to evaluate a spot on his hip that has grown and burst open, releasing thick white material. This spot was previously evaluated and found to be non-cancerous. He is advised to keep the appointment for further assessment and possible treatment.    4. Hypertension  BP well controlled.  Continue current regimen      Current Outpatient Medications:     allopurinol (ZYLOPRIM) 300 MG tablet, Take 1 tablet by mouth Daily., Disp: , Rfl:     Ascorbic Acid (VITAMIN C PO), Take  by mouth., Disp: , Rfl:     ASPIRIN 81 PO, Take  by mouth., Disp: , Rfl:     atorvastatin (LIPITOR) 40 MG tablet, Take 1 tablet by mouth Every Night., Disp: 90 tablet, Rfl: 3    carvedilol (Coreg) 3.125 MG tablet, Take 1 tablet by mouth 2 (Two) Times a Day With Meals., Disp: 180 tablet, Rfl: 3    cetirizine (zyrTEC) 10 MG tablet, Take 1 tablet by mouth Daily., Disp: 30 tablet, Rfl: 1    chlorthalidone (HYGROTON) 25 MG tablet, Take 1 tablet by mouth Daily., Disp: 90 tablet, Rfl: 3    Cholecalciferol (D3 ADULT PO), Take  by mouth., Disp: , Rfl:     clopidogrel (PLAVIX) 75 MG tablet, Take 1 tablet by mouth Daily., Disp: 90 tablet, Rfl: 1    Cyanocobalamin (B-12 PO), Take  by mouth., Disp: , Rfl:     cyanocobalamin (VITAMIN B-12) 1000 MCG tablet, Take 1 tablet by mouth Daily., Disp: , Rfl:     fluticasone (FLONASE) 50 MCG/ACT nasal spray, 1 spray into the nostril(s) as directed by provider 2 (Two) Times a Day As  Needed for Allergies., Disp: 1 bottle, Rfl: 3    gabapentin (NEURONTIN) 400 MG capsule, Take 2 capsules by mouth Every 8 (Eight) Hours. Take 2 capsules by mouth in the am, two capsules by mouth in the afternoon, and 2 capsules by mouth in the pm.   Patient is taking 2 capsules in the AM, 2 capsules in the afternoon, 1 capsule in the PM, Disp: , Rfl:     glipizide (GLUCOTROL XL) 5 MG ER tablet, Take 1 tablet by mouth Daily., Disp: 90 tablet, Rfl: 3    hydrALAZINE (APRESOLINE) 50 MG tablet, TAKE 1 TABLET BY MOUTH THREE TIMES DAILY, Disp: 270 tablet, Rfl: 1    losartan (COZAAR) 50 MG tablet, Take 1 tablet by mouth 2 (Two) Times a Day., Disp: 180 tablet, Rfl: 3    MAGNESIUM PO, Take  by mouth., Disp: , Rfl:     Multiple Vitamins-Minerals (ZINC PO), Take  by mouth., Disp: , Rfl:     nitroglycerin (NITROSTAT) 0.4 MG SL tablet, Place 1 tablet under the tongue Every 5 (Five) Minutes As Needed for Chest Pain (Systolic BP Greater Than 100). Take no more than 3 doses in 15 minutes., Disp: 15 tablet, Rfl: 12    Nystatin powder, Apply  topically to the appropriate area as directed As Needed., Disp: , Rfl:     omeprazole (priLOSEC) 20 MG capsule, Take 1 capsule by mouth 2 (Two) Times a Day., Disp: , Rfl:     Semaglutide, 1 MG/DOSE, (Ozempic, 1 MG/DOSE,) 4 MG/3ML solution pen-injector, Inject 1 mg under the skin into the appropriate area as directed 1 (One) Time Per Week., Disp: 3 mL, Rfl: 0    TiZANidine (ZANAFLEX) 4 MG capsule, Take 1 capsule by mouth 3 (Three) Times a Day As Needed for Muscle Spasms., Disp: 30 capsule, Rfl: 0    Result Review :                               Follow Up   Return in about 2 months (around 4/3/2025), or if symptoms worsen or fail to improve, for follow up for above problems. Longitudinal care., Medicare Wellness - Labs prior to visit.  Patient was given instructions and counseling regarding his condition or for health maintenance advice. Please see specific information pulled into the AVS if  appropriate.       RAY Lion MD, FACP, FHM      Electronically signed by Daquan Lion MD, 02/03/25, 1:00 PM CST.    Patient or patient representative verbalized consent for the use of Ambient Listening during the visit with  Daquan Lion MD for chart documentation. 2/3/2025  13:01 CST

## 2025-02-10 DIAGNOSIS — I10 PRIMARY HYPERTENSION: ICD-10-CM

## 2025-02-10 DIAGNOSIS — I10 ESSENTIAL HYPERTENSION: ICD-10-CM

## 2025-02-10 RX ORDER — CARVEDILOL 3.12 MG/1
3.12 TABLET ORAL 2 TIMES DAILY WITH MEALS
Qty: 180 TABLET | Refills: 3 | Status: SHIPPED | OUTPATIENT
Start: 2025-02-10

## 2025-02-10 RX ORDER — CHLORTHALIDONE 25 MG/1
25 TABLET ORAL DAILY
Qty: 90 TABLET | Refills: 3 | Status: SHIPPED | OUTPATIENT
Start: 2025-02-10

## 2025-02-10 NOTE — TELEPHONE ENCOUNTER
Caller: Eloian Bradley    Relationship: Emergency Contact    Best call back number: [phone]     Requested Prescriptions:   Requested Prescriptions     Pending Prescriptions Disp Refills    chlorthalidone (HYGROTON) 25 MG tablet 90 tablet 3     Sig: Take 1 tablet by mouth Daily.        Pharmacy where request should be sent: [facility] [address] [place] [address] [phone] [facility] [phone]      Last office visit with prescribing clinician: 2/3/2025   Last telemedicine visit with prescribing clinician: Visit date not found   Next office visit with prescribing clinician: 4/23/2025     Additional details provided by patient: PATIENT IS OUT    Does the patient have less than a 3 day supply:  [x] Yes  [] No    Would you like a call back once the refill request has been completed: [x] Yes [] No    If the office needs to give you a call back, can they leave a voicemail: [x] Yes [] No    Kevin Elaine Rep   02/10/25 10:21 CST

## 2025-02-11 ENCOUNTER — TELEPHONE (OUTPATIENT)
Dept: INTERNAL MEDICINE | Facility: CLINIC | Age: 75
End: 2025-02-11

## 2025-02-11 RX ORDER — SEMAGLUTIDE 1.34 MG/ML
1 INJECTION, SOLUTION SUBCUTANEOUS WEEKLY
Qty: 3 ML | Refills: 0 | Status: CANCELLED | OUTPATIENT
Start: 2025-02-21

## 2025-02-11 RX ORDER — SEMAGLUTIDE 1.34 MG/ML
1 INJECTION, SOLUTION SUBCUTANEOUS WEEKLY
Qty: 3 ML | Refills: 0 | OUTPATIENT
Start: 2025-02-11

## 2025-02-11 NOTE — TELEPHONE ENCOUNTER
Called wife - they only need the Ozempic filled as the other Rx is ready to be picked up (did not say which med it was).  Told her we received the request for Ozempic yesterday, but has not been sent in, as we have 3 days to process refill requests, but will make sure to send to provider today.

## 2025-02-11 NOTE — TELEPHONE ENCOUNTER
Still has 2 shots left this month, not due to be refilled until 2/22.  He has been on the 1mg for 30 days and is asking if he should stay on the 1mg or increase to the 2mg?

## 2025-02-17 ENCOUNTER — TELEPHONE (OUTPATIENT)
Dept: INTERNAL MEDICINE | Facility: CLINIC | Age: 75
End: 2025-02-17
Payer: MEDICARE

## 2025-02-18 ENCOUNTER — TELEPHONE (OUTPATIENT)
Dept: INTERNAL MEDICINE | Facility: CLINIC | Age: 75
End: 2025-02-18

## 2025-02-18 DIAGNOSIS — I10 PRIMARY HYPERTENSION: ICD-10-CM

## 2025-02-18 RX ORDER — CARVEDILOL 3.12 MG/1
3.12 TABLET ORAL 2 TIMES DAILY WITH MEALS
Qty: 180 TABLET | Refills: 3 | OUTPATIENT
Start: 2025-02-18

## 2025-02-18 NOTE — TELEPHONE ENCOUNTER
Patient's wife called and needs to know if patient is to stay on current dosage of Ozempic or is it to be increased? Please call [phone]

## 2025-02-18 NOTE — TELEPHONE ENCOUNTER
Trying to get him an answer, there is another message in Dr. Lion's box that we are still processing back and forth.

## 2025-02-18 NOTE — TELEPHONE ENCOUNTER
Caller: Eloina Bradley    Relationship: Emergency Contact    Best call back number: [phone]     Requested Prescriptions:   Requested Prescriptions     Pending Prescriptions Disp Refills    carvedilol (Coreg) 3.125 MG tablet 180 tablet 3     Sig: Take 1 tablet by mouth 2 (Two) Times a Day With Meals.        Pharmacy where request should be sent: [facility] [address] [place] [address] [phone] [facility] [phone]      Last office visit with prescribing clinician: 2/3/2025   Last telemedicine visit with prescribing clinician: Visit date not found   Next office visit with prescribing clinician: 4/23/2025     Additional details provided by patient: PATIENT HAS 3 DAYS LEFT    Does the patient have less than a 3 day supply:  [x] Yes  [] No    Would you like a call back once the refill request has been completed: [x] Yes [] No    If the office needs to give you a call back, can they leave a voicemail: [x] Yes [] No    Kevin Elaine Rep   02/18/25 09:07 CST

## 2025-03-11 ENCOUNTER — OFFICE VISIT (OUTPATIENT)
Age: 75
End: 2025-03-11
Payer: MEDICARE

## 2025-03-11 VITALS — BODY MASS INDEX: 32.64 KG/M2 | HEIGHT: 70 IN | WEIGHT: 228 LBS

## 2025-03-11 DIAGNOSIS — M17.12 PRIMARY OSTEOARTHRITIS OF LEFT KNEE: ICD-10-CM

## 2025-03-11 DIAGNOSIS — M25.562 LEFT KNEE PAIN, UNSPECIFIED CHRONICITY: Primary | ICD-10-CM

## 2025-03-11 PROCEDURE — 1036F TOBACCO NON-USER: CPT | Performed by: ORTHOPAEDIC SURGERY

## 2025-03-11 PROCEDURE — 99214 OFFICE O/P EST MOD 30 MIN: CPT | Performed by: ORTHOPAEDIC SURGERY

## 2025-03-11 PROCEDURE — 1159F MED LIST DOCD IN RCRD: CPT | Performed by: ORTHOPAEDIC SURGERY

## 2025-03-11 PROCEDURE — G8417 CALC BMI ABV UP PARAM F/U: HCPCS | Performed by: ORTHOPAEDIC SURGERY

## 2025-03-11 PROCEDURE — G8427 DOCREV CUR MEDS BY ELIG CLIN: HCPCS | Performed by: ORTHOPAEDIC SURGERY

## 2025-03-11 PROCEDURE — 20610 DRAIN/INJ JOINT/BURSA W/O US: CPT | Performed by: ORTHOPAEDIC SURGERY

## 2025-03-11 PROCEDURE — 1123F ACP DISCUSS/DSCN MKR DOCD: CPT | Performed by: ORTHOPAEDIC SURGERY

## 2025-03-11 RX ORDER — NITROGLYCERIN 0.4 MG/1
0.4 TABLET SUBLINGUAL
COMMUNITY
Start: 2024-11-06

## 2025-03-11 RX ORDER — TRIAMCINOLONE ACETONIDE 1 MG/G
CREAM TOPICAL
COMMUNITY
Start: 2025-01-27

## 2025-03-11 RX ORDER — ATORVASTATIN CALCIUM 40 MG/1
40 TABLET, FILM COATED ORAL NIGHTLY
COMMUNITY

## 2025-03-11 RX ORDER — FUROSEMIDE 40 MG/1
40 TABLET ORAL 2 TIMES DAILY
COMMUNITY

## 2025-03-11 RX ORDER — MAGNESIUM OXIDE 400 MG/1
1200 TABLET ORAL
COMMUNITY

## 2025-03-11 RX ORDER — BUPIVACAINE HYDROCHLORIDE 5 MG/ML
30 INJECTION, SOLUTION PERINEURAL ONCE
Status: COMPLETED | OUTPATIENT
Start: 2025-03-11 | End: 2025-03-11

## 2025-03-11 RX ORDER — BETAMETHASONE SODIUM PHOSPHATE AND BETAMETHASONE ACETATE 3; 3 MG/ML; MG/ML
6 INJECTION, SUSPENSION INTRA-ARTICULAR; INTRALESIONAL; INTRAMUSCULAR; SOFT TISSUE ONCE
Status: COMPLETED | OUTPATIENT
Start: 2025-03-11 | End: 2025-03-11

## 2025-03-11 RX ORDER — CLOPIDOGREL BISULFATE 75 MG/1
75 TABLET ORAL DAILY
COMMUNITY

## 2025-03-11 RX ORDER — LOSARTAN POTASSIUM 50 MG/1
50 TABLET ORAL 2 TIMES DAILY
COMMUNITY

## 2025-03-11 RX ORDER — OMEGA-3-ACID ETHYL ESTERS 1 G/1
1 CAPSULE, LIQUID FILLED ORAL
COMMUNITY

## 2025-03-11 RX ADMIN — BUPIVACAINE HYDROCHLORIDE 150 MG: 5 INJECTION, SOLUTION PERINEURAL at 12:04

## 2025-03-11 RX ADMIN — BETAMETHASONE SODIUM PHOSPHATE AND BETAMETHASONE ACETATE 6 MG: 3; 3 INJECTION, SUSPENSION INTRA-ARTICULAR; INTRALESIONAL; INTRAMUSCULAR; SOFT TISSUE at 12:03

## 2025-03-11 ASSESSMENT — ENCOUNTER SYMPTOMS
ALLERGIC/IMMUNOLOGIC NEGATIVE: 1
EYES NEGATIVE: 1
GASTROINTESTINAL NEGATIVE: 1
RESPIRATORY NEGATIVE: 1

## 2025-03-11 NOTE — PROGRESS NOTES
[facility]  [facility]  [address]  [facility] [id number] [phone]       Ang Villegas (:  1950) is a 74 y.o. male,Established patient, here for evaluation of the following chief complaint(s):  Follow-up (Lt knee pain)        Assessment & Plan  1. Arthritis of the left knee.  The patient's arthritis has progressed to a stage where surgical intervention is a viable option. However, due to cardiac concerns, the previous plan for surgery was postponed. It is anticipated that the efficacy of the injections will gradually diminish over time. He is advised to continue with injection therapy until he is taken off Plavix and receives clearance from his cardiologist for surgery. A CT scan will be ordered once he is off Plavix and cleared for surgery. The knee replacement procedure can not be performed within 12 weeks of the injection. An injection will be administered today to provide temporary relief.    PROCEDURE  An injection was administered into the left knee during the previous visit.  The risks and benefits of the procedure were explained to the patient including the risk for infection, nerve and artery damage, continued pain, continued decreased range of motion, paresthesias, paralysis, loss of limb, loss of life and the need for surgery, and the patient conveyed their understanding and willingness to proceed.  Under aseptic technique, the left knee was injected using a 21-gauge needle with a solution of 6 mg / 1 cc of Celestone corticosteroid and 4 cc of half percent bupivacaine local anesthetic.  The patient tolerated the procedure very well.  It is explained to the patient that this injection may be repeated as frequently as every 3 months or less frequently if their symptoms will allow.  They convey their understanding.       ICD-10-CM    1. Left knee pain, unspecified chronicity  M25.562           No follow-ups on

## 2025-03-13 ENCOUNTER — TELEPHONE (OUTPATIENT)
Dept: CARDIOLOGY | Facility: CLINIC | Age: 75
End: 2025-03-13
Payer: MEDICARE

## 2025-03-13 NOTE — TELEPHONE ENCOUNTER
REQUEST FOR CARDIAC CLEARANCE    Caller name: Chung Bradley     Phone Number: [phone]     Surgeon's name: DR. TURCIOS - [facility]    Type of planned surgery: KNEE SURGERY     Date of planned surgery: NOT SCHEDULED BUT WANTING TO SCHEDULE FOR JUNE 2025    Type of anesthesia: UNSURE TYPE    Have you been experiencing chest pain or shortness of breath? NO     Is your doctor requesting for you to stop any of your medications prior to your surgery? UP TO DR. LARES    Where should we fax the clearance to? UNSURE      PATIENT IS ON PLAVIX UNTIL 5.11.25 AND NEEDS TO KNOW IF HE WILL BE CLEARED TO HAVE THE SURGERY. PATIENT IS ALSO NEEDING TO KNOW IF PLAVIX WILL BE CONTINUED AFTER MAY AS HE WAS TOLD TO BE ON PLAVIX FOR 6 MONTHS.    PATIENT IS TAKING OZEMPIC 2 MG.

## 2025-03-17 NOTE — TELEPHONE ENCOUNTER
Tried to call patient to advise that prescription was sent in 2/2025 with two refills.  Should be able to have refilled.

## 2025-04-16 DIAGNOSIS — E11.65 TYPE 2 DIABETES MELLITUS WITH HYPERGLYCEMIA, WITHOUT LONG-TERM CURRENT USE OF INSULIN: ICD-10-CM

## 2025-04-16 DIAGNOSIS — N18.2 STAGE 2 CHRONIC KIDNEY DISEASE: ICD-10-CM

## 2025-04-16 DIAGNOSIS — I10 ESSENTIAL HYPERTENSION: ICD-10-CM

## 2025-04-16 DIAGNOSIS — E78.2 MIXED HYPERLIPIDEMIA: ICD-10-CM

## 2025-04-17 LAB
ALBUMIN SERPL-MCNC: 4.1 G/DL (ref 3.5–5.2)
ALBUMIN/CREAT UR: 639 MG/G CREAT (ref 0–29)
ALBUMIN/GLOB SERPL: 1.8 G/DL
ALP SERPL-CCNC: 100 U/L (ref 39–117)
ALT SERPL-CCNC: 13 U/L (ref 1–41)
APPEARANCE UR: ABNORMAL
AST SERPL-CCNC: 22 U/L (ref 1–40)
BACTERIA #/AREA URNS HPF: ABNORMAL /HPF
BASOPHILS # BLD AUTO: 0.05 10*3/MM3 (ref 0–0.2)
BASOPHILS NFR BLD AUTO: 0.7 % (ref 0–1.5)
BILIRUB SERPL-MCNC: 0.6 MG/DL (ref 0–1.2)
BILIRUB UR QL STRIP: NEGATIVE
BUN SERPL-MCNC: 25 MG/DL (ref 8–23)
BUN/CREAT SERPL: 21 (ref 7–25)
CALCIUM SERPL-MCNC: 9 MG/DL (ref 8.6–10.5)
CASTS URNS MICRO: ABNORMAL
CHLORIDE SERPL-SCNC: 103 MMOL/L (ref 98–107)
CHOLEST SERPL-MCNC: 80 MG/DL (ref 0–200)
CO2 SERPL-SCNC: 25.1 MMOL/L (ref 22–29)
COLOR UR: YELLOW
CREAT SERPL-MCNC: 1.19 MG/DL (ref 0.76–1.27)
CREAT UR-MCNC: 68.9 MG/DL
CRYSTALS URNS MICRO: ABNORMAL
EGFRCR SERPLBLD CKD-EPI 2021: 63.3 ML/MIN/1.73
EOSINOPHIL # BLD AUTO: 0.19 10*3/MM3 (ref 0–0.4)
EOSINOPHIL NFR BLD AUTO: 2.7 % (ref 0.3–6.2)
EPI CELLS #/AREA URNS HPF: ABNORMAL /HPF
ERYTHROCYTE [DISTWIDTH] IN BLOOD BY AUTOMATED COUNT: 14.9 % (ref 12.3–15.4)
GLOBULIN SER CALC-MCNC: 2.3 GM/DL
GLUCOSE SERPL-MCNC: 108 MG/DL (ref 65–99)
GLUCOSE UR QL STRIP: ABNORMAL
HBA1C MFR BLD: 6 % (ref 4.8–5.6)
HCT VFR BLD AUTO: 41.1 % (ref 37.5–51)
HDLC SERPL-MCNC: 25 MG/DL (ref 40–60)
HGB BLD-MCNC: 13.1 G/DL (ref 13–17.7)
HGB UR QL STRIP: NEGATIVE
IMM GRANULOCYTES # BLD AUTO: 0.03 10*3/MM3 (ref 0–0.05)
IMM GRANULOCYTES NFR BLD AUTO: 0.4 % (ref 0–0.5)
KETONES UR QL STRIP: NEGATIVE
LDLC SERPL CALC-MCNC: 29 MG/DL (ref 0–100)
LEUKOCYTE ESTERASE UR QL STRIP: ABNORMAL
LYMPHOCYTES # BLD AUTO: 1.01 10*3/MM3 (ref 0.7–3.1)
LYMPHOCYTES NFR BLD AUTO: 14.5 % (ref 19.6–45.3)
MCH RBC QN AUTO: 28.7 PG (ref 26.6–33)
MCHC RBC AUTO-ENTMCNC: 31.9 G/DL (ref 31.5–35.7)
MCV RBC AUTO: 89.9 FL (ref 79–97)
MICROALBUMIN UR-MCNC: 440.4 UG/ML
MONOCYTES # BLD AUTO: 0.38 10*3/MM3 (ref 0.1–0.9)
MONOCYTES NFR BLD AUTO: 5.5 % (ref 5–12)
NEUTROPHILS # BLD AUTO: 5.3 10*3/MM3 (ref 1.7–7)
NEUTROPHILS NFR BLD AUTO: 76.2 % (ref 42.7–76)
NITRITE UR QL STRIP: NEGATIVE
NRBC BLD AUTO-RTO: 0 /100 WBC (ref 0–0.2)
PH UR STRIP: >=9 [PH] (ref 5–8)
PLATELET # BLD AUTO: 160 10*3/MM3 (ref 140–450)
POTASSIUM SERPL-SCNC: 4 MMOL/L (ref 3.5–5.2)
PROT SERPL-MCNC: 6.4 G/DL (ref 6–8.5)
PROT UR QL STRIP: ABNORMAL
RBC # BLD AUTO: 4.57 10*6/MM3 (ref 4.14–5.8)
RBC #/AREA URNS HPF: ABNORMAL /HPF
SODIUM SERPL-SCNC: 141 MMOL/L (ref 136–145)
SP GR UR STRIP: 1.02 (ref 1–1.03)
TRIGL SERPL-MCNC: 151 MG/DL (ref 0–150)
TSH SERPL DL<=0.005 MIU/L-ACNC: 1.31 UIU/ML (ref 0.27–4.2)
UROBILINOGEN UR STRIP-MCNC: ABNORMAL MG/DL
VLDLC SERPL CALC-MCNC: 26 MG/DL (ref 5–40)
WBC # BLD AUTO: 6.96 10*3/MM3 (ref 3.4–10.8)
WBC #/AREA URNS HPF: ABNORMAL /HPF

## 2025-04-23 ENCOUNTER — OFFICE VISIT (OUTPATIENT)
Dept: INTERNAL MEDICINE | Facility: CLINIC | Age: 75
End: 2025-04-23
Payer: MEDICARE

## 2025-04-23 VITALS
BODY MASS INDEX: 31.64 KG/M2 | HEIGHT: 70 IN | WEIGHT: 221 LBS | TEMPERATURE: 98.4 F | OXYGEN SATURATION: 98 % | DIASTOLIC BLOOD PRESSURE: 76 MMHG | SYSTOLIC BLOOD PRESSURE: 134 MMHG | HEART RATE: 75 BPM

## 2025-04-23 DIAGNOSIS — E11.65 TYPE 2 DIABETES MELLITUS WITH HYPERGLYCEMIA, WITHOUT LONG-TERM CURRENT USE OF INSULIN: ICD-10-CM

## 2025-04-23 DIAGNOSIS — Z95.5 S/P DRUG ELUTING CORONARY STENT PLACEMENT: ICD-10-CM

## 2025-04-23 DIAGNOSIS — E66.09 CLASS 1 OBESITY DUE TO EXCESS CALORIES WITH SERIOUS COMORBIDITY AND BODY MASS INDEX (BMI) OF 31.0 TO 31.9 IN ADULT: ICD-10-CM

## 2025-04-23 DIAGNOSIS — N18.2 STAGE 2 CHRONIC KIDNEY DISEASE: ICD-10-CM

## 2025-04-23 DIAGNOSIS — I25.10 CORONARY ARTERY DISEASE INVOLVING NATIVE CORONARY ARTERY OF NATIVE HEART WITHOUT ANGINA PECTORIS: Primary | ICD-10-CM

## 2025-04-23 DIAGNOSIS — E66.811 CLASS 1 OBESITY DUE TO EXCESS CALORIES WITH SERIOUS COMORBIDITY AND BODY MASS INDEX (BMI) OF 31.0 TO 31.9 IN ADULT: ICD-10-CM

## 2025-04-23 DIAGNOSIS — I10 ESSENTIAL HYPERTENSION: ICD-10-CM

## 2025-04-29 ENCOUNTER — TELEPHONE (OUTPATIENT)
Age: 75
End: 2025-04-29

## 2025-04-29 DIAGNOSIS — M17.12 PRIMARY OSTEOARTHRITIS OF LEFT KNEE: Primary | ICD-10-CM

## 2025-04-29 NOTE — PROGRESS NOTES
Subjective   The ABCs of the Annual Wellness Visit  Medicare Wellness Visit      Chung Bradley is a 74 y.o. patient who presents for a Medicare Wellness Visit.    The following portions of the patient's history were reviewed and   updated as appropriate: allergies, current medications, past family history, past medical history, past social history, past surgical history, and problem list.    Compared to one year ago, the patient's physical   health is the same.  Compared to one year ago, the patient's mental   health is the same.    Recent Hospitalizations:  He was admitted within the past 365 days at [facility].     Current Medical Providers:  Patient Care Team:  Daquan Lion MD as PCP - General (Internal Medicine)  Chris Esparza MD as Consulting Physician (Urology)  Ruddy Haas MD as Consulting Physician (Retina Ophthalmology)    Outpatient Medications Prior to Visit   Medication Sig Dispense Refill    allopurinol (ZYLOPRIM) 300 MG tablet Take 1 tablet by mouth Daily.      Ascorbic Acid (VITAMIN C PO) Take  by mouth.      ASPIRIN 81 PO Take  by mouth.      atorvastatin (LIPITOR) 40 MG tablet Take 1 tablet by mouth Every Night. 90 tablet 3    carvedilol (Coreg) 3.125 MG tablet Take 1 tablet by mouth 2 (Two) Times a Day With Meals. 180 tablet 3    cetirizine (zyrTEC) 10 MG tablet Take 1 tablet by mouth Daily. 30 tablet 1    chlorthalidone (HYGROTON) 25 MG tablet Take 1 tablet by mouth Daily. 90 tablet 3    Cholecalciferol (D3 ADULT PO) Take  by mouth.      clopidogrel (PLAVIX) 75 MG tablet Take 1 tablet by mouth Daily. 90 tablet 1    Cyanocobalamin (B-12 PO) Take  by mouth.      cyanocobalamin (VITAMIN B-12) 1000 MCG tablet Take 1 tablet by mouth Daily.      fluticasone (FLONASE) 50 MCG/ACT nasal spray 1 spray into the nostril(s) as directed by provider 2 (Two) Times a Day As Needed for Allergies. 1 bottle 3    gabapentin (NEURONTIN) 400 MG capsule Take 2 capsules by mouth  Every 8 (Eight) Hours. Take 2 capsules by mouth in the am, two capsules by mouth in the afternoon, and 2 capsules by mouth in the pm.     Patient is taking 2 capsules in the AM, 2 capsules in the afternoon, 1 capsule in the PM      hydrALAZINE (APRESOLINE) 50 MG tablet TAKE 1 TABLET BY MOUTH THREE TIMES DAILY 270 tablet 1    losartan (COZAAR) 50 MG tablet Take 1 tablet by mouth 2 (Two) Times a Day. 180 tablet 3    MAGNESIUM PO Take  by mouth.      Multiple Vitamins-Minerals (ZINC PO) Take  by mouth.      nitroglycerin (NITROSTAT) 0.4 MG SL tablet Place 1 tablet under the tongue Every 5 (Five) Minutes As Needed for Chest Pain (Systolic BP Greater Than 100). Take no more than 3 doses in 15 minutes. 15 tablet 12    Nystatin powder Apply  topically to the appropriate area as directed As Needed.      omeprazole (priLOSEC) 20 MG capsule Take 1 capsule by mouth 2 (Two) Times a Day.      Semaglutide, 2 MG/DOSE, (OZEMPIC) 8 MG/3ML solution pen-injector Inject 2 mg under the skin into the appropriate area as directed 1 (One) Time Per Week. 24 mL 2    TiZANidine (ZANAFLEX) 4 MG capsule Take 1 capsule by mouth 3 (Three) Times a Day As Needed for Muscle Spasms. 30 capsule 0    glipizide (GLUCOTROL XL) 5 MG ER tablet Take 1 tablet by mouth Daily. 90 tablet 3     No facility-administered medications prior to visit.     No opioid medication identified on active medication list. I have reviewed chart for other potential  high risk medication/s and harmful drug interactions in the elderly.      Aspirin is on active medication list. Aspirin use is indicated based on review of current medical condition/s. Pros and cons of this therapy have been discussed today. Benefits of this medication outweigh potential harm.  Patient has been encouraged to continue taking this medication.  .      Patient Active Problem List   Diagnosis    Bladder neoplasm of uncertain malignant potential    Spinal stenosis in cervical region    Former smoker     "Cervical radiculopathy    Cervical spondylosis without myelopathy    Essential hypertension    Sleep apnea    Type 2 diabetes mellitus with hyperglycemia, without long-term current use of insulin    Joseph's esophagus without dysplasia    Family history of esophageal cancer    History of colonic polyps    Stage 2 chronic kidney disease    Class 1 obesity due to excess calories with serious comorbidity and body mass index (BMI) of 31.0 to 31.9 in adult    Traumatic complete tear of left rotator cuff    Atrophy of right kidney    Adverse effect of amlodipine - Does not tolerate >5 mg due to edema    Venous insufficiency    S/P drug eluting coronary stent placement    Coronary artery disease involving native coronary artery of native heart without angina pectoris     Advance Care Planning Advance Directive is not on file.  ACP discussion was held with the patient during this visit. Patient does not have an advance directive, information provided.            Objective   Vitals:    04/23/25 0849   BP: 134/76   BP Location: Left arm   Patient Position: Sitting   Cuff Size: Adult   Pulse: 75   Temp: 98.4 °F (36.9 °C)   TempSrc: Temporal   SpO2: 98%   Weight: 100 kg (221 lb)   Height: 177.8 cm (70\")   PainSc: 0-No pain       Estimated body mass index is 31.71 kg/m² as calculated from the following:    Height as of this encounter: 177.8 cm (70\").    Weight as of this encounter: 100 kg (221 lb).                Does the patient have evidence of cognitive impairment? No  Lab Results   Component Value Date    CHLPL 80 04/16/2025    TRIG 151 (H) 04/16/2025    HDL 25 (L) 04/16/2025    LDL 29 04/16/2025    VLDL 26 04/16/2025    HGBA1C 6.00 (H) 04/16/2025                                                                                               Health  Risk Assessment    Smoking Status:  Social History     Tobacco Use   Smoking Status Former    Current packs/day: 0.00    Types: Cigarettes    Quit date: 1997    Years since " quittin.3   Smokeless Tobacco Never     Alcohol Consumption:  Social History     Substance and Sexual Activity   Alcohol Use No       Fall Risk Screen  STEADI Fall Risk Assessment was completed, and patient is at LOW risk for falls.Assessment completed on:2025    Depression Screening   Little interest or pleasure in doing things? Not at all   Feeling down, depressed, or hopeless? Not at all   PHQ-2 Total Score 0      Health Habits and Functional and Cognitive Screenin/23/2025     8:57 AM   Functional & Cognitive Status   Exercise (times per week) 0 times per week   Current Exercises Include Yard Work;House Cleaning           Age-appropriate Screening Schedule:  Refer to the list below for future screening recommendations based on patient's age, sex and/or medical conditions. Orders for these recommended tests are listed in the plan section. The patient has been provided with a written plan.    Health Maintenance List  Health Maintenance   Topic Date Due    RSV Vaccine - Adults (1 - 1-dose 75+ series) Never done    DIABETIC EYE EXAM  2025    DIABETIC FOOT EXAM  2025    COVID-19 Vaccine ( season) 2025    INFLUENZA VACCINE  2025    HEMOGLOBIN A1C  10/16/2025    LIPID PANEL  2026    URINE MICROALBUMIN-CREATININE RATIO (uACR)  2026    ANNUAL WELLNESS VISIT  2026    TDAP/TD VACCINES (3 - Td or Tdap) 10/23/2027    COLORECTAL CANCER SCREENING  2031    HEPATITIS C SCREENING  Completed    Pneumococcal Vaccine 50+  Completed    ZOSTER VACCINE  Completed                                                                                                                                                 CMS Preventative Services Quick Reference  Risk Factors Identified During Encounter  Immunizations Discussed/Encouraged: RSV (Respiratory Syncytial Virus)  Dental Screening Recommended  Vision Screening Recommended    The above risks/problems have been  discussed with the patient.  Pertinent information has been shared with the patient in the After Visit Summary.  An After Visit Summary and PPPS were made available to the patient.    Follow Up:   Next Medicare Wellness visit to be scheduled in 1 year.        Diagnoses and all orders for this visit:    1. Coronary artery disease involving native coronary artery of native heart without angina pectoris (Primary)    2. Essential hypertension    3. Class 1 obesity due to excess calories with serious comorbidity and body mass index (BMI) of 31.0 to 31.9 in adult    4. S/P drug eluting coronary stent placement    5. Type 2 diabetes mellitus with hyperglycemia, without long-term current use of insulin    6. Stage 2 chronic kidney disease        Recommend at least annual dental and vision screening.  Recommend annual influenza vaccination  Recommend a varied diet and appropriate portion sizes.   CDC recommendations for physical activity:  At least 150 minutes a week (for example, 30 minutes a day, 5 days a week) of moderate-intensity activity such as brisk walking. Or can consider 75 minutes a week of vigorous-intensity activity such as hiking, jogging, or running.  At least 2 days a week of activities that strengthen muscles.  Plus activities to improve balance.    Health Maintenance Due   Topic Date Due    RSV Vaccine - Adults (1 - 1-dose 75+ series) Never done    DIABETIC EYE EXAM  01/02/2025    DIABETIC FOOT EXAM  04/22/2025     History of Present Illness  The patient is a 74-year-old gentleman who presents today for a Medicare wellness visit.    He reports an improvement in his overall health compared to the previous year. Weight loss of 18 pounds since December 2024 is noted. A change in taste preferences since starting Ozempic has led to a reduced intake of potato chips and meat. A decreased appetite is reported, consuming approximately half of his usual food intake. Increased sensitivity to cold temperatures is  attributed to the weight loss.    Diabetes is managed with Ozempic 2 mg, resulting in altered taste preferences and reduced intake of certain foods. Occasional hypoglycemic episodes occur, particularly during outdoor activities, necessitating periods of rest to recover. Blood glucose levels are typically monitored before dinner, with a tendency to decrease postprandially. No lotion is applied to his feet, but regular callus removal is maintained.    A stent placement procedure was performed last year, requiring an overnight hospital stay. Currently on Plavix, with a 6-month supply provided by his cardiologist, Dr. To. Scheduled for knee replacement surgery, but informed that it cannot proceed until Plavix is discontinued. An appointment with his cardiologist has been requested to discuss temporarily stopping Plavix for the surgery. During previous shoulder surgery, aspirin was not required to be stopped. A CT scan is planned, with results to be sent to Dr. Rishi Miller, typically taking 2 months. Additional Plavix may be required if surgery is delayed. Consultation with Dr. To regarding the duration of Plavix discontinuation prior to surgery is planned.    A history of bladder removal is noted, and a stoma is present. An advanced directive or living will is in place. The last vision examination was conducted less than a year ago, in January 2025.    PAST SURGICAL HISTORY:  The patient has a history of stent placement last year, shoulder surgery, and bladder removal.    Assessment & Plan  1. Medicare wellness visit.  His weight has decreased by 18 pounds since December 2024, and he reports feeling better overall. His blood pressure readings are within normal range. His A1c level is 6.0, indicating good control of his diabetes. His white blood cell count, hemoglobin, and platelet levels are all within normal limits. His kidney function remains stable with a slight increase in BUN during fasting, which is not  concerning. His total cholesterol has decreased from 149 to 80, LDL from 48 to 29, and triglycerides from 466 to 151. His liver function tests are normal, and his thyroid function is also normal. The protein in his urine has decreased from 2000 to 600, likely due to his stoma. He was advised to maintain a Mediterranean diet with appropriate portion sizes. He was also advised to use [employer] Diabetic Foot Cream or O'Ke[employer]fe's Working Hands Cream for his feet.    2. Diabetes Mellitus.  He reports that his blood sugar sometimes drops while working outside. He is currently on Ozempic 2 mg and has stopped taking glipizide. He was advised to ensure he is eating and drinking regularly to avoid hypoglycemia. A refill for Ozempic was sent to [facility].    3. Hyperlipidemia.  His cholesterol levels have improved significantly with his current medication regimen. He was advised to continue his current cholesterol medication and follow a Mediterranean diet to further improve his lipid profile.    4. Post-stent placement management.  He is currently on Plavix and aspirin following a stent placement. He was advised to continue Plavix without interruption until after 05/05/2025, and then hold it temporarily for his knee replacement surgery. He was instructed not to stop aspirin at any time. A letter was sent to Dr. To on 03/13/2025, regarding these recommendations by cardiology.    5. Health Maintenance.  He has an advanced directive or living will in place but it is not on file here. His last vision exam was in 01/2025. He was advised to bring it into the office for scanning.    Patient or patient representative verbalized consent for the use of Ambient Listening during the visit with  Daquan Lion MD for chart documentation. 4/28/2025  21:17 CDT          Result Review :  The following data was reviewed by: Daquan Lion MD on 04/23/2025:  CMP          11/6/2024    04:28 12/19/2024    11:37 4/16/2025    07:13   SOILA    Glucose 151   108    Glucose  90        BUN 22  27     25    Creatinine 1.03  1.05     1.19    EGFR 75.3   63.3    Sodium 141  139     141    Potassium 3.7  3.7     4.0    Chloride 104  106     103    Calcium 8.8  9.1     9.0    Total Protein   6.4    Albumin   4.1    Globulin   2.3    Total Bilirubin   0.6    Alkaline Phosphatase   100    AST (SGOT)   22    ALT (SGPT)   13    Albumin/Globulin Ratio   1.8    BUN/Creatinine Ratio 21.4   21.0    Anion Gap 10.0  9           Details          This result is from an external source.             CBC w/diff          11/5/2024    12:47 11/6/2024    04:28 4/16/2025    07:13   CBC w/Diff   WBC 6.67  7.34  6.96    RBC 5.03  4.91  4.57    Hemoglobin 13.8  13.5  13.1    Hematocrit 44.3  43.5  41.1    MCV 88.1  88.6  89.9    MCH 27.4  27.5  28.7    MCHC 31.2  31.0  31.9    RDW 15.9  16.1  14.9    Platelets 145  141  160    Neutrophil Rel %   76.2    Lymphocyte Rel %   14.5    Monocyte Rel %   5.5    Eosinophil Rel %   2.7    Basophil Rel %   0.7      Lipid Panel          11/6/2024    04:28 4/16/2025    07:13   Lipid Panel   Total Cholesterol 98     Total Cholesterol  80    Triglycerides 383  151    HDL Cholesterol 23  25    VLDL Cholesterol 54  26    LDL Cholesterol  21  29    LDL/HDL Ratio Unable to Calculate       TSH          4/16/2025    07:13   TSH   TSH 1.310      A1C Last 3 Results          10/17/2024    08:49 11/6/2024    04:28 4/16/2025    07:13   HGBA1C Last 3 Results   Hemoglobin A1C 6.1  6.30  6.00      Microalbumin          4/16/2025    07:13   Microalbumin   Microalbumin, Urine 440.4      UA          4/16/2025    07:13   Urinalysis   Blood, UA Negative    Leukocytes, UA See below:    Nitrite, UA Negative    RBC, UA 0-2    Bacteria, UA 2+             Follow Up:   Return in about 3 months (around 7/23/2025), or if symptoms worsen or fail to improve, for follow up for above problems. Longitudinal care..     An After Visit Summary and PPPS were made available to the  patient.                   RAY Lion MD, FACP, FHM      Electronically signed by Daquan Lion MD, 04/28/25, 9:16 PM CDT.

## 2025-05-01 RX ORDER — CLOPIDOGREL BISULFATE 75 MG/1
75 TABLET ORAL DAILY
Qty: 90 TABLET | Refills: 3 | Status: SHIPPED | OUTPATIENT
Start: 2025-05-01

## 2025-05-01 NOTE — TELEPHONE ENCOUNTER
Caller: Chapin Bradleyharini MOSES    Relationship: Self    Best call back number: [phone]    Requested Prescriptions:   Requested Prescriptions     Pending Prescriptions Disp Refills    clopidogrel (PLAVIX) 75 MG tablet 90 tablet 1     Sig: Take 1 tablet by mouth Daily.        Pharmacy where request should be sent: [facility] [address] [place] [address] [phone] [facility] [phone]      Last office visit with prescribing clinician: 10/9/2024   Last telemedicine visit with prescribing clinician: Visit date not found   Next office visit with prescribing clinician: Visit date not found     Additional details provided by patient: HAS 4 LEFT.  WANTS TO MAKE SURE THAT DR. LARES IS GOING TO KEEP HIM ON THIS MEDICATION. PLEASE REACH OUT TO LET HIM KNOW FOR SURE  HE IS HAVING SURGERY IN JUNE 16 ON HIS KNEE    Does the patient have less than a 3 day supply:  [x] Yes  [] No    Would you like a call back once the refill request has been completed: [x] Yes [] No    If the office needs to give you a call back, can they leave a voicemail: [] Yes [] No    Kevin León Rep   05/01/25 11:29 CDT

## 2025-05-06 ENCOUNTER — TELEPHONE (OUTPATIENT)
Age: 75
End: 2025-05-06

## 2025-05-06 NOTE — TELEPHONE ENCOUNTER
PT wife called and said that she needs to talk to a nurse about his scan and not getting a call back she needs a call after dinner

## 2025-05-06 NOTE — TELEPHONE ENCOUNTER
Attempted to return patients call, No answer VMB full. Patient no showed CT scan on 05/01. If it needs to be rescheduled they will need to call hospital at [phone]

## 2025-05-12 ENCOUNTER — PREP FOR PROCEDURE (OUTPATIENT)
Age: 75
End: 2025-05-12

## 2025-05-12 PROBLEM — M17.12 OSTEOARTHRITIS OF LEFT KNEE: Status: ACTIVE | Noted: 2025-05-12

## 2025-05-12 RX ORDER — SODIUM CHLORIDE 9 MG/ML
INJECTION, SOLUTION INTRAVENOUS PRN
Status: CANCELLED | OUTPATIENT
Start: 2025-06-27

## 2025-05-12 RX ORDER — SODIUM CHLORIDE 0.9 % (FLUSH) 0.9 %
5-40 SYRINGE (ML) INJECTION PRN
Status: CANCELLED | OUTPATIENT
Start: 2025-06-27

## 2025-05-12 RX ORDER — SODIUM CHLORIDE 0.9 % (FLUSH) 0.9 %
5-40 SYRINGE (ML) INJECTION EVERY 12 HOURS SCHEDULED
Status: CANCELLED | OUTPATIENT
Start: 2025-06-27

## 2025-05-14 ENCOUNTER — HOSPITAL ENCOUNTER (OUTPATIENT)
Dept: GENERAL RADIOLOGY | Age: 75
Discharge: HOME | End: 2025-05-14
Attending: ORTHOPAEDIC SURGERY
Payer: MEDICARE

## 2025-05-14 DIAGNOSIS — M17.12 PRIMARY OSTEOARTHRITIS OF LEFT KNEE: ICD-10-CM

## 2025-05-14 PROCEDURE — 73700 CT LOWER EXTREMITY W/O DYE: CPT

## 2025-06-09 ENCOUNTER — TELEPHONE (OUTPATIENT)
Age: 75
End: 2025-06-09

## 2025-06-09 ENCOUNTER — OFFICE VISIT (OUTPATIENT)
Dept: INTERNAL MEDICINE | Facility: CLINIC | Age: 75
End: 2025-06-09
Payer: MEDICARE

## 2025-06-09 ENCOUNTER — HOSPITAL ENCOUNTER (OUTPATIENT)
Dept: PREADMISSION TESTING | Age: 75
Discharge: HOME | End: 2025-06-13
Payer: MEDICARE

## 2025-06-09 VITALS — WEIGHT: 210 LBS | HEIGHT: 70 IN | BODY MASS INDEX: 30.06 KG/M2

## 2025-06-09 VITALS
HEART RATE: 62 BPM | OXYGEN SATURATION: 97 % | SYSTOLIC BLOOD PRESSURE: 128 MMHG | WEIGHT: 212 LBS | TEMPERATURE: 98.4 F | DIASTOLIC BLOOD PRESSURE: 62 MMHG | BODY MASS INDEX: 30.35 KG/M2 | HEIGHT: 70 IN

## 2025-06-09 DIAGNOSIS — E66.09 CLASS 1 OBESITY DUE TO EXCESS CALORIES WITH SERIOUS COMORBIDITY AND BODY MASS INDEX (BMI) OF 30.0 TO 30.9 IN ADULT: ICD-10-CM

## 2025-06-09 DIAGNOSIS — R21 RASH: ICD-10-CM

## 2025-06-09 DIAGNOSIS — E11.65 TYPE 2 DIABETES MELLITUS WITH HYPERGLYCEMIA, WITHOUT LONG-TERM CURRENT USE OF INSULIN: ICD-10-CM

## 2025-06-09 DIAGNOSIS — I10 ESSENTIAL HYPERTENSION: ICD-10-CM

## 2025-06-09 DIAGNOSIS — Z95.5 S/P DRUG ELUTING CORONARY STENT PLACEMENT: Primary | ICD-10-CM

## 2025-06-09 DIAGNOSIS — E66.811 CLASS 1 OBESITY DUE TO EXCESS CALORIES WITH SERIOUS COMORBIDITY AND BODY MASS INDEX (BMI) OF 30.0 TO 30.9 IN ADULT: ICD-10-CM

## 2025-06-09 DIAGNOSIS — Z01.818 PREOPERATIVE EXAMINATION: ICD-10-CM

## 2025-06-09 LAB
APTT PPP: 34 SEC (ref 26–36.2)
EKG P AXIS: 99 DEGREES
EKG P-R INTERVAL: 216 MS
EKG Q-T INTERVAL: 436 MS
EKG QRS DURATION: 142 MS
EKG QTC CALCULATION (BAZETT): 429 MS
EKG T AXIS: 69 DEGREES
INR PPP: 1.01 (ref 0.88–1.18)
PROTHROMBIN TIME: 13.2 SEC (ref 12–14.6)

## 2025-06-09 PROCEDURE — 85610 PROTHROMBIN TIME: CPT

## 2025-06-09 PROCEDURE — 93005 ELECTROCARDIOGRAM TRACING: CPT | Performed by: ANESTHESIOLOGY

## 2025-06-09 PROCEDURE — 85730 THROMBOPLASTIN TIME PARTIAL: CPT

## 2025-06-09 PROCEDURE — 93010 ELECTROCARDIOGRAM REPORT: CPT | Performed by: INTERNAL MEDICINE

## 2025-06-09 RX ORDER — CARVEDILOL 3.12 MG/1
3.12 TABLET ORAL DAILY
COMMUNITY

## 2025-06-09 RX ORDER — CLOTRIMAZOLE AND BETAMETHASONE DIPROPIONATE 10; .64 MG/G; MG/G
1 CREAM TOPICAL 2 TIMES DAILY
Qty: 15 G | Refills: 0 | Status: SHIPPED | OUTPATIENT
Start: 2025-06-09

## 2025-06-10 ENCOUNTER — ANESTHESIA EVENT (OUTPATIENT)
Dept: OPERATING ROOM | Age: 75
End: 2025-06-10
Payer: MEDICARE

## 2025-06-12 NOTE — PROGRESS NOTES
"      Chief Complaint  bothersom spot on left foot (Side of left foot- itches has had about 3 weeks/) and sore on buttock (Left but cheek )    Subjective        Chung Bradley presents to [facility]    HPI    Patient here for the above problems.  See Assessment and Plan for further HPI components.      Review of Systems    Objective   Vital Signs:  /62 (BP Location: Left arm, Patient Position: Sitting, Cuff Size: Adult)   Pulse 62   Temp 98.4 °F (36.9 °C) (Temporal)   Ht 177.8 cm (70\")   Wt 96.2 kg (212 lb)   SpO2 97%   BMI 30.42 kg/m²   Estimated body mass index is 30.42 kg/m² as calculated from the following:    Height as of this encounter: 177.8 cm (70\").    Weight as of this encounter: 96.2 kg (212 lb).      Physical Exam  Vitals and nursing note reviewed.   Constitutional:       Appearance: He is not ill-appearing.   Eyes:      General: No scleral icterus.     Conjunctiva/sclera: Conjunctivae normal.   Cardiovascular:      Rate and Rhythm: Normal rate and regular rhythm.   Pulmonary:      Effort: Pulmonary effort is normal. No respiratory distress.   Musculoskeletal:        Feet:    Skin:         Neurological:      General: No focal deficit present.      Mental Status: He is alert and oriented to person, place, and time.   Psychiatric:         Mood and Affect: Mood normal.         Behavior: Behavior normal.                       Assessment and Plan   Diagnoses and all orders for this visit:    1. S/P drug eluting coronary stent placement (Primary)    2. Essential hypertension    3. Rash, left foot and buttock    4. Type 2 diabetes mellitus with hyperglycemia, without long-term current use of insulin    5. Class 1 obesity due to excess calories with serious comorbidity and body mass index (BMI) of 30.0 to 30.9 in adult    6. Preoperative examination    Other orders  -     clotrimazole-betamethasone (LOTRISONE) 1-0.05 % cream; Apply 1 Application topically to the " appropriate area as directed 2 (Two) Times a Day.  Dispense: 15 g; Refill: 0        History of Present Illness  The patient presents for preoperative evaluation, diabetes mellitus, and skin rash.    He is scheduled for a surgical procedure on 06/27/2025. He has been advised to discontinue Ozempic 7 days prior to the surgery and is seeking clarification on whether he should take any medication for his blood glucose levels during this period. He has some glipizide at home. He is also inquiring about the necessity of taking aspirin on the day of the surgery. He is currently on Plavix, which he plans to stop 7 to 8 days before the surgery. He has a stent in his heart, placed in November 2024, and Dr. Malik has already communicated with Dr. To regarding the discontinuation of Plavix for the upcoming knee replacement surgery.    He has been experiencing an itchy spot on his foot for approximately 4 to 5 weeks, which he has been unable to resolve. The itchiness intensifies upon rubbing, similar to the sensation of poison ivy. The spot initially presented as a small red area but has since enlarged over the past 4 weeks. It has a rough texture, akin to small seeds, but is not associated with pain. He has attempted to alleviate the symptoms by applying calamine lotion, but this has not been effective.    Additionally, he reports a small spot on his buttock that has been present for over 10 weeks. This spot, which resembles a scab, does not cause discomfort when sitting unless he remains seated for an extended period. However, it becomes painful if he slides back while lying down, describing the sensation as akin to being pricked with a needle.    PAST SURGICAL HISTORY:  Heart stent placement in 11/2024.  Knee replacement surgery scheduled for 06/27/2025.      Assessment & Plan  Preoperative evaluation.  He is scheduled for surgery on 06/27/2025. He has been advised to discontinue Ozempic 7 days prior to the surgery and is  seeking clarification on whether he should take any medication for his blood glucose levels during this period. He has some glipizide at home. He is also inquiring about the necessity of taking aspirin on the day of the surgery. He is currently on Plavix, which he plans to stop 7 days before the surgery per cardiology he is to continue his aspirin without cessation. He has a stent in his heart, placed in November 2024, and Dr. Mosqueda has already communicated with Dr. To regarding the discontinuation of Plavix for the upcoming knee replacement surgery. He will discontinue Ozempic 7 days prior to the surgery and resume it postoperatively as per the surgeon's instructions. His blood glucose levels will be closely monitored before, during, and after the surgery. If necessary, insulin may be administered intraoperatively. Post-surgery, if his blood glucose levels remain elevated, glipizide may be reintroduced into his regimen. He will continue his aspirin therapy without interruption.    Diabetes mellitus.  He will discontinue Ozempic 7 days prior to the surgery and resume it postoperatively as per the surgeon's instructions. His blood glucose levels will be closely monitored before, during, and after the surgery. If necessary, insulin may be administered intraoperatively. Post-surgery, if his blood glucose levels remain elevated, glipizide may be reintroduced into his regimen.    Skin rash on left foot  A prescription for Lotrisone cream has been issued to manage the itching and facilitate the resolution of the rash. He is instructed to apply this cream twice daily.    Buttock rash.  He is advised to apply the same Lotrisone cream used for his foot rash to the affected area on his buttock.    Patient has a BMI > 30.  Obesity increases risks of diseases such as diabetes, coronary artery disease, hypertension, sleep apnea, hyperlipidemia, arthritis, and stroke.  Recommend focusing on portion control and making healthy  diet choices.  Avoid fast food.  Avoid calorie beverages including sweet tea, juice, soft drinks, and alcohol.  Recommend increasing your activity and starting a regular exercise program. Can consider utilizing calorie counting apps such as [employer].          Result Review :                               Follow Up   Return in about 6 weeks (around 7/21/2025), or if symptoms worsen or fail to improve, for follow up for above problems. Longitudinal care..  Patient was given instructions and counseling regarding his condition or for health maintenance advice. Please see specific information pulled into the AVS if appropriate.       RAY Lion MD, FACP, [facility]      Electronically signed by Daquan Lion MD, 06/12/25, 3:09 PM CDT.    Patient or patient representative verbalized consent for the use of Ambient Listening during the visit with  Daquan Lion MD for chart documentation. 6/12/2025  15:13 CDT

## 2025-06-13 DIAGNOSIS — I10 PRIMARY HYPERTENSION: ICD-10-CM

## 2025-06-13 DIAGNOSIS — N18.2 STAGE 2 CHRONIC KIDNEY DISEASE: ICD-10-CM

## 2025-06-13 RX ORDER — LOSARTAN POTASSIUM 50 MG/1
50 TABLET ORAL 2 TIMES DAILY
Qty: 180 TABLET | Refills: 3 | Status: SHIPPED | OUTPATIENT
Start: 2025-06-13

## 2025-06-13 NOTE — TELEPHONE ENCOUNTER
Caller: Eloina Bradley    Relationship: Emergency Contact    Best call back number: [phone] (Home)     Requested Prescriptions:   Requested Prescriptions     Pending Prescriptions Disp Refills    losartan (COZAAR) 50 MG tablet 180 tablet 3     Sig: Take 1 tablet by mouth 2 (Two) Times a Day.        Pharmacy where request should be sent: [facility] [address] [place] [address] [phone] [facility] [phone] FX     Last office visit with prescribing clinician: 6/9/2025   Last telemedicine visit with prescribing clinician: Visit date not found   Next office visit with prescribing clinician: 7/23/2025     Additional details provided by patient: REQUESTING A PICKUP TODAY.    Does the patient have less than a 3 day supply:  [x] Yes  [] No      Kevin Pulido Rep   06/13/25 08:44 CDT

## 2025-06-17 ENCOUNTER — TELEPHONE (OUTPATIENT)
Age: 75
End: 2025-06-17

## 2025-06-17 DIAGNOSIS — R39.198 DIFFICULTY URINATING: ICD-10-CM

## 2025-06-17 DIAGNOSIS — Z00.00 PREVENTATIVE HEALTH CARE: Primary | ICD-10-CM

## 2025-06-17 RX ORDER — MUPIROCIN 2 %
OINTMENT (GRAM) TOPICAL
Qty: 1 EACH | Refills: 0 | Status: SHIPPED | OUTPATIENT
Start: 2025-06-17

## 2025-06-17 RX ORDER — TAMSULOSIN HYDROCHLORIDE 0.4 MG/1
0.4 CAPSULE ORAL DAILY
Qty: 30 CAPSULE | Refills: 0 | Status: SHIPPED | OUTPATIENT
Start: 2025-06-17

## 2025-06-17 NOTE — TELEPHONE ENCOUNTER
Pt wife called and said that she needs to talk to a nurse about his meds before surgery they have not got the nasal spray

## 2025-06-17 NOTE — PROGRESS NOTES
Patients wife called very upset. She went to pharmacy and none of her husbands pre op medication has been sent to pharmacy and patient having LT TKR on 6/27. Pre op medication Batriban, narcan and flomax sent to [facility] pharmacy in [place].

## 2025-06-19 ENCOUNTER — TELEPHONE (OUTPATIENT)
Dept: INTERNAL MEDICINE | Facility: CLINIC | Age: 75
End: 2025-06-19
Payer: MEDICARE

## 2025-06-19 NOTE — TELEPHONE ENCOUNTER
Patients wife called on 6/18/25 concerned about low BP running in the 90'-low 100's/40;s/50s.   Per Dr. Lion pt needs to do the following:  Today(6/18/25) he needs to hold the hydralazine and second dose of carvedilol. Starting tomorrow, hold chlorthalidone and hydralazine.

## 2025-06-20 ENCOUNTER — TELEPHONE (OUTPATIENT)
Age: 75
End: 2025-06-20

## 2025-06-23 NOTE — TELEPHONE ENCOUNTER
Returned call to patient and patients wife. Due to patients history of bladder CA patient presently has a illiostomy and encouraged to only take medications prescribed by his urologist. Informed patient that he does not need to take flomax unless his urologist recommends him taking. Patient verbalized understanding

## 2025-06-24 ASSESSMENT — PROMIS GLOBAL HEALTH SCALE
IN GENERAL, HOW WOULD YOU RATE YOUR SATISFACTION WITH YOUR SOCIAL ACTIVITIES AND RELATIONSHIPS [ON A SCALE OF 1 (POOR) TO 5 (EXCELLENT)]?: GOOD
WHO IS THE PERSON COMPLETING THE PROMIS V1.1 SURVEY?: SELF
IN THE PAST 7 DAYS, HOW WOULD YOU RATE YOUR FATIGUE ON AVERAGE [ON A SCALE FROM 1 (NONE) TO 5 (VERY SEVERE)]?: MODERATE
IN GENERAL, HOW WOULD YOU RATE YOUR PHYSICAL HEALTH [ON A SCALE OF 1 (POOR) TO 5 (EXCELLENT)]?: FAIR
TO WHAT EXTENT ARE YOU ABLE TO CARRY OUT YOUR EVERYDAY PHYSICAL ACTIVITIES SUCH AS WALKING, CLIMBING STAIRS, CARRYING GROCERIES, OR MOVING A CHAIR [ON A SCALE OF 1 (NOT AT ALL) TO 5 (COMPLETELY)]?: MODERATELY
IN GENERAL, WOULD YOU SAY YOUR HEALTH IS...[ON A SCALE OF 1 (POOR) TO 5 (EXCELLENT)]: FAIR
IN GENERAL, WOULD YOU SAY YOUR QUALITY OF LIFE IS...[ON A SCALE OF 1 (POOR) TO 5 (EXCELLENT)]: FAIR
SUM OF RESPONSES TO QUESTIONS 3, 6, 7, & 8: 16
IN GENERAL, HOW WOULD YOU RATE YOUR MENTAL HEALTH, INCLUDING YOUR MOOD AND YOUR ABILITY TO THINK [ON A SCALE OF 1 (POOR) TO 5 (EXCELLENT)]?: GOOD
IN THE PAST 7 DAYS, HOW OFTEN HAVE YOU BEEN BOTHERED BY EMOTIONAL PROBLEMS, SUCH AS FEELING ANXIOUS, DEPRESSED, OR IRRITABLE [ON A SCALE FROM 1 (NEVER) TO 5 (ALWAYS)]?: RARELY
SUM OF RESPONSES TO QUESTIONS 2, 4, 5, & 10: 12
HOW IS THE PROMIS V1.1 BEING ADMINISTERED?: TELEPHONE
IN THE PAST 7 DAYS, HOW WOULD YOU RATE YOUR PAIN ON AVERAGE [ON A SCALE FROM 0 (NO PAIN) TO 10 (WORST IMAGINABLE PAIN)]?: 8
IN GENERAL, PLEASE RATE HOW WELL YOU CARRY OUT YOUR USUAL SOCIAL ACTIVITIES (INCLUDES ACTIVITIES AT HOME, AT WORK, AND IN YOUR COMMUNITY, AND RESPONSIBILITIES AS A PARENT, CHILD, SPOUSE, EMPLOYEE, FRIEND, ETC) [ON A SCALE OF 1 (POOR) TO 5 (EXCELLENT)]?: GOOD

## 2025-06-24 ASSESSMENT — KOOS JR
TWISING OR PIVOTING ON KNEE: MODERATE
RISING FROM SITTING: SEVERE
KOOS JR TOTAL INTERVAL SCORE: 39.625
GOING UP OR DOWN STAIRS: SEVERE
BENDING TO THE FLOOR TO PICK UP OBJECT: SEVERE
STANDING UPRIGHT: MODERATE
HOW SEVERE IS YOUR KNEE STIFFNESS AFTER FIRST WAKING IN MORNING: SEVERE
STRAIGHTENING KNEE FULLY: SEVERE

## 2025-06-26 ENCOUNTER — OFFICE VISIT (OUTPATIENT)
Dept: CARDIOLOGY | Facility: CLINIC | Age: 75
End: 2025-06-26
Payer: MEDICARE

## 2025-06-26 VITALS
WEIGHT: 210.8 LBS | SYSTOLIC BLOOD PRESSURE: 134 MMHG | HEART RATE: 57 BPM | BODY MASS INDEX: 30.18 KG/M2 | HEIGHT: 70 IN | DIASTOLIC BLOOD PRESSURE: 66 MMHG | OXYGEN SATURATION: 95 %

## 2025-06-26 DIAGNOSIS — I25.10 CORONARY ARTERY DISEASE INVOLVING NATIVE CORONARY ARTERY OF NATIVE HEART WITHOUT ANGINA PECTORIS: ICD-10-CM

## 2025-06-26 DIAGNOSIS — E11.65 TYPE 2 DIABETES MELLITUS WITH HYPERGLYCEMIA, WITHOUT LONG-TERM CURRENT USE OF INSULIN: Primary | ICD-10-CM

## 2025-06-26 DIAGNOSIS — I10 ESSENTIAL HYPERTENSION: ICD-10-CM

## 2025-06-26 PROCEDURE — 3078F DIAST BP <80 MM HG: CPT | Performed by: NURSE PRACTITIONER

## 2025-06-26 PROCEDURE — 93000 ELECTROCARDIOGRAM COMPLETE: CPT | Performed by: NURSE PRACTITIONER

## 2025-06-26 PROCEDURE — 1160F RVW MEDS BY RX/DR IN RCRD: CPT | Performed by: NURSE PRACTITIONER

## 2025-06-26 PROCEDURE — 3075F SYST BP GE 130 - 139MM HG: CPT | Performed by: NURSE PRACTITIONER

## 2025-06-26 PROCEDURE — 99214 OFFICE O/P EST MOD 30 MIN: CPT | Performed by: NURSE PRACTITIONER

## 2025-06-26 PROCEDURE — 1159F MED LIST DOCD IN RCRD: CPT | Performed by: NURSE PRACTITIONER

## 2025-06-26 NOTE — PROGRESS NOTES
"    Subjective:     Encounter Date: 6/26/2025      Patient ID: Chung Bradley is a 74 y.o. male.    Chief Complaint: follow up CAD     History of Present Illness    The patient established care with Dr. Mosqueda for exertional dyspnea and fatigue.  He had an abnormal stress echocardiogram followed by CTA of the coronary arteries showing hemodynamically significant CT-FFR findings of the mid LAD.  Therefore he underwent cardiac catheterization in early November 2024 showing 60 to 70% stenosis in the mid LAD.  This was successfully treated with 1 drug-eluting stent.  He was noted to have a normal LVEF and LVEDP.  He was discharged home in stable condition on dual antiplatelet therapy with aspirin and Plavix as well as other guideline directed medical therapy.    He followed up in December and was doing well.  He reported he might be mildly improved after stent placement.  He stated \"I have a little bit more juice.\"  He was otherwise without complaint.  He reported issues with labile blood pressures but by the time he saw me for that visit his blood pressure had normalized.  No changes were made at that visit.    He is going to have knee surgery tomorrow with Dr. To.  We have provided a cardiac risk assessment.  As he is greater than 6 months out from PCI for non-ACS indication, he is currently holding his Plavix.  He confirmed he is continuing his aspirin without interruption.  He states he is feeling well.  He denies any chest pain, shortness of breath, decline in stamina.  He states he has lost over 30 pounds and subsequently he started to have low blood pressure so Dr. Lion is working on adjusting his blood pressure medication.  He is now off of all of his blood pressure medication with the exception of his losartan.  His blood pressures remain normal and he feels better.    Specifically he states he is no longer on Coreg, chlorthalidone, hydralazine.      The following portions of the patient's history were " reviewed and updated as appropriate: allergies, current medications, past family history, past medical history, past social history, past surgical history and problem list.    Review of Systems   Constitutional: Negative for malaise/fatigue.   Cardiovascular:  Negative for chest pain, claudication, dyspnea on exertion, leg swelling, near-syncope, orthopnea, palpitations, paroxysmal nocturnal dyspnea and syncope.   Respiratory:  Negative for cough and shortness of breath.    Hematologic/Lymphatic: Bruises/bleeds easily.   Musculoskeletal:  Positive for joint pain (knee). Negative for falls.   Gastrointestinal:  Negative for bloating.   Neurological:  Negative for dizziness, light-headedness and weakness.       Allergies   Allergen Reactions    Penicillins Rash     POSSIBLY NOT THE CAUSE       Current Outpatient Medications:     allopurinol (ZYLOPRIM) 300 MG tablet, Take 1 tablet by mouth Daily., Disp: , Rfl:     Ascorbic Acid (VITAMIN C PO), Take  by mouth., Disp: , Rfl:     ASPIRIN 81 PO, Take  by mouth., Disp: , Rfl:     atorvastatin (LIPITOR) 40 MG tablet, Take 1 tablet by mouth Every Night., Disp: 90 tablet, Rfl: 3    cetirizine (zyrTEC) 10 MG tablet, Take 1 tablet by mouth Daily., Disp: 30 tablet, Rfl: 1    Cholecalciferol (D3 ADULT PO), Take  by mouth., Disp: , Rfl:     clopidogrel (PLAVIX) 75 MG tablet, Take 1 tablet by mouth Daily., Disp: 90 tablet, Rfl: 3    clotrimazole-betamethasone (LOTRISONE) 1-0.05 % cream, Apply 1 Application topically to the appropriate area as directed 2 (Two) Times a Day., Disp: 15 g, Rfl: 0    Cyanocobalamin (B-12 PO), Take  by mouth., Disp: , Rfl:     cyanocobalamin (VITAMIN B-12) 1000 MCG tablet, Take 1 tablet by mouth Daily., Disp: , Rfl:     fluticasone (FLONASE) 50 MCG/ACT nasal spray, 1 spray into the nostril(s) as directed by provider 2 (Two) Times a Day As Needed for Allergies., Disp: 1 bottle, Rfl: 3    gabapentin (NEURONTIN) 400 MG capsule, Take 2 capsules by mouth Every  "8 (Eight) Hours. Take 2 capsules by mouth in the am, two capsules by mouth in the afternoon, and 2 capsules by mouth in the pm.   Patient is taking 2 capsules in the AM, 2 capsules in the afternoon, 1 capsule in the PM, Disp: , Rfl:     losartan (COZAAR) 50 MG tablet, Take 1 tablet by mouth 2 (Two) Times a Day., Disp: 180 tablet, Rfl: 3    MAGNESIUM PO, Take  by mouth., Disp: , Rfl:     Multiple Vitamins-Minerals (ZINC PO), Take  by mouth., Disp: , Rfl:     nitroglycerin (NITROSTAT) 0.4 MG SL tablet, Place 1 tablet under the tongue Every 5 (Five) Minutes As Needed for Chest Pain (Systolic BP Greater Than 100). Take no more than 3 doses in 15 minutes., Disp: 15 tablet, Rfl: 12    Nystatin powder, Apply  topically to the appropriate area as directed As Needed., Disp: , Rfl:     omeprazole (priLOSEC) 20 MG capsule, Take 1 capsule by mouth 2 (Two) Times a Day., Disp: , Rfl:     Semaglutide, 2 MG/DOSE, (OZEMPIC) 8 MG/3ML solution pen-injector, Inject 2 mg under the skin into the appropriate area as directed 1 (One) Time Per Week., Disp: 24 mL, Rfl: 2    TiZANidine (ZANAFLEX) 4 MG capsule, Take 1 capsule by mouth 3 (Three) Times a Day As Needed for Muscle Spasms., Disp: 30 capsule, Rfl: 0         Objective:    /66   Pulse 57   Ht 177.8 cm (70\")   Wt 95.6 kg (210 lb 12.8 oz)   SpO2 95%   BMI 30.25 kg/m²        Vitals and nursing note reviewed.   Constitutional:       General: Not in acute distress.     Appearance: Well-developed and not in distress. Not diaphoretic.   Neck:      Vascular: No JVD.   Pulmonary:      Effort: Pulmonary effort is normal. No respiratory distress.      Breath sounds: Normal breath sounds.   Cardiovascular:      Normal rate. Regular rhythm.      Murmurs: There is no murmur.   Edema:     Peripheral edema absent.   Abdominal:      Tenderness: There is no abdominal tenderness.   Skin:     General: Skin is warm and dry.   Neurological:      Mental Status: Alert and oriented to person, " place, and time.         Lab Review:   Lab Results   Component Value Date    CHOL 98 11/06/2024    CHLPL 80 04/16/2025    TRIG 151 (H) 04/16/2025    HDL 25 (L) 04/16/2025    LDL 29 04/16/2025        Lab Results   Component Value Date    HGBA1C 6.00 (H) 04/16/2025        Lab Results   Component Value Date    GLUCOSE 108 (H) 04/16/2025    BUN 25 (H) 04/16/2025    CREATININE 1.19 04/16/2025     04/16/2025    K 4.0 04/16/2025     04/16/2025    CALCIUM 9.0 04/16/2025    PROTEINTOT 6.4 04/16/2025    ALBUMIN 4.1 04/16/2025    ALT 13 04/16/2025    AST 22 04/16/2025    ALKPHOS 100 04/16/2025    BILITOT 0.6 04/16/2025    GLOB 2.3 04/16/2025    AGRATIO 1.8 04/16/2025    BCR 21.0 04/16/2025    ANIONGAP 9 12/19/2024    EGFR 63.3 04/16/2025              ECG 12 Lead    Date/Time: 6/26/2025 9:16 AM  Performed by: Marisabel Armijo APRN    Authorized by: Marisabel Armijo APRN  Comparison: compared with previous ECG from 12/4/2024  Similar to previous ECG  Rhythm: sinus bradycardia  Ectopy: infrequent PVCs  BPM: 57  Conduction: right bundle branch block  Other findings: non-specific ST-T wave changes    Clinical impression: abnormal EKG  Comments: Inferior Q waves           Results for orders placed during the hospital encounter of 09/26/24    Adult Stress Echo W/ Cont or Stress Agent if Necessary Per Protocol    Interpretation Summary    Poor functional capacity, patient was changed to dobutamine due to inability to reach target heart rate with exercise.    Clinically and electrically negative.    Left ventricular systolic function is normal at rest.  There is stress-induced hypokinesis of the mid anterior, apical anterior and mid anteroseptal walls.    Abnormal stress echo consistent with an intermediate risk study for myocardial ischemia.    11/2024 cath report:  Impression:  1. Single vessel coronary artery disease, with severe focal stenosis of the mid LAD corresponding with area of abnormality on CT FFR (0.79)  2.  Successful percutaneous coronary intervention the mid-LAD using a 2.5x18 Xience drug-eluting stent.  3. Normal LVEF with normal EDP     Plan:   1. 2 hours bedrest  2. Continue aspirin 81mg daily indefinitely  3. Will transition to clopidogrel tonight with a 300 mg p.o. dose x 1, and start 75 mg daily tomorrow. Will plan total duration of dual antiplatelet therapy, without interruption, to be 6 months.   4. Check lipid panel in the a.m. and change lovastatin to high intensity statin, regardless of lipid results   5. Cardiac rehab referral.  6. Follow-up with me in 4 weeks, and PCP in 1-2 weeks.    Cassius Mosqueda MD     Assessment:      Problem List Items Addressed This Visit          Cardiac and Vasculature    Essential hypertension    Coronary artery disease involving native coronary artery of native heart without angina pectoris       Endocrine and Metabolic    Type 2 diabetes mellitus with hyperglycemia, without long-term current use of insulin - Primary       Plan:     1.  Coronary artery disease: Established problem, stable.  No angina.  He had mild improvement in his energy level after LAD PCI in November 2024.    -Continue aspirin 81 mg daily indefinitely without interruption  -He is currently holding Plavix for his knee surgery which is acceptable as he is greater than 6 months out from PCI for non-ACS indication.  We discussed the risks versus benefits of continuing Plavix after knee surgery.  He denies any major bleeding issues but does bruise easily.  He will resume Plavix 75 mg daily when felt safe to do so per Dr. To after knee surgery.  He will call prior to next appointment with any new or worsening bleeding issues, and at that point we would consider complete discontinuation of Plavix  -Continue high intensity statin  -No longer on beta-blocker due to some hypotension with recent weight loss  -Not requiring as needed sublingual nitroglycerin    2.  Hypertension: Well-controlled.  Continue current  medical therapy and continue to follow with PCP.    3.  Hyperlipidemia: Well-controlled with an LDL of 29.  Continue high intensity statin.    Follow-up with Dr. Mosqueda in 6 months, call sooner with symptoms or concerns.

## 2025-06-27 ENCOUNTER — HOSPITAL ENCOUNTER (OUTPATIENT)
Age: 75
Setting detail: OUTPATIENT SURGERY
Discharge: HOME | End: 2025-06-27
Attending: ORTHOPAEDIC SURGERY | Admitting: ORTHOPAEDIC SURGERY
Payer: MEDICARE

## 2025-06-27 ENCOUNTER — ANESTHESIA (OUTPATIENT)
Dept: OPERATING ROOM | Age: 75
End: 2025-06-27
Payer: MEDICARE

## 2025-06-27 VITALS
HEIGHT: 70 IN | HEART RATE: 50 BPM | DIASTOLIC BLOOD PRESSURE: 67 MMHG | RESPIRATION RATE: 20 BRPM | WEIGHT: 210 LBS | SYSTOLIC BLOOD PRESSURE: 174 MMHG | TEMPERATURE: 97.6 F | BODY MASS INDEX: 30.06 KG/M2 | OXYGEN SATURATION: 94 %

## 2025-06-27 DIAGNOSIS — M17.12 PRIMARY OSTEOARTHRITIS OF LEFT KNEE: Primary | ICD-10-CM

## 2025-06-27 LAB
GLUCOSE BLD-MCNC: 112 MG/DL (ref 70–99)
GLUCOSE BLD-MCNC: 147 MG/DL (ref 70–99)
PERFORMED ON: ABNORMAL
PERFORMED ON: ABNORMAL

## 2025-06-27 PROCEDURE — 2580000003 HC RX 258: Performed by: NURSE ANESTHETIST, CERTIFIED REGISTERED

## 2025-06-27 PROCEDURE — 3700000001 HC ADD 15 MINUTES (ANESTHESIA): Performed by: ORTHOPAEDIC SURGERY

## 2025-06-27 PROCEDURE — 6360000002 HC RX W HCPCS: Performed by: ORTHOPAEDIC SURGERY

## 2025-06-27 PROCEDURE — 2500000003 HC RX 250 WO HCPCS: Performed by: NURSE ANESTHETIST, CERTIFIED REGISTERED

## 2025-06-27 PROCEDURE — 6360000002 HC RX W HCPCS: Performed by: ANESTHESIOLOGY

## 2025-06-27 PROCEDURE — 27447 TOTAL KNEE ARTHROPLASTY: CPT | Performed by: ORTHOPAEDIC SURGERY

## 2025-06-27 PROCEDURE — 2709999900 HC NON-CHARGEABLE SUPPLY: Performed by: ORTHOPAEDIC SURGERY

## 2025-06-27 PROCEDURE — C1776 JOINT DEVICE (IMPLANTABLE): HCPCS | Performed by: ORTHOPAEDIC SURGERY

## 2025-06-27 PROCEDURE — 7100000011 HC PHASE II RECOVERY - ADDTL 15 MIN: Performed by: ORTHOPAEDIC SURGERY

## 2025-06-27 PROCEDURE — 2500000003 HC RX 250 WO HCPCS: Performed by: ORTHOPAEDIC SURGERY

## 2025-06-27 PROCEDURE — 2580000003 HC RX 258: Performed by: ANESTHESIOLOGY

## 2025-06-27 PROCEDURE — 6360000002 HC RX W HCPCS: Performed by: NURSE ANESTHETIST, CERTIFIED REGISTERED

## 2025-06-27 PROCEDURE — C1713 ANCHOR/SCREW BN/BN,TIS/BN: HCPCS | Performed by: ORTHOPAEDIC SURGERY

## 2025-06-27 PROCEDURE — 7100000010 HC PHASE II RECOVERY - FIRST 15 MIN: Performed by: ORTHOPAEDIC SURGERY

## 2025-06-27 PROCEDURE — 64447 NJX AA&/STRD FEMORAL NRV IMG: CPT | Performed by: NURSE ANESTHETIST, CERTIFIED REGISTERED

## 2025-06-27 PROCEDURE — 7100000001 HC PACU RECOVERY - ADDTL 15 MIN: Performed by: ORTHOPAEDIC SURGERY

## 2025-06-27 PROCEDURE — 82962 GLUCOSE BLOOD TEST: CPT

## 2025-06-27 PROCEDURE — 97161 PT EVAL LOW COMPLEX 20 MIN: CPT

## 2025-06-27 PROCEDURE — 3600000005 HC SURGERY LEVEL 5 BASE: Performed by: ORTHOPAEDIC SURGERY

## 2025-06-27 PROCEDURE — 3600000015 HC SURGERY LEVEL 5 ADDTL 15MIN: Performed by: ORTHOPAEDIC SURGERY

## 2025-06-27 PROCEDURE — 2720000010 HC SURG SUPPLY STERILE: Performed by: ORTHOPAEDIC SURGERY

## 2025-06-27 PROCEDURE — 3700000000 HC ANESTHESIA ATTENDED CARE: Performed by: ORTHOPAEDIC SURGERY

## 2025-06-27 PROCEDURE — 7100000000 HC PACU RECOVERY - FIRST 15 MIN: Performed by: ORTHOPAEDIC SURGERY

## 2025-06-27 DEVICE — CEMENT BONE 40 GM W/ GENTMYCN HI VISC PALACOS R+G: Type: IMPLANTABLE DEVICE | Site: KNEE | Status: FUNCTIONAL

## 2025-06-27 DEVICE — IMPLANT PAT 35X7MM CR IPOLY ITOTAL: Type: IMPLANTABLE DEVICE | Site: KNEE | Status: FUNCTIONAL

## 2025-06-27 DEVICE — JIGS CR RIGHT INDENTITY ITOTAL: Type: IMPLANTABLE DEVICE | Status: FUNCTIONAL

## 2025-06-27 DEVICE — TRAY TIBIAL CR IDENTITY ITOTAL: Type: IMPLANTABLE DEVICE | Site: KNEE | Status: FUNCTIONAL

## 2025-06-27 DEVICE — COMPONENT FEMORAL CR IDENTITY ITOTAL: Type: IMPLANTABLE DEVICE | Site: KNEE | Status: FUNCTIONAL

## 2025-06-27 DEVICE — IMPLANTABLE DEVICE: Type: IMPLANTABLE DEVICE | Site: KNEE | Status: FUNCTIONAL

## 2025-06-27 RX ORDER — SODIUM CHLORIDE 9 MG/ML
INJECTION, SOLUTION INTRAVENOUS PRN
Status: DISCONTINUED | OUTPATIENT
Start: 2025-06-27 | End: 2025-06-27 | Stop reason: HOSPADM

## 2025-06-27 RX ORDER — ROPIVACAINE HYDROCHLORIDE 5 MG/ML
INJECTION, SOLUTION EPIDURAL; INFILTRATION; PERINEURAL
Status: COMPLETED | OUTPATIENT
Start: 2025-06-27 | End: 2025-06-27

## 2025-06-27 RX ORDER — METOCLOPRAMIDE HYDROCHLORIDE 5 MG/ML
10 INJECTION INTRAMUSCULAR; INTRAVENOUS
Status: DISCONTINUED | OUTPATIENT
Start: 2025-06-27 | End: 2025-06-27 | Stop reason: HOSPADM

## 2025-06-27 RX ORDER — SODIUM CHLORIDE, SODIUM LACTATE, POTASSIUM CHLORIDE, CALCIUM CHLORIDE 600; 310; 30; 20 MG/100ML; MG/100ML; MG/100ML; MG/100ML
INJECTION, SOLUTION INTRAVENOUS CONTINUOUS
Status: DISCONTINUED | OUTPATIENT
Start: 2025-06-27 | End: 2025-06-27 | Stop reason: HOSPADM

## 2025-06-27 RX ORDER — VANCOMYCIN HYDROCHLORIDE 1 G/20ML
INJECTION, POWDER, LYOPHILIZED, FOR SOLUTION INTRAVENOUS PRN
Status: DISCONTINUED | OUTPATIENT
Start: 2025-06-27 | End: 2025-06-27 | Stop reason: HOSPADM

## 2025-06-27 RX ORDER — OXYCODONE AND ACETAMINOPHEN 7.5; 325 MG/1; MG/1
1 TABLET ORAL
Status: DISCONTINUED | OUTPATIENT
Start: 2025-06-27 | End: 2025-06-27 | Stop reason: HOSPADM

## 2025-06-27 RX ORDER — SODIUM CHLORIDE 0.9 % (FLUSH) 0.9 %
5-40 SYRINGE (ML) INJECTION EVERY 12 HOURS SCHEDULED
Status: DISCONTINUED | OUTPATIENT
Start: 2025-06-27 | End: 2025-06-27 | Stop reason: HOSPADM

## 2025-06-27 RX ORDER — DIPHENHYDRAMINE HYDROCHLORIDE 50 MG/ML
12.5 INJECTION, SOLUTION INTRAMUSCULAR; INTRAVENOUS
Status: DISCONTINUED | OUTPATIENT
Start: 2025-06-27 | End: 2025-06-27 | Stop reason: HOSPADM

## 2025-06-27 RX ORDER — SODIUM CHLORIDE 0.9 % (FLUSH) 0.9 %
5-40 SYRINGE (ML) INJECTION PRN
Status: DISCONTINUED | OUTPATIENT
Start: 2025-06-27 | End: 2025-06-27 | Stop reason: HOSPADM

## 2025-06-27 RX ORDER — HYDRALAZINE HYDROCHLORIDE 20 MG/ML
10 INJECTION INTRAMUSCULAR; INTRAVENOUS
Status: DISCONTINUED | OUTPATIENT
Start: 2025-06-27 | End: 2025-06-27 | Stop reason: HOSPADM

## 2025-06-27 RX ORDER — HYDROMORPHONE HYDROCHLORIDE 1 MG/ML
0.5 INJECTION, SOLUTION INTRAMUSCULAR; INTRAVENOUS; SUBCUTANEOUS EVERY 5 MIN PRN
Status: DISCONTINUED | OUTPATIENT
Start: 2025-06-27 | End: 2025-06-27 | Stop reason: HOSPADM

## 2025-06-27 RX ORDER — PROPOFOL 10 MG/ML
INJECTION, EMULSION INTRAVENOUS
Status: DISCONTINUED | OUTPATIENT
Start: 2025-06-27 | End: 2025-06-27 | Stop reason: SDUPTHER

## 2025-06-27 RX ORDER — DOCUSATE SODIUM 100 MG/1
100 CAPSULE, LIQUID FILLED ORAL 2 TIMES DAILY
Qty: 60 CAPSULE | Refills: 1 | Status: SHIPPED | OUTPATIENT
Start: 2025-06-27

## 2025-06-27 RX ORDER — HYDROMORPHONE HYDROCHLORIDE 1 MG/ML
0.25 INJECTION, SOLUTION INTRAMUSCULAR; INTRAVENOUS; SUBCUTANEOUS EVERY 5 MIN PRN
Status: DISCONTINUED | OUTPATIENT
Start: 2025-06-27 | End: 2025-06-27 | Stop reason: HOSPADM

## 2025-06-27 RX ORDER — MIDAZOLAM HYDROCHLORIDE 2 MG/2ML
2 INJECTION, SOLUTION INTRAMUSCULAR; INTRAVENOUS
Status: COMPLETED | OUTPATIENT
Start: 2025-06-27 | End: 2025-06-27

## 2025-06-27 RX ORDER — ROCURONIUM BROMIDE 10 MG/ML
INJECTION, SOLUTION INTRAVENOUS
Status: DISCONTINUED | OUTPATIENT
Start: 2025-06-27 | End: 2025-06-27 | Stop reason: SDUPTHER

## 2025-06-27 RX ORDER — TRANEXAMIC ACID 100 MG/ML
INJECTION, SOLUTION INTRAVENOUS
Status: DISCONTINUED | OUTPATIENT
Start: 2025-06-27 | End: 2025-06-27 | Stop reason: SDUPTHER

## 2025-06-27 RX ORDER — ONDANSETRON 4 MG/1
4 TABLET, FILM COATED ORAL EVERY 8 HOURS PRN
Qty: 20 TABLET | Refills: 1 | Status: SHIPPED | OUTPATIENT
Start: 2025-06-27

## 2025-06-27 RX ORDER — ROPIVACAINE HYDROCHLORIDE 5 MG/ML
30 INJECTION, SOLUTION EPIDURAL; INFILTRATION; PERINEURAL ONCE
Status: DISCONTINUED | OUTPATIENT
Start: 2025-06-27 | End: 2025-06-27 | Stop reason: HOSPADM

## 2025-06-27 RX ORDER — NALOXONE HYDROCHLORIDE 0.4 MG/ML
INJECTION, SOLUTION INTRAMUSCULAR; INTRAVENOUS; SUBCUTANEOUS PRN
Status: DISCONTINUED | OUTPATIENT
Start: 2025-06-27 | End: 2025-06-27 | Stop reason: HOSPADM

## 2025-06-27 RX ORDER — BUPIVACAINE HYDROCHLORIDE 2.5 MG/ML
INJECTION, SOLUTION INFILTRATION; PERINEURAL PRN
Status: DISCONTINUED | OUTPATIENT
Start: 2025-06-27 | End: 2025-06-27 | Stop reason: HOSPADM

## 2025-06-27 RX ORDER — IPRATROPIUM BROMIDE AND ALBUTEROL SULFATE 2.5; .5 MG/3ML; MG/3ML
1 SOLUTION RESPIRATORY (INHALATION)
Status: DISCONTINUED | OUTPATIENT
Start: 2025-06-27 | End: 2025-06-27 | Stop reason: HOSPADM

## 2025-06-27 RX ORDER — LIDOCAINE HYDROCHLORIDE 10 MG/ML
1 INJECTION, SOLUTION EPIDURAL; INFILTRATION; INTRACAUDAL; PERINEURAL
Status: DISCONTINUED | OUTPATIENT
Start: 2025-06-27 | End: 2025-06-27 | Stop reason: HOSPADM

## 2025-06-27 RX ORDER — ONDANSETRON 2 MG/ML
INJECTION INTRAMUSCULAR; INTRAVENOUS
Status: DISCONTINUED | OUTPATIENT
Start: 2025-06-27 | End: 2025-06-27 | Stop reason: SDUPTHER

## 2025-06-27 RX ORDER — LABETALOL HYDROCHLORIDE 5 MG/ML
10 INJECTION, SOLUTION INTRAVENOUS
Status: DISCONTINUED | OUTPATIENT
Start: 2025-06-27 | End: 2025-06-27 | Stop reason: HOSPADM

## 2025-06-27 RX ORDER — FENTANYL CITRATE 50 UG/ML
INJECTION, SOLUTION INTRAMUSCULAR; INTRAVENOUS
Status: DISCONTINUED | OUTPATIENT
Start: 2025-06-27 | End: 2025-06-27 | Stop reason: SDUPTHER

## 2025-06-27 RX ORDER — CYCLOBENZAPRINE HCL 10 MG
10 TABLET ORAL 3 TIMES DAILY PRN
Qty: 30 TABLET | Refills: 0 | Status: SHIPPED | OUTPATIENT
Start: 2025-06-27 | End: 2025-07-07

## 2025-06-27 RX ORDER — DEXAMETHASONE SODIUM PHOSPHATE 10 MG/ML
INJECTION, SOLUTION INTRAMUSCULAR; INTRAVENOUS
Status: DISCONTINUED | OUTPATIENT
Start: 2025-06-27 | End: 2025-06-27 | Stop reason: SDUPTHER

## 2025-06-27 RX ORDER — OXYCODONE AND ACETAMINOPHEN 7.5; 325 MG/1; MG/1
1 TABLET ORAL EVERY 4 HOURS PRN
Qty: 60 TABLET | Refills: 0 | Status: SHIPPED | OUTPATIENT
Start: 2025-06-27 | End: 2025-07-07

## 2025-06-27 RX ORDER — LIDOCAINE HYDROCHLORIDE 10 MG/ML
INJECTION, SOLUTION INFILTRATION; PERINEURAL
Status: DISCONTINUED | OUTPATIENT
Start: 2025-06-27 | End: 2025-06-27 | Stop reason: SDUPTHER

## 2025-06-27 RX ADMIN — ROCURONIUM BROMIDE 50 MG: 10 INJECTION, SOLUTION INTRAVENOUS at 09:06

## 2025-06-27 RX ADMIN — SUGAMMADEX 100 MG: 100 INJECTION, SOLUTION INTRAVENOUS at 10:54

## 2025-06-27 RX ADMIN — SODIUM CHLORIDE 250 MCG: 9 INJECTION, SOLUTION INTRAVENOUS at 09:52

## 2025-06-27 RX ADMIN — FENTANYL CITRATE 50 MCG: 0.05 INJECTION, SOLUTION INTRAMUSCULAR; INTRAVENOUS at 09:32

## 2025-06-27 RX ADMIN — SODIUM CHLORIDE 250 MCG: 9 INJECTION, SOLUTION INTRAVENOUS at 09:45

## 2025-06-27 RX ADMIN — SUGAMMADEX 100 MG: 100 INJECTION, SOLUTION INTRAVENOUS at 10:58

## 2025-06-27 RX ADMIN — FENTANYL CITRATE 50 MCG: 0.05 INJECTION, SOLUTION INTRAMUSCULAR; INTRAVENOUS at 09:37

## 2025-06-27 RX ADMIN — SODIUM CHLORIDE, SODIUM LACTATE, POTASSIUM CHLORIDE, AND CALCIUM CHLORIDE: 600; 310; 30; 20 INJECTION, SOLUTION INTRAVENOUS at 06:51

## 2025-06-27 RX ADMIN — PROPOFOL 20 MG: 10 INJECTION, EMULSION INTRAVENOUS at 09:42

## 2025-06-27 RX ADMIN — TRANEXAMIC ACID 1000 MG: 100 INJECTION, SOLUTION INTRAVENOUS at 09:26

## 2025-06-27 RX ADMIN — PROPOFOL 100 MG: 10 INJECTION, EMULSION INTRAVENOUS at 09:06

## 2025-06-27 RX ADMIN — PROPOFOL 30 MG: 10 INJECTION, EMULSION INTRAVENOUS at 10:08

## 2025-06-27 RX ADMIN — DEXAMETHASONE SODIUM PHOSPHATE 10 MG: 10 INJECTION, SOLUTION INTRAMUSCULAR; INTRAVENOUS at 09:15

## 2025-06-27 RX ADMIN — HYDROMORPHONE HYDROCHLORIDE 0.5 MG: 1 INJECTION, SOLUTION INTRAMUSCULAR; INTRAVENOUS; SUBCUTANEOUS at 09:50

## 2025-06-27 RX ADMIN — FENTANYL CITRATE 50 MCG: 0.05 INJECTION, SOLUTION INTRAMUSCULAR; INTRAVENOUS at 09:41

## 2025-06-27 RX ADMIN — SODIUM CHLORIDE, SODIUM LACTATE, POTASSIUM CHLORIDE, AND CALCIUM CHLORIDE: 600; 310; 30; 20 INJECTION, SOLUTION INTRAVENOUS at 09:00

## 2025-06-27 RX ADMIN — SUGAMMADEX 200 MG: 100 INJECTION, SOLUTION INTRAVENOUS at 10:49

## 2025-06-27 RX ADMIN — SODIUM CHLORIDE 250 MCG: 9 INJECTION, SOLUTION INTRAVENOUS at 09:39

## 2025-06-27 RX ADMIN — CEFAZOLIN 2000 MG: 1 INJECTION, POWDER, FOR SOLUTION INTRAMUSCULAR; INTRAVENOUS at 09:10

## 2025-06-27 RX ADMIN — PROPOFOL 30 MG: 10 INJECTION, EMULSION INTRAVENOUS at 09:41

## 2025-06-27 RX ADMIN — PROPOFOL 20 MG: 10 INJECTION, EMULSION INTRAVENOUS at 09:50

## 2025-06-27 RX ADMIN — SODIUM CHLORIDE 250 MCG: 9 INJECTION, SOLUTION INTRAVENOUS at 10:12

## 2025-06-27 RX ADMIN — FENTANYL CITRATE 50 MCG: 0.05 INJECTION, SOLUTION INTRAMUSCULAR; INTRAVENOUS at 09:04

## 2025-06-27 RX ADMIN — ROPIVACAINE HYDROCHLORIDE 20 ML: 5 INJECTION, SOLUTION EPIDURAL; INFILTRATION; PERINEURAL at 08:24

## 2025-06-27 RX ADMIN — HYDROMORPHONE HYDROCHLORIDE 0.5 MG: 1 INJECTION, SOLUTION INTRAMUSCULAR; INTRAVENOUS; SUBCUTANEOUS at 09:57

## 2025-06-27 RX ADMIN — MIDAZOLAM HYDROCHLORIDE 2 MG: 1 INJECTION, SOLUTION INTRAMUSCULAR; INTRAVENOUS at 08:17

## 2025-06-27 RX ADMIN — TRANEXAMIC ACID 1000 MG: 100 INJECTION, SOLUTION INTRAVENOUS at 10:48

## 2025-06-27 RX ADMIN — ONDANSETRON 4 MG: 2 INJECTION INTRAMUSCULAR; INTRAVENOUS at 10:30

## 2025-06-27 RX ADMIN — LIDOCAINE HYDROCHLORIDE 50 MG: 10 INJECTION, SOLUTION INFILTRATION; PERINEURAL at 09:04

## 2025-06-27 ASSESSMENT — PAIN - FUNCTIONAL ASSESSMENT
PAIN_FUNCTIONAL_ASSESSMENT: NONE - DENIES PAIN

## 2025-06-27 ASSESSMENT — LIFESTYLE VARIABLES: SMOKING_STATUS: 0

## 2025-06-27 NOTE — H&P
Pt Name: Ang Villegas  MRN: [id number]  YOB: 1950  Date: 6/27/2025      HPI: 74 y.o. Year old male with chronic left knee pain due to documented primary osteoarthritis. Has previously failed conservative treatments including, but not limited to, NSAIDs, physical therapy, and inject ible corticosteroids.       Past Medical/Surgical History:   Past Medical History:   Diagnosis Date    Angina at rest     Russell esophagus     Cancer (HCC)     Bladder    Constipation     CPAP (continuous positive airway pressure) dependence     CVD (cardiovascular disease)     Diabetes (HCC)     ED (erectile dysfunction)     Gout     Hemorrhoid     HLD (hyperlipidemia)     HTN (hypertension)     Neuropathy     Presence of urostomy (HCC)     Sleep apnea     uses CPAP    Thyroid disease     Thyroid Fever     Past Surgical History:   Procedure Laterality Date    ABDOMINAL EXPLORATION SURGERY      take down of adhesions    APPENDECTOMY      BLADDER REMOVAL      at [facility], [place]    BLADDER SURGERY      2 tumors removed    CARDIAC CATHETERIZATION      CATARACT REMOVAL      Bilateral w/ lens implants.    CHOLECYSTECTOMY      COLONOSCOPY  02/2010    Nabil:  HP,  5yr recall    COLONOSCOPY  11/2015    Anthony:  normal 5y recall    COLONOSCOPY N/A 08/30/2021    Dr SANCHEZ Lubin-AP x 2, HP x 2, 3 yr recall    FEMUR SURGERY Left     ORIF of femur    [place] EGD TRANSORAL BIOPSY SINGLE/MULTIPLE N/A 01/25/2018    Dr Cruz-Active chronic esophagogastritis--3 yr recall    SHOULDER ARTHROSCOPY W/ ROTATOR CUFF REPAIR Left     UPPER GASTROINTESTINAL ENDOSCOPY  2/2010 3/2011    UPPER GASTROINTESTINAL ENDOSCOPY  11/13/2014    Dr. Cruz: dewey neg, MODERATE CHRONIC ESOPHAGOGASTRITIS. 3y recall    UPPER GASTROINTESTINAL ENDOSCOPY N/A 08/30/2021    Dr SANCHEZ Lubin-No Russell's, gastritis, 3 yr recall         Social History:    Smoking:  No Smoking History = 0   Alcohol:Rare consumption    Medications:   Prior to Admission medications    Medication Sig Start Date End

## 2025-06-27 NOTE — OP NOTE
Pt Name: Ang Villegas   MRN: [id number]   YOB: 1950   Date: 6/27/2025         PREOPERATIVE DIAGNOSIS: Left Knee Primary Tricompartmental Osteoarthritis      POSTOPERATIVE DIAGNOSIS:  Left Knee Primary Tricompartmental Osteoarthritis     PROCEDURE:  Left Total Knee Arthroplasty     SURGEON:  Linwood Betancourt MD     ASSISTANT:  None     ANESTHESIA:  General     ESTIMATED BLOOD LOSS:  200 mL     COMPLICATIONS:  None.     CONDITION:  Stable.     IMPLANTS:  Poly:  Medial - 6; Lateral - A; Patella - 35 mm    INDICATIONS:  75 yo male presents today for a left total knee arthroplasty for primary osteoarthritis.  The patient has failed conservative management including time, activity modifications, NSAIDs and corticosteroids.    PROCEDURE:  The patient was interviewed in the pre-anesthesia area.  The operative limb was then marked with a marking pen.  The patient was then administered a regional block per the anesthesia team.  The patient was then taken to the operative suite where General anesthesia was performed by the anesthesia team.  A time out was then called to confirm the administration of 2 Ancef as well as the administration of tranexamic acid prior to incision.  The patient was then prepped and draped in standard sterile fashion using ChloraPrep.      Once fully prepped and draped, the limb was re-prepped with ChloraPrep.  Sterile marking pen was then used to senthil out the tibia distally as well as the first web space.  The leg was then placed in a [facility] leg garcia and a standard midline incision was carried out followed by a medial parapatellar arthrotomy.  The knee did demonstrate a large knee joint effusion with tricompartmental osteoarthrosis.     Attention was then turned to the distal femoral resection.  Placed in the F1 jig on the distal femur referencing off of the osteophytes but noted to be securely firm onto the femur, the coring tool was used to drill down to subchondral bone for

## 2025-06-27 NOTE — PROGRESS NOTES
CLINICAL PHARMACY NOTE: MEDS TO BEDS    Total # of Prescriptions Filled: 6   The following medications were delivered to the patient:  Eliquis 2.5mg (starting month & continuing month)  Oxycodone-acetaminophen 7.5-325mg  Ondansetron hcl 4mg  Cyclobenzaprine hcl 10mg  Docusate sodium 100mg      Additional Documentation:  [place] paid with cash  Patients daughter picked up rx at [facility]

## 2025-06-27 NOTE — PROGRESS NOTES
Physical Therapy  Physical Therapy     Facility/Department: [facility] OR  Initial Assessment  PHYSICAL THERAPY EVALUATION      Ang Villegas    : 1950  MRN: [id number]   PHYSICIAN:  Linwood Betancourt MD  Primary Problem    Patient Active Problem List   Diagnosis    Heartburn    Family history of esophageal cancer    Russell's esophagus without dysplasia    History of colonic polyps    Chronic heartburn    Primary osteoarthritis of left knee    Osteoarthritis of left knee       Rehabilitation Diagnosis:     Osteoarthritis of left knee [M17.12]   L TKR    SERVICE DATE: 2025        SUBJECTIVE: Patient states that they are planning on discharging home today    Pain Screening  Patient Currently in Pain: No    PRIOR LEVEL OF FUNCTION:    [x] Independent in community no assistive device     [] Independent in community with assistive device     [] Independent in the house with assistive device     [] Transfer only    []     OBJECTIVE:  Orientation: Within functional Limits      ROM - Passive, Non-operative side     [] Left lower extremity  [x] Right lower extremity       Within functional limits    ROM - Passive, operative side    [x] Left lower extremity  [] Right lower extremity      Hip - extension to 0 degrees and flexion to 80 in sitting    Knee - extension to 0 degrees in supine and flexion to 80 in sitting        STRENGTH - Non-operative side    [] Left lower extremity  [x] Right lower extremity       Within functional limits    STRENGTH - operative side    [x] Left lower extremity  [] Right lower extremity    Grossly  3-/5        TRANSFERS   Sit to stand     [x] CGA [] Minimum        Bed to chair     [x] CGA [] Minimum    Bed mobility   Supine to sit      [x] CGA [] Minimum      Scoot  [] Side to side  [] Up and down     [] CGA [] Minimum    AMBULATION   Weight bearing: - WBAT      Distance: 20 ft     Device: Rolling Walker - The patient has been trained on the use of this equipment     Assistance:        [x]

## 2025-06-27 NOTE — ANESTHESIA PROCEDURE NOTES
Peripheral Block    Patient location during procedure: holding area  Reason for block: post-op pain management  Start time: 6/27/2025 8:24 AM  End time: 6/27/2025 8:26 AM  Staffing  Performed: anesthesiologist   Anesthesiologist: Amy Gan MD  Performed by: Amy Gan MD  Authorized by: Amy Gan MD    Preanesthetic Checklist  Completed: patient identified, IV checked, site marked, risks and benefits discussed, surgical/procedural consents, equipment checked, pre-op evaluation, timeout performed, anesthesia consent given, oxygen available, monitors applied/VS acknowledged, fire risk safety assessment completed and verbalized and blood product R/B/A discussed and consented  Peripheral Block   Patient position: supine  Prep: ChloraPrep  Provider prep: mask and sterile gloves  Patient monitoring: cardiac monitor, continuous pulse ox and frequent blood pressure checks  Block type: Femoral  Adductor canal  Laterality: left  Injection technique: single-shot  Guidance: ultrasound guided  Local infiltration: lidocaine  Infiltration strength: 1 %  Local infiltration: lidocaine  Dose: 0.5 mL    Needle   Needle type: insulated echogenic nerve stimulator needle   Needle gauge: 20 G  Needle localization: ultrasound guidance  Needle length: 10 cm  Assessment   Injection assessment: negative aspiration for heme, no paresthesia on injection, local visualized surrounding nerve on ultrasound and no intravascular symptoms  Paresthesia pain: none  Slow fractionated injection: yes  Hemodynamics: stable  Outcomes: uncomplicated and patient tolerated procedure well    Medications Administered  ropivacaine (NAROPIN) injection 0.5% - Perineural   20 mL - 6/27/2025 8:24:00 AM

## 2025-06-27 NOTE — ANESTHESIA PRE PROCEDURE
Department of Anesthesiology  Preprocedure Note       Name:  Ang Villegas   Age:  74 y.o.  :  1950                                          MRN:  [id number]         Date:  2025      Surgeon: Surgeon(s):  Linwood Betancourt MD    Procedure: Procedure(s):  LEFT TOTAL KNEE REPLACEMENT    Medications prior to admission:   Prior to Admission medications    Medication Sig Start Date End Date Taking? Authorizing Provider   atorvastatin (LIPITOR) 40 MG tablet Take 1 tablet by mouth at bedtime   Yes Madan Seaman MD   losartan (COZAAR) 50 MG tablet Take 1 tablet by mouth 2 times daily   Yes Madan Seaman MD   magnesium oxide (MAG-OX) 400 MG tablet Take 3 tablets by mouth daily   Yes Madan Seaman MD   omega-3 acid ethyl esters (LOVAZA) 1 g capsule Take 1 capsule by mouth daily   Yes Madan Seaman MD   chlorthalidone (HYGROTON) 25 MG tablet Take 1 tablet by mouth daily 21  Yes Madan Seaman MD   hydrALAZINE (APRESOLINE) 10 MG tablet Take 1 tablet by mouth 3 times daily 21  Yes Madan Seaman MD   allopurinol (ZYLOPRIM) 300 MG tablet Take 1 tablet by mouth daily   Yes Madan Seaman MD   aspirin 81 MG tablet Take 1 tablet by mouth daily   Yes Madan Seaman MD   gabapentin (NEURONTIN) 400 MG capsule Take 2 capsules by mouth 3 times daily. Take 6 caps daily   Yes Madan Seaman MD   OMEPRAZOLE PO Take 20 mg by mouth 2 times daily    Yes Madan Seaman MD   naloxone 4 MG/0.1ML LIQD nasal spray 1 spray by Nasal route as needed for Opioid Reversal  Patient not taking: Reported on 2025   Linwood Betancourt MD   tamsulosin (FLOMAX) 0.4 MG capsule Take 1 capsule by mouth daily Starting 7 days prior to surgery and continue until finish.  Patient not taking: Reported on 2025   Linwood Betancourt MD   carvedilol (COREG) 3.125 MG tablet Take 1 tablet by mouth daily  Patient not taking: Reported on 2025    Madan Seaman

## 2025-06-27 NOTE — ANESTHESIA POSTPROCEDURE EVALUATION
Department of Anesthesiology  Postprocedure Note    Patient: Ang Villegas  MRN: [id number]  YOB: 1950  Date of evaluation: 6/27/2025    Procedure Summary       Date: 06/27/25 Room / Location: [address]    Anesthesia Start: 0900 Anesthesia Stop: 1129    Procedure: LEFT TOTAL KNEE REPLACEMENT (Left: Knee) Diagnosis:       Osteoarthritis of left knee      (Osteoarthritis of left knee [M17.12])    Surgeons: Linwood Betancourt MD Responsible Provider: Veena Henry APRN - CRNA    Anesthesia Type: General, Regional ASA Status: 3            Anesthesia Type: General, Regional    Irving Phase I: Irving Score: 8    Irving Phase II:      Anesthesia Post Evaluation    Patient location during evaluation: PACU  Patient participation: waiting for patient participation  Level of consciousness: responsive to light touch  Pain score: 0  Airway patency: patent  Nausea & Vomiting: no nausea and no vomiting  Cardiovascular status: blood pressure returned to baseline and hemodynamically stable  Respiratory status: acceptable, oral airway, spontaneous ventilation, nonlabored ventilation and face mask  Hydration status: stable  Comments: /62   Pulse 56   Temp 97.2 °F (36.2 °C)   Resp 12   Ht 1.778 m (5' 10\")   Wt 95.3 kg (210 lb)   SpO2 98%   BMI 30.13 kg/m²     Pain management: adequate    No notable events documented.

## 2025-06-27 NOTE — DISCHARGE INSTRUCTIONS
Lower Extremity Post-op Instructions  Dr. ANGUIANO      POST-OP CARE: Please follow these instructions closely!    Weight Bearing: Your surgery requires that you have the following weight bearing restrictions:   ___X__ Weight Bearing as tolerated (with or without crutches)    IMPORTANT PHONE NUMBERS:  For emergencies, please call [phone]  You may reach Dr. Anguiano or his office medical staff at [phone] EXT: [phone]  M-F 8:00am-5:00pm  After 5pm or on the weekends, please call [phone] to reach the doctor on call.  Call immediately if you have any of the following symptoms:  Elevated temperature above 101 degrees for more than 48hours after surgery  Persistent drainage from wound  Severe pain around surgical site  Calf pain  Any signs of infection    Dressings: Do NOT remove dressing/splint unless noted below. SOME DRAINAGE IS NORMAL!  DO NOT touch, remove, or apply ointment to the incision and/or steri strips  Signs of infection that warrant a phone call to our clinical line:  Excessive drainage or redness  Red streaking coming away from the incision  Increased pain  Increased temperature above 101 degrees    Sutures:  If your physician uses sutures in your knee, they will dissolve on their own and will not need to be removed.  Some black sutures occasionally used will need to be removed 10-14 days after surgery.    Bathing:    _X_ Keep your adhesive dressing in place until follow up.  You may remove your ACE wrap and begin showering on the 3rd day following surgery.  Please check the border of the dressing(s) to ensure there is a good seal before each showering session.  Water may cascade over your dressing(s), but do not submerge your dressing(s) and incision(s) in water.    Elevate: Place 2 pillows under your ankle to get the incision area above the level of the heart to help in swelling (a recliner is not elevated!!!)    Ice: Ice your surgery site 5-6 times per day for 20 minutes at a time with dressing in

## 2025-06-27 NOTE — PROGRESS NOTES
Patient discharged home today with outpatient therapy script.  Went over all discharge instructions and new medications with patient's family and provided a copy of all new medications to take home.  Patient's family verbalized understanding.   Patient's stability will be assessed by PT and Op Care RN before discharging home.  Electronically signed by Marleny Barger RN on 6/27/2025 at 11:25 AM

## 2025-06-27 NOTE — DISCHARGE INSTR - DIET

## 2025-06-30 ENCOUNTER — TELEPHONE (OUTPATIENT)
Dept: INPATIENT UNIT | Age: 75
End: 2025-06-30

## 2025-06-30 NOTE — TELEPHONE ENCOUNTER
Follow up phone call x 1 attempt.  No one answered.  Electronically signed by Marleny Barger RN on 6/30/2025 at 1:00 PM

## 2025-07-11 ENCOUNTER — TELEPHONE (OUTPATIENT)
Age: 75
End: 2025-07-11

## 2025-07-11 DIAGNOSIS — G89.18 POST-OPERATIVE PAIN: Primary | ICD-10-CM

## 2025-07-11 RX ORDER — OXYCODONE AND ACETAMINOPHEN 7.5; 325 MG/1; MG/1
1-2 TABLET ORAL
Qty: 42 TABLET | Refills: 0 | Status: SHIPPED | OUTPATIENT
Start: 2025-07-11 | End: 2025-07-18

## 2025-07-11 RX ORDER — CYCLOBENZAPRINE HCL 10 MG
10 TABLET ORAL 3 TIMES DAILY PRN
Qty: 30 TABLET | Refills: 0 | Status: SHIPPED | OUTPATIENT
Start: 2025-07-11 | End: 2025-07-21

## 2025-07-15 ENCOUNTER — OFFICE VISIT (OUTPATIENT)
Age: 75
End: 2025-07-15

## 2025-07-15 VITALS — HEIGHT: 70 IN | BODY MASS INDEX: 28.55 KG/M2 | WEIGHT: 199.4 LBS

## 2025-07-15 DIAGNOSIS — Z96.652 PRESENCE OF ARTIFICIAL KNEE JOINT, LEFT: ICD-10-CM

## 2025-07-15 DIAGNOSIS — Z48.89 AFTERCARE FOLLOWING SURGERY: Primary | ICD-10-CM

## 2025-07-15 PROCEDURE — 99024 POSTOP FOLLOW-UP VISIT: CPT | Performed by: ORTHOPAEDIC SURGERY

## 2025-07-15 ASSESSMENT — ENCOUNTER SYMPTOMS
GASTROINTESTINAL NEGATIVE: 1
ALLERGIC/IMMUNOLOGIC NEGATIVE: 1
EYES NEGATIVE: 1
RESPIRATORY NEGATIVE: 1

## 2025-07-15 NOTE — PROGRESS NOTES
[facility]  [facility]  [address]  [facility] [id number] [phone]       Ang Villegas (:  1950) is a 74 y.o. male,Established patient, here for evaluation of the following chief complaint(s):  Post-Op Check (Left knee/(Sx: 2025 / TKR))        Assessment & Plan  1. Postoperative status following knee surgery:  Recovery is progressing well, with excellent extension and straightness in the knee. However, there is a need to improve the bend, with a goal of achieving 130 degrees. The mesh on the skin should remain in place for an additional 7 to 10 days, or longer if possible, as it is water-resistant and continues to provide significant benefits, including minimizing scar visibility. Showering with the mesh in place is permissible. Therapy sessions will continue 3 times a week. A prescription for 84 tablets of muscle relaxant was provided, which should last for 42 days. After this period, resuming Plavix and aspirin regimen as per Dr. Juarez' instructions is recommended.    Follow-up: A follow-up appointment is scheduled for 4 weeks from now.       ICD-10-CM    1. Aftercare following surgery  Z48.89 CANCELED: XR KNEE LEFT (MIN 4 VIEWS)      2. Presence of artificial knee joint, left  Z96.652           Return in about 4 weeks (around 2025).    SUBJECTIVE/OBJECTIVE:  History of Present Illness  The patient presents for evaluation of his knee.    He reports a general feeling of well-being, although he experiences some soreness in his hip. He has been attending rehabilitation sessions 3 times a week and has completed 4 days of rehab so far. He is not seeking a refill of his pain medication at this time but is requesting a refill of his muscle relaxant. He also mentions that he spends time in his recliner, positioning himself on his left hip to avoid contact with his knee, which results in soreness in his ankle.

## 2025-07-23 ENCOUNTER — OFFICE VISIT (OUTPATIENT)
Dept: INTERNAL MEDICINE | Facility: CLINIC | Age: 75
End: 2025-07-23
Payer: MEDICARE

## 2025-07-23 VITALS
OXYGEN SATURATION: 97 % | SYSTOLIC BLOOD PRESSURE: 130 MMHG | TEMPERATURE: 97.4 F | WEIGHT: 194 LBS | HEART RATE: 78 BPM | BODY MASS INDEX: 27.77 KG/M2 | HEIGHT: 70 IN | DIASTOLIC BLOOD PRESSURE: 64 MMHG

## 2025-07-23 DIAGNOSIS — I10 ESSENTIAL HYPERTENSION: ICD-10-CM

## 2025-07-23 DIAGNOSIS — Z96.652 HISTORY OF TOTAL KNEE ARTHROPLASTY, LEFT: ICD-10-CM

## 2025-07-23 DIAGNOSIS — Z95.5 S/P DRUG ELUTING CORONARY STENT PLACEMENT: ICD-10-CM

## 2025-07-23 DIAGNOSIS — D64.9 ANEMIA, UNSPECIFIED TYPE: ICD-10-CM

## 2025-07-23 DIAGNOSIS — N18.2 STAGE 2 CHRONIC KIDNEY DISEASE: ICD-10-CM

## 2025-07-23 DIAGNOSIS — E11.65 TYPE 2 DIABETES MELLITUS WITH HYPERGLYCEMIA, WITHOUT LONG-TERM CURRENT USE OF INSULIN: Primary | ICD-10-CM

## 2025-07-23 DIAGNOSIS — I25.10 CORONARY ARTERY DISEASE INVOLVING NATIVE CORONARY ARTERY OF NATIVE HEART WITHOUT ANGINA PECTORIS: ICD-10-CM

## 2025-07-23 LAB — HBA1C MFR BLD: 5.1 % (ref 4.5–5.7)

## 2025-07-23 RX ORDER — LOSARTAN POTASSIUM 50 MG/1
50 TABLET ORAL DAILY
Start: 2025-07-23

## 2025-07-23 RX ORDER — GLIPIZIDE 2.5 MG/1
2.5 TABLET, EXTENDED RELEASE ORAL DAILY
Qty: 90 TABLET | Refills: 1 | Status: SHIPPED | OUTPATIENT
Start: 2025-07-23

## 2025-07-23 NOTE — PROGRESS NOTES
"      Chief Complaint  Hypertension, Diabetes (A1c: 5.1), and discuss medication (Aspirin and plavix on hold and is on eliquis per  That did knee surgery /Discuss coming off ozempic )    Subjective        Chung Bradley presents to [facility]    HPI    Patient here for the above problems.  See Assessment and Plan for further HPI components.      Review of Systems    Objective   Vital Signs:  /64 (BP Location: Left arm, Patient Position: Sitting, Cuff Size: Adult)   Pulse 78   Temp 97.4 °F (36.3 °C) (Temporal)   Ht 177.8 cm (70\")   Wt 88 kg (194 lb)   SpO2 97%   BMI 27.84 kg/m²   Estimated body mass index is 27.84 kg/m² as calculated from the following:    Height as of this encounter: 177.8 cm (70\").    Weight as of this encounter: 88 kg (194 lb).      Physical Exam  Vitals and nursing note reviewed.   Constitutional:       Appearance: He is not ill-appearing.   Eyes:      General: No scleral icterus.     Conjunctiva/sclera: Conjunctivae normal.   Cardiovascular:      Rate and Rhythm: Normal rate and regular rhythm.   Pulmonary:      Effort: Pulmonary effort is normal. No respiratory distress.   Skin:         Neurological:      General: No focal deficit present.      Mental Status: He is alert and oriented to person, place, and time.   Psychiatric:         Mood and Affect: Mood normal.         Behavior: Behavior normal.                       Assessment and Plan   Diagnoses and all orders for this visit:    1. Type 2 diabetes mellitus with hyperglycemia, without long-term current use of insulin (Primary)  -     POC Glycated Hemoglobin, Total  -     glipizide (GLUCOTROL XL) 2.5 MG 24 hr tablet; Take 1 tablet by mouth Daily.  Dispense: 90 tablet; Refill: 1  -     Comprehensive metabolic panel    2. Coronary artery disease involving native coronary artery of native heart without angina pectoris    3. Essential hypertension  -     losartan (COZAAR) 50 MG tablet; Take 1 tablet " "by mouth Daily.  -     Comprehensive metabolic panel    4. Stage 2 chronic kidney disease  -     losartan (COZAAR) 50 MG tablet; Take 1 tablet by mouth Daily.    5. Anemia, unspecified type  -     CBC w AUTO Differential  -     Comprehensive metabolic panel  -     Ferritin  -     Iron Profile w/o Ferritin    6. S/P drug eluting coronary stent placement    7. History of total knee arthroplasty, left        History of Present Illness  The patient is a 74-year-old gentleman who presents today for follow-up.    He reports persistent nausea and a lack of appetite, which he attributes to his current medication regimen. He expresses a desire to discontinue Ozempic due to these side effects. His food intake has been minimal, and he feels the need to consume more for energy. He has been monitoring his blood sugar levels daily but ran out of test strips three weeks ago. [facility] not [facility]. He discontinued Ozempic 10 days prior to his surgery on June 27, 2025, and remained off it for a week post-surgery. He has previously taken glipizide 2.5 mg and 5 mg. He is intolerant to metformin.    His blood pressure readings have been inconsistent, with some low readings yesterday and elevated readings this morning. However, most of his readings have been around 109/70s. His heart rate has been fluctuating between 99, 98, and 94. He maintains good hydration, consuming approximately ten 20-ounce bottles of water daily.    He was placed on a high dose \"blood thinner\" by Dr. To, which is more than his usual aspirin and Plavix regimen. This dosage is to be maintained for six weeks.  Patient indicated Dr. To told him that he did not need to take his ASA while on this medication.  This is not the case.  Patient was supposed to hold plavix and take ASA uninterrupted.  This was reiterated again today.      PAST SURGICAL HISTORY:  Knee surgery on June 27, 2025.      Assessment & Plan  1. CAD s/p DC " 11/2024.  S/p TKR DVT PPx  Advised to resume aspirin regimen immediately upon returning home. Eliquis does not replace aspirin for antiplatelet therapy after PCI. Plavix continuation will be determined after completing the Eliquis course. The importance of aspirin to prevent stent stenosis was emphasized.  Eliquis for DVT PPx.      2. Diabetes mellitus.  The patient's A1c level is currently at 5.1, which may be artificially low due to recent surgery. He was given the option to discontinue Ozempic due to gastrointestinal side effects. It was advised to start glipizide 2.5 mg once daily, two weeks after discontinuing Ozempic. A prescription for 90 tablets of glipizide extended-release 2.5 mg was provided.    3. Hypertension.  Blood pressure readings have been inconsistent, with some low and some elevated. Most readings have been around 109/70s, and the heart rate has been fluctuating between 94 and 99. The patient was advised to take losartan only in the morning and discontinue the evening dose. The importance of hydration was emphasized.    4. Anemia.  There is concern for potential anemia due to recent surgery and blood loss. Labs were ordered to assess blood count and iron levels. Monitoring for signs of anemia is necessary due to age-related slower recovery.    Follow-up: Labs to be checked on the way out. Follow-up.        Result Review :                               Follow Up   Return in about 3 months (around 10/23/2025), or if symptoms worsen or fail to improve, for follow up for above problems. Longitudinal care..  Patient was given instructions and counseling regarding his condition or for health maintenance advice. Please see specific information pulled into the AVS if appropriate.       RAY Lion MD, FACP, [facility]      Electronically signed by Daquan Lion MD, 07/23/25, 12:44 PM CDT.    Patient or patient representative verbalized consent for the use of Ambient Listening during the visit with   Daquan Lion MD for chart documentation. 7/23/2025  12:48 CDT

## 2025-07-24 LAB
ALBUMIN SERPL-MCNC: 4.1 G/DL (ref 3.5–5.2)
ALBUMIN/GLOB SERPL: 1.6 G/DL
ALP SERPL-CCNC: 143 U/L (ref 39–117)
ALT SERPL-CCNC: 14 U/L (ref 1–41)
AST SERPL-CCNC: 21 U/L (ref 1–40)
BASOPHILS # BLD AUTO: 0.05 10*3/MM3 (ref 0–0.2)
BASOPHILS NFR BLD AUTO: 0.8 % (ref 0–1.5)
BILIRUB SERPL-MCNC: 0.8 MG/DL (ref 0–1.2)
BUN SERPL-MCNC: 21 MG/DL (ref 8–23)
BUN/CREAT SERPL: 18.4 (ref 7–25)
CALCIUM SERPL-MCNC: 9.8 MG/DL (ref 8.6–10.5)
CHLORIDE SERPL-SCNC: 99 MMOL/L (ref 98–107)
CO2 SERPL-SCNC: 26.3 MMOL/L (ref 22–29)
CREAT SERPL-MCNC: 1.14 MG/DL (ref 0.76–1.27)
EGFRCR SERPLBLD CKD-EPI 2021: 66.7 ML/MIN/1.73
EOSINOPHIL # BLD AUTO: 0.09 10*3/MM3 (ref 0–0.4)
EOSINOPHIL NFR BLD AUTO: 1.4 % (ref 0.3–6.2)
ERYTHROCYTE [DISTWIDTH] IN BLOOD BY AUTOMATED COUNT: 15.4 % (ref 12.3–15.4)
FERRITIN SERPL-MCNC: 465 NG/ML (ref 30–400)
GLOBULIN SER CALC-MCNC: 2.6 GM/DL
GLUCOSE SERPL-MCNC: 94 MG/DL (ref 65–99)
HCT VFR BLD AUTO: 40.9 % (ref 37.5–51)
HGB BLD-MCNC: 13.1 G/DL (ref 13–17.7)
IMM GRANULOCYTES # BLD AUTO: 0.03 10*3/MM3 (ref 0–0.05)
IMM GRANULOCYTES NFR BLD AUTO: 0.5 % (ref 0–0.5)
IRON SATN MFR SERPL: 17 % (ref 20–50)
IRON SERPL-MCNC: 59 MCG/DL (ref 59–158)
LYMPHOCYTES # BLD AUTO: 1.75 10*3/MM3 (ref 0.7–3.1)
LYMPHOCYTES NFR BLD AUTO: 28.1 % (ref 19.6–45.3)
MCH RBC QN AUTO: 28 PG (ref 26.6–33)
MCHC RBC AUTO-ENTMCNC: 32 G/DL (ref 31.5–35.7)
MCV RBC AUTO: 87.4 FL (ref 79–97)
MONOCYTES # BLD AUTO: 0.66 10*3/MM3 (ref 0.1–0.9)
MONOCYTES NFR BLD AUTO: 10.6 % (ref 5–12)
NEUTROPHILS # BLD AUTO: 3.64 10*3/MM3 (ref 1.7–7)
NEUTROPHILS NFR BLD AUTO: 58.6 % (ref 42.7–76)
NRBC BLD AUTO-RTO: 0 /100 WBC (ref 0–0.2)
PLATELET # BLD AUTO: 198 10*3/MM3 (ref 140–450)
POTASSIUM SERPL-SCNC: 4.6 MMOL/L (ref 3.5–5.2)
PROT SERPL-MCNC: 6.7 G/DL (ref 6–8.5)
RBC # BLD AUTO: 4.68 10*6/MM3 (ref 4.14–5.8)
SODIUM SERPL-SCNC: 138 MMOL/L (ref 136–145)
TIBC SERPL-MCNC: 349 MCG/DL
UIBC SERPL-MCNC: 290 MCG/DL (ref 112–346)
WBC # BLD AUTO: 6.22 10*3/MM3 (ref 3.4–10.8)

## 2025-07-28 ENCOUNTER — APPOINTMENT (OUTPATIENT)
Dept: CT IMAGING | Facility: HOSPITAL | Age: 75
End: 2025-07-28
Payer: MEDICARE

## 2025-07-28 ENCOUNTER — HOSPITAL ENCOUNTER (INPATIENT)
Facility: HOSPITAL | Age: 75
LOS: 1 days | Discharge: HOME | End: 2025-07-30
Attending: EMERGENCY MEDICINE | Admitting: FAMILY MEDICINE
Payer: MEDICARE

## 2025-07-28 ENCOUNTER — APPOINTMENT (OUTPATIENT)
Dept: GENERAL RADIOLOGY | Facility: HOSPITAL | Age: 75
End: 2025-07-28
Payer: MEDICARE

## 2025-07-28 DIAGNOSIS — R55 NEAR SYNCOPE: ICD-10-CM

## 2025-07-28 DIAGNOSIS — R07.9 CHEST PAIN, UNSPECIFIED TYPE: Primary | ICD-10-CM

## 2025-07-28 DIAGNOSIS — R22.2 PLEURAL NODULE: ICD-10-CM

## 2025-07-28 LAB
ALBUMIN SERPL-MCNC: 3.5 G/DL (ref 3.5–5.2)
ALBUMIN/GLOB SERPL: 1.2 G/DL
ALP SERPL-CCNC: 117 U/L (ref 39–117)
ALT SERPL W P-5'-P-CCNC: 8 U/L (ref 1–41)
ANION GAP SERPL CALCULATED.3IONS-SCNC: 14 MMOL/L (ref 5–15)
AST SERPL-CCNC: 20 U/L (ref 1–40)
BASOPHILS # BLD AUTO: 0.04 10*3/MM3 (ref 0–0.2)
BASOPHILS NFR BLD AUTO: 0.5 % (ref 0–1.5)
BILIRUB SERPL-MCNC: 1 MG/DL (ref 0–1.2)
BUN SERPL-MCNC: 23.8 MG/DL (ref 8–23)
BUN/CREAT SERPL: 24.8 (ref 7–25)
CALCIUM SPEC-SCNC: 9 MG/DL (ref 8.6–10.5)
CHLORIDE SERPL-SCNC: 101 MMOL/L (ref 98–107)
CO2 SERPL-SCNC: 23 MMOL/L (ref 22–29)
CREAT SERPL-MCNC: 0.96 MG/DL (ref 0.76–1.27)
D DIMER PPP FEU-MCNC: 1.71 MCGFEU/ML (ref 0–0.76)
DEPRECATED RDW RBC AUTO: 53.1 FL (ref 37–54)
EGFRCR SERPLBLD CKD-EPI 2021: 81.9 ML/MIN/1.73
EOSINOPHIL # BLD AUTO: 0.07 10*3/MM3 (ref 0–0.4)
EOSINOPHIL NFR BLD AUTO: 0.9 % (ref 0.3–6.2)
ERYTHROCYTE [DISTWIDTH] IN BLOOD BY AUTOMATED COUNT: 16.2 % (ref 12.3–15.4)
GEN 5 1HR TROPONIN T REFLEX: 56 NG/L
GLOBULIN UR ELPH-MCNC: 2.9 GM/DL
GLUCOSE SERPL-MCNC: 142 MG/DL (ref 65–99)
HCT VFR BLD AUTO: 39 % (ref 37.5–51)
HGB BLD-MCNC: 12.1 G/DL (ref 13–17.7)
HOLD SPECIMEN: NORMAL
HOLD SPECIMEN: NORMAL
IMM GRANULOCYTES # BLD AUTO: 0.03 10*3/MM3 (ref 0–0.05)
IMM GRANULOCYTES NFR BLD AUTO: 0.4 % (ref 0–0.5)
INR PPP: 1.09 (ref 0.91–1.09)
LYMPHOCYTES # BLD AUTO: 0.87 10*3/MM3 (ref 0.7–3.1)
LYMPHOCYTES NFR BLD AUTO: 10.7 % (ref 19.6–45.3)
MCH RBC QN AUTO: 27.6 PG (ref 26.6–33)
MCHC RBC AUTO-ENTMCNC: 31 G/DL (ref 31.5–35.7)
MCV RBC AUTO: 88.8 FL (ref 79–97)
MONOCYTES # BLD AUTO: 0.64 10*3/MM3 (ref 0.1–0.9)
MONOCYTES NFR BLD AUTO: 7.9 % (ref 5–12)
NEUTROPHILS NFR BLD AUTO: 6.48 10*3/MM3 (ref 1.7–7)
NEUTROPHILS NFR BLD AUTO: 79.6 % (ref 42.7–76)
NRBC BLD AUTO-RTO: 0 /100 WBC (ref 0–0.2)
NT-PROBNP SERPL-MCNC: 1099 PG/ML (ref 0–1800)
PLATELET # BLD AUTO: 158 10*3/MM3 (ref 140–450)
PMV BLD AUTO: 8.9 FL (ref 6–12)
POTASSIUM SERPL-SCNC: 4.8 MMOL/L (ref 3.5–5.2)
PROT SERPL-MCNC: 6.4 G/DL (ref 6–8.5)
PROTHROMBIN TIME: 14.7 SECONDS (ref 11.8–14.8)
RBC # BLD AUTO: 4.39 10*6/MM3 (ref 4.14–5.8)
SODIUM SERPL-SCNC: 138 MMOL/L (ref 136–145)
TROPONIN T % DELTA: 6
TROPONIN T NUMERIC DELTA: 3 NG/L
TROPONIN T SERPL HS-MCNC: 53 NG/L
WBC NRBC COR # BLD AUTO: 8.13 10*3/MM3 (ref 3.4–10.8)
WHOLE BLOOD HOLD COAG: NORMAL
WHOLE BLOOD HOLD SPECIMEN: NORMAL

## 2025-07-28 PROCEDURE — 25510000001 IOPAMIDOL PER 1 ML: Performed by: EMERGENCY MEDICINE

## 2025-07-28 PROCEDURE — 85025 COMPLETE CBC W/AUTO DIFF WBC: CPT | Performed by: EMERGENCY MEDICINE

## 2025-07-28 PROCEDURE — 83880 ASSAY OF NATRIURETIC PEPTIDE: CPT | Performed by: EMERGENCY MEDICINE

## 2025-07-28 PROCEDURE — 36415 COLL VENOUS BLD VENIPUNCTURE: CPT

## 2025-07-28 PROCEDURE — 99285 EMERGENCY DEPT VISIT HI MDM: CPT | Performed by: EMERGENCY MEDICINE

## 2025-07-28 PROCEDURE — 84484 ASSAY OF TROPONIN QUANT: CPT | Performed by: EMERGENCY MEDICINE

## 2025-07-28 PROCEDURE — 85610 PROTHROMBIN TIME: CPT | Performed by: EMERGENCY MEDICINE

## 2025-07-28 PROCEDURE — 93005 ELECTROCARDIOGRAM TRACING: CPT | Performed by: EMERGENCY MEDICINE

## 2025-07-28 PROCEDURE — 80053 COMPREHEN METABOLIC PANEL: CPT | Performed by: EMERGENCY MEDICINE

## 2025-07-28 PROCEDURE — 93010 ELECTROCARDIOGRAM REPORT: CPT | Performed by: INTERNAL MEDICINE

## 2025-07-28 PROCEDURE — 85379 FIBRIN DEGRADATION QUANT: CPT | Performed by: EMERGENCY MEDICINE

## 2025-07-28 PROCEDURE — 71275 CT ANGIOGRAPHY CHEST: CPT

## 2025-07-28 PROCEDURE — 71045 X-RAY EXAM CHEST 1 VIEW: CPT

## 2025-07-28 PROCEDURE — 93005 ELECTROCARDIOGRAM TRACING: CPT

## 2025-07-28 RX ORDER — SODIUM CHLORIDE 0.9 % (FLUSH) 0.9 %
10 SYRINGE (ML) INJECTION AS NEEDED
Status: DISCONTINUED | OUTPATIENT
Start: 2025-07-28 | End: 2025-07-30 | Stop reason: HOSPADM

## 2025-07-28 RX ORDER — IOPAMIDOL 755 MG/ML
100 INJECTION, SOLUTION INTRAVASCULAR
Status: COMPLETED | OUTPATIENT
Start: 2025-07-28 | End: 2025-07-28

## 2025-07-28 RX ADMIN — IOPAMIDOL 100 ML: 755 INJECTION, SOLUTION INTRAVENOUS at 22:11

## 2025-07-29 ENCOUNTER — APPOINTMENT (OUTPATIENT)
Dept: CARDIOLOGY | Facility: HOSPITAL | Age: 75
End: 2025-07-29
Payer: MEDICARE

## 2025-07-29 ENCOUNTER — APPOINTMENT (OUTPATIENT)
Facility: HOSPITAL | Age: 75
End: 2025-07-29
Payer: MEDICARE

## 2025-07-29 PROBLEM — E11.59 TYPE 2 DIABETES MELLITUS WITH CIRCULATORY DISORDER, WITHOUT LONG-TERM CURRENT USE OF INSULIN: Status: ACTIVE | Noted: 2020-06-08

## 2025-07-29 PROBLEM — R07.9 CHEST PAIN: Status: ACTIVE | Noted: 2025-07-29

## 2025-07-29 PROBLEM — I21.4 NSTEMI (NON-ST ELEVATED MYOCARDIAL INFARCTION): Status: ACTIVE | Noted: 2025-07-29

## 2025-07-29 PROBLEM — I25.110 CORONARY ARTERY DISEASE INVOLVING NATIVE CORONARY ARTERY OF NATIVE HEART WITH UNSTABLE ANGINA PECTORIS: Status: ACTIVE | Noted: 2024-12-04

## 2025-07-29 PROBLEM — E78.5 HYPERLIPIDEMIA LDL GOAL <70: Status: ACTIVE | Noted: 2025-07-29

## 2025-07-29 LAB
ALBUMIN SERPL-MCNC: 3.4 G/DL (ref 3.5–5.2)
ALBUMIN/GLOB SERPL: 1.2 G/DL
ALP SERPL-CCNC: 110 U/L (ref 39–117)
ALT SERPL W P-5'-P-CCNC: 5 U/L (ref 1–41)
ANION GAP SERPL CALCULATED.3IONS-SCNC: 15 MMOL/L (ref 5–15)
APTT PPP: 37.4 SECONDS (ref 24.5–36)
AST SERPL-CCNC: 12 U/L (ref 1–40)
BH CV NUCLEAR PRIOR STUDY: 3
BH CV REST NUCLEAR ISOTOPE DOSE: 23.7 MCI
BH CV STRESS BP STAGE 1: NORMAL
BH CV STRESS COMMENTS STAGE 1: NORMAL
BH CV STRESS DOSE REGADENOSON STAGE 1: 0.4
BH CV STRESS DURATION MIN STAGE 1: 0
BH CV STRESS DURATION SEC STAGE 1: 10
BH CV STRESS HR STAGE 1: 91
BH CV STRESS NUCLEAR ISOTOPE DOSE: 23.6 MCI
BH CV STRESS PROTOCOL 1: NORMAL
BH CV STRESS RECOVERY BP: NORMAL MMHG
BH CV STRESS RECOVERY HR: 88 BPM
BH CV STRESS STAGE 1: 1
BILIRUB SERPL-MCNC: 1 MG/DL (ref 0–1.2)
BUN SERPL-MCNC: 23.8 MG/DL (ref 8–23)
BUN/CREAT SERPL: 28 (ref 7–25)
CALCIUM SPEC-SCNC: 8.9 MG/DL (ref 8.6–10.5)
CHLORIDE SERPL-SCNC: 100 MMOL/L (ref 98–107)
CO2 SERPL-SCNC: 20 MMOL/L (ref 22–29)
CREAT SERPL-MCNC: 0.85 MG/DL (ref 0.76–1.27)
DEPRECATED RDW RBC AUTO: 53.8 FL (ref 37–54)
EGFRCR SERPLBLD CKD-EPI 2021: 90.1 ML/MIN/1.73
ERYTHROCYTE [DISTWIDTH] IN BLOOD BY AUTOMATED COUNT: 16 % (ref 12.3–15.4)
GLOBULIN UR ELPH-MCNC: 2.8 GM/DL
GLUCOSE BLDC GLUCOMTR-MCNC: 107 MG/DL (ref 70–130)
GLUCOSE BLDC GLUCOMTR-MCNC: 125 MG/DL (ref 70–130)
GLUCOSE BLDC GLUCOMTR-MCNC: 127 MG/DL (ref 70–130)
GLUCOSE BLDC GLUCOMTR-MCNC: 93 MG/DL (ref 70–130)
GLUCOSE SERPL-MCNC: 112 MG/DL (ref 65–99)
HCT VFR BLD AUTO: 39.3 % (ref 37.5–51)
HGB BLD-MCNC: 11.8 G/DL (ref 13–17.7)
INR PPP: 1.11 (ref 0.91–1.09)
MAGNESIUM SERPL-MCNC: 1.3 MG/DL (ref 1.6–2.4)
MAGNESIUM SERPL-MCNC: 1.4 MG/DL (ref 1.6–2.4)
MAGNESIUM SERPL-MCNC: 1.7 MG/DL (ref 1.6–2.4)
MAXIMAL PREDICTED HEART RATE: 144 BPM
MCH RBC QN AUTO: 27.3 PG (ref 26.6–33)
MCHC RBC AUTO-ENTMCNC: 30 G/DL (ref 31.5–35.7)
MCV RBC AUTO: 90.8 FL (ref 79–97)
PERCENT MAX PREDICTED HR: 63.19 %
PHOSPHATE SERPL-MCNC: 3.8 MG/DL (ref 2.5–4.5)
PLATELET # BLD AUTO: 154 10*3/MM3 (ref 140–450)
PMV BLD AUTO: 8.7 FL (ref 6–12)
POTASSIUM SERPL-SCNC: 4 MMOL/L (ref 3.5–5.2)
PROT SERPL-MCNC: 6.2 G/DL (ref 6–8.5)
PROTHROMBIN TIME: 14.9 SECONDS (ref 11.8–14.8)
RBC # BLD AUTO: 4.33 10*6/MM3 (ref 4.14–5.8)
SODIUM SERPL-SCNC: 135 MMOL/L (ref 136–145)
SPECT HRT GATED+EF W RNC IV: 38 %
STRESS BASELINE BP: NORMAL MMHG
STRESS BASELINE HR: 80 BPM
STRESS PERCENT HR: 74 %
STRESS POST EXERCISE DUR MIN: 0 MIN
STRESS POST EXERCISE DUR SEC: 10 SEC
STRESS POST PEAK BP: NORMAL MMHG
STRESS POST PEAK HR: 91 BPM
STRESS TARGET HR: 122 BPM
TROPONIN T SERPL HS-MCNC: 58 NG/L
TROPONIN T SERPL HS-MCNC: 58 NG/L
TSH SERPL DL<=0.05 MIU/L-ACNC: 0.81 UIU/ML (ref 0.27–4.2)
WBC NRBC COR # BLD AUTO: 7.81 10*3/MM3 (ref 3.4–10.8)

## 2025-07-29 PROCEDURE — 93018 CV STRESS TEST I&R ONLY: CPT | Performed by: INTERNAL MEDICINE

## 2025-07-29 PROCEDURE — 34310000005 RUBIDIUM CHLORIDE: Performed by: FAMILY MEDICINE

## 2025-07-29 PROCEDURE — 25010000002 REGADENOSON 0.4 MG/5ML SOLUTION: Performed by: INTERNAL MEDICINE

## 2025-07-29 PROCEDURE — 85610 PROTHROMBIN TIME: CPT

## 2025-07-29 PROCEDURE — 83735 ASSAY OF MAGNESIUM: CPT | Performed by: FAMILY MEDICINE

## 2025-07-29 PROCEDURE — 93356 MYOCRD STRAIN IMG SPCKL TRCK: CPT | Performed by: INTERNAL MEDICINE

## 2025-07-29 PROCEDURE — 99222 1ST HOSP IP/OBS MODERATE 55: CPT | Performed by: NURSE PRACTITIONER

## 2025-07-29 PROCEDURE — 84484 ASSAY OF TROPONIN QUANT: CPT | Performed by: NURSE PRACTITIONER

## 2025-07-29 PROCEDURE — 93356 MYOCRD STRAIN IMG SPCKL TRCK: CPT

## 2025-07-29 PROCEDURE — 93017 CV STRESS TEST TRACING ONLY: CPT

## 2025-07-29 PROCEDURE — 82948 REAGENT STRIP/BLOOD GLUCOSE: CPT

## 2025-07-29 PROCEDURE — 80053 COMPREHEN METABOLIC PANEL: CPT

## 2025-07-29 PROCEDURE — 84443 ASSAY THYROID STIM HORMONE: CPT

## 2025-07-29 PROCEDURE — A9555 RB82 RUBIDIUM: HCPCS | Performed by: FAMILY MEDICINE

## 2025-07-29 PROCEDURE — 84100 ASSAY OF PHOSPHORUS: CPT

## 2025-07-29 PROCEDURE — 93306 TTE W/DOPPLER COMPLETE: CPT | Performed by: INTERNAL MEDICINE

## 2025-07-29 PROCEDURE — 85730 THROMBOPLASTIN TIME PARTIAL: CPT

## 2025-07-29 PROCEDURE — 25010000002 MAGNESIUM SULFATE 2 GM/50ML SOLUTION: Performed by: FAMILY MEDICINE

## 2025-07-29 PROCEDURE — 78431 MYOCRD IMG PET RST&STRS CT: CPT | Performed by: INTERNAL MEDICINE

## 2025-07-29 PROCEDURE — 83735 ASSAY OF MAGNESIUM: CPT

## 2025-07-29 PROCEDURE — 84484 ASSAY OF TROPONIN QUANT: CPT

## 2025-07-29 PROCEDURE — 78431 MYOCRD IMG PET RST&STRS CT: CPT

## 2025-07-29 PROCEDURE — 85027 COMPLETE CBC AUTOMATED: CPT

## 2025-07-29 PROCEDURE — 25510000001 PERFLUTREN 6.52 MG/ML SUSPENSION: Performed by: FAMILY MEDICINE

## 2025-07-29 PROCEDURE — 93306 TTE W/DOPPLER COMPLETE: CPT

## 2025-07-29 RX ORDER — SODIUM CHLORIDE 0.9 % (FLUSH) 0.9 %
10 SYRINGE (ML) INJECTION AS NEEDED
Status: DISCONTINUED | OUTPATIENT
Start: 2025-07-29 | End: 2025-07-30 | Stop reason: HOSPADM

## 2025-07-29 RX ORDER — LOSARTAN POTASSIUM 50 MG/1
50 TABLET ORAL
Status: DISCONTINUED | OUTPATIENT
Start: 2025-07-29 | End: 2025-07-30 | Stop reason: HOSPADM

## 2025-07-29 RX ORDER — DEXTROSE MONOHYDRATE 25 G/50ML
25 INJECTION, SOLUTION INTRAVENOUS
Status: DISCONTINUED | OUTPATIENT
Start: 2025-07-29 | End: 2025-07-30

## 2025-07-29 RX ORDER — LOSARTAN POTASSIUM 50 MG/1
50 TABLET ORAL 2 TIMES DAILY
COMMUNITY
End: 2025-07-31

## 2025-07-29 RX ORDER — PANTOPRAZOLE SODIUM 40 MG/1
40 TABLET, DELAYED RELEASE ORAL
Status: DISCONTINUED | OUTPATIENT
Start: 2025-07-30 | End: 2025-07-30 | Stop reason: HOSPADM

## 2025-07-29 RX ORDER — NICOTINE POLACRILEX 4 MG
15 LOZENGE BUCCAL
Status: DISCONTINUED | OUTPATIENT
Start: 2025-07-29 | End: 2025-07-30

## 2025-07-29 RX ORDER — REGADENOSON 0.08 MG/ML
0.4 INJECTION, SOLUTION INTRAVENOUS ONCE
Status: COMPLETED | OUTPATIENT
Start: 2025-07-29 | End: 2025-07-29

## 2025-07-29 RX ORDER — CLOPIDOGREL BISULFATE 75 MG/1
75 TABLET ORAL DAILY
COMMUNITY

## 2025-07-29 RX ORDER — GLIPIZIDE 5 MG/1
1.25 TABLET ORAL
Status: DISCONTINUED | OUTPATIENT
Start: 2025-07-29 | End: 2025-07-30 | Stop reason: HOSPADM

## 2025-07-29 RX ORDER — ENOXAPARIN SODIUM 100 MG/ML
40 INJECTION SUBCUTANEOUS DAILY
Status: DISCONTINUED | OUTPATIENT
Start: 2025-07-29 | End: 2025-07-29

## 2025-07-29 RX ORDER — IBUPROFEN 600 MG/1
1 TABLET ORAL
Status: DISCONTINUED | OUTPATIENT
Start: 2025-07-29 | End: 2025-07-30

## 2025-07-29 RX ORDER — CETIRIZINE HYDROCHLORIDE 10 MG/1
10 TABLET ORAL DAILY
Status: DISCONTINUED | OUTPATIENT
Start: 2025-07-29 | End: 2025-07-30 | Stop reason: HOSPADM

## 2025-07-29 RX ORDER — GABAPENTIN 400 MG/1
800 CAPSULE ORAL EVERY 8 HOURS SCHEDULED
Status: DISCONTINUED | OUTPATIENT
Start: 2025-07-29 | End: 2025-07-30 | Stop reason: HOSPADM

## 2025-07-29 RX ORDER — MAGNESIUM OXIDE 400 MG/1
400 TABLET ORAL DAILY
COMMUNITY

## 2025-07-29 RX ORDER — MAGNESIUM SULFATE HEPTAHYDRATE 40 MG/ML
2 INJECTION, SOLUTION INTRAVENOUS ONCE
Status: COMPLETED | OUTPATIENT
Start: 2025-07-29 | End: 2025-07-29

## 2025-07-29 RX ORDER — SODIUM CHLORIDE 9 MG/ML
40 INJECTION, SOLUTION INTRAVENOUS AS NEEDED
Status: DISCONTINUED | OUTPATIENT
Start: 2025-07-29 | End: 2025-07-30 | Stop reason: HOSPADM

## 2025-07-29 RX ORDER — ACETAMINOPHEN 325 MG/1
650 TABLET ORAL EVERY 4 HOURS PRN
Status: DISCONTINUED | OUTPATIENT
Start: 2025-07-29 | End: 2025-07-30 | Stop reason: HOSPADM

## 2025-07-29 RX ORDER — SODIUM CHLORIDE 0.9 % (FLUSH) 0.9 %
10 SYRINGE (ML) INJECTION EVERY 12 HOURS SCHEDULED
Status: DISCONTINUED | OUTPATIENT
Start: 2025-07-29 | End: 2025-07-30 | Stop reason: HOSPADM

## 2025-07-29 RX ORDER — ATORVASTATIN CALCIUM 40 MG/1
40 TABLET, FILM COATED ORAL NIGHTLY
Status: DISCONTINUED | OUTPATIENT
Start: 2025-07-29 | End: 2025-07-30 | Stop reason: HOSPADM

## 2025-07-29 RX ORDER — ASPIRIN 81 MG/1
243 TABLET, CHEWABLE ORAL ONCE
Status: COMPLETED | OUTPATIENT
Start: 2025-07-29 | End: 2025-07-29

## 2025-07-29 RX ORDER — FLUTICASONE PROPIONATE 50 MCG
1 SPRAY, SUSPENSION (ML) NASAL 2 TIMES DAILY PRN
Status: DISCONTINUED | OUTPATIENT
Start: 2025-07-29 | End: 2025-07-30 | Stop reason: HOSPADM

## 2025-07-29 RX ORDER — ASPIRIN 81 MG/1
81 TABLET ORAL DAILY
Status: DISCONTINUED | OUTPATIENT
Start: 2025-07-29 | End: 2025-07-30 | Stop reason: HOSPADM

## 2025-07-29 RX ORDER — ACETAMINOPHEN 650 MG/1
650 SUPPOSITORY RECTAL EVERY 4 HOURS PRN
Status: DISCONTINUED | OUTPATIENT
Start: 2025-07-29 | End: 2025-07-30 | Stop reason: HOSPADM

## 2025-07-29 RX ORDER — ACETAMINOPHEN 160 MG/5ML
650 SOLUTION ORAL EVERY 4 HOURS PRN
Status: DISCONTINUED | OUTPATIENT
Start: 2025-07-29 | End: 2025-07-30 | Stop reason: HOSPADM

## 2025-07-29 RX ORDER — ALLOPURINOL 300 MG/1
300 TABLET ORAL DAILY
Status: DISCONTINUED | OUTPATIENT
Start: 2025-07-29 | End: 2025-07-30 | Stop reason: HOSPADM

## 2025-07-29 RX ORDER — CARBOXYMETHYLCELLULOSE SODIUM 10 MG/ML
1 GEL OPHTHALMIC 4 TIMES DAILY PRN
COMMUNITY

## 2025-07-29 RX ORDER — NITROGLYCERIN 0.4 MG/1
0.4 TABLET SUBLINGUAL
Status: DISCONTINUED | OUTPATIENT
Start: 2025-07-29 | End: 2025-07-30 | Stop reason: HOSPADM

## 2025-07-29 RX ADMIN — ASPIRIN 81 MG: 81 TABLET, COATED ORAL at 09:04

## 2025-07-29 RX ADMIN — APIXABAN 2.5 MG: 2.5 TABLET, FILM COATED ORAL at 22:34

## 2025-07-29 RX ADMIN — APIXABAN 2.5 MG: 2.5 TABLET, FILM COATED ORAL at 13:44

## 2025-07-29 RX ADMIN — CETIRIZINE HYDROCHLORIDE 10 MG: 10 TABLET, FILM COATED ORAL at 13:44

## 2025-07-29 RX ADMIN — ATORVASTATIN CALCIUM 40 MG: 40 TABLET, FILM COATED ORAL at 22:34

## 2025-07-29 RX ADMIN — Medication 10 ML: at 22:34

## 2025-07-29 RX ADMIN — Medication 10 ML: at 09:04

## 2025-07-29 RX ADMIN — LOSARTAN POTASSIUM 50 MG: 50 TABLET, FILM COATED ORAL at 10:56

## 2025-07-29 RX ADMIN — GABAPENTIN 800 MG: 400 CAPSULE ORAL at 13:44

## 2025-07-29 RX ADMIN — PERFLUTREN 6.52 MG: 6.52 INJECTION, SUSPENSION INTRAVENOUS at 12:40

## 2025-07-29 RX ADMIN — REGADENOSON 0.4 MG: 0.08 INJECTION, SOLUTION INTRAVENOUS at 11:56

## 2025-07-29 RX ADMIN — ATORVASTATIN CALCIUM 40 MG: 40 TABLET, FILM COATED ORAL at 03:34

## 2025-07-29 RX ADMIN — ASPIRIN 81 MG CHEWABLE TABLET 243 MG: 81 TABLET CHEWABLE at 01:45

## 2025-07-29 RX ADMIN — GABAPENTIN 800 MG: 400 CAPSULE ORAL at 22:34

## 2025-07-29 RX ADMIN — MAGNESIUM SULFATE HEPTAHYDRATE 2 G: 40 INJECTION, SOLUTION INTRAVENOUS at 13:44

## 2025-07-29 RX ADMIN — ALLOPURINOL 300 MG: 300 TABLET ORAL at 13:44

## 2025-07-29 NOTE — CONSULTS
[facility] HEART GROUP CONSULT NOTE    Referring Provider: Kim Do MD    Reason for Consultation: Unstable angina     Chief Complaint   Patient presents with    Chest Pain       Subjective .     History of present illness:  Chung Bradley is a 74 y.o. male with a known PMH significant for coronary artery disease status post 2.5 x 18 mm Xience drug-eluting stent to the mid LAD 11/5/2024, hypertension, hyperlipidemia, type 2 diabetes mellitus and obstructive sleep apnea on CPAP.  He presented to [facility] emergency department last night with complaints of chest pain, diaphoresis and near syncope.  He reports sudden onset of chest pain yesterday evening while walking from his recliner to the kitchen. He sat down at the kitchen table, at which time the pain increased in severity. He reports associated neck pain and diaphoresis. His wife gave him a nitroglycerin tablet, at which time he became near syncopal. He denies actually losing consciousness, however he states he was unable to respond to his wife and neighbor during that time. EMS was called. He states that when EMS arrived, his symptoms had improved. However, he did still have some mild chest discomfort.  EKG on arrival revealed sinus rhythm with chronic right bundle branch block and no acute ST-T changes.  Initial troponin level was elevated at 53 with flat trend.  Other labs were unremarkable. Chest x-ray revealed left basilar airspace disease without any other abnormality.  CTA of the chest revealed no evidence of pulmonary embolism, no aortic aneurysm or dissection and subpleural nodule in the lower lobes bilaterally with recommendation for 3-month follow-up CT scan.  Cardiology services been consulted for assistance with management of unstable angina.  Cardiac PET scan has been ordered for today per primary team. He denies any chest pain this morning.     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    Cancer     bladder    Coronary artery disease involving native coronary artery of native heart without angina pectoris 12/4/2024    Degenerative arthritis of cervical spine with nerve compression     per patient report    Diabetes mellitus     Gutierrez catheter in place     GERD (gastroesophageal reflux disease)     Hearing aid worn     Hyperlipidemia     Hypertension     Impaired hearing     without hearing aids can barely hear anything    Neuropathy     NSTEMI (non-ST elevated myocardial infarction) 7/29/2025    Pulmonary embolism     Rotator cuff injury     left    Sleep apnea     wears cpap   ,   Past Surgical History:   Procedure Laterality Date    CARDIAC CATHETERIZATION      CARDIAC CATHETERIZATION N/A 11/05/2024    Procedure: Coronary angiography;  Surgeon: Cassius Mosqueda MD;  Location:  [facility] CATH INVASIVE LOCATION;  Service: Cardiology;  Laterality: N/A;    CARDIAC CATHETERIZATION N/A 11/05/2024    Procedure: Percutaneous Coronary Intervention;  Surgeon: Cassius Mosqueda MD;  Location:  [facility] CATH INVASIVE LOCATION;  Service: Cardiology;  Laterality: N/A;    CATARACT EXTRACTION W/ INTRAOCULAR LENS IMPLANT      CHOLECYSTECTOMY      CYSTECTOMY      2017    EXPLORATORY LAPAROTOMY      LEG SURGERY      REPLACEMENT TOTAL KNEE Left 06/27/2025    SHOULDER ARTHROSCOPY W/ ROTATOR CUFF REPAIR Left 09/11/2023    Procedure: LEFT SHOULDER ROTATOR CUFF REPAIR, ACROMIOPLASTY AND DISTAL CLAVICLE EXCISION;  Surgeon: Justen To MD;  Location:  [facility] OR;  Service: Orthopedics;  Laterality: Left;    TRANSURETHRAL RESECTION OF BLADDER TUMOR Bilateral 07/25/2017    Procedure: CYSTOSCOPY TRANSURETHRAL RESECTION OF BLADDER TUMOR & POSSIBLE BILATERAL RETROGRADE URETEROPYELOGRAMS;  Surgeon: Chris Esparza MD;  Location:  [facility] OR;  Service:     TRANSURETHRAL RESECTION OF BLADDER TUMOR N/A 11/03/2017    Procedure: CYSTOSCOPY TRANSURETHRAL RESECTION  OF BLADDER TUMOR ;  Surgeon: Chris Esparza MD;  Location:  [facility] OR;  Service:     TRANSURETHRAL RESECTION OF BLADDER TUMOR N/A 09/10/2018    Procedure: CYSTOSCOPY TRANSURETHRAL RESECTION OF LARGE BLADDER TUMOR, CLOT EVACUATION, AND FULGURATION;  Surgeon: Chris Esparza MD;  Location:  [facility] OR;  Service: Urology   ,   Family History   Problem Relation Age of Onset    Cancer Father     Hypertension Father     Heart disease Mother     Arthritis Mother     Hypertension Mother     Cancer Sister     Cancer Brother     Heart disease Sister    ,   Social History     Tobacco Use    Smoking status: Former     Current packs/day: 0.00     Types: Cigarettes     Quit date:      Years since quittin.5    Smokeless tobacco: Never   Vaping Use    Vaping status: Never Used   Substance Use Topics    Alcohol use: No    Drug use: No   ,     Medications  Current Facility-Administered Medications   Medication Dose Route Frequency Provider Last Rate Last Admin    acetaminophen (TYLENOL) tablet 650 mg  650 mg Oral Q4H PRN Kim Do MD        Or    acetaminophen (TYLENOL) 160 MG/5ML oral solution 650 mg  650 mg Oral Q4H PRN Kim Do MD        Or    acetaminophen (TYLENOL) suppository 650 mg  650 mg Rectal Q4H PRN Kim Do MD        aspirin EC tablet 81 mg  81 mg Oral Daily Kim Do MD   81 mg at 25 0904    atorvastatin (LIPITOR) tablet 40 mg  40 mg Oral Nightly Kim Do MD   40 mg at 25 0334    dextrose (D50W) (25 g/50 mL) IV injection 25 g  25 g Intravenous Q15 Min PRN Kim Do MD        dextrose (GLUTOSE) oral gel 15 g  15 g Oral Q15 Min PRN Kim Do MD        enoxaparin sodium (LOVENOX) syringe 40 mg  40 mg Subcutaneous Daily Kim Do MD        glucagon (GLUCAGEN) injection 1 mg  1 mg Intramuscular Q15 Min PRN Kim Do MD        insulin regular (humuLIN R,novoLIN R) injection 2-7 Units  2-7 Units Subcutaneous 4x Daily AC & at Bedtime Kim Do  MD        losartan (COZAAR) tablet 50 mg  50 mg Oral Q24H Sana Ramirez APRN   50 mg at 07/29/25 1056    nitroglycerin (NITROSTAT) SL tablet 0.4 mg  0.4 mg Sublingual Q5 Min PRN Kim Do MD        regadenoson (LEXISCAN) injection 0.4 mg  0.4 mg Intravenous Once Rene Barrera MD        sodium chloride 0.9 % flush 10 mL  10 mL Intravenous PRN Usman Ortez MD        sodium chloride 0.9 % flush 10 mL  10 mL Intravenous Q12H Kim Do MD   10 mL at 07/29/25 0904    sodium chloride 0.9 % flush 10 mL  10 mL Intravenous PRN Kim Do MD        sodium chloride 0.9 % infusion 40 mL  40 mL Intravenous PRN Kim Do MD           Allergies:  Metformin and Penicillins    Review of Systems  Review of Systems   Constitutional: Positive for diaphoresis. Negative for chills, decreased appetite, fever, malaise/fatigue, night sweats, weight gain and weight loss.   HENT:  Negative for nosebleeds.    Eyes:  Negative for visual disturbance.   Cardiovascular:  Positive for chest pain and near-syncope. Negative for dyspnea on exertion, leg swelling, orthopnea, palpitations, paroxysmal nocturnal dyspnea and syncope.   Respiratory:  Negative for cough, hemoptysis, shortness of breath, snoring and wheezing.    Endocrine: Negative for cold intolerance and heat intolerance.   Hematologic/Lymphatic: Does not bruise/bleed easily.   Skin:  Negative for rash.   Musculoskeletal:  Negative for back pain and falls.   Gastrointestinal:  Negative for abdominal pain, change in bowel habit, constipation, diarrhea, dysphagia, heartburn, nausea and vomiting.   Genitourinary:  Negative for hematuria.   Neurological:  Negative for dizziness, headaches, light-headedness and weakness.   Psychiatric/Behavioral:  Negative for altered mental status.    Allergic/Immunologic: Negative for persistent infections.       Objective     Physical Exam:  Patient Vitals for the past 24 hrs:   BP Temp Temp src Pulse Resp SpO2 Height  "Weight   07/29/25 1056 148/64 98.9 °F (37.2 °C) Oral 82 18 94 % -- --   07/29/25 0759 114/71 97.8 °F (36.6 °C) Oral -- 16 96 % -- --   07/29/25 0524 150/70 98.1 °F (36.7 °C) Oral 80 16 95 % -- --   07/29/25 0224 -- -- -- -- -- -- -- 88.7 kg (195 lb 9.6 oz)   07/29/25 0101 145/71 98.4 °F (36.9 °C) Oral -- 18 95 % 208.3 cm (82\") --   07/29/25 0041 132/67 98.2 °F (36.8 °C) Oral 88 20 95 % -- --   07/29/25 0001 129/70 -- -- 98 20 93 % -- --   07/28/25 2301 141/68 -- -- 91 20 95 % -- --   07/28/25 2218 130/70 -- -- 101 20 95 % -- --   07/28/25 2045 132/78 -- -- 91 20 94 % -- --   07/28/25 2006 131/68 98 °F (36.7 °C) Oral 90 20 94 % 208.3 cm (82\") 92.1 kg (203 lb)     Telemetry: SR 68-88 bpm    Vitals and nursing note reviewed.   Constitutional:       General: Awake.      Appearance: Normal and healthy appearance. Well-developed, normal weight and not in distress.   Eyes:      General: Lids are normal.      Conjunctiva/sclera: Conjunctivae normal.      Pupils: Pupils are equal, round, and reactive to light.   HENT:      Head: Normocephalic and atraumatic.      Nose: Nose normal.   Neck:      Vascular: No JVR. JVD normal.   Pulmonary:      Effort: Pulmonary effort is normal.      Breath sounds: Normal breath sounds. No wheezing. No rhonchi. No rales.   Chest:      Chest wall: Not tender to palpatation.   Cardiovascular:      PMI at left midclavicular line. Normal rate. Regular rhythm. Normal S1. Normal S2.       Murmurs: There is no murmur.      No gallop.  No click. No rub.   Pulses:     Intact distal pulses.   Edema:     Peripheral edema absent.   Abdominal:      General: Bowel sounds are normal.      Palpations: Abdomen is soft.      Tenderness: There is no abdominal tenderness.   Musculoskeletal: Normal range of motion.         General: No tenderness.      Cervical back: Normal range of motion. Skin:     General: Skin is warm and dry.   Neurological:      General: No focal deficit present.      Mental Status: Alert, " oriented to person, place, and time and oriented to person, place and time.   Psychiatric:         Attention and Perception: Attention and perception normal.         Mood and Affect: Mood and affect normal.         Speech: Speech normal.         Behavior: Behavior normal. Behavior is cooperative.         Thought Content: Thought content normal.         Cognition and Memory: Cognition and memory normal.         Judgment: Judgment normal.         Results Review:   I reviewed the patient's new clinical results.    Lab Results (last 72 hours)       Procedure Component Value Units Date/Time    POC Glucose 4x Daily Before Meals & at Bedtime [[id number]]  (Normal) Collected: 07/29/25 0808    Specimen: Blood Updated: 07/29/25 0810     Glucose 107 mg/dL      Comment: Serial Number: [id number]:  [id number]       Comprehensive Metabolic Panel [[id number]]  (Abnormal) Collected: 07/29/25 0415    Specimen: Blood Updated: 07/29/25 0513     Glucose 112 mg/dL      BUN 23.8 mg/dL      Creatinine 0.85 mg/dL      Sodium 135 mmol/L      Potassium 4.0 mmol/L      Chloride 100 mmol/L      CO2 20.0 mmol/L      Calcium 8.9 mg/dL      Total Protein 6.2 g/dL      Albumin 3.4 g/dL      ALT (SGPT) 5 U/L      AST (SGOT) 12 U/L      Alkaline Phosphatase 110 U/L      Total Bilirubin 1.0 mg/dL      Globulin 2.8 gm/dL      A/G Ratio 1.2 g/dL      BUN/Creatinine Ratio 28.0     Anion Gap 15.0 mmol/L      eGFR 90.1 mL/min/1.73     Narrative:      GFR Categories in Chronic Kidney Disease (CKD)              GFR Category          GFR (mL/min/1.73)    Interpretation  G1                    90 or greater        Normal or high (1)  G2                    60-89                Mild decrease (1)  G3a                   45-59                Mild to moderate decrease  G3b                   30-44                Moderate to severe decrease  G4                    15-29                Severe decrease  G5                    14 or less           Kidney  failure    (1)In the absence of evidence of kidney disease, neither GFR category G1 or G2 fulfill the criteria for CKD.    eGFR calculation 2021 CKD-EPI creatinine equation, which does not include race as a factor    Magnesium [[id number]]  (Abnormal) Collected: 07/29/25 0415    Specimen: Blood Updated: 07/29/25 0513     Magnesium 1.4 mg/dL     Phosphorus [[id number]]  (Normal) Collected: 07/29/25 0415    Specimen: Blood Updated: 07/29/25 0513     Phosphorus 3.8 mg/dL     TSH [[id number]]  (Normal) Collected: 07/29/25 0415    Specimen: Blood Updated: 07/29/25 0513     TSH 0.814 uIU/mL     CBC (No Diff) [[id number]]  (Abnormal) Collected: 07/29/25 0415    Specimen: Blood Updated: 07/29/25 0500     WBC 7.81 10*3/mm3      RBC 4.33 10*6/mm3      Hemoglobin 11.8 g/dL      Hematocrit 39.3 %      MCV 90.8 fL      MCH 27.3 pg      MCHC 30.0 g/dL      RDW 16.0 %      RDW-SD 53.8 fl      MPV 8.7 fL      Platelets 154 10*3/mm3     Protime-INR [[id number]]  (Abnormal) Collected: 07/29/25 0415    Specimen: Blood Updated: 07/29/25 0447     Protime 14.9 Seconds      INR 1.11    aPTT [[id number]]  (Abnormal) Collected: 07/29/25 0415    Specimen: Blood Updated: 07/29/25 0447     PTT 37.4 seconds     Narrative:      PTT = The equivalent PTT values for the therapeutic range of heparin levels at 0.3 to 0.7 U/ml are 77 - 99 seconds.    High Sensitivity Troponin T [[id number]]  (Abnormal) Collected: 07/29/25 0035    Specimen: Blood from Arm, Left Updated: 07/29/25 0122     HS Troponin T 58 ng/L     Narrative:      High Sensitive Troponin T Reference Range:  <14.0 ng/L- Negative Female for AMI  <22.0 ng/L- Negative Male for AMI  >=14 - Abnormal Female indicating possible myocardial injury.  >=22 - Abnormal Male indicating possible myocardial injury.   Clinicians would have to utilize clinical acumen, EKG, Troponin, and serial changes to determine if it is an Acute Myocardial Infarction or myocardial injury due to an underlying chronic  "condition.         High Sensitivity Troponin T 1Hr [[id number]]  (Abnormal) Collected: 07/28/25 2109    Specimen: Blood from Arm, Left Updated: 07/28/25 2138     HS Troponin T 56 ng/L      Comment: 1 HR troponin collected less than 1 HR after the initial troponin.        Troponin T Numeric Delta 3 ng/L      Troponin T % Delta 6    Narrative:      High Sensitive Troponin T Reference Range:  <14.0 ng/L- Negative Female for AMI  <22.0 ng/L- Negative Male for AMI  >=14 - Abnormal Female indicating possible myocardial injury.  >=22 - Abnormal Male indicating possible myocardial injury.   Clinicians would have to utilize clinical acumen, EKG, Troponin, and serial changes to determine if it is an Acute Myocardial Infarction or myocardial injury due to an underlying chronic condition.         D-dimer, Quantitative [[id number]]  (Abnormal) Collected: 07/28/25 2015    Specimen: Blood Updated: 07/28/25 2111     D-Dimer, Quantitative 1.71 MCGFEU/mL     Narrative:      According to the assay 's published package insert, a normal (<0.50 MCGFEU/mL) D-dimer result in conjunction with a non-high clinical probability assessment, excludes deep vein thrombosis (DVT) and pulmonary embolism (PE) with high sensitivity.    D-dimer values increase with age and this can make VTE exclusion of an older population difficult. To address this, the [employer], based on best available evidence and recent guidelines, recommends that clinicians use age-adjusted D-dimer thresholds in patients greater than 50 years of age with: a) a low probability of PE who do not meet all Pulmonary Embolism Rule Out Criteria, or b) in those with intermediate probability of PE.   The formula for an age-adjusted D-dimer cut-off is \"age/100\".  For example, a 60 year old patient would have an age-adjusted cut-off of 0.60 MCGFEU/mL and an 80 year old 0.80 MCGFEU/mL.    High Sensitivity Troponin T [[id number]]  (Abnormal) Collected: 07/28/25 " 2015    Specimen: Blood Updated: 07/28/25 2042     HS Troponin T 53 ng/L     Narrative:      High Sensitive Troponin T Reference Range:  <14.0 ng/L- Negative Female for AMI  <22.0 ng/L- Negative Male for AMI  >=14 - Abnormal Female indicating possible myocardial injury.  >=22 - Abnormal Male indicating possible myocardial injury.   Clinicians would have to utilize clinical acumen, EKG, Troponin, and serial changes to determine if it is an Acute Myocardial Infarction or myocardial injury due to an underlying chronic condition.         Comprehensive Metabolic Panel [[id number]]  (Abnormal) Collected: 07/28/25 2015    Specimen: Blood Updated: 07/28/25 2041     Glucose 142 mg/dL      BUN 23.8 mg/dL      Creatinine 0.96 mg/dL      Sodium 138 mmol/L      Potassium 4.8 mmol/L      Comment: Slight hemolysis detected by analyzer. Result may be falsely elevated.        Chloride 101 mmol/L      CO2 23.0 mmol/L      Calcium 9.0 mg/dL      Total Protein 6.4 g/dL      Albumin 3.5 g/dL      ALT (SGPT) 8 U/L      AST (SGOT) 20 U/L      Comment: Slight hemolysis detected by analyzer. Result may be falsely elevated.        Alkaline Phosphatase 117 U/L      Total Bilirubin 1.0 mg/dL      Globulin 2.9 gm/dL      A/G Ratio 1.2 g/dL      BUN/Creatinine Ratio 24.8     Anion Gap 14.0 mmol/L      eGFR 81.9 mL/min/1.73     Narrative:      GFR Categories in Chronic Kidney Disease (CKD)              GFR Category          GFR (mL/min/1.73)    Interpretation  G1                    90 or greater        Normal or high (1)  G2                    60-89                Mild decrease (1)  G3a                   45-59                Mild to moderate decrease  G3b                   30-44                Moderate to severe decrease  G4                    15-29                Severe decrease  G5                    14 or less           Kidney failure    (1)In the absence of evidence of kidney disease, neither GFR category G1 or G2 fulfill the criteria for  CKD.    eGFR calculation 2021 CKD-EPI creatinine equation, which does not include race as a factor    BNP [[id number]]  (Normal) Collected: 07/28/25 2015    Specimen: Blood Updated: 07/28/25 2039     proBNP 1,099.0 pg/mL     Narrative:      This assay is used as an aid in the diagnosis of individuals suspected of having heart failure. It can be used as an aid in the diagnosis of acute decompensated heart failure (ADHF) in patients presenting with signs and symptoms of ADHF to the emergency department (ED). In addition, NT-proBNP of <300 pg/mL indicates ADHF is not likely.    Age Range Result Interpretation  NT-proBNP Concentration (pg/mL:      <50             Positive            >450                   Gray                 300-450                    Negative             <300    50-75           Positive            >900                  Gray                300-900                  Negative            <300      >75             Positive            >1800                  Gray                300-1800                  Negative            <300    [place] Draw [[id number]] Collected: 07/28/25 2015    Specimen: Blood Updated: 07/28/25 2030    Narrative:      The following orders were created for panel order [place] Draw.  Procedure                               Abnormality         Status                     ---------                               -----------         ------                     Green Top (Gel)[[id number]]                                  Final result               Lavender Top[[id number]]                                     Final result               Red Top[[id number]]                                          Final result               Light Blue Top[[id number]]                                   Final result                 Please view results for these tests on the individual orders.    Green Top (Gel) [[id number]] Collected: 07/28/25 2015    Specimen: Blood Updated: 07/28/25 2030     Extra Tube Hold for add-ons.      Comment: Auto resulted.       Lavender Top [[id number]] Collected: 07/28/25 2015    Specimen: Blood Updated: 07/28/25 2030     Extra Tube hold for add-on     Comment: Auto resulted       Red Top [[id number]] Collected: 07/28/25 2015    Specimen: Blood Updated: 07/28/25 2030     Extra Tube Hold for add-ons.     Comment: Auto resulted.       Light Blue Top [[id number]] Collected: 07/28/25 2015    Specimen: Blood Updated: 07/28/25 2030     Extra Tube Hold for add-ons.     Comment: Auto resulted       Protime-INR [[id number]]  (Normal) Collected: 07/28/25 2015    Specimen: Blood Updated: 07/28/25 2030     Protime 14.7 Seconds      INR 1.09    CBC & Differential [[id number]]  (Abnormal) Collected: 07/28/25 2015    Specimen: Blood Updated: 07/28/25 2021    Narrative:      The following orders were created for panel order CBC & Differential.  Procedure                               Abnormality         Status                     ---------                               -----------         ------                     CBC Auto Differential[[id number]]        Abnormal            Final result                 Please view results for these tests on the individual orders.    CBC Auto Differential [[id number]]  (Abnormal) Collected: 07/28/25 2015    Specimen: Blood Updated: 07/28/25 2021     WBC 8.13 10*3/mm3      RBC 4.39 10*6/mm3      Hemoglobin 12.1 g/dL      Hematocrit 39.0 %      MCV 88.8 fL      MCH 27.6 pg      MCHC 31.0 g/dL      RDW 16.2 %      RDW-SD 53.1 fl      MPV 8.9 fL      Platelets 158 10*3/mm3      Neutrophil % 79.6 %      Lymphocyte % 10.7 %      Monocyte % 7.9 %      Eosinophil % 0.9 %      Basophil % 0.5 %      Immature Grans % 0.4 %      Neutrophils, Absolute 6.48 10*3/mm3      Lymphocytes, Absolute 0.87 10*3/mm3      Monocytes, Absolute 0.64 10*3/mm3      Eosinophils, Absolute 0.07 10*3/mm3      Basophils, Absolute 0.04 10*3/mm3      Immature Grans, Absolute 0.03 10*3/mm3      nRBC 0.0 /100 WBC             No results  "found for: \"ECHOEFEST\"    Imaging Results (Last 72 Hours)       Procedure Component Value Units Date/Time    CT Angiogram Chest Pulmonary Embolism [[id number]] Collected: 07/29/25 0555     Updated: 07/29/25 0759    Narrative:      EXAMINATION: CT ANGIOGRAM CHEST PULMONARY EMBOLISM-        HISTORY: Pulmonary Embolism     DLP: 1783 mGycm.     In order to have a CT radiation dose as low as reasonably achievable,  automated exposure control was utilized to adjust the exposure  parameters according to patient's size.     CT angiography of the chest performed after intravenous contrast  enhancement.     Images are acquired in axial plane and subsequent 2D construction in  coronal and sagittal planes and 3D maximum intensity projection image  reconstruction.     The comparison is made with the previous study dated 9/18/2018.     There is good opacification of the main pulmonary artery or right and  left pulmonary arteries. There is ill-defined defect in the right upper  and middle lobar branch which is due to artifact from the adjacent high  density contrast in the superior vena cava. No filling defect in the  remaining visualized pulmonary arterial bed.     RV/LV ratio is 45/60 which is normal. No finding to suggest right heart  strain.     Atheromatous change of thoracic aorta. No aneurysmal dilatation or  dissection.     Moderate atheromatous changes of the limited visualized coronary  arteries.     No significant cardiomegaly.     No evidence of mediastinal or hilar mass or lymphadenopathy.     Limited visualized soft tissue of the neck are normal. No thyroid  nodules.     No axillary lymphadenopathy.     The lungs are moderately well-expanded.     Central airways patent and clear.     Chronic emphysematous changes of the lungs bilaterally are noted.     There is a subpleural nodule in the right lower lobe, image #104 in  series 5, measuring 12 mm in diameter. This was not noted in the  previous study in 2018.     The " smaller subpleural nodule in the left lower lobe, image #138 in  series 5, measures 6 mm.     Atelectatic changes are seen in the lower lungs bilaterally.     Few calcified nodules/granulomas are seen in the lungs bilaterally.     Limited included abdomen appears unremarkable except for small atrophic  right kidney.     Images reviewed in bone window show chronic degenerative changes of the  thoracic spine. There is no acute bony abnormality.       Impression:      1. No evidence of pulmonary embolism. No aortic aneurysm or dissection.  2. Subpleural nodule in the lower lobes bilaterally as detailed above.  Further evaluation with a follow-up diagnostic CT scan of the chest in 3  months and/or radionuclide PET/CT imaging may be obtained.     The above study was initially reviewed and reported by [employer]. I do not  find any discrepancies.                          This report was signed and finalized on 7/29/2025 7:56 AM by Dr. Francisco Dennis MD.       XR Chest 1 View [[id number]] Collected: 07/28/25 2020     Updated: 07/28/25 2024    Narrative:      EXAMINATION: XR CHEST 1 VW-  7/28/2025 8:20 PM     HISTORY: Chest pain     FINDINGS: Upright frontal projection of the chest demonstrates patchy  left basilar airspace disease either related atelectasis or infiltrate.  There is a granuloma in the right lung base. Lungs are otherwise clear.  Mediastinum within normal limits.       Impression:      1. Left basilar airspace disease with differential as above. The lungs  are otherwise clear.     This report was signed and finalized on 7/28/2025 8:21 PM by Dr. Roberto Carlos Rutherford MD.             Assessment       Coronary artery disease involving native coronary artery of native heart with unstable angina pectoris    Type 2 diabetes mellitus with circulatory disorder, without long-term current use of insulin    S/P drug eluting coronary stent placement    Essential hypertension    Hyperlipidemia LDL goal <70      Plan     1.   Coronary artery disease with unstable angina- no acute ST-T changes on presenting EKG.  Troponin level elevated with flat trend. Check 2d echo. Cardiac PET scan pending per primary team.  2.  Type 2 diabetes mellitus-management per primary team.  3.  Status post drug-eluting coronary stent placement-2.5 x 18 mm Xience drug-eluting stent to the mid LAD 11/5/2024.  Patient completed 6 months of dual antiplatelet therapy due to non-ACS indication for stent placement.  Continue aspirin.  4.  Essential hypertension-blood pressures have been elevated since admission.  Resume home dose of losartan 50 mg daily.  Continue to monitor.  5.  Hyperlipidemia LDL goal less than 70-LDL was well-controlled on his most recent lipid panel in April.  Continue Lipitor.    Further orders per Dr. Barrera upon his evaluation of the patient.     Thank you for the consultation, cardiology will gladly continue to follow.     HANNAH Valderrama  7/29/2025  11:31 CDT      Please note this cardiology consultation note is the result of a face to face consultation with the patient, in addition to reviewing medical records at length by myself,  Electronically signed by HANNAH Valderrama, 07/29/25, 9:44 AM CDT.         Time: 35 minutes

## 2025-07-29 NOTE — PLAN OF CARE
Goal Outcome Evaluation:  Plan of Care Reviewed With: patient        Progress: improving  Outcome Evaluation: pt to the floor around 0100. admitted for chest pain and syncope. no conplaints while on the floor. slept through the night. surgical wound on left knee from a surgery about a month ago. Tele S 85-87 1st degree BBB. Cardiology consult has been put in for this patient. VSS. A&Ox4. saftey maintained call light in reach.

## 2025-07-29 NOTE — CASE MANAGEMENT/SOCIAL WORK
Discharge Planning Assessment  [facility]     Patient Name: Chung Bradley  MRN: [id number]  Today's Date: 7/29/2025    Admit Date: 7/28/2025        Discharge Needs Assessment       Row Name 07/29/25 1115       Living Environment    People in Home spouse    Current Living Arrangements home    Potentially Unsafe Housing Conditions none    Primary Care Provided by self    Family Caregiver if Needed spouse    Quality of Family Relationships supportive;involved    Able to Return to Prior Arrangements yes       Resource/Environmental Concerns    Resource/Environmental Concerns none    Transportation Concerns none       Transition Planning    Patient/Family Anticipates Transition to home       Discharge Needs Assessment    Equipment Currently Used at Home cane, straight;cpap    Concerns to be Addressed denies needs/concerns at this time    Discharge Coordination/Progress Spoke with patient and spouse at bedside for dc planning. Patient cares for self at home with use of cane and does not use outpatient services. Has pcp and Rx coverage. Denies dc needs at this time.                   Discharge Plan    No documentation.                 Continued Care and Services - Admitted Since 7/28/2025    No active coordination exists.          Demographic Summary    No documentation.                  Functional Status    No documentation.                  Psychosocial    No documentation.                  Abuse/Neglect    No documentation.                  Legal    No documentation.                  Substance Abuse    No documentation.                  Patient Forms    No documentation.                     Merlina A Fletcher, RN

## 2025-07-29 NOTE — PROGRESS NOTES
Admitted  earlier today by nocturnist  Laboratory tests reviewed    Patient with history of coronary artery disease with a drug-eluting stent to LAD in November 2024.  He recently had surgical intervention to left knee, and he is on Eliquis for anticoagulation, as well as aspirin.      Troponins were 53, and 58    Symptomatology yesterday, at home, chest pain anterior aspect, radiated to both shoulders, and episode of lightheadedness and presyncope as per description.    I was informed by nurse that he has refused Lovenox and wants to restart Eliquis.  It had been placed on hold in case he needed any intervention.    Cardiac stress test.  Echocardiogram  Cardiology evaluation.

## 2025-07-29 NOTE — ED PROVIDER NOTES
Subjective   History of Present Illness  74-year-old male presents to the ED with complaint of chest pain and near syncope.  He has a history of hypertension, hyperlipidemia, diabetes, single-vessel coronary disease coronary disease with previous PCI to the mid LAD 11/2024.  Patient underwent knee surgery 1 month ago.  Patient reports episode of chest pain near syncope that occurred around 6:30 PM.  Patient states he had sudden onset of substernal chest pain.  Wife who was present at the time states the patient was not responding and his head was slurred but he had not completely passed out.  He had diaphoresis.  No nausea or vomiting.  Episode lasted about 30 minutes.  Improved following sublingual nitroglycerin.  Paramedics were called and he was brought to the ED for further evaluation.  Upon arrival to the emergency department, patient was chest pain-free.    History provided by:  Patient      Review of Systems   All other systems reviewed and are negative.      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    Cancer     bladder    Coronary artery disease involving native coronary artery of native heart without angina pectoris 12/4/2024    Degenerative arthritis of cervical spine with nerve compression     per patient report    Diabetes mellitus     Gutierrez catheter in place     GERD (gastroesophageal reflux disease)     Hearing aid worn     Hyperlipidemia     Hypertension     Impaired hearing     without hearing aids can barely hear anything    Neuropathy     Pulmonary embolism     Rotator cuff injury     left    Sleep apnea     wears cpap       Allergies   Allergen Reactions    Metformin Other (See Comments)     Renal dysfunction    Penicillins Rash     POSSIBLY NOT THE CAUSE       Past Surgical History:   Procedure Laterality Date    CARDIAC CATHETERIZATION      CARDIAC CATHETERIZATION N/A 11/05/2024    Procedure:  Coronary angiography;  Surgeon: Cassius Mosqueda MD;  Location:  [facility]ATH [facility]ASIVE [facility];  Service: Cardiology;  Laterality: N/A;    CARDIAC CATHETERIZATION N/A 2024    Procedure: Percutaneous Coronary Intervention;  Surgeon: Cassius Mosqueda MD;  Location:  [facility] CATH [facility];  Service: Cardiology;  Laterality: N/A;    CATARACT EXTRACTION W/ INTRAOCULAR LENS IMPLANT      CHOLECYSTECTOMY      CYSTECTOMY      2017    EXPLORATORY LAPAROTOMY      LEG SURGERY      REPLACEMENT TOTAL KNEE Left 2025    SHOULDER ARTHROSCOPY W/ ROTATOR CUFF REPAIR Left 2023    Procedure: LEFT SHOULDER ROTATOR CUFF REPAIR, ACROMIOPLASTY AND DISTAL CLAVICLE EXCISION;  Surgeon: Justen To MD;  Location:  [facility] OR;  Service: Orthopedics;  Laterality: Left;    TRANSURETHRAL RESECTION OF BLADDER TUMOR Bilateral 2017    Procedure: CYSTOSCOPY TRANSURETHRAL RESECTION OF BLADDER TUMOR & POSSIBLE BILATERAL RETROGRADE URETEROPYELOGRAMS;  Surgeon: Chris Esparza MD;  Location:  [facility] OR;  Service:     TRANSURETHRAL RESECTION OF BLADDER TUMOR N/A 2017    Procedure: CYSTOSCOPY TRANSURETHRAL RESECTION OF BLADDER TUMOR ;  Surgeon: Chris Esparza MD;  Location:  [facility] OR;  Service:     TRANSURETHRAL RESECTION OF BLADDER TUMOR N/A 09/10/2018    Procedure: CYSTOSCOPY TRANSURETHRAL RESECTION OF LARGE BLADDER TUMOR, CLOT EVACUATION, AND FULGURATION;  Surgeon: Chris Esparza MD;  Location:  [facility] OR;  Service: Urology       Family History   Problem Relation Age of Onset    Cancer Father     Hypertension Father     Heart disease Mother     Arthritis Mother     Hypertension Mother     Cancer Sister     Cancer Brother     Heart disease Sister        Social History     Socioeconomic History    Marital status:    Tobacco Use    Smoking status: Former     Current packs/day: 0.00     Types: Cigarettes     Quit date:      Years since quittin.5    Smokeless tobacco: Never   Vaping Use    Vaping  status: Never Used   Substance and Sexual Activity    Alcohol use: No    Drug use: No    Sexual activity: Never     Partners: Female     Birth control/protection: None           Objective   Physical Exam  Vitals and nursing note reviewed.   Constitutional:       Appearance: Normal appearance. He is normal weight.   HENT:      Head: Normocephalic and atraumatic.      Nose: Nose normal. No congestion or rhinorrhea.      Mouth/Throat:      Mouth: Mucous membranes are moist.   Eyes:      Extraocular Movements: Extraocular movements intact.      Conjunctiva/sclera: Conjunctivae normal.      Pupils: Pupils are equal, round, and reactive to light.   Cardiovascular:      Rate and Rhythm: Normal rate and regular rhythm.      Heart sounds: Normal heart sounds. No murmur heard.  Pulmonary:      Effort: Pulmonary effort is normal.      Breath sounds: Normal breath sounds. No wheezing, rhonchi or rales.   Abdominal:      General: Abdomen is flat. Bowel sounds are normal.      Palpations: Abdomen is soft.   Musculoskeletal:      Right lower leg: No edema.      Left lower leg: No edema.      Comments: Surgical incision anterior aspect left knee clean dry and intact   Skin:     General: Skin is warm and dry.      Capillary Refill: Capillary refill takes less than 2 seconds.   Neurological:      General: No focal deficit present.      Mental Status: He is alert and oriented to person, place, and time. Mental status is at baseline.         Procedures       Lab Results (last 24 hours)       Procedure Component Value Units Date/Time    CBC & Differential [[id number]]  (Abnormal) Collected: 07/28/25 2015    Specimen: Blood Updated: 07/28/25 2021    Narrative:      The following orders were created for panel order CBC & Differential.  Procedure                               Abnormality         Status                     ---------                               -----------         ------                     CBC Auto Differential[[id number]]         Abnormal            Final result                 Please view results for these tests on the individual orders.    Comprehensive Metabolic Panel [[id number]]  (Abnormal) Collected: 07/28/25 2015    Specimen: Blood Updated: 07/28/25 2041     Glucose 142 mg/dL      BUN 23.8 mg/dL      Creatinine 0.96 mg/dL      Sodium 138 mmol/L      Potassium 4.8 mmol/L      Comment: Slight hemolysis detected by analyzer. Result may be falsely elevated.        Chloride 101 mmol/L      CO2 23.0 mmol/L      Calcium 9.0 mg/dL      Total Protein 6.4 g/dL      Albumin 3.5 g/dL      ALT (SGPT) 8 U/L      AST (SGOT) 20 U/L      Comment: Slight hemolysis detected by analyzer. Result may be falsely elevated.        Alkaline Phosphatase 117 U/L      Total Bilirubin 1.0 mg/dL      Globulin 2.9 gm/dL      A/G Ratio 1.2 g/dL      BUN/Creatinine Ratio 24.8     Anion Gap 14.0 mmol/L      eGFR 81.9 mL/min/1.73     Narrative:      GFR Categories in Chronic Kidney Disease (CKD)              GFR Category          GFR (mL/min/1.73)    Interpretation  G1                    90 or greater        Normal or high (1)  G2                    60-89                Mild decrease (1)  G3a                   45-59                Mild to moderate decrease  G3b                   30-44                Moderate to severe decrease  G4                    15-29                Severe decrease  G5                    14 or less           Kidney failure    (1)In the absence of evidence of kidney disease, neither GFR category G1 or G2 fulfill the criteria for CKD.    eGFR calculation 2021 CKD-EPI creatinine equation, which does not include race as a factor    Protime-INR [[id number]]  (Normal) Collected: 07/28/25 2015    Specimen: Blood Updated: 07/28/25 2030     Protime 14.7 Seconds      INR 1.09    BNP [[id number]]  (Normal) Collected: 07/28/25 2015    Specimen: Blood Updated: 07/28/25 2039     proBNP 1,099.0 pg/mL     Narrative:      This assay is used as an aid in the  diagnosis of individuals suspected of having heart failure. It can be used as an aid in the diagnosis of acute decompensated heart failure (ADHF) in patients presenting with signs and symptoms of ADHF to the emergency department (ED). In addition, NT-proBNP of <300 pg/mL indicates ADHF is not likely.    Age Range Result Interpretation  NT-proBNP Concentration (pg/mL:      <50             Positive            >450                   Gray                 300-450                    Negative             <300    50-75           Positive            >900                  Gray                300-900                  Negative            <300      >75             Positive            >1800                  Gray                300-1800                  Negative            <300    High Sensitivity Troponin T [[id number]]  (Abnormal) Collected: 07/28/25 2015    Specimen: Blood Updated: 07/28/25 2042     HS Troponin T 53 ng/L     Narrative:      High Sensitive Troponin T Reference Range:  <14.0 ng/L- Negative Female for AMI  <22.0 ng/L- Negative Male for AMI  >=14 - Abnormal Female indicating possible myocardial injury.  >=22 - Abnormal Male indicating possible myocardial injury.   Clinicians would have to utilize clinical acumen, EKG, Troponin, and serial changes to determine if it is an Acute Myocardial Infarction or myocardial injury due to an underlying chronic condition.         CBC Auto Differential [[id number]]  (Abnormal) Collected: 07/28/25 2015    Specimen: Blood Updated: 07/28/25 2021     WBC 8.13 10*3/mm3      RBC 4.39 10*6/mm3      Hemoglobin 12.1 g/dL      Hematocrit 39.0 %      MCV 88.8 fL      MCH 27.6 pg      MCHC 31.0 g/dL      RDW 16.2 %      RDW-SD 53.1 fl      MPV 8.9 fL      Platelets 158 10*3/mm3      Neutrophil % 79.6 %      Lymphocyte % 10.7 %      Monocyte % 7.9 %      Eosinophil % 0.9 %      Basophil % 0.5 %      Immature Grans % 0.4 %      Neutrophils, Absolute 6.48 10*3/mm3      Lymphocytes, Absolute 0.87  "10*3/mm3      Monocytes, Absolute 0.64 10*3/mm3      Eosinophils, Absolute 0.07 10*3/mm3      Basophils, Absolute 0.04 10*3/mm3      Immature Grans, Absolute 0.03 10*3/mm3      nRBC 0.0 /100 WBC     D-dimer, Quantitative [[id number]]  (Abnormal) Collected: 07/28/25 2015    Specimen: Blood Updated: 07/28/25 2111     D-Dimer, Quantitative 1.71 MCGFEU/mL     Narrative:      According to the assay 's published package insert, a normal (<0.50 MCGFEU/mL) D-dimer result in conjunction with a non-high clinical probability assessment, excludes deep vein thrombosis (DVT) and pulmonary embolism (PE) with high sensitivity.    D-dimer values increase with age and this can make VTE exclusion of an older population difficult. To address this, the [employer], based on best available evidence and recent guidelines, recommends that clinicians use age-adjusted D-dimer thresholds in patients greater than 50 years of age with: a) a low probability of PE who do not meet all Pulmonary Embolism Rule Out Criteria, or b) in those with intermediate probability of PE.   The formula for an age-adjusted D-dimer cut-off is \"age/100\".  For example, a 60 year old patient would have an age-adjusted cut-off of 0.60 MCGFEU/mL and an 80 year old 0.80 MCGFEU/mL.    High Sensitivity Troponin T 1Hr [[id number]]  (Abnormal) Collected: 07/28/25 2109    Specimen: Blood from Arm, Left Updated: 07/28/25 2138     HS Troponin T 56 ng/L      Comment: 1 HR troponin collected less than 1 HR after the initial troponin.        Troponin T Numeric Delta 3 ng/L      Troponin T % Delta 6    Narrative:      High Sensitive Troponin T Reference Range:  <14.0 ng/L- Negative Female for AMI  <22.0 ng/L- Negative Male for AMI  >=14 - Abnormal Female indicating possible myocardial injury.  >=22 - Abnormal Male indicating possible myocardial injury.   Clinicians would have to utilize clinical acumen, EKG, Troponin, and serial changes to determine if " it is an Acute Myocardial Infarction or myocardial injury due to an underlying chronic condition.              XR Chest 1 View  Result Date: 7/28/2025  EXAMINATION: XR CHEST 1 VW-  7/28/2025 8:20 PM  HISTORY: Chest pain  FINDINGS: Upright frontal projection of the chest demonstrates patchy left basilar airspace disease either related atelectasis or infiltrate. There is a granuloma in the right lung base. Lungs are otherwise clear. Mediastinum within normal limits.      1. Left basilar airspace disease with differential as above. The lungs are otherwise clear.  This report was signed and finalized on 7/28/2025 8:21 PM by Dr. Roberto Carlos Rutherford MD.         ED Course  ED Course as of 07/29/25 0043   Tue Jul 29, 2025   0041 74-year-old male with history of hypertension, hyperlipidemia, diabetes, known coronary disease with most recent cath in PCI 11/2024 who presents to the ED with complaint of chest pain.  Initial troponin 53, repeat 56 for delta of 3, flat trajectory.  No evidence of MI but symptoms may be related to unstable angina.  D-dimer was elevated to 1.71.  Had a recent knee surgery, underwent CTA chest which was negative for PE.  Heart score 7.  High risk for major adverse cardiac event.  Plan for admission to the hospitalist service for inpatient chest pain evaluation [AW]      ED Course User Index  [AW] Usman Ortez MD                  HEART Score: 8   Shared Decision Making  I discussed the findings with the patient/patient representative who is in agreement with the treatment plan and the final disposition.  Risks and benefits of discharge and/or observation/admission were discussed: Yes                                      Medical Decision Making  Problems Addressed:  Chest pain, unspecified type: complicated acute illness or injury  Near syncope: complicated acute illness or injury    Amount and/or Complexity of Data Reviewed  Labs: ordered.  Radiology: ordered.  ECG/medicine tests:  ordered.    Risk  Prescription drug management.  Decision regarding hospitalization.        Final diagnoses:   Chest pain, unspecified type   Near syncope       ED Disposition  ED Disposition       ED Disposition   Decision to Admit    Condition   --    Comment   Level of Care: Telemetry [5]   Diagnosis: Chest pain [248599]   Certification: I Certify That Inpatient Hospital Services Are Medically Necessary For Greater Than 2 Midnights                 No follow-up provider specified.       Medication List      No changes were made to your prescriptions during this visit.            Usman Ortez MD  07/29/25 0043

## 2025-07-29 NOTE — PLAN OF CARE
Problem: Adult Inpatient Plan of Care  Goal: Absence of Hospital-Acquired Illness or Injury  Intervention: Identify and Manage Fall Risk  Recent Flowsheet Documentation  Taken 7/29/2025 1200 by Kostas Lopez RN  Safety Promotion/Fall Prevention: patient off unit  Taken 7/29/2025 1100 by Kostas Lopez RN  Safety Promotion/Fall Prevention: safety round/check completed  Taken 7/29/2025 1000 by Kostas Lopez RN  Safety Promotion/Fall Prevention: safety round/check completed  Taken 7/29/2025 0900 by Kostas Lopez RN  Safety Promotion/Fall Prevention: safety round/check completed  Taken 7/29/2025 0800 by Kostas Lopez RN  Safety Promotion/Fall Prevention: safety round/check completed  Taken 7/29/2025 0700 by Kostas Lopez RN  Safety Promotion/Fall Prevention: safety round/check completed  Intervention: Prevent Skin Injury  Recent Flowsheet Documentation  Taken 7/29/2025 0900 by Kostas Lopez RN  Body Position: supine  Skin Protection: silicone foam dressing in place  Intervention: Prevent Infection  Recent Flowsheet Documentation  Taken 7/29/2025 0900 by Kostas Lopez RN  Infection Prevention: single patient room provided  Goal: Optimal Comfort and Wellbeing  Intervention: Provide Person-Centered Care  Recent Flowsheet Documentation  Taken 7/29/2025 0900 by Kostas Lopez RN  Trust Relationship/Rapport:   care explained   choices provided   questions answered   thoughts/feelings acknowledged     Problem: Comorbidity Management  Goal: Blood Glucose Level Within Target Range  Intervention: Monitor and Manage Glycemia  Recent Flowsheet Documentation  Taken 7/29/2025 0900 by Kostas Lopez RN  Medication Review/Management: medications reviewed  Goal: Blood Pressure in Desired Range  Intervention: Maintain Blood Pressure Management  Recent Flowsheet Documentation  Taken 7/29/2025 0900 by Kostas Lopez RN  Medication Review/Management: medications reviewed   Goal Outcome Evaluation: Patient is A & Ox4,  VSS. Patient is on room air. Patient had a stress test and an ECHO done today. Safety maintained, call light in reach. No complaints of pain this shift. TELLE: S 68-92, BBB 1st degree.

## 2025-07-29 NOTE — H&P
[facility] Medicine Services  HISTORY AND PHYSICAL    Date of Admission: 7/28/2025  Primary Care Physician: Daquan Lion MD    Subjective   Primary Historian: Patient and family at bedside    Chief Complaint: Chest pain followed by syncope    History of Present Illness  This is a 74-year-old male patient with a medical history of diabetes mellitus, coronary artery disease, hypertension, hyperlipidemia, recent knee surgery, presenting to the ED for the evaluation of chest pain.  As reported, patient was at home and had a chest pain, substernal, associated with excessive diaphoresis, and was followed immediately by a syncope/near syncope.  Per wife, patient became unresponsive, and his color changed.  Patient was not having chest pain at the moment.  There is no report of shortness of breath, fever or chills, abdominal pain, nausea vomiting or diarrhea.      Review of Systems   Otherwise complete ROS reviewed and negative except as mentioned in the HPI.    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    Cancer     bladder    Coronary artery disease involving native coronary artery of native heart without angina pectoris 12/4/2024    Degenerative arthritis of cervical spine with nerve compression     per patient report    Diabetes mellitus     Gutierrez catheter in place     GERD (gastroesophageal reflux disease)     Hearing aid worn     Hyperlipidemia     Hypertension     Impaired hearing     without hearing aids can barely hear anything    Neuropathy     Pulmonary embolism     Rotator cuff injury     left    Sleep apnea     wears cpap     Past Surgical History:  Past Surgical History:   Procedure Laterality Date    CARDIAC CATHETERIZATION      CARDIAC CATHETERIZATION N/A 11/05/2024    Procedure: Coronary angiography;  Surgeon: Cassius Mosqueda MD;  Location:   [facility] CATH [facility];  Service: Cardiology;  Laterality: N/A;    CARDIAC CATHETERIZATION N/A 11/05/2024    Procedure: Percutaneous Coronary Intervention;  Surgeon: Cassius Msoqueda MD;  Location:  [facility] CATH INV[facility];  Service: Cardiology;  Laterality: N/A;    CATARACT EXTRACTION W/ INTRAOCULAR LENS IMPLANT      CHOLECYSTECTOMY      CYSTECTOMY      2017    EXPLORATORY LAPAROTOMY      LEG SURGERY      REPLACEMENT TOTAL KNEE Left 06/27/2025    SHOULDER ARTHROSCOPY W/ ROTATOR CUFF REPAIR Left 09/11/2023    Procedure: LEFT SHOULDER ROTATOR CUFF REPAIR, ACROMIOPLASTY AND DISTAL CLAVICLE EXCISION;  Surgeon: Justen To MD;  Location:  [facility] OR;  Service: Orthopedics;  Laterality: Left;    TRANSURETHRAL RESECTION OF BLADDER TUMOR Bilateral 07/25/2017    Procedure: CYSTOSCOPY TRANSURETHRAL RESECTION OF BLADDER TUMOR & POSSIBLE BILATERAL RETROGRADE URETEROPYELOGRAMS;  Surgeon: Chris Esparza MD;  Location:  [facility] OR;  Service:     TRANSURETHRAL RESECTION OF BLADDER TUMOR N/A 11/03/2017    Procedure: CYSTOSCOPY TRANSURETHRAL RESECTION OF BLADDER TUMOR ;  Surgeon: Chris Esparza MD;  Location:  [facility] OR;  Service:     TRANSURETHRAL RESECTION OF BLADDER TUMOR N/A 09/10/2018    Procedure: CYSTOSCOPY TRANSURETHRAL RESECTION OF LARGE BLADDER TUMOR, CLOT EVACUATION, AND FULGURATION;  Surgeon: Chris Esparza MD;  Location:  [facility] OR;  Service: Urology     Social History:  reports that he quit smoking about 28 years ago. His smoking use included cigarettes. He has never used smokeless tobacco. He reports that he does not drink alcohol and does not use drugs.    Family History: family history includes Arthritis in his mother; Cancer in his brother, father, and sister; Heart disease in his mother and sister; Hypertension in his father and mother.       Allergies:  Allergies   Allergen Reactions    Metformin Other (See Comments)     Renal dysfunction    Penicillins Rash     POSSIBLY NOT THE CAUSE        Medications:  Prior to Admission medications    Medication Sig Start Date End Date Taking? Authorizing Provider   allopurinol (ZYLOPRIM) 300 MG tablet Take 1 tablet by mouth Daily.   Yes Sanket Marrero MD   Ascorbic Acid (VITAMIN C PO) Take  by mouth.   Yes Sanket Marrero MD   ASPIRIN 81 PO Take  by mouth.   Yes Sanket Marrero MD   cetirizine (zyrTEC) 10 MG tablet Take 1 tablet by mouth Daily. 11/20/23  Yes PACO Neri,    Cholecalciferol (D3 ADULT PO) Take  by mouth.   Yes Sanket Marrero MD   fluticasone (FLONASE) 50 MCG/ACT nasal spray 1 spray into the nostril(s) as directed by provider 2 (Two) Times a Day As Needed for Allergies. 10/19/20  Yes Fidel Yousif MD   gabapentin (NEURONTIN) 400 MG capsule Take 2 capsules by mouth Every 8 (Eight) Hours. Take 2 capsules by mouth in the am, two capsules by mouth in the afternoon, and 2 capsules by mouth in the pm.     Patient is taking 2 capsules in the AM, 2 capsules in the afternoon, 1 capsule in the PM   Yes Sanket Marrero MD   glipizide (GLUCOTROL XL) 2.5 MG 24 hr tablet Take 1 tablet by mouth Daily. 7/23/25  Yes Daquan Lion MD   losartan (COZAAR) 50 MG tablet Take 1 tablet by mouth Daily. 7/23/25  Yes Daquan Lion MD   MAGNESIUM PO Take  by mouth.   Yes Sanket Marrero MD   Multiple Vitamins-Minerals (ZINC PO) Take  by mouth.   Yes Sanket Marrero MD   nitroglycerin (NITROSTAT) 0.4 MG SL tablet Place 1 tablet under the tongue Every 5 (Five) Minutes As Needed for Chest Pain (Systolic BP Greater Than 100). Take no more than 3 doses in 15 minutes. 11/6/24  Yes Cassius Mosqueda MD   Nystatin powder Apply  topically to the appropriate area as directed As Needed.   Yes Sanket Marrero MD   omeprazole (priLOSEC) 20 MG capsule Take 1 capsule by mouth 2 (Two) Times a Day.   Yes Sanket Marrero MD   TiZANidine (ZANAFLEX) 4 MG capsule Take 1 capsule by mouth 3 (Three) Times a Day As  "Needed for Muscle Spasms. 7/8/24  Yes Daquan Lion MD   atorvastatin (LIPITOR) 40 MG tablet Take 1 tablet by mouth Every Night. 11/6/24   Cassius Mosqueda MD   clotrimazole-betamethasone (LOTRISONE) 1-0.05 % cream Apply 1 Application topically to the appropriate area as directed 2 (Two) Times a Day. 6/9/25   Daquan Lion MD   Cyanocobalamin (B-12 PO) Take  by mouth.    Provider, MD Sanket   cyanocobalamin (VITAMIN B-12) 1000 MCG tablet Take 1 tablet by mouth Daily.    Provider, MD Sanket     I have utilized all available immediate resources to obtain, update, or review the patient's current medications (including all prescriptions, over-the-counter products, herbals, cannabis/cannabidiol products, and vitamin/mineral/dietary (nutritional) supplements).    Objective     Vital Signs: /70 (BP Location: Left arm, Patient Position: Lying)   Pulse 80   Temp 98.1 °F (36.7 °C) (Oral)   Resp 16   Ht 208.3 cm (82\")   Wt 88.7 kg (195 lb 9.6 oz)   SpO2 95%   BMI 20.45 kg/m²   Physical Exam  Constitutional:       Appearance: He is ill-appearing.   Cardiovascular:      Rate and Rhythm: Normal rate and regular rhythm.      Pulses: Normal pulses.      Heart sounds: Normal heart sounds. No murmur heard.  Pulmonary:      Effort: Pulmonary effort is normal. No respiratory distress.      Breath sounds: Normal breath sounds. No wheezing or rales.   Abdominal:      General: Bowel sounds are normal. There is no distension.      Palpations: Abdomen is soft.      Tenderness: There is no abdominal tenderness. There is no guarding.   Musculoskeletal:      Right lower leg: No edema.      Left lower leg: No edema.      Comments: Sutures noted above left knee   Skin:     General: Skin is warm.   Neurological:      Mental Status: He is alert and oriented to person, place, and time. Mental status is at baseline.              Results Reviewed:  Lab Results (last 24 hours)       Procedure Component Value Units " Date/Time    POC Glucose 4x Daily Before Meals & at Bedtime [[id number]]  (Normal) Collected: 07/29/25 0808    Specimen: Blood Updated: 07/29/25 0810     Glucose 107 mg/dL      Comment: Serial Number: [id number]:  [id number]       Comprehensive Metabolic Panel [[id number]]  (Abnormal) Collected: 07/29/25 0415    Specimen: Blood Updated: 07/29/25 0513     Glucose 112 mg/dL      BUN 23.8 mg/dL      Creatinine 0.85 mg/dL      Sodium 135 mmol/L      Potassium 4.0 mmol/L      Chloride 100 mmol/L      CO2 20.0 mmol/L      Calcium 8.9 mg/dL      Total Protein 6.2 g/dL      Albumin 3.4 g/dL      ALT (SGPT) 5 U/L      AST (SGOT) 12 U/L      Alkaline Phosphatase 110 U/L      Total Bilirubin 1.0 mg/dL      Globulin 2.8 gm/dL      A/G Ratio 1.2 g/dL      BUN/Creatinine Ratio 28.0     Anion Gap 15.0 mmol/L      eGFR 90.1 mL/min/1.73     Narrative:      GFR Categories in Chronic Kidney Disease (CKD)              GFR Category          GFR (mL/min/1.73)    Interpretation  G1                    90 or greater        Normal or high (1)  G2                    60-89                Mild decrease (1)  G3a                   45-59                Mild to moderate decrease  G3b                   30-44                Moderate to severe decrease  G4                    15-29                Severe decrease  G5                    14 or less           Kidney failure    (1)In the absence of evidence of kidney disease, neither GFR category G1 or G2 fulfill the criteria for CKD.    eGFR calculation 2021 CKD-EPI creatinine equation, which does not include race as a factor    Magnesium [[id number]]  (Abnormal) Collected: 07/29/25 0415    Specimen: Blood Updated: 07/29/25 0513     Magnesium 1.4 mg/dL     Phosphorus [[id number]]  (Normal) Collected: 07/29/25 0415    Specimen: Blood Updated: 07/29/25 0513     Phosphorus 3.8 mg/dL     TSH [[id number]]  (Normal) Collected: 07/29/25 0415    Specimen: Blood Updated: 07/29/25 0513     TSH 0.814 uIU/mL     CBC  (No Diff) [[id number]]  (Abnormal) Collected: 07/29/25 0415    Specimen: Blood Updated: 07/29/25 0500     WBC 7.81 10*3/mm3      RBC 4.33 10*6/mm3      Hemoglobin 11.8 g/dL      Hematocrit 39.3 %      MCV 90.8 fL      MCH 27.3 pg      MCHC 30.0 g/dL      RDW 16.0 %      RDW-SD 53.8 fl      MPV 8.7 fL      Platelets 154 10*3/mm3     Protime-INR [[id number]]  (Abnormal) Collected: 07/29/25 0415    Specimen: Blood Updated: 07/29/25 0447     Protime 14.9 Seconds      INR 1.11    aPTT [[id number]]  (Abnormal) Collected: 07/29/25 0415    Specimen: Blood Updated: 07/29/25 0447     PTT 37.4 seconds     Narrative:      PTT = The equivalent PTT values for the therapeutic range of heparin levels at 0.3 to 0.7 U/ml are 77 - 99 seconds.    High Sensitivity Troponin T [[id number]]  (Abnormal) Collected: 07/29/25 0035    Specimen: Blood from Arm, Left Updated: 07/29/25 0122     HS Troponin T 58 ng/L     Narrative:      High Sensitive Troponin T Reference Range:  <14.0 ng/L- Negative Female for AMI  <22.0 ng/L- Negative Male for AMI  >=14 - Abnormal Female indicating possible myocardial injury.  >=22 - Abnormal Male indicating possible myocardial injury.   Clinicians would have to utilize clinical acumen, EKG, Troponin, and serial changes to determine if it is an Acute Myocardial Infarction or myocardial injury due to an underlying chronic condition.         High Sensitivity Troponin T 1Hr [[id number]]  (Abnormal) Collected: 07/28/25 2109    Specimen: Blood from Arm, Left Updated: 07/28/25 2138     HS Troponin T 56 ng/L      Comment: 1 HR troponin collected less than 1 HR after the initial troponin.        Troponin T Numeric Delta 3 ng/L      Troponin T % Delta 6    Narrative:      High Sensitive Troponin T Reference Range:  <14.0 ng/L- Negative Female for AMI  <22.0 ng/L- Negative Male for AMI  >=14 - Abnormal Female indicating possible myocardial injury.  >=22 - Abnormal Male indicating possible myocardial injury.   Clinicians would  "have to utilize clinical acumen, EKG, Troponin, and serial changes to determine if it is an Acute Myocardial Infarction or myocardial injury due to an underlying chronic condition.         D-dimer, Quantitative [[id number]]  (Abnormal) Collected: 07/28/25 2015    Specimen: Blood Updated: 07/28/25 2111     D-Dimer, Quantitative 1.71 MCGFEU/mL     Narrative:      According to the assay 's published package insert, a normal (<0.50 MCGFEU/mL) D-dimer result in conjunction with a non-high clinical probability assessment, excludes deep vein thrombosis (DVT) and pulmonary embolism (PE) with high sensitivity.    D-dimer values increase with age and this can make VTE exclusion of an older population difficult. To address this, the [employer], based on best available evidence and recent guidelines, recommends that clinicians use age-adjusted D-dimer thresholds in patients greater than 50 years of age with: a) a low probability of PE who do not meet all Pulmonary Embolism Rule Out Criteria, or b) in those with intermediate probability of PE.   The formula for an age-adjusted D-dimer cut-off is \"age/100\".  For example, a 60 year old patient would have an age-adjusted cut-off of 0.60 MCGFEU/mL and an 80 year old 0.80 MCGFEU/mL.    High Sensitivity Troponin T [[id number]]  (Abnormal) Collected: 07/28/25 2015    Specimen: Blood Updated: 07/28/25 2042     HS Troponin T 53 ng/L     Narrative:      High Sensitive Troponin T Reference Range:  <14.0 ng/L- Negative Female for AMI  <22.0 ng/L- Negative Male for AMI  >=14 - Abnormal Female indicating possible myocardial injury.  >=22 - Abnormal Male indicating possible myocardial injury.   Clinicians would have to utilize clinical acumen, EKG, Troponin, and serial changes to determine if it is an Acute Myocardial Infarction or myocardial injury due to an underlying chronic condition.         Comprehensive Metabolic Panel [[id number]]  (Abnormal) Collected: " 07/28/25 2015    Specimen: Blood Updated: 07/28/25 2041     Glucose 142 mg/dL      BUN 23.8 mg/dL      Creatinine 0.96 mg/dL      Sodium 138 mmol/L      Potassium 4.8 mmol/L      Comment: Slight hemolysis detected by analyzer. Result may be falsely elevated.        Chloride 101 mmol/L      CO2 23.0 mmol/L      Calcium 9.0 mg/dL      Total Protein 6.4 g/dL      Albumin 3.5 g/dL      ALT (SGPT) 8 U/L      AST (SGOT) 20 U/L      Comment: Slight hemolysis detected by analyzer. Result may be falsely elevated.        Alkaline Phosphatase 117 U/L      Total Bilirubin 1.0 mg/dL      Globulin 2.9 gm/dL      A/G Ratio 1.2 g/dL      BUN/Creatinine Ratio 24.8     Anion Gap 14.0 mmol/L      eGFR 81.9 mL/min/1.73     Narrative:      GFR Categories in Chronic Kidney Disease (CKD)              GFR Category          GFR (mL/min/1.73)    Interpretation  G1                    90 or greater        Normal or high (1)  G2                    60-89                Mild decrease (1)  G3a                   45-59                Mild to moderate decrease  G3b                   30-44                Moderate to severe decrease  G4                    15-29                Severe decrease  G5                    14 or less           Kidney failure    (1)In the absence of evidence of kidney disease, neither GFR category G1 or G2 fulfill the criteria for CKD.    eGFR calculation 2021 CKD-EPI creatinine equation, which does not include race as a factor    BNP [[id number]]  (Normal) Collected: 07/28/25 2015    Specimen: Blood Updated: 07/28/25 2039     proBNP 1,099.0 pg/mL     Narrative:      This assay is used as an aid in the diagnosis of individuals suspected of having heart failure. It can be used as an aid in the diagnosis of acute decompensated heart failure (ADHF) in patients presenting with signs and symptoms of ADHF to the emergency department (ED). In addition, NT-proBNP of <300 pg/mL indicates ADHF is not likely.    Age Range Result  Interpretation  NT-proBNP Concentration (pg/mL:      <50             Positive            >450                   Gray                 300-450                    Negative             <300    50-75           Positive            >900                  Gray                300-900                  Negative            <300      >75             Positive            >1800                  Gray                300-1800                  Negative            <300    [place] Draw [[id number]] Collected: 07/28/25 2015    Specimen: Blood Updated: 07/28/25 2030    Narrative:      The following orders were created for panel order [place] Draw.  Procedure                               Abnormality         Status                     ---------                               -----------         ------                     Green Top (Gel)[[id number]]                                  Final result               Lavender Top[[id number]]                                     Final result               Red Top[[id number]]                                          Final result               Light Blue Top[[id number]]                                   Final result                 Please view results for these tests on the individual orders.    Green Top (Gel) [[id number]] Collected: 07/28/25 2015    Specimen: Blood Updated: 07/28/25 2030     Extra Tube Hold for add-ons.     Comment: Auto resulted.       Lavender Top [[id number]] Collected: 07/28/25 2015    Specimen: Blood Updated: 07/28/25 2030     Extra Tube hold for add-on     Comment: Auto resulted       Red Top [[id number]] Collected: 07/28/25 2015    Specimen: Blood Updated: 07/28/25 2030     Extra Tube Hold for add-ons.     Comment: Auto resulted.       Light Blue Top [[id number]] Collected: 07/28/25 2015    Specimen: Blood Updated: 07/28/25 2030     Extra Tube Hold for add-ons.     Comment: Auto resulted       Protime-INR [[id number]]  (Normal) Collected: 07/28/25 2015    Specimen: Blood Updated: 07/28/25  2030     Protime 14.7 Seconds      INR 1.09    CBC & Differential [[id number]]  (Abnormal) Collected: 07/28/25 2015    Specimen: Blood Updated: 07/28/25 2021    Narrative:      The following orders were created for panel order CBC & Differential.  Procedure                               Abnormality         Status                     ---------                               -----------         ------                     CBC Auto Differential[[id number]]        Abnormal            Final result                 Please view results for these tests on the individual orders.    CBC Auto Differential [[id number]]  (Abnormal) Collected: 07/28/25 2015    Specimen: Blood Updated: 07/28/25 2021     WBC 8.13 10*3/mm3      RBC 4.39 10*6/mm3      Hemoglobin 12.1 g/dL      Hematocrit 39.0 %      MCV 88.8 fL      MCH 27.6 pg      MCHC 31.0 g/dL      RDW 16.2 %      RDW-SD 53.1 fl      MPV 8.9 fL      Platelets 158 10*3/mm3      Neutrophil % 79.6 %      Lymphocyte % 10.7 %      Monocyte % 7.9 %      Eosinophil % 0.9 %      Basophil % 0.5 %      Immature Grans % 0.4 %      Neutrophils, Absolute 6.48 10*3/mm3      Lymphocytes, Absolute 0.87 10*3/mm3      Monocytes, Absolute 0.64 10*3/mm3      Eosinophils, Absolute 0.07 10*3/mm3      Basophils, Absolute 0.04 10*3/mm3      Immature Grans, Absolute 0.03 10*3/mm3      nRBC 0.0 /100 WBC           Imaging Results (Last 24 Hours)       Procedure Component Value Units Date/Time    CT Angiogram Chest Pulmonary Embolism [[id number]] Collected: 07/29/25 0555     Updated: 07/29/25 0759    Narrative:      EXAMINATION: CT ANGIOGRAM CHEST PULMONARY EMBOLISM-        HISTORY: Pulmonary Embolism     DLP: 1783 mGycm.     In order to have a CT radiation dose as low as reasonably achievable,  automated exposure control was utilized to adjust the exposure  parameters according to patient's size.     CT angiography of the chest performed after intravenous contrast  enhancement.     Images are acquired in axial  plane and subsequent 2D construction in  coronal and sagittal planes and 3D maximum intensity projection image  reconstruction.     The comparison is made with the previous study dated 9/18/2018.     There is good opacification of the main pulmonary artery or right and  left pulmonary arteries. There is ill-defined defect in the right upper  and middle lobar branch which is due to artifact from the adjacent high  density contrast in the superior vena cava. No filling defect in the  remaining visualized pulmonary arterial bed.     RV/LV ratio is 45/60 which is normal. No finding to suggest right heart  strain.     Atheromatous change of thoracic aorta. No aneurysmal dilatation or  dissection.     Moderate atheromatous changes of the limited visualized coronary  arteries.     No significant cardiomegaly.     No evidence of mediastinal or hilar mass or lymphadenopathy.     Limited visualized soft tissue of the neck are normal. No thyroid  nodules.     No axillary lymphadenopathy.     The lungs are moderately well-expanded.     Central airways patent and clear.     Chronic emphysematous changes of the lungs bilaterally are noted.     There is a subpleural nodule in the right lower lobe, image #104 in  series 5, measuring 12 mm in diameter. This was not noted in the  previous study in 2018.     The smaller subpleural nodule in the left lower lobe, image #138 in  series 5, measures 6 mm.     Atelectatic changes are seen in the lower lungs bilaterally.     Few calcified nodules/granulomas are seen in the lungs bilaterally.     Limited included abdomen appears unremarkable except for small atrophic  right kidney.     Images reviewed in bone window show chronic degenerative changes of the  thoracic spine. There is no acute bony abnormality.       Impression:      1. No evidence of pulmonary embolism. No aortic aneurysm or dissection.  2. Subpleural nodule in the lower lobes bilaterally as detailed above.  Further evaluation  with a follow-up diagnostic CT scan of the chest in 3  months and/or radionuclide PET/CT imaging may be obtained.     The above study was initially reviewed and reported by [facility]. I do not  find any discrepancies.                          This report was signed and finalized on 7/29/2025 7:56 AM by Dr. Francisco Dennis MD.       XR Chest 1 View [[id number]] Collected: 07/28/25 2020     Updated: 07/28/25 2024    Narrative:      EXAMINATION: XR CHEST 1 VW-  7/28/2025 8:20 PM     HISTORY: Chest pain     FINDINGS: Upright frontal projection of the chest demonstrates patchy  left basilar airspace disease either related atelectasis or infiltrate.  There is a granuloma in the right lung base. Lungs are otherwise clear.  Mediastinum within normal limits.       Impression:      1. Left basilar airspace disease with differential as above. The lungs  are otherwise clear.     This report was signed and finalized on 7/28/2025 8:21 PM by Dr. Roebrto Carlos Rutherford MD.             I have personally reviewed and interpreted the radiology studies and ECG obtained at time of admission.     Assessment / Plan   Assessment:   Active Hospital Problems    Diagnosis     **Chest pain        Treatment Plan  The patient will be admitted to my service here at [facility].     Assessment and plan:  #Chest pain followed by syncope.  Elevated troponin, rule out non-STEMI.  #History of diabetes, coronary artery disease, hypertension, and recent left total knee surgery.    -Admitting to floor.  - Patient took his daily dose of aspirin.  ED gave additional 3 pills to complete the loading dose.  - Patient is not having chest pain at the moment.  Day team may consider consulting with cardiology if they find appropriate.  - Will keep n.p.o. in case of any procedure in the morning.  - TTE ordered.  - DVT prophylaxis Lovenox subcu.    - PT consulted.        Medical Decision Making  Number and Complexity of problems: 1 acute on  moderate  Differential Diagnosis: As above    Conditions and Status        Condition is worsening.     [facility] Data  External documents reviewed: yes  Cardiac tracing (EKG, telemetry) interpretation: yes  Radiology interpretation: yes  Labs reviewed: yes  Any tests that were considered but not ordered: no     Decision rules/scores evaluated (example [id number], [place], etc): no     Discussed with: patient, family and ED provider     Care Planning  Shared decision making: Discussed the plan with the patient, and family at bedside and they were agreeable  Code status and discussions: Full code    Disposition  Social Determinants of Health that impact treatment or disposition: Not at the moment  Estimated length of stay is 2 to 3 days.     I confirmed that the patient's advanced care plan is present, code status is documented, and a surrogate decision maker is listed in the patient's medical record.     The patient was seen and examined by me on 7/29 at 1 AM.    Electronically signed by Kim Do MD, 07/29/25, 08:10 CDT.

## 2025-07-29 NOTE — PLAN OF CARE
Goal Outcome Evaluation:  Arrived to complete PT eval as ordered.  Pt declines PT services at this time.  Reports he has recently had a TKR and is still undergoing OP therapy, but is not using his cane much any longer.  Reports they cancelled his appointment for tomorrow, but plans to go on Thursday and Friday and had a visit on Monday the day of admission.  Pt states he does not need IP therapy and does not plan to be here long enough to need it.  Family reports if admission is longer than expected, they will discuss w/ MD and have therapy reordered, but at this time does not want it.  PT to sign off as requested.

## 2025-07-30 ENCOUNTER — READMISSION MANAGEMENT (OUTPATIENT)
Dept: CALL CENTER | Facility: HOSPITAL | Age: 75
End: 2025-07-30
Payer: MEDICARE

## 2025-07-30 VITALS
HEIGHT: 70 IN | RESPIRATION RATE: 16 BRPM | TEMPERATURE: 98.2 F | BODY MASS INDEX: 27.99 KG/M2 | OXYGEN SATURATION: 98 % | WEIGHT: 195.5 LBS | HEART RATE: 90 BPM | SYSTOLIC BLOOD PRESSURE: 133 MMHG | DIASTOLIC BLOOD PRESSURE: 67 MMHG

## 2025-07-30 PROBLEM — I50.20 HFREF (HEART FAILURE WITH REDUCED EJECTION FRACTION): Chronic | Status: ACTIVE | Noted: 2025-07-30

## 2025-07-30 LAB
ANION GAP SERPL CALCULATED.3IONS-SCNC: 14 MMOL/L (ref 5–15)
AORTIC DIMENSIONLESS INDEX: 0.72 (DI)
AV MEAN PRESS GRAD SYS DOP V1V2: 4.7 MMHG
AV VMAX SYS DOP: 145.2 CM/SEC
BH CV ECHO LEFT VENTRICLE GLOBAL LONGITUDINAL STRAIN: -15.4 %
BH CV ECHO MEAS - 2D AUTO EF SIEMENS: 45.1 %
BH CV ECHO MEAS - AO MAX PG: 8.4 MMHG
BH CV ECHO MEAS - AO ROOT DIAM: 3.2 CM
BH CV ECHO MEAS - AO V2 VTI: 27 CM
BH CV ECHO MEAS - AVA(I,D): 3.7 CM2
BH CV ECHO MEAS - EDV(MOD-SP2): 134.5 ML
BH CV ECHO MEAS - EDV(MOD-SP4): 147.2 ML
BH CV ECHO MEAS - EF(MOD-SP2): 46.1 %
BH CV ECHO MEAS - EF(MOD-SP4): 39.9 %
BH CV ECHO MEAS - ESV(MOD-SP2): 72.5 ML
BH CV ECHO MEAS - ESV(MOD-SP4): 88.5 ML
BH CV ECHO MEAS - IVSD: 1 CM
BH CV ECHO MEAS - LA DIMENSION: 3.7 CM
BH CV ECHO MEAS - LAT PEAK E' VEL: 6.6 CM/SEC
BH CV ECHO MEAS - LV DIASTOLIC VOL/BSA (35-75): 71.3 CM2
BH CV ECHO MEAS - LV MAX PG: 5.1 MMHG
BH CV ECHO MEAS - LV MEAN PG: 2.7 MMHG
BH CV ECHO MEAS - LV SYSTOLIC VOL/BSA (12-30): 42.8 CM2
BH CV ECHO MEAS - LV V1 MAX: 112.9 CM/SEC
BH CV ECHO MEAS - LV V1 VTI: 19.3 CM
BH CV ECHO MEAS - LVIDD: 5 CM
BH CV ECHO MEAS - LVIDS: 4 CM
BH CV ECHO MEAS - LVOT AREA: 5.1 CM2
BH CV ECHO MEAS - LVOT DIAM: 2.6 CM
BH CV ECHO MEAS - LVPWD: 1.1 CM
BH CV ECHO MEAS - MED PEAK E' VEL: 7.4 CM/SEC
BH CV ECHO MEAS - MV A MAX VEL: 80.6 CM/SEC
BH CV ECHO MEAS - MV DEC SLOPE: 255.3 CM/SEC2
BH CV ECHO MEAS - MV DEC TIME: 0.24 SEC
BH CV ECHO MEAS - MV E MAX VEL: 59.5 CM/SEC
BH CV ECHO MEAS - MV E/A: 0.74
BH CV ECHO MEAS - MV MAX PG: 2.7 MMHG
BH CV ECHO MEAS - MV MEAN PG: 1.41 MMHG
BH CV ECHO MEAS - MV V2 VTI: 18.3 CM
BH CV ECHO MEAS - MVA(VTI): 5.4 CM2
BH CV ECHO MEAS - PA V2 MAX: 100.9 CM/SEC
BH CV ECHO MEAS - RAP SYSTOLE: 8 MMHG
BH CV ECHO MEAS - RVSP: 9.2 MMHG
BH CV ECHO MEAS - SV(LVOT): 99.3 ML
BH CV ECHO MEAS - SV(MOD-SP2): 62 ML
BH CV ECHO MEAS - SV(MOD-SP4): 58.8 ML
BH CV ECHO MEAS - SVI(LVOT): 48.1 ML/M2
BH CV ECHO MEAS - SVI(MOD-SP2): 30 ML/M2
BH CV ECHO MEAS - SVI(MOD-SP4): 28.5 ML/M2
BH CV ECHO MEAS - TAPSE (>1.6): 2.9 CM
BH CV ECHO MEAS - TR MAX PG: 1.19 MMHG
BH CV ECHO MEAS - TR MAX VEL: 54.6 CM/SEC
BH CV ECHO MEASUREMENTS AVERAGE E/E' RATIO: 8.5
BUN SERPL-MCNC: 18.7 MG/DL (ref 8–23)
BUN/CREAT SERPL: 22.5 (ref 7–25)
CALCIUM SPEC-SCNC: 9.1 MG/DL (ref 8.6–10.5)
CHLORIDE SERPL-SCNC: 99 MMOL/L (ref 98–107)
CO2 SERPL-SCNC: 23 MMOL/L (ref 22–29)
CREAT SERPL-MCNC: 0.83 MG/DL (ref 0.76–1.27)
EGFRCR SERPLBLD CKD-EPI 2021: 90.7 ML/MIN/1.73
GLUCOSE BLDC GLUCOMTR-MCNC: 121 MG/DL (ref 70–130)
GLUCOSE BLDC GLUCOMTR-MCNC: 162 MG/DL (ref 70–130)
GLUCOSE SERPL-MCNC: 105 MG/DL (ref 65–99)
MAGNESIUM SERPL-MCNC: 1.6 MG/DL (ref 1.6–2.4)
POTASSIUM SERPL-SCNC: 4.1 MMOL/L (ref 3.5–5.2)
SODIUM SERPL-SCNC: 136 MMOL/L (ref 136–145)

## 2025-07-30 PROCEDURE — 99232 SBSQ HOSP IP/OBS MODERATE 35: CPT | Performed by: NURSE PRACTITIONER

## 2025-07-30 PROCEDURE — 83735 ASSAY OF MAGNESIUM: CPT | Performed by: FAMILY MEDICINE

## 2025-07-30 PROCEDURE — 25010000002 MAGNESIUM SULFATE 2 GM/50ML SOLUTION: Performed by: FAMILY MEDICINE

## 2025-07-30 PROCEDURE — 63710000001 INSULIN REGULAR HUMAN PER 5 UNITS

## 2025-07-30 PROCEDURE — 82948 REAGENT STRIP/BLOOD GLUCOSE: CPT

## 2025-07-30 PROCEDURE — 80048 BASIC METABOLIC PNL TOTAL CA: CPT | Performed by: FAMILY MEDICINE

## 2025-07-30 RX ORDER — METOPROLOL SUCCINATE 25 MG/1
25 TABLET, EXTENDED RELEASE ORAL
Status: DISCONTINUED | OUTPATIENT
Start: 2025-07-30 | End: 2025-07-30 | Stop reason: HOSPADM

## 2025-07-30 RX ORDER — MAGNESIUM SULFATE HEPTAHYDRATE 40 MG/ML
2 INJECTION, SOLUTION INTRAVENOUS ONCE
Status: COMPLETED | OUTPATIENT
Start: 2025-07-30 | End: 2025-07-30

## 2025-07-30 RX ORDER — METOPROLOL SUCCINATE 25 MG/1
25 TABLET, EXTENDED RELEASE ORAL
Qty: 30 TABLET | Refills: 0 | Status: SHIPPED | OUTPATIENT
Start: 2025-07-31 | End: 2025-08-30

## 2025-07-30 RX ADMIN — LOSARTAN POTASSIUM 50 MG: 50 TABLET, FILM COATED ORAL at 08:28

## 2025-07-30 RX ADMIN — ASPIRIN 81 MG: 81 TABLET, COATED ORAL at 08:28

## 2025-07-30 RX ADMIN — APIXABAN 2.5 MG: 2.5 TABLET, FILM COATED ORAL at 08:28

## 2025-07-30 RX ADMIN — MAGNESIUM SULFATE HEPTAHYDRATE 2 G: 40 INJECTION, SOLUTION INTRAVENOUS at 08:31

## 2025-07-30 RX ADMIN — ALLOPURINOL 300 MG: 300 TABLET ORAL at 08:28

## 2025-07-30 RX ADMIN — Medication 10 ML: at 08:31

## 2025-07-30 RX ADMIN — GABAPENTIN 800 MG: 400 CAPSULE ORAL at 05:19

## 2025-07-30 RX ADMIN — GABAPENTIN 800 MG: 400 CAPSULE ORAL at 13:24

## 2025-07-30 RX ADMIN — HUMAN INSULIN 2 UNITS: 100 INJECTION, SOLUTION SUBCUTANEOUS at 11:39

## 2025-07-30 RX ADMIN — METOPROLOL SUCCINATE 25 MG: 25 TABLET, EXTENDED RELEASE ORAL at 09:46

## 2025-07-30 RX ADMIN — PANTOPRAZOLE SODIUM 40 MG: 40 TABLET, DELAYED RELEASE ORAL at 05:19

## 2025-07-30 RX ADMIN — CETIRIZINE HYDROCHLORIDE 10 MG: 10 TABLET, FILM COATED ORAL at 08:28

## 2025-07-30 NOTE — PROGRESS NOTES
[facility] Medicine Services  INPATIENT PROGRESS NOTE    Patient Name: Chung Bradley  Date of Admission: 7/28/2025  Today's Date: 07/30/25  Length of Stay: 1  Primary Care Physician: Daquan Lion MD    Subjective   Chief Complaint: Chest pain  HPI   74-year-old male, with history of hypertension, coronary artery disease with a drug-eluting stent to LAD in November 2024. He recently had left knee arthroplasty June 2025, and he is on Eliquis for anticoagulation, as well as aspirin.  Came to the hospital reporting chest pain anterior aspect, radiated to both shoulders, and episode of lightheadedness and presyncope as per description.  He was admitted to the hospital, had cardiology evaluation.  Troponins were 53 and 58.  He had a cardiac stress test with pet imaging performed 7/29/2025.    Today, patient reports no chest pain.  No shortness of breath.  No palpitations.  No symptomatology.  He had magnesium replaced again this morning. Evaluated with family members present in the room.  He is asking for discharge.        Review of Systems   All pertinent negatives and positives are as above. All other systems have been reviewed and are negative unless otherwise stated.     Objective    Temp:  [98.3 °F (36.8 °C)-99 °F (37.2 °C)] 98.4 °F (36.9 °C)  Heart Rate:  [72-88] 86  Resp:  [16-18] 16  BP: (103-164)/(53-71) 164/66  Physical Exam  Constitutional:       Appearance: Alert, oriented x 3, sitting in recliner.  No respiratory distress.    HENT:      Head: Normocephalic and atraumatic.      Nose: Nose normal.      Mouth/Throat:      Mouth: Mucous membranes are moist.      Pharynx: Oropharynx is clear.   Eyes:      Extraocular Movements: Extraocular movements intact.      Conjunctiva/sclera: Conjunctivae normal.      Pupils: Pupils are equal, round, and reactive to light.   Cardiovascular:      Rate and Rhythm: Normal rate and regular rhythm.      Pulses: Normal pulses.  "  Pulmonary:      Effort: No respiratory distress.      Breath sounds: Normal breath sounds.  Abdominal:      General: Abdomen is flat. Bowel sounds are normal.      Palpations: Abdomen is soft.   Extremities:  No lower extremity edema.  Skin:     Capillary Refill: Capillary refill takes less than 2 seconds.      Coloration: Skin is not jaundiced.   Neurological:      General: No focal deficit present.      Mental Status: Patient is alert, oriented to place time and person        Results Review:  I have reviewed the labs, radiology results, and diagnostic studies.    Laboratory Data:   Results from last 7 days   Lab Units 07/29/25 0415 07/28/25 2015   WBC 10*3/mm3 7.81 8.13   HEMOGLOBIN g/dL 11.8* 12.1*   HEMATOCRIT % 39.3 39.0   PLATELETS 10*3/mm3 154 158        Results from last 7 days   Lab Units 07/30/25  0332 07/29/25 0415 07/28/25 2015   SODIUM mmol/L 136 135* 138   POTASSIUM mmol/L 4.1 4.0 4.8   CHLORIDE mmol/L 99 100 101   CO2 mmol/L 23.0 20.0* 23.0   BUN mg/dL 18.7 23.8* 23.8*   CREATININE mg/dL 0.83 0.85 0.96   CALCIUM mg/dL 9.1 8.9 9.0   BILIRUBIN mg/dL  --  1.0 1.0   ALK PHOS U/L  --  110 117   ALT (SGPT) U/L  --  5 8   AST (SGOT) U/L  --  12 20   GLUCOSE mg/dL 105* 112* 142*       Culture Data:   No results found for: \"BLOODCX\", \"URINECX\", \"WOUNDCX\", \"MRSACX\", \"RESPCX\", \"STOOLCX\"    Radiology Data:   Imaging Results (Last 24 Hours)       Procedure Component Value Units Date/Time    NM PET Cardiac Stress Radiology Interpretation [[id number]] Collected: 07/29/25 1606     Updated: 07/29/25 1619    Narrative:      EXAM: NM PET CARDIAC STRESS RADIOLOGY INTERPRETATION-      HISTORY: typical chest pain; R07.9-Chest pain, unspecified; R55-Syncope  and collapse       COMPARISON: No existing relevant imaging studies available.     DOSE LENGTH PRODUCT: 297.62 mGy.cm. Automatic exposure control was  utilized to make radiation dose as low as reasonably achievable.     TECHNIQUE: NM PET cardiac stress per " cardiology protocol with  multiplanar reformats of the attenuation correction CT submitted for  radiology interpretation of non-cardiac structures.     FINDINGS:     AIRWAYS/PULMONARY PARENCHYMA: Lung emphysema. Right lower lobe calcified  granuloma. 6 mm noncalcified left lower lobe pulmonary nodule on axial  image 80 versus atelectasis. No pleural effusion. Airways are clear.     VESSELS: Included portions of the thoracic aorta are nondilated. Central  pulmonary arteries are nondilated.     CARDIAC: Cardiac PET interpretation by cardiology dictated separately.  No pericardial effusion.     MEDIASTINUM: Granulomatous calcification. No suspicious adenopathy.     EXTRATHORACIC: Included portions of the chest wall soft tissues are  unremarkable.     UPPER ABDOMEN: Visualized upper abdominal structures are within normal  limits.     BONES: Anterior bridging osteophytes. No acute bony abnormality.          Impression:      1. NM PET cardiac stress with the attenuation correction CT submitted  for radiology interpretation of non-cardiac structures. Interpretation  of the PET stress deferred to cardiology, which will be dictated  separately.  2. 6 mm noncalcified left lower lobe pulmonary nodule near the posterior  costal pleura versus dependent atelectasis. Fleischner recommendation is  follow-up chest CT in 6-12 months to ensure stability or resolution.     This report was signed and finalized on 7/29/2025 4:16 PM by Dr Edilson Cardozo.               I have reviewed the patient's current medications.     Assessment/Plan   Assessment  Active Hospital Problems    Diagnosis     **Coronary artery disease involving native coronary artery of native heart with unstable angina pectoris     Hyperlipidemia LDL goal <70     S/P drug eluting coronary stent placement     Type 2 diabetes mellitus with circulatory disorder, without long-term current use of insulin     Essential hypertension      Chest pain  Hypomagnesemia  Chronic  cardiomyopathy with systolic dysfunction, EF 41 to 45%  Coronary artery disease, stent November 2024  Hyperlipidemia  Diabetes mellitus type 2  Hypertension      Sodium 136, potassium 4.1, magnesium 1.6.  Creatinine 0.83.      Echocardiogram report    Left ventricular systolic function is mildly decreased. Automated 2D EF = 45.1%  Left ventricular ejection fraction appears to be 41 - 45%.    Left ventricular diastolic function was indeterminate.    Estimated right ventricular systolic pressure from tricuspid regurgitation is normal (<35 mmHg).    Cardiac stress test PET imaging    Abnormal total coronary flow reserve of 1.5, abnormal LAD 1.5, abnormal LCx 1.5 and abnormal RCA 1.4    No perfusion abnormalities noted    Left ventricular ejection fraction is moderately reduced (Calculated EF = 38%).    Moderate coronary calcification in the left anterior descending coronary artery distribution noted    Above findings may be suggestive of microvascular disease versus balanced ischemia    Treatment Plan  Continue losartan 50 mg po daily; add Toprol XL 25 mg po daily.  Follow blood pressure and adjust medication based on values.  Continue aspirin 81 mg p.o. daily, continue atorvastatin 40 mg p.o. daily.      Replace magnesium and follow levels.    Unclear if patient will have additional intervention per Cardiology. He can be discharged later if BP controlled and no additional test/intervention recommended by specialist.  Discussed with nurse.    A1c 5.1 discontinue insulin sliding scale  Holding sulfonylurea, did not recommend this medication given risk of hypoglycemia    Continue eliquis for DVT prophylaxis.        Medical Decision Making  Number and Complexity of problems: 7, moderate complexity    Differential Diagnosis: see above    Conditions and Status        Condition is unchanged.     [facility] Data  External documents reviewed: no  Cardiac tracing (EKG, telemetry) interpretation: no new  Radiology interpretation: no  new  Labs reviewed: yes  Any tests that were considered but not ordered: no     Decision rules/scores evaluated (example [id number], Wells, etc): none     Discussed with: patient     Care Planning  Shared decision making: patient  Code status and discussions: FC    Disposition  Social Determinants of Health that impact treatment or disposition: none  I expect the patient to be discharged to home.      Electronically signed by Jose Alfredo Srinivasan MD, 07/30/25, 09:21 CDT.

## 2025-07-30 NOTE — PROGRESS NOTES
Transitional Care Follow Up Visit  Subjective     Chung Bradley is a 74 y.o. male who presents for a transitional care management visit.    Within 48 business hours after discharge our office contacted him via telephone to coordinate his care and needs.      I reviewed and discussed the details of that call along with the discharge summary, hospital problems, inpatient lab results, inpatient diagnostic studies, and consultation reports with Chung.     Current outpatient and discharge medications have been reconciled for the patient.  Reviewed by: HANNAH Correa          7/30/2025     7:08 PM   Date of TCM Phone Call   [facility]   Date of Admission 7/28/2025   Date of Discharge 7/30/2025   Discharge Disposition Home or Self Care     Risk for Readmission (LACE) Score: 9 (7/30/2025  5:00 AM)      History of Present Illness  The patient is a 74-year-old male who presents for a hospital discharge follow-up. This is my first encounter with him. His PCP is Dr. Lion. He was discharged from the hospital yesterday after being evaluated for an episode of chest pain. He is accompanied by his spouse.    He reports feeling weak since his discharge but has not experienced any further chest pain, even during his hospital stay. He is scheduled to visit the heart failure clinic next week on 08/05/2025.  He was started on metoprolol and Jardiance at discharge. His blood pressure is lower than typical for him today at 102/58 and says he feels weak. His last A1C one week ago  was 5.1. so ozempic was discontinued. Glipizide was discontinued at hospital discharge and was not resumed.     His magnesium levels were low during his hospital stay, but they have since normalized after resuming his magnesium supplement. He reports no recent dehydration or overheating.    Had finding of incidental pulmonary nodules on CT chest and radiologist recommends repeat 3 month CT scan.         Course During Hospital Stay:    The  patient was admitted to the hospital, and he had cardiology evaluation.  Troponins were 53 and 58.  He had a cardiac stress test with pet imaging performed 7/29/2025.  Per report abnormal total coronary flow reserve, no perfusion abnormalities.  Moderate coronary calcification in the left anterior descending artery distribution.  Suggestive of microvascular disease versus balanced ischemia.  Transthoracic echocardiogram with systolic dysfunction and EF 41 to 45%.  Blood pressure was managed with losartan, as per prior use 50 mg p.o. daily.  Patient was started on new medication Toprol-XL 25 mg p.o. daily.  Recommended to continue aspirin 81 mg p.o. daily and atorvastatin 40 mg p.o. daily.  Hypomagnesemia and magnesium was replaced per protocol.  A1c was 5.1 and had sulfonylurea placed on hold; I do not recommend this medication given risk of hypoglycemia and normal A1C. Diet management and outpatient follow up recommended for this.  A CT angiogram performed in the emergency department no evidence of pulmonary embolism, n aortic aneurysm or dissection; it showed a subpleural nodule in the lower lobes bilaterally as detailed above. Further evaluation with a follow-up diagnostic CT scan of the chest in 3 months and/or radionuclide PET/CT imaging may be obtained.  An outpatient referral with pulmonologist was made for follow-up and additional imaging if indicated by specialist.  No additional intervention was recommended by specialist and the patient was discharged home to continue outpatient follow-up with cardiology and primary care provider.     The following portions of the patient's history were reviewed and updated as appropriate: allergies, current medications, past family history, past medical history, past social history, past surgical history, and problem list.    Review of Systems- Negative except as noted per HPI     Objective   /58 (BP Location: Left arm, Patient Position: Sitting, Cuff Size: Adult)   " Pulse 94   Temp 97.6 °F (36.4 °C) (Temporal)   Ht 178.8 cm (70.39\")   Wt 87.1 kg (192 lb)   SpO2 97%   BMI 27.24 kg/m²   Physical Exam  Vitals and nursing note reviewed.   Constitutional:       Appearance: Normal appearance. He is normal weight.   HENT:      Mouth/Throat:      Mouth: Mucous membranes are moist.   Cardiovascular:      Rate and Rhythm: Normal rate and regular rhythm.      Pulses: Normal pulses.      Heart sounds: Normal heart sounds. No murmur heard.     No friction rub. No gallop.   Pulmonary:      Effort: Pulmonary effort is normal. No respiratory distress.      Breath sounds: Normal breath sounds. No wheezing.   Musculoskeletal:         General: Normal range of motion.      Cervical back: Normal range of motion and neck supple.      Right lower leg: No edema.      Left lower leg: No edema.   Skin:     General: Skin is warm and dry.      Capillary Refill: Capillary refill takes less than 2 seconds.          Neurological:      General: No focal deficit present.      Mental Status: He is alert and oriented to person, place, and time.   Psychiatric:         Mood and Affect: Mood normal.         Behavior: Behavior normal.         Thought Content: Thought content normal.         Judgment: Judgment normal.         Assessment & Plan   Diagnoses and all orders for this visit:    1. Hospital discharge follow-up (Primary)    2. HFrEF (heart failure with reduced ejection fraction)  -     losartan (Cozaar) 25 MG tablet; Take 1 tablet by mouth Daily.  Dispense: 90 tablet; Refill: 0  - Ejection fraction has decreased from 55% to 45% over the past year, necessitating the initiation of metoprolol and Jardiance.  - Blood pressure is currently low at 102/58, which could be contributing to his feelings of weakness. Magnesium levels were previously low but have normalized since resuming his magnesium supplement.    3. Coronary artery disease involving native coronary artery of native heart with unstable angina " pectoris  - Follow with cardiology as scheduled next week   -Seek emergency care for unrelieved chest pain after taking nitroglycerin    4. S/P drug eluting coronary stent placement  - Continue anticoagulation as directed and follow with cardiology as scheduled.     5. Type 2 diabetes mellitus with diabetic peripheral angiopathy without gangrene, without long-term current use of insulin  - Continue Jardiance   -Do not resume glipizide, risk of hypoglycemia     6. Primary hypertension  -     losartan (Cozaar) 25 MG tablet; Take 1 tablet by mouth Daily.  Dispense: 90 tablet; Refill: 0  - Currently on losartan 50 mg once a day. Due to low blood pressure reading of 102/58, the dosage will be reduced to 25 mg once daily.   -Blood pressure will be rechecked by the cardiologist next week, and further adjustments may be made based on readings and overall health status.  - Prescription for losartan 25 mg once daily will be sent to pharmacy.  - Monitoring for symptoms of dizziness or weakness due to low blood pressure.    7. Pulmonary nodules  -     CT Chest Without Contrast; Future  A pulmonary nodule was identified on one of his scans, prompting a recommendation for a repeat scan in 3 months.    Return for follow-up with Dr. Lion in the next month or sooner as needed.  Patient and spouse verbalized understanding and agreement with this plan of care.             Patient or patient representative verbalized consent for the use of Ambient Listening during the visit with  HANNAH Correa for chart documentation. 7/31/2025  08:13 CDT

## 2025-07-30 NOTE — PROGRESS NOTES
[facility] -  Progress Note     LOS: 1 day   Patient Care Team:  Daquan Lion MD as PCP - General (Internal Medicine)  Chris Esparza MD as Consulting Physician (Urology)  Ruddy Haas MD as Consulting Physician ([facility])    Chief Complaint: Chest pain    Subjective     Interval History: Abnormal total coronary flow reserve of 1.5 with no perfusion abnormalities suggestive of microvascular disease versus balanced ischemia on cardiac PET scan yesterday.  2D echo yesterday revealed mildly decreased left ventricular systolic function with ejection fraction 41 to 45%, indeterminate left ventricular diastolic function, abnormal global longitudinal LV strain with left ventricular apical sparing and recommendation for workup of cardiac amyloid.  He denies any recurrence of chest pain since admission.    Patient Complaints: Chronic dyspnea with exertion and intermittent bilateral lower extremity edema        Review of Systems:     Review of Systems   Constitutional:  Negative for chills, fatigue and fever.   HENT: Negative.     Eyes: Negative.    Respiratory:  Positive for shortness of breath. Negative for cough, chest tightness, wheezing and stridor.    Cardiovascular:  Positive for leg swelling. Negative for chest pain and palpitations.   Gastrointestinal:  Negative for abdominal distention, abdominal pain, blood in stool, constipation, diarrhea, nausea and vomiting.   Endocrine: Negative.    Genitourinary:  Negative for difficulty urinating, dysuria, flank pain and hematuria.   Musculoskeletal: Negative.    Skin:  Negative for rash and wound.   Allergic/Immunologic: Negative.    Neurological:  Negative for dizziness, syncope, weakness, light-headedness and headaches.   Hematological:  Does not bruise/bleed easily.   Psychiatric/Behavioral:  Negative for agitation, behavioral problems, confusion, hallucinations, sleep disturbance and suicidal ideas. The patient  is not nervous/anxious.      Objective     Vital Sign Min/Max for last 24 hours  Temp  Min: 98.2 °F (36.8 °C)  Max: 99 °F (37.2 °C)   BP  Min: 133/67  Max: 164/66   Pulse  Min: 85  Max: 90   Resp  Min: 16  Max: 16   SpO2  Min: 93 %  Max: 98 %   No data recorded   No data recorded         07/29/25  1142   Weight: 88.7 kg (195 lb 8 oz)       Physical Exam:    Vitals and nursing note reviewed.   Constitutional:       General: Awake.      Appearance: Normal and healthy appearance. Well-developed, overweight and not in distress.   Eyes:      General: Lids are normal.      Conjunctiva/sclera: Conjunctivae normal.      Pupils: Pupils are equal, round, and reactive to light.   HENT:      Head: Normocephalic and atraumatic.      Nose: Nose normal.   Neck:      Vascular: No JVR. JVD normal.   Pulmonary:      Effort: Pulmonary effort is normal.      Breath sounds: Examination of the right-lower field reveals decreased breath sounds. Examination of the left-lower field reveals decreased breath sounds. Decreased breath sounds present. No wheezing. No rhonchi. No rales.   Chest:      Chest wall: Not tender to palpatation.   Cardiovascular:      PMI at left midclavicular line. Normal rate. Regular rhythm. Normal S1. Normal S2.       Murmurs: There is no murmur.      No gallop.  No click. No rub.   Pulses:     Intact distal pulses.   Edema:     Peripheral edema absent.   Abdominal:      General: Bowel sounds are normal.      Palpations: Abdomen is soft.      Tenderness: There is no abdominal tenderness.   Musculoskeletal: Normal range of motion.         General: No tenderness.      Cervical back: Normal range of motion. Skin:     General: Skin is warm and dry.   Neurological:      General: No focal deficit present.      Mental Status: Alert, oriented to person, place, and time and oriented to person, place and time.   Psychiatric:         Attention and Perception: Attention and perception normal.         Mood and Affect: Mood and  affect normal.         Speech: Speech normal.         Behavior: Behavior normal. Behavior is cooperative.         Thought Content: Thought content normal.         Cognition and Memory: Cognition and memory normal.         Judgment: Judgment normal.       Results Review:   Lab Results (last 72 hours)       Procedure Component Value Units Date/Time    POC Glucose Once [[id number]]  (Abnormal) Collected: 07/30/25 1058    Specimen: Blood Updated: 07/30/25 1109     Glucose 162 mg/dL      Comment: Serial Number: [id number]:  [id number]       POC Glucose Once [[id number]]  (Normal) Collected: 07/30/25 0752    Specimen: Blood Updated: 07/30/25 0754     Glucose 121 mg/dL      Comment: Serial Number: [id number]:  [id number]       Basic Metabolic Panel [[id number]]  (Abnormal) Collected: 07/30/25 0332    Specimen: Blood Updated: 07/30/25 0432     Glucose 105 mg/dL      BUN 18.7 mg/dL      Creatinine 0.83 mg/dL      Sodium 136 mmol/L      Potassium 4.1 mmol/L      Chloride 99 mmol/L      CO2 23.0 mmol/L      Calcium 9.1 mg/dL      BUN/Creatinine Ratio 22.5     Anion Gap 14.0 mmol/L      eGFR 90.7 mL/min/1.73     Narrative:      GFR Categories in Chronic Kidney Disease (CKD)              GFR Category          GFR (mL/min/1.73)    Interpretation  G1                    90 or greater        Normal or high (1)  G2                    60-89                Mild decrease (1)  G3a                   45-59                Mild to moderate decrease  G3b                   30-44                Moderate to severe decrease  G4                    15-29                Severe decrease  G5                    14 or less           Kidney failure    (1)In the absence of evidence of kidney disease, neither GFR category G1 or G2 fulfill the criteria for CKD.    eGFR calculation 2021 CKD-EPI creatinine equation, which does not include race as a factor    Magnesium [[id number]]  (Normal) Collected: 07/30/25 0332    Specimen: Blood Updated: 07/30/25  0432     Magnesium 1.6 mg/dL     POC Glucose Once [[id number]]  (Normal) Collected: 07/29/25 2107    Specimen: Blood Updated: 07/29/25 2110     Glucose 93 mg/dL      Comment: Serial Number: [id number]:  [id number]       Magnesium [[id number]]  (Normal) Collected: 07/29/25 2021    Specimen: Blood Updated: 07/29/25 2058     Magnesium 1.7 mg/dL     POC Glucose Once [[id number]]  (Normal) Collected: 07/29/25 1635    Specimen: Blood Updated: 07/29/25 1637     Glucose 125 mg/dL      Comment: Serial Number: [id number]:  [id number]       Magnesium [[id number]]  (Abnormal) Collected: 07/29/25 1309    Specimen: Blood Updated: 07/29/25 1340     Magnesium 1.3 mg/dL     POC Glucose Once [[id number]]  (Normal) Collected: 07/29/25 1307    Specimen: Blood Updated: 07/29/25 1312     Glucose 127 mg/dL      Comment: Serial Number: [id number]:  [id number]       High Sensitivity Troponin T [[id number]]  (Abnormal) Collected: 07/29/25 0415    Specimen: Blood Updated: 07/29/25 1026     HS Troponin T 58 ng/L     Narrative:      High Sensitive Troponin T Reference Range:  <14.0 ng/L- Negative Female for AMI  <22.0 ng/L- Negative Male for AMI  >=14 - Abnormal Female indicating possible myocardial injury.  >=22 - Abnormal Male indicating possible myocardial injury.   Clinicians would have to utilize clinical acumen, EKG, Troponin, and serial changes to determine if it is an Acute Myocardial Infarction or myocardial injury due to an underlying chronic condition.         POC Glucose 4x Daily Before Meals & at Bedtime [[id number]]  (Normal) Collected: 07/29/25 0808    Specimen: Blood Updated: 07/29/25 0810     Glucose 107 mg/dL      Comment: Serial Number: [id number]:  [id number]       Comprehensive Metabolic Panel [[id number]]  (Abnormal) Collected: 07/29/25 0415    Specimen: Blood Updated: 07/29/25 0513     Glucose 112 mg/dL      BUN 23.8 mg/dL      Creatinine 0.85 mg/dL      Sodium 135 mmol/L      Potassium 4.0 mmol/L       Chloride 100 mmol/L      CO2 20.0 mmol/L      Calcium 8.9 mg/dL      Total Protein 6.2 g/dL      Albumin 3.4 g/dL      ALT (SGPT) 5 U/L      AST (SGOT) 12 U/L      Alkaline Phosphatase 110 U/L      Total Bilirubin 1.0 mg/dL      Globulin 2.8 gm/dL      A/G Ratio 1.2 g/dL      BUN/Creatinine Ratio 28.0     Anion Gap 15.0 mmol/L      eGFR 90.1 mL/min/1.73     Narrative:      GFR Categories in Chronic Kidney Disease (CKD)              GFR Category          GFR (mL/min/1.73)    Interpretation  G1                    90 or greater        Normal or high (1)  G2                    60-89                Mild decrease (1)  G3a                   45-59                Mild to moderate decrease  G3b                   30-44                Moderate to severe decrease  G4                    15-29                Severe decrease  G5                    14 or less           Kidney failure    (1)In the absence of evidence of kidney disease, neither GFR category G1 or G2 fulfill the criteria for CKD.    eGFR calculation 2021 CKD-EPI creatinine equation, which does not include race as a factor    Magnesium [[id number]]  (Abnormal) Collected: 07/29/25 0415    Specimen: Blood Updated: 07/29/25 0513     Magnesium 1.4 mg/dL     Phosphorus [[id number]]  (Normal) Collected: 07/29/25 0415    Specimen: Blood Updated: 07/29/25 0513     Phosphorus 3.8 mg/dL     TSH [[id number]]  (Normal) Collected: 07/29/25 0415    Specimen: Blood Updated: 07/29/25 0513     TSH 0.814 uIU/mL     CBC (No Diff) [[id number]]  (Abnormal) Collected: 07/29/25 0415    Specimen: Blood Updated: 07/29/25 0500     WBC 7.81 10*3/mm3      RBC 4.33 10*6/mm3      Hemoglobin 11.8 g/dL      Hematocrit 39.3 %      MCV 90.8 fL      MCH 27.3 pg      MCHC 30.0 g/dL      RDW 16.0 %      RDW-SD 53.8 fl      MPV 8.7 fL      Platelets 154 10*3/mm3     Protime-INR [[id number]]  (Abnormal) Collected: 07/29/25 0415    Specimen: Blood Updated: 07/29/25 0447     Protime 14.9 Seconds      INR 1.11     aPTT [[id number]]  (Abnormal) Collected: 07/29/25 0415    Specimen: Blood Updated: 07/29/25 0447     PTT 37.4 seconds     Narrative:      PTT = The equivalent PTT values for the therapeutic range of heparin levels at 0.3 to 0.7 U/ml are 77 - 99 seconds.    High Sensitivity Troponin T [[id number]]  (Abnormal) Collected: 07/29/25 0035    Specimen: Blood from Arm, Left Updated: 07/29/25 0122     HS Troponin T 58 ng/L     Narrative:      High Sensitive Troponin T Reference Range:  <14.0 ng/L- Negative Female for AMI  <22.0 ng/L- Negative Male for AMI  >=14 - Abnormal Female indicating possible myocardial injury.  >=22 - Abnormal Male indicating possible myocardial injury.   Clinicians would have to utilize clinical acumen, EKG, Troponin, and serial changes to determine if it is an Acute Myocardial Infarction or myocardial injury due to an underlying chronic condition.         High Sensitivity Troponin T 1Hr [[id number]]  (Abnormal) Collected: 07/28/25 2109    Specimen: Blood from Arm, Left Updated: 07/28/25 2138     HS Troponin T 56 ng/L      Comment: 1 HR troponin collected less than 1 HR after the initial troponin.        Troponin T Numeric Delta 3 ng/L      Troponin T % Delta 6    Narrative:      High Sensitive Troponin T Reference Range:  <14.0 ng/L- Negative Female for AMI  <22.0 ng/L- Negative Male for AMI  >=14 - Abnormal Female indicating possible myocardial injury.  >=22 - Abnormal Male indicating possible myocardial injury.   Clinicians would have to utilize clinical acumen, EKG, Troponin, and serial changes to determine if it is an Acute Myocardial Infarction or myocardial injury due to an underlying chronic condition.         D-dimer, Quantitative [[id number]]  (Abnormal) Collected: 07/28/25 2015    Specimen: Blood Updated: 07/28/25 2111     D-Dimer, Quantitative 1.71 MCGFEU/mL     Narrative:      According to the assay 's published package insert, a normal (<0.50 MCGFEU/mL) D-dimer result in  "conjunction with a non-high clinical probability assessment, excludes deep vein thrombosis (DVT) and pulmonary embolism (PE) with high sensitivity.    D-dimer values increase with age and this can make VTE exclusion of an older population difficult. To address this, the [employer], based on best available evidence and recent guidelines, recommends that clinicians use age-adjusted D-dimer thresholds in patients greater than 50 years of age with: a) a low probability of PE who do not meet all Pulmonary Embolism Rule Out Criteria, or b) in those with intermediate probability of PE.   The formula for an age-adjusted D-dimer cut-off is \"age/100\".  For example, a 60 year old patient would have an age-adjusted cut-off of 0.60 MCGFEU/mL and an 80 year old 0.80 MCGFEU/mL.    High Sensitivity Troponin T [[id number]]  (Abnormal) Collected: 07/28/25 2015    Specimen: Blood Updated: 07/28/25 2042     HS Troponin T 53 ng/L     Narrative:      High Sensitive Troponin T Reference Range:  <14.0 ng/L- Negative Female for AMI  <22.0 ng/L- Negative Male for AMI  >=14 - Abnormal Female indicating possible myocardial injury.  >=22 - Abnormal Male indicating possible myocardial injury.   Clinicians would have to utilize clinical acumen, EKG, Troponin, and serial changes to determine if it is an Acute Myocardial Infarction or myocardial injury due to an underlying chronic condition.         Comprehensive Metabolic Panel [[id number]]  (Abnormal) Collected: 07/28/25 2015    Specimen: Blood Updated: 07/28/25 2041     Glucose 142 mg/dL      BUN 23.8 mg/dL      Creatinine 0.96 mg/dL      Sodium 138 mmol/L      Potassium 4.8 mmol/L      Comment: Slight hemolysis detected by analyzer. Result may be falsely elevated.        Chloride 101 mmol/L      CO2 23.0 mmol/L      Calcium 9.0 mg/dL      Total Protein 6.4 g/dL      Albumin 3.5 g/dL      ALT (SGPT) 8 U/L      AST (SGOT) 20 U/L      Comment: Slight hemolysis detected by " analyzer. Result may be falsely elevated.        Alkaline Phosphatase 117 U/L      Total Bilirubin 1.0 mg/dL      Globulin 2.9 gm/dL      A/G Ratio 1.2 g/dL      BUN/Creatinine Ratio 24.8     Anion Gap 14.0 mmol/L      eGFR 81.9 mL/min/1.73     Narrative:      GFR Categories in Chronic Kidney Disease (CKD)              GFR Category          GFR (mL/min/1.73)    Interpretation  G1                    90 or greater        Normal or high (1)  G2                    60-89                Mild decrease (1)  G3a                   45-59                Mild to moderate decrease  G3b                   30-44                Moderate to severe decrease  G4                    15-29                Severe decrease  G5                    14 or less           Kidney failure    (1)In the absence of evidence of kidney disease, neither GFR category G1 or G2 fulfill the criteria for CKD.    eGFR calculation 2021 CKD-EPI creatinine equation, which does not include race as a factor    BNP [[id number]]  (Normal) Collected: 07/28/25 2015    Specimen: Blood Updated: 07/28/25 2039     proBNP 1,099.0 pg/mL     Narrative:      This assay is used as an aid in the diagnosis of individuals suspected of having heart failure. It can be used as an aid in the diagnosis of acute decompensated heart failure (ADHF) in patients presenting with signs and symptoms of ADHF to the emergency department (ED). In addition, NT-proBNP of <300 pg/mL indicates ADHF is not likely.    Age Range Result Interpretation  NT-proBNP Concentration (pg/mL:      <50             Positive            >450                   Gray                 300-450                    Negative             <300    50-75           Positive            >900                  Gray                300-900                  Negative            <300      >75             Positive            >1800                  Gray                300-1800                  Negative            <300    [place] Draw [[id number]]  Collected: 07/28/25 2015    Specimen: Blood Updated: 07/28/25 2030    Narrative:      The following orders were created for panel order [place] Draw.  Procedure                               Abnormality         Status                     ---------                               -----------         ------                     Green Top (Gel)[[id number]]                                  Final result               Lavender Top[[id number]]                                     Final result               Red Top[[id number]]                                          Final result               Light Blue Top[[id number]]                                   Final result                 Please view results for these tests on the individual orders.    Green Top (Gel) [[id number]] Collected: 07/28/25 2015    Specimen: Blood Updated: 07/28/25 2030     Extra Tube Hold for add-ons.     Comment: Auto resulted.       Lavender Top [[id number]] Collected: 07/28/25 2015    Specimen: Blood Updated: 07/28/25 2030     Extra Tube hold for add-on     Comment: Auto resulted       Red Top [[id number]] Collected: 07/28/25 2015    Specimen: Blood Updated: 07/28/25 2030     Extra Tube Hold for add-ons.     Comment: Auto resulted.       Light Blue Top [[id number]] Collected: 07/28/25 2015    Specimen: Blood Updated: 07/28/25 2030     Extra Tube Hold for add-ons.     Comment: Auto resulted       Protime-INR [[id number]]  (Normal) Collected: 07/28/25 2015    Specimen: Blood Updated: 07/28/25 2030     Protime 14.7 Seconds      INR 1.09    CBC & Differential [[id number]]  (Abnormal) Collected: 07/28/25 2015    Specimen: Blood Updated: 07/28/25 2021    Narrative:      The following orders were created for panel order CBC & Differential.  Procedure                               Abnormality         Status                     ---------                               -----------         ------                     CBC Auto Differential[[id number]]        Abnormal             "Final result                 Please view results for these tests on the individual orders.    CBC Auto Differential [[id number]]  (Abnormal) Collected: 07/28/25 2015    Specimen: Blood Updated: 07/28/25 2021     WBC 8.13 10*3/mm3      RBC 4.39 10*6/mm3      Hemoglobin 12.1 g/dL      Hematocrit 39.0 %      MCV 88.8 fL      MCH 27.6 pg      MCHC 31.0 g/dL      RDW 16.2 %      RDW-SD 53.1 fl      MPV 8.9 fL      Platelets 158 10*3/mm3      Neutrophil % 79.6 %      Lymphocyte % 10.7 %      Monocyte % 7.9 %      Eosinophil % 0.9 %      Basophil % 0.5 %      Immature Grans % 0.4 %      Neutrophils, Absolute 6.48 10*3/mm3      Lymphocytes, Absolute 0.87 10*3/mm3      Monocytes, Absolute 0.64 10*3/mm3      Eosinophils, Absolute 0.07 10*3/mm3      Basophils, Absolute 0.04 10*3/mm3      Immature Grans, Absolute 0.03 10*3/mm3      nRBC 0.0 /100 WBC                 Echo EF Estimated  No results found for: \"ECHOEFEST\"      Cath Ejection Fraction Quantitative  No results found for: \"CATHEF\"        Medication Review: yes  Current Facility-Administered Medications   Medication Dose Route Frequency Provider Last Rate Last Admin    acetaminophen (TYLENOL) tablet 650 mg  650 mg Oral Q4H PRN Kim Do MD        Or    acetaminophen (TYLENOL) 160 MG/5ML oral solution 650 mg  650 mg Oral Q4H PRN Kim Do MD        Or    acetaminophen (TYLENOL) suppository 650 mg  650 mg Rectal Q4H PRN Kim Do MD        allopurinol (ZYLOPRIM) tablet 300 mg  300 mg Oral Daily Jose Alfredo Srinivasan MD   300 mg at 07/30/25 0828    apixaban (ELIQUIS) tablet 2.5 mg  2.5 mg Oral Q12H Jose Alfredo Srinivasan MD   2.5 mg at 07/30/25 0828    aspirin EC tablet 81 mg  81 mg Oral Daily Kim Do MD   81 mg at 07/30/25 0828    atorvastatin (LIPITOR) tablet 40 mg  40 mg Oral Nightly Kim Do MD   40 mg at 07/29/25 2234    Calcium Replacement - Follow Nurse / BPA Driven Protocol   Not Applicable Jose Alfredo Donovan MD        cetirizine " (zyrTEC) tablet 10 mg  10 mg Oral Daily Jose Alfredo Srinivasan MD   10 mg at 07/30/25 0828    fluticasone (FLONASE) 50 MCG/ACT nasal spray 1 spray  1 spray Nasal BID PRN Jose Alfredo Srinivasan MD        gabapentin (NEURONTIN) capsule 800 mg  800 mg Oral Q8H Jose Alfredo Srinivasan MD   800 mg at 07/30/25 1324    [Held by provider] glipizide (GLUCOTROL) tablet 1.25 mg  1.25 mg Oral BID AC Jose Alfredo Srinivasan MD        losartan (COZAAR) tablet 50 mg  50 mg Oral Q24H Sana Ramirez, APRN   50 mg at 07/30/25 0828    Magnesium Low Dose Replacement - Follow Nurse / BPA Driven Protocol   Not Applicable PRN Jose Alfredo Srinivasan MD        metoprolol succinate XL (TOPROL-XL) 24 hr tablet 25 mg  25 mg Oral Q24H Jose Alfredo Srinivasan MD   25 mg at 07/30/25 0946    nitroglycerin (NITROSTAT) SL tablet 0.4 mg  0.4 mg Sublingual Q5 Min PRN Kim Do MD        pantoprazole (PROTONIX) EC tablet 40 mg  40 mg Oral Q AM Jose Alfredo Srinivasan MD   40 mg at 07/30/25 0519    Phosphorus Replacement - Follow Nurse / BPA Driven Protocol   Not Applicable PRN Jose Alfredo Srinivasan MD        Potassium Replacement - Follow Nurse / BPA Driven Protocol   Not Applicable PRN Jose Alfredo Srinivasan MD        sodium chloride 0.9 % flush 10 mL  10 mL Intravenous PRN Usman Ortez MD        sodium chloride 0.9 % flush 10 mL  10 mL Intravenous Q12H Kim Do MD   10 mL at 07/30/25 0831    sodium chloride 0.9 % flush 10 mL  10 mL Intravenous PRN Kim Do MD        sodium chloride 0.9 % infusion 40 mL  40 mL Intravenous PRN Kim Do MD        tiZANidine (ZANAFLEX) tablet 2 mg  2 mg Oral Q8H PRN Jose Alfredo Srinivasan MD             Assessment & Plan       Coronary artery disease involving native coronary artery of native heart with unstable angina pectoris    Type 2 diabetes mellitus with circulatory disorder, without long-term current use of insulin    S/P drug eluting coronary stent placement    Essential hypertension    Hyperlipidemia LDL goal  <70    HFrEF (heart failure with reduced ejection fraction)      Plan    1. Coronary artery disease with unstable angina-patient has had no recurrence of chest pain since admission.  Abnormal total coronary flow reserve of 1.5 with no perfusion abnormalities suggestive of microvascular disease versus balanced ischemia on cardiac PET scan yesterday.  We discussed inpatient left heart catheterization versus outpatient reevaluation in the near future.  The patient has opted to be discharged home today with close follow-up with his established cardiology team Dr. Cassius gaytan and HANNAH Armijo.  He will continue on aspirin.  He is currently on Eliquis post knee replacement.  2.  Type 2 diabetes mellitus-management per primary team.  3.  Status post drug-eluting coronary stent placement-2.5 x 18 mm Xience drug-eluting stent to the mid LAD 11/5/2024.  Patient completed 6 months of dual antiplatelet therapy due to non-ACS indication for stent placement.  Continue aspirin.  4.  Essential hypertension-blood pressures have been elevated since admission.  He has been started on metoprolol succinate 25 mg daily.  Continue to monitor.  5.  Hyperlipidemia LDL goal less than 70-LDL was well-controlled on his most recent lipid panel in April.  Continue Lipitor.  6.  Heart failure with reduced ejection fraction-2D echo yesterday revealed mildly decreased left ventricular systolic function with ejection fraction 41 to 45%, indeterminate left ventricular diastolic function, abnormal global longitudinal LV strain with left ventricular apical sparing and recommendation for workup of cardiac amyloid.  NYHA class II.  Stage C.  Compensated.  Start Jardiance 10 mg daily.  Consider transition from losartan to Entresto at outpatient follow-up.  Stable for discharge from cardiology standpoint.  He has been referred to the heart failure clinic per primary team.    Electronically signed by HANNAH Valderrama, 07/30/25, 4:06 PM  CDT.

## 2025-07-30 NOTE — DISCHARGE SUMMARY
[facility]  DISCHARGE SUMMARY       Date of Admission: 7/28/2025  Date of Discharge:  7/30/2025  Primary Care Physician: Daquan Lion MD    Presenting Problem/History of Present Illness:  74-year-old male, with history of hypertension, coronary artery disease with a drug-eluting stent to LAD in November 2024. He recently had left knee arthroplasty June 2025, and he is on Eliquis for anticoagulation, as well as aspirin.  Came to the hospital reporting chest pain anterior aspect, radiated to both shoulders, and episode of lightheadedness and presyncope as per description.      Final Discharge Diagnoses:  Active Hospital Problems    Diagnosis     **Coronary artery disease involving native coronary artery of native heart with unstable angina pectoris     HFrEF (heart failure with reduced ejection fraction)     Hyperlipidemia LDL goal <70     S/P drug eluting coronary stent placement     Type 2 diabetes mellitus with circulatory disorder, without long-term current use of insulin     Essential hypertension        Consults: Cardiology     Procedures Performed: no    Pertinent Test Results:   Results for orders placed during the hospital encounter of 07/28/25    Adult Transthoracic Echo Complete W/ Cont if Necessary Per Protocol    Interpretation Summary  Images from the original result were not included.      Left ventricular systolic function is mildly decreased. Automated 2D EF = 45.1%  Left ventricular ejection fraction appears to be 41 - 45%.    Left ventricular diastolic function was indeterminate.    Estimated right ventricular systolic pressure from tricuspid regurgitation is normal (<35 mmHg).    Abnormal left ventricular global strain with left ventricular apical sparing.  Recommend workup for cardiac amyloid.      Imaging Results (All)       Procedure Component Value Units Date/Time    NM PET Cardiac Stress Radiology Interpretation [[id number]]  Collected: 07/29/25 1606     Updated: 07/29/25 1619    Narrative:      EXAM: NM PET CARDIAC STRESS RADIOLOGY INTERPRETATION-      HISTORY: typical chest pain; R07.9-Chest pain, unspecified; R55-Syncope  and collapse       COMPARISON: No existing relevant imaging studies available.     DOSE LENGTH PRODUCT: 297.62 mGy.cm. Automatic exposure control was  utilized to make radiation dose as low as reasonably achievable.     TECHNIQUE: NM PET cardiac stress per cardiology protocol with  multiplanar reformats of the attenuation correction CT submitted for  radiology interpretation of non-cardiac structures.     FINDINGS:     AIRWAYS/PULMONARY PARENCHYMA: Lung emphysema. Right lower lobe calcified  granuloma. 6 mm noncalcified left lower lobe pulmonary nodule on axial  image 80 versus atelectasis. No pleural effusion. Airways are clear.     VESSELS: Included portions of the thoracic aorta are nondilated. Central  pulmonary arteries are nondilated.     CARDIAC: Cardiac PET interpretation by cardiology dictated separately.  No pericardial effusion.     MEDIASTINUM: Granulomatous calcification. No suspicious adenopathy.     EXTRATHORACIC: Included portions of the chest wall soft tissues are  unremarkable.     UPPER ABDOMEN: Visualized upper abdominal structures are within normal  limits.     BONES: Anterior bridging osteophytes. No acute bony abnormality.          Impression:      1. NM PET cardiac stress with the attenuation correction CT submitted  for radiology interpretation of non-cardiac structures. Interpretation  of the PET stress deferred to cardiology, which will be dictated  separately.  2. 6 mm noncalcified left lower lobe pulmonary nodule near the posterior  costal pleura versus dependent atelectasis. Fleischner recommendation is  follow-up chest CT in 6-12 months to ensure stability or resolution.     This report was signed and finalized on 7/29/2025 4:16 PM by Dr Edilson Cardozo.       CT Angiogram Chest  Pulmonary Embolism [[id number]] Collected: 07/29/25 0555     Updated: 07/29/25 0759    Narrative:      EXAMINATION: CT ANGIOGRAM CHEST PULMONARY EMBOLISM-        HISTORY: Pulmonary Embolism     DLP: 1783 mGycm.     In order to have a CT radiation dose as low as reasonably achievable,  automated exposure control was utilized to adjust the exposure  parameters according to patient's size.     CT angiography of the chest performed after intravenous contrast  enhancement.     Images are acquired in axial plane and subsequent 2D construction in  coronal and sagittal planes and 3D maximum intensity projection image  reconstruction.     The comparison is made with the previous study dated 9/18/2018.     There is good opacification of the main pulmonary artery or right and  left pulmonary arteries. There is ill-defined defect in the right upper  and middle lobar branch which is due to artifact from the adjacent high  density contrast in the superior vena cava. No filling defect in the  remaining visualized pulmonary arterial bed.     RV/LV ratio is 45/60 which is normal. No finding to suggest right heart  strain.     Atheromatous change of thoracic aorta. No aneurysmal dilatation or  dissection.     Moderate atheromatous changes of the limited visualized coronary  arteries.     No significant cardiomegaly.     No evidence of mediastinal or hilar mass or lymphadenopathy.     Limited visualized soft tissue of the neck are normal. No thyroid  nodules.     No axillary lymphadenopathy.     The lungs are moderately well-expanded.     Central airways patent and clear.     Chronic emphysematous changes of the lungs bilaterally are noted.     There is a subpleural nodule in the right lower lobe, image #104 in  series 5, measuring 12 mm in diameter. This was not noted in the  previous study in 2018.     The smaller subpleural nodule in the left lower lobe, image #138 in  series 5, measures 6 mm.     Atelectatic changes are seen in the  lower lungs bilaterally.     Few calcified nodules/granulomas are seen in the lungs bilaterally.     Limited included abdomen appears unremarkable except for small atrophic  right kidney.     Images reviewed in bone window show chronic degenerative changes of the  thoracic spine. There is no acute bony abnormality.       Impression:      1. No evidence of pulmonary embolism. No aortic aneurysm or dissection.  2. Subpleural nodule in the lower lobes bilaterally as detailed above.  Further evaluation with a follow-up diagnostic CT scan of the chest in 3  months and/or radionuclide PET/CT imaging may be obtained.     The above study was initially reviewed and reported by [facility]. I do not  find any discrepancies.                          This report was signed and finalized on 7/29/2025 7:56 AM by Dr. Francisco Dennis MD.       XR Chest 1 View [[id number]] Collected: 07/28/25 2020     Updated: 07/28/25 2024    Narrative:      EXAMINATION: XR CHEST 1 VW-  7/28/2025 8:20 PM     HISTORY: Chest pain     FINDINGS: Upright frontal projection of the chest demonstrates patchy  left basilar airspace disease either related atelectasis or infiltrate.  There is a granuloma in the right lung base. Lungs are otherwise clear.  Mediastinum within normal limits.       Impression:      1. Left basilar airspace disease with differential as above. The lungs  are otherwise clear.     This report was signed and finalized on 7/28/2025 8:21 PM by Dr. Roberto Carlos Rutherford MD.             LAB RESULTS:      Lab 07/29/25 0415 07/28/25 2015   WBC 7.81 8.13   HEMOGLOBIN 11.8* 12.1*   HEMATOCRIT 39.3 39.0   PLATELETS 154 158   NEUTROS ABS  --  6.48   IMMATURE GRANS (ABS)  --  0.03   LYMPHS ABS  --  0.87   MONOS ABS  --  0.64   EOS ABS  --  0.07   MCV 90.8 88.8   PROTIME 14.9* 14.7   APTT 37.4*  --    D DIMER QUANT  --  1.71*         Lab 07/30/25  0332 07/29/25 2021 07/29/25  1309 07/29/25 0415 07/28/25 2015   SODIUM 136  --   --  135* 138    POTASSIUM 4.1  --   --  4.0 4.8   CHLORIDE 99  --   --  100 101   CO2 23.0  --   --  20.0* 23.0   ANION GAP 14.0  --   --  15.0 14.0   BUN 18.7  --   --  23.8* 23.8*   CREATININE 0.83  --   --  0.85 0.96   EGFR 90.7  --   --  90.1 81.9   GLUCOSE 105*  --   --  112* 142*   CALCIUM 9.1  --   --  8.9 9.0   MAGNESIUM 1.6 1.7 1.3* 1.4*  --    PHOSPHORUS  --   --   --  3.8  --    TSH  --   --   --  0.814  --          Lab 07/29/25  0415 07/28/25 2015   TOTAL PROTEIN 6.2 6.4   ALBUMIN 3.4* 3.5   GLOBULIN 2.8 2.9   ALT (SGPT) 5 8   AST (SGOT) 12 20   BILIRUBIN 1.0 1.0   ALK PHOS 110 117         Lab 07/29/25  0415 07/29/25  0035 07/28/25 2109 07/28/25 2015   PROBNP  --   --   --  1,099.0   HSTROP T 58* 58* 56* 53*   PROTIME 14.9*  --   --  14.7   INR 1.11*  --   --  1.09                 Brief Urine Lab Results  (Last result in the past 365 days)        Color   Clarity   Blood   Leuk Est   Nitrite   Protein   CREAT   Urine HCG        04/16/25 0713 Yellow  Comment: REFERENCE RANGE: Yellow, Straw   Cloudy   Negative   See below:  Comment: Moderate (2+)   Negative   See below:  Comment: >=300 mg/dL (3+)           04/16/25 0713             68.9               Microbiology Results (last 10 days)       ** No results found for the last 240 hours. **            Hospital Course:   The patient was admitted to the hospital, and he had cardiology evaluation.  Troponins were 53 and 58.  He had a cardiac stress test with pet imaging performed 7/29/2025.  Per report abnormal total coronary flow reserve, no perfusion abnormalities.  Moderate coronary calcification in the left anterior descending artery distribution.  Suggestive of microvascular disease versus balanced ischemia.  Transthoracic echocardiogram with systolic dysfunction and EF 41 to 45%.  Blood pressure was managed with losartan, as per prior use 50 mg p.o. daily.  Patient was started on new medication Toprol-XL 25 mg p.o. daily.  Recommended to continue aspirin 81 mg p.o.  "daily and atorvastatin 40 mg p.o. daily.  Hypomagnesemia and magnesium was replaced per protocol.  A1c was 5.1 and had sulfonylurea placed on hold; I do not recommend this medication given risk of hypoglycemia and normal A1C. Diet management and outpatient follow up recommended for this.  A CT angiogram performed in the emergency department no evidence of pulmonary embolism, n aortic aneurysm or dissection; it showed a subpleural nodule in the lower lobes bilaterally as detailed above. Further evaluation with a follow-up diagnostic CT scan of the chest in 3 months and/or radionuclide PET/CT imaging may be obtained.  An outpatient referral with pulmonologist was made for follow-up and additional imaging if indicated by specialist.  No additional intervention was recommended by specialist and the patient was discharged home to continue outpatient follow-up with cardiology and primary care provider.      Physical Exam on Discharge:  /67 (BP Location: Left arm, Patient Position: Sitting)   Pulse 90   Temp 98.2 °F (36.8 °C) (Oral)   Resp 16   Ht 178.3 cm (70.2\")   Wt 88.7 kg (195 lb 8 oz)   SpO2 98%   BMI 27.89 kg/m²   Physical Exam  Performed on the same day, see progress note    Condition on Discharge: Stable    Discharge Disposition:  Home or Self Care    Discharge Medications:     Discharge Medications        PAUSE taking these medications        Instructions Start Date   clopidogrel 75 MG tablet  Wait to take this until your doctor or other care provider tells you to start again.  Commonly known as: PLAVIX   75 mg, Oral, Daily      glipizide 2.5 MG 24 hr tablet  Wait to take this until your doctor or other care provider tells you to start again.  Commonly known as: GLUCOTROL XL   2.5 mg, Oral, Daily             New Medications        Instructions Start Date   metoprolol succinate XL 25 MG 24 hr tablet  Commonly known as: TOPROL-XL   25 mg, Oral, Every 24 Hours Scheduled   Start Date: July 31, 2025   "          Continue These Medications        Instructions Start Date   allopurinol 300 MG tablet  Commonly known as: ZYLOPRIM   300 mg, Daily      apixaban 2.5 MG tablet tablet  Commonly known as: ELIQUIS   2.5 mg, Oral, 2 Times Daily, Needs for 8 more day, post left knee surgery      ASPIRIN 81 PO   1 tablet, Oral, Daily      atorvastatin 40 MG tablet  Commonly known as: LIPITOR   40 mg, Oral, Nightly      carboxymethylcellulose sod 1 % gel eye gel   1 drop, 4 Times Daily PRN      cetirizine 10 MG tablet  Commonly known as: zyrTEC   10 mg, Oral, Daily      cyanocobalamin 1000 MCG tablet  Commonly known as: VITAMIN B-12   1,000 mcg, Daily      fluticasone 50 MCG/ACT nasal spray  Commonly known as: FLONASE   1 spray, Nasal, 2 Times Daily PRN      gabapentin 400 MG capsule  Commonly known as: NEURONTIN   800 mg, Oral, 3 times daily,    Patient is taking 2 capsules in the AM, 2 capsules in the afternoon, 2 capsule in the PM      losartan 50 MG tablet  Commonly known as: COZAAR   50 mg, 2 Times Daily      magnesium oxide 400 MG tablet  Commonly known as: MAG-OX   400 mg, Daily      nitroglycerin 0.4 MG SL tablet  Commonly known as: NITROSTAT   0.4 mg, Sublingual, Every 5 Minutes PRN, Take no more than 3 doses in 15 minutes.      omeprazole 20 MG capsule  Commonly known as: priLOSEC   20 mg, 2 Times Daily      VITAMIN C PO   Take  by mouth.      vitamin D3 125 MCG (5000 UT) capsule capsule   5,000 Units, Daily      ZINC PO   1 tablet, Oral, Daily               This patient has current or prior documentation of an left ventricular ejection fraction (LVEF) of less than or equal to 40%.    The patient was prescribed or already taking an ACE/ARB.    The patient was prescribed already taking bisoprolol, carvedilol, or sustained release metoprolol succinate.     Discharge Diet:   Diet Instructions       Diet: Cardiac Diets, Diabetic Diets; Healthy Heart (2-3 Na+); Regular (IDDSI 7); Thin (IDDSI 0); Consistent Carbohydrate       Discharge Diet:  Cardiac Diets  Diabetic Diets       Cardiac Diet: Healthy Heart (2-3 Na+)    Texture: Regular (IDDSI 7)    Fluid Consistency: Thin (IDDSI 0)    Diabetic Diet: Consistent Carbohydrate            Activity at Discharge:  as tolerated    Follow-up Appointments:   Future Appointments   Date Time Provider Department Center   10/23/2025  8:15 AM Daquan Lion MD [facility]   12/29/2025  1:30 PM Marisabel Armijo APRN [facility]       Test Results Pending at Discharge: no    Electronically signed by Jose Alfredo Srinivasan MD, 07/30/25, 12:44 CDT.    Time: 50 minutes.

## 2025-07-30 NOTE — PLAN OF CARE
Goal Outcome Evaluation:  Plan of Care Reviewed With: patient      Progress: improving  Outcome Evaluation: [id number] 1st degree BBB; AAOx4. Able to make needs known. IV intact and patent. O2@2L BNC intact. Denies SOB. Denies pain. Bed low and locked with call light in reach. Family at bedside. Urostomy intact to bsb. Dark gentry, malodorous urine to bsb.

## 2025-07-31 ENCOUNTER — OFFICE VISIT (OUTPATIENT)
Dept: INTERNAL MEDICINE | Facility: CLINIC | Age: 75
End: 2025-07-31
Payer: MEDICARE

## 2025-07-31 ENCOUNTER — TRANSITIONAL CARE MANAGEMENT TELEPHONE ENCOUNTER (OUTPATIENT)
Dept: CALL CENTER | Facility: HOSPITAL | Age: 75
End: 2025-07-31
Payer: MEDICARE

## 2025-07-31 VITALS
DIASTOLIC BLOOD PRESSURE: 58 MMHG | SYSTOLIC BLOOD PRESSURE: 102 MMHG | HEART RATE: 94 BPM | WEIGHT: 192 LBS | HEIGHT: 70 IN | TEMPERATURE: 97.6 F | OXYGEN SATURATION: 97 % | BODY MASS INDEX: 27.49 KG/M2

## 2025-07-31 DIAGNOSIS — I50.20 HFREF (HEART FAILURE WITH REDUCED EJECTION FRACTION): Chronic | ICD-10-CM

## 2025-07-31 DIAGNOSIS — I25.110 CORONARY ARTERY DISEASE INVOLVING NATIVE CORONARY ARTERY OF NATIVE HEART WITH UNSTABLE ANGINA PECTORIS: ICD-10-CM

## 2025-07-31 DIAGNOSIS — I10 PRIMARY HYPERTENSION: ICD-10-CM

## 2025-07-31 DIAGNOSIS — R91.8 PULMONARY NODULES: ICD-10-CM

## 2025-07-31 DIAGNOSIS — E11.51 TYPE 2 DIABETES MELLITUS WITH DIABETIC PERIPHERAL ANGIOPATHY WITHOUT GANGRENE, WITHOUT LONG-TERM CURRENT USE OF INSULIN: ICD-10-CM

## 2025-07-31 DIAGNOSIS — Z95.5 S/P DRUG ELUTING CORONARY STENT PLACEMENT: ICD-10-CM

## 2025-07-31 DIAGNOSIS — Z09 HOSPITAL DISCHARGE FOLLOW-UP: Primary | ICD-10-CM

## 2025-07-31 LAB
QT INTERVAL: 356 MS
QT INTERVAL: 376 MS
QTC INTERVAL: 456 MS
QTC INTERVAL: 457 MS

## 2025-07-31 RX ORDER — PSEUDOEPHEDRINE HCL 30 MG
100 TABLET ORAL AS NEEDED
COMMUNITY
Start: 2025-06-27

## 2025-07-31 RX ORDER — LOSARTAN POTASSIUM 25 MG/1
25 TABLET ORAL DAILY
Qty: 90 TABLET | Refills: 0 | Status: SHIPPED | OUTPATIENT
Start: 2025-07-31

## 2025-07-31 RX ORDER — CYCLOBENZAPRINE HCL 10 MG
10 TABLET ORAL 3 TIMES DAILY PRN
COMMUNITY
Start: 2025-07-11

## 2025-07-31 RX ORDER — OXYCODONE AND ACETAMINOPHEN 7.5; 325 MG/1; MG/1
2 TABLET ORAL EVERY 6 HOURS PRN
COMMUNITY
Start: 2025-07-11

## 2025-07-31 RX ORDER — ONDANSETRON 4 MG/1
4 TABLET, FILM COATED ORAL EVERY 8 HOURS PRN
COMMUNITY
Start: 2025-06-27

## 2025-07-31 NOTE — OUTREACH NOTE
Call Center TCM Note      Flowsheet Row Responses   [facility] patient discharged from? [place]   Does the patient have one of the following disease processes/diagnoses(primary or secondary)? Cardiothoracic surgery   TCM attempt successful? Yes   Discharge diagnosis Coronary artery disease   TCM call completed? Yes   Wrap up additional comments Patient has a completed hospital follow up appt with PCP office for today (7/31).  This fulfills TCM requirements and no phone call is needed.   Would this patient benefit from a Referral to Amb Social Work? No   Is the patient interested in additional calls from an ambulatory ? No            Cierra ANTONIO - Registered Nurse    7/31/2025, 11:11 CDT

## 2025-07-31 NOTE — OUTREACH NOTE
Prep Survey      Flowsheet Row Responses   [facility] discharged from? [place]   Is LACE score < 7 ? No   Eligibility [facility]   Date of Admission 07/28/25   Date of Discharge 07/30/25   Discharge Disposition Home or Self Care   Discharge diagnosis Coronary artery disease   Does the patient have one of the following disease processes/diagnoses(primary or secondary)? Cardiothoracic surgery   Does the patient have Home health ordered? No   Is there a DME ordered? No   Prep survey completed? Yes            ISAURO SINGH - Registered Nurse

## 2025-08-01 ENCOUNTER — APPOINTMENT (OUTPATIENT)
Dept: GENERAL RADIOLOGY | Facility: HOSPITAL | Age: 75
End: 2025-08-01
Payer: MEDICARE

## 2025-08-01 ENCOUNTER — TELEPHONE (OUTPATIENT)
Dept: INTERNAL MEDICINE | Facility: CLINIC | Age: 75
End: 2025-08-01
Payer: MEDICARE

## 2025-08-01 ENCOUNTER — HOSPITAL ENCOUNTER (EMERGENCY)
Facility: HOSPITAL | Age: 75
Discharge: HOME | End: 2025-08-01
Attending: EMERGENCY MEDICINE
Payer: MEDICARE

## 2025-08-01 ENCOUNTER — TELEPHONE (OUTPATIENT)
Dept: CARDIOLOGY | Facility: CLINIC | Age: 75
End: 2025-08-01
Payer: MEDICARE

## 2025-08-01 VITALS
OXYGEN SATURATION: 95 % | DIASTOLIC BLOOD PRESSURE: 86 MMHG | HEIGHT: 70 IN | WEIGHT: 194 LBS | RESPIRATION RATE: 18 BRPM | SYSTOLIC BLOOD PRESSURE: 136 MMHG | HEART RATE: 90 BPM | BODY MASS INDEX: 27.77 KG/M2 | TEMPERATURE: 98.1 F

## 2025-08-01 DIAGNOSIS — A49.9 UTI (URINARY TRACT INFECTION), BACTERIAL: Primary | ICD-10-CM

## 2025-08-01 DIAGNOSIS — I95.9 HYPOTENSION, UNSPECIFIED HYPOTENSION TYPE: ICD-10-CM

## 2025-08-01 DIAGNOSIS — N39.0 UTI (URINARY TRACT INFECTION), BACTERIAL: Primary | ICD-10-CM

## 2025-08-01 LAB
ALBUMIN SERPL-MCNC: 3.4 G/DL (ref 3.5–5.2)
ALBUMIN/GLOB SERPL: 1.1 G/DL
ALP SERPL-CCNC: 104 U/L (ref 39–117)
ALT SERPL W P-5'-P-CCNC: 6 U/L (ref 1–41)
ANION GAP SERPL CALCULATED.3IONS-SCNC: 13 MMOL/L (ref 5–15)
AST SERPL-CCNC: 15 U/L (ref 1–40)
BACTERIA UR QL AUTO: ABNORMAL /HPF
BASOPHILS # BLD AUTO: 0.06 10*3/MM3 (ref 0–0.2)
BASOPHILS NFR BLD AUTO: 0.6 % (ref 0–1.5)
BILIRUB SERPL-MCNC: 0.5 MG/DL (ref 0–1.2)
BILIRUB UR QL STRIP: NEGATIVE
BUN SERPL-MCNC: 32.3 MG/DL (ref 8–23)
BUN/CREAT SERPL: 27.4 (ref 7–25)
CALCIUM SPEC-SCNC: 9 MG/DL (ref 8.6–10.5)
CHLORIDE SERPL-SCNC: 100 MMOL/L (ref 98–107)
CLARITY UR: CLEAR
CO2 SERPL-SCNC: 21 MMOL/L (ref 22–29)
COLOR UR: YELLOW
CREAT SERPL-MCNC: 1.18 MG/DL (ref 0.76–1.27)
DEPRECATED RDW RBC AUTO: 51.6 FL (ref 37–54)
EGFRCR SERPLBLD CKD-EPI 2021: 64 ML/MIN/1.73
EOSINOPHIL # BLD AUTO: 0.17 10*3/MM3 (ref 0–0.4)
EOSINOPHIL NFR BLD AUTO: 1.8 % (ref 0.3–6.2)
ERYTHROCYTE [DISTWIDTH] IN BLOOD BY AUTOMATED COUNT: 15.8 % (ref 12.3–15.4)
GEN 5 1HR TROPONIN T REFLEX: 84 NG/L
GLOBULIN UR ELPH-MCNC: 3 GM/DL
GLUCOSE SERPL-MCNC: 151 MG/DL (ref 65–99)
GLUCOSE UR STRIP-MCNC: ABNORMAL MG/DL
HCT VFR BLD AUTO: 40 % (ref 37.5–51)
HGB BLD-MCNC: 12.5 G/DL (ref 13–17.7)
HGB UR QL STRIP.AUTO: ABNORMAL
HOLD SPECIMEN: NORMAL
HOLD SPECIMEN: NORMAL
HYALINE CASTS UR QL AUTO: ABNORMAL /LPF
IMM GRANULOCYTES # BLD AUTO: 0.06 10*3/MM3 (ref 0–0.05)
IMM GRANULOCYTES NFR BLD AUTO: 0.6 % (ref 0–0.5)
KETONES UR QL STRIP: NEGATIVE
LEUKOCYTE ESTERASE UR QL STRIP.AUTO: ABNORMAL
LYMPHOCYTES # BLD AUTO: 1.95 10*3/MM3 (ref 0.7–3.1)
LYMPHOCYTES NFR BLD AUTO: 21.1 % (ref 19.6–45.3)
MCH RBC QN AUTO: 27.6 PG (ref 26.6–33)
MCHC RBC AUTO-ENTMCNC: 31.3 G/DL (ref 31.5–35.7)
MCV RBC AUTO: 88.3 FL (ref 79–97)
MONOCYTES # BLD AUTO: 0.73 10*3/MM3 (ref 0.1–0.9)
MONOCYTES NFR BLD AUTO: 7.9 % (ref 5–12)
NEUTROPHILS NFR BLD AUTO: 6.29 10*3/MM3 (ref 1.7–7)
NEUTROPHILS NFR BLD AUTO: 68 % (ref 42.7–76)
NITRITE UR QL STRIP: NEGATIVE
NRBC BLD AUTO-RTO: 0 /100 WBC (ref 0–0.2)
PH UR STRIP.AUTO: 7.5 [PH] (ref 5–8)
PLATELET # BLD AUTO: 237 10*3/MM3 (ref 140–450)
PMV BLD AUTO: 8.6 FL (ref 6–12)
POTASSIUM SERPL-SCNC: 4.2 MMOL/L (ref 3.5–5.2)
PROT SERPL-MCNC: 6.4 G/DL (ref 6–8.5)
PROT UR QL STRIP: ABNORMAL
QT INTERVAL: 376 MS
QT INTERVAL: 400 MS
QTC INTERVAL: 464 MS
QTC INTERVAL: 516 MS
RBC # BLD AUTO: 4.53 10*6/MM3 (ref 4.14–5.8)
RBC # UR STRIP: ABNORMAL /HPF
REF LAB TEST METHOD: ABNORMAL
SODIUM SERPL-SCNC: 134 MMOL/L (ref 136–145)
SP GR UR STRIP: 1.02 (ref 1–1.03)
SQUAMOUS #/AREA URNS HPF: ABNORMAL /HPF
TROPONIN T % DELTA: 0
TROPONIN T NUMERIC DELTA: 0 NG/L
TROPONIN T SERPL HS-MCNC: 84 NG/L
UROBILINOGEN UR QL STRIP: ABNORMAL
WBC # UR STRIP: ABNORMAL /HPF
WBC NRBC COR # BLD AUTO: 9.26 10*3/MM3 (ref 3.4–10.8)
WHOLE BLOOD HOLD COAG: NORMAL
WHOLE BLOOD HOLD SPECIMEN: NORMAL

## 2025-08-01 PROCEDURE — 87077 CULTURE AEROBIC IDENTIFY: CPT | Performed by: EMERGENCY MEDICINE

## 2025-08-01 PROCEDURE — 84484 ASSAY OF TROPONIN QUANT: CPT

## 2025-08-01 PROCEDURE — 25810000003 SODIUM CHLORIDE 0.9 % SOLUTION: Performed by: EMERGENCY MEDICINE

## 2025-08-01 PROCEDURE — 85025 COMPLETE CBC W/AUTO DIFF WBC: CPT

## 2025-08-01 PROCEDURE — 87086 URINE CULTURE/COLONY COUNT: CPT | Performed by: EMERGENCY MEDICINE

## 2025-08-01 PROCEDURE — 93005 ELECTROCARDIOGRAM TRACING: CPT

## 2025-08-01 PROCEDURE — 71045 X-RAY EXAM CHEST 1 VIEW: CPT

## 2025-08-01 PROCEDURE — 80053 COMPREHEN METABOLIC PANEL: CPT

## 2025-08-01 PROCEDURE — 87186 SC STD MICRODIL/AGAR DIL: CPT | Performed by: EMERGENCY MEDICINE

## 2025-08-01 PROCEDURE — 25010000002 CEFTRIAXONE PER 250 MG: Performed by: EMERGENCY MEDICINE

## 2025-08-01 PROCEDURE — 96365 THER/PROPH/DIAG IV INF INIT: CPT

## 2025-08-01 PROCEDURE — 99284 EMERGENCY DEPT VISIT MOD MDM: CPT | Performed by: EMERGENCY MEDICINE

## 2025-08-01 PROCEDURE — 36415 COLL VENOUS BLD VENIPUNCTURE: CPT

## 2025-08-01 PROCEDURE — 81001 URINALYSIS AUTO W/SCOPE: CPT | Performed by: EMERGENCY MEDICINE

## 2025-08-01 RX ORDER — CEFDINIR 300 MG/1
300 CAPSULE ORAL 2 TIMES DAILY
Qty: 20 CAPSULE | Refills: 0 | Status: SHIPPED | OUTPATIENT
Start: 2025-08-01 | End: 2025-08-11

## 2025-08-01 RX ORDER — ASPIRIN 81 MG/1
324 TABLET, CHEWABLE ORAL ONCE
Status: DISCONTINUED | OUTPATIENT
Start: 2025-08-01 | End: 2025-08-01 | Stop reason: SDUPTHER

## 2025-08-01 RX ORDER — SODIUM CHLORIDE 0.9 % (FLUSH) 0.9 %
10 SYRINGE (ML) INJECTION AS NEEDED
Status: DISCONTINUED | OUTPATIENT
Start: 2025-08-01 | End: 2025-08-01 | Stop reason: HOSPADM

## 2025-08-01 RX ORDER — ASPIRIN 81 MG/1
324 TABLET, CHEWABLE ORAL ONCE
Status: DISCONTINUED | OUTPATIENT
Start: 2025-08-01 | End: 2025-08-01 | Stop reason: HOSPADM

## 2025-08-01 RX ORDER — CEFDINIR 300 MG/1
300 CAPSULE ORAL 2 TIMES DAILY
Qty: 20 CAPSULE | Refills: 0 | Status: SHIPPED | OUTPATIENT
Start: 2025-08-01 | End: 2025-08-01

## 2025-08-01 RX ADMIN — CEFTRIAXONE SODIUM 1000 MG: 1 INJECTION, POWDER, FOR SOLUTION INTRAMUSCULAR; INTRAVENOUS at 15:03

## 2025-08-01 RX ADMIN — SODIUM CHLORIDE 500 ML: 9 INJECTION, SOLUTION INTRAVENOUS at 13:17

## 2025-08-01 NOTE — TELEPHONE ENCOUNTER
Patient's wife called and stated, that his bp was 80/55 with a pulse of 142, advised to go to ER.  While talking bp was checked again and was 112/58 pulse 101. This was after moving from the table to his recliner.  Advised that if it drops back down to go to ER per Rae.

## 2025-08-01 NOTE — ED PROVIDER NOTES
"EMERGENCY DEPARTMENT ATTENDING NOTE    Patient Name: Chung Bradley    Chief Complaint   Patient presents with    Slow Heart Rate       PATIENT PRESENTATION:  Chung Bradley is a 74 y.o. male with PMH significant for heart failure with reduced EF, LAD stent in 2024 with CAD, hypertension and recent hospitalization with reassuring stress test, trended troponins and echo showing slightly reduced EF otherwise no acute findings who presents to the ED with fluctuating heart rates. Patient denies any significant chest pain, shortness of breath, dizziness today reports he is taking his blood pressure and vital signs with a glucose test with his PCM after starting Jardiance and amlodipine with his recent admission.  Patient's family was concerned due to the elevated heart rate in the 130s and blood pressure under 80 systolic whenever he would stood up for orthostatic vital signs.  Patient denies any urinary symptoms, recent fever, cough, congestion.  Has not had a urinary catheter recently.  Patient was recently started on Jardiance and amlodipine.      PHYSICAL EXAM:   VS: /72   Pulse 91   Temp 97.7 °F (36.5 °C)   Resp 18   Ht 177.8 cm (70\")   Wt 88 kg (194 lb)   SpO2 95%   BMI 27.84 kg/m²   GENERAL: well-nourished, well-developed, awake, alert, no acute distress, nontoxic appearing, comfortable  EYES: PERRL, sclerae anicteric, extraocular movements grossly intact, symmetric lids  EARS, NOSE, MOUTH, THROAT: atraumatic external nose and ears, moist mucous membranes  NECK: symmetric, trachea midline  RESPIRATORY: unlabored respiratory effort, clear to auscultation bilaterally, good air movement  CARDIOVASCULAR: no murmurs, peripheral pulses 2+ and equal in all extremities  GI: soft, nontender, nondistended  MUSCULOSKELETAL/EXTREMITIES: extremities without obvious deformity  SKIN: warm and dry with no obvious rashes  NEUROLOGIC: moving all 4 extremities symmetrically, CN II-XII grossly intact  PSYCHIATRIC: " alert, pleasant and cooperative. Appropriate mood and affect.      MEDICAL DECISION MAKING:    Chung Bradley is a 74 y.o. male who presented to the ED orthostatic hypotension with appropriate tachycardic response    Procedures    Differential Diagnosis Considered: Hypovolemia, electrolyte abnormality, UTI, pneumonia, atypical ACS, medication side effect    Labs Ordered  Labs Reviewed   COMPREHENSIVE METABOLIC PANEL - Abnormal; Notable for the following components:       Result Value    Glucose 151 (*)     BUN 32.3 (*)     Sodium 134 (*)     CO2 21.0 (*)     Albumin 3.4 (*)     BUN/Creatinine Ratio 27.4 (*)     All other components within normal limits    Narrative:     GFR Categories in Chronic Kidney Disease (CKD)              GFR Category          GFR (mL/min/1.73)    Interpretation  G1                    90 or greater        Normal or high (1)  G2                    60-89                Mild decrease (1)  G3a                   45-59                Mild to moderate decrease  G3b                   30-44                Moderate to severe decrease  G4                    15-29                Severe decrease  G5                    14 or less           Kidney failure    (1)In the absence of evidence of kidney disease, neither GFR category G1 or G2 fulfill the criteria for CKD.    eGFR calculation 2021 CKD-EPI creatinine equation, which does not include race as a factor   TROPONIN - Abnormal; Notable for the following components:    HS Troponin T 84 (*)     All other components within normal limits    Narrative:     High Sensitive Troponin T Reference Range:  <14.0 ng/L- Negative Female for AMI  <22.0 ng/L- Negative Male for AMI  >=14 - Abnormal Female indicating possible myocardial injury.  >=22 - Abnormal Male indicating possible myocardial injury.   Clinicians would have to utilize clinical acumen, EKG, Troponin, and serial changes to determine if it is an Acute Myocardial Infarction or myocardial injury due to an  underlying chronic condition.        CBC WITH AUTO DIFFERENTIAL - Abnormal; Notable for the following components:    Hemoglobin 12.5 (*)     MCHC 31.3 (*)     RDW 15.8 (*)     Immature Grans % 0.6 (*)     Immature Grans, Absolute 0.06 (*)     All other components within normal limits   HIGH SENSITIVITIY TROPONIN T 1HR - Abnormal; Notable for the following components:    HS Troponin T 84 (*)     All other components within normal limits    Narrative:     High Sensitive Troponin T Reference Range:  <14.0 ng/L- Negative Female for AMI  <22.0 ng/L- Negative Male for AMI  >=14 - Abnormal Female indicating possible myocardial injury.  >=22 - Abnormal Male indicating possible myocardial injury.   Clinicians would have to utilize clinical acumen, EKG, Troponin, and serial changes to determine if it is an Acute Myocardial Infarction or myocardial injury due to an underlying chronic condition.        URINALYSIS W/ MICROSCOPIC IF INDICATED (NO CULTURE) - Abnormal; Notable for the following components:    Glucose, UA >=1000 mg/dL (3+) (*)     Blood, UA Trace (*)     Protein, UA Trace (*)     Leuk Esterase, UA Small (1+) (*)     All other components within normal limits   URINALYSIS, MICROSCOPIC ONLY - Abnormal; Notable for the following components:    WBC, UA 11-20 (*)     Bacteria, UA 2+ (*)     All other components within normal limits   URINE CULTURE   RAINBOW DRAW    Narrative:     The following orders were created for panel order [place] Draw.  Procedure                               Abnormality         Status                     ---------                               -----------         ------                     Green Top (Gel)[[id number]]                                  Final result               Lavender Top[[id number]]                                     Final result               Red Top[[id number]]                                          Final result               Light Blue Top[[id number]]                                    Final result                 Please view results for these tests on the individual orders.   CBC AND DIFFERENTIAL    Narrative:     The following orders were created for panel order CBC & Differential.  Procedure                               Abnormality         Status                     ---------                               -----------         ------                     CBC Auto Differential[[id number]]        Abnormal            Final result                 Please view results for these tests on the individual orders.   GREEN TOP   LAVENDER TOP   RED TOP   LIGHT BLUE TOP        Imaging Ordered:   XR Chest 1 View   Final Result   1. Streaky bibasilar opacities with possible LEFT pleural fluid.       This report was signed and finalized on 8/1/2025 1:01 PM by Dr Karen Johnson MD.              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    Cancer     bladder    Coronary artery disease involving native coronary artery of native heart without angina pectoris 12/4/2024    Degenerative arthritis of cervical spine with nerve compression     per patient report    Diabetes mellitus     Gutierrez catheter in place     GERD (gastroesophageal reflux disease)     Hearing aid worn     Hyperlipidemia     Hypertension     Impaired hearing     without hearing aids can barely hear anything    Neuropathy     NSTEMI (non-ST elevated myocardial infarction) 7/29/2025    Pulmonary embolism     Rotator cuff injury     left    Sleep apnea     wears cpap       Past Surgical History:   Procedure Laterality Date    CARDIAC CATHETERIZATION      CARDIAC CATHETERIZATION N/A 11/05/2024    Procedure: Coronary angiography;  Surgeon: Cassius Mosqueda MD;  Location:  [facility];  Service: Cardiology;  Laterality: N/A;    CARDIAC CATHETERIZATION N/A 11/05/2024    Procedure: Percutaneous Coronary Intervention;   Surgeon: Cassius Mosqueda MD;  Location:  [facility];  Service: Cardiology;  Laterality: N/A;    CATARACT EXTRACTION W/ INTRAOCULAR LENS IMPLANT      CHOLECYSTECTOMY      CYSTECTOMY      2017    EXPLORATORY LAPAROTOMY      LEG SURGERY      REPLACEMENT TOTAL KNEE Left 06/27/2025    SHOULDER ARTHROSCOPY W/ ROTATOR CUFF REPAIR Left 09/11/2023    Procedure: LEFT SHOULDER ROTATOR CUFF REPAIR, ACROMIOPLASTY AND DISTAL CLAVICLE EXCISION;  Surgeon: Justen To MD;  Location:  [facility] OR;  Service: Orthopedics;  Laterality: Left;    TRANSURETHRAL RESECTION OF BLADDER TUMOR Bilateral 07/25/2017    Procedure: CYSTOSCOPY TRANSURETHRAL RESECTION OF BLADDER TUMOR & POSSIBLE BILATERAL RETROGRADE URETEROPYELOGRAMS;  Surgeon: Chris Esparza MD;  Location:  [facility] OR;  Service:     TRANSURETHRAL RESECTION OF BLADDER TUMOR N/A 11/03/2017    Procedure: CYSTOSCOPY TRANSURETHRAL RESECTION OF BLADDER TUMOR ;  Surgeon: Chris Esparza MD;  Location:  [facility] OR;  Service:     TRANSURETHRAL RESECTION OF BLADDER TUMOR N/A 09/10/2018    Procedure: CYSTOSCOPY TRANSURETHRAL RESECTION OF LARGE BLADDER TUMOR, CLOT EVACUATION, AND FULGURATION;  Surgeon: Chris Esparza MD;  Location:  [facility] OR;  Service: Urology       Allergies   Allergen Reactions    Metformin Other (See Comments)     Renal dysfunction    Penicillins Rash     POSSIBLY NOT THE CAUSE         Current Facility-Administered Medications:     aspirin chewable tablet 324 mg, 324 mg, Oral, Once, Emergency, Triage Protocol, MD    cefTRIAXone (ROCEPHIN) 1,000 mg in sodium chloride 0.9 % 100 mL MBP, 1,000 mg, Intravenous, Once, Rafiq Cardoza MD    sodium chloride 0.9 % flush 10 mL, 10 mL, Intravenous, PRN, Emergency, Triage Protocol, MD    Current Outpatient Medications:     allopurinol (ZYLOPRIM) 300 MG tablet, Take 1 tablet by mouth Daily., Disp: , Rfl:     apixaban (ELIQUIS) 2.5 MG tablet tablet, Take 1 tablet by mouth 2 (Two) Times a Day. Needs for 8 more day,  post left knee surgery, Disp: , Rfl:     Ascorbic Acid (VITAMIN C PO), Take  by mouth., Disp: , Rfl:     ASPIRIN 81 PO, Take 1 tablet by mouth Daily., Disp: , Rfl:     atorvastatin (LIPITOR) 40 MG tablet, Take 1 tablet by mouth Every Night., Disp: 90 tablet, Rfl: 3    carboxymethylcellulose sod 1 % gel eye gel, Administer 1 drop to both eyes 4 (Four) Times a Day As Needed (dry eyes)., Disp: , Rfl:     cefdinir (OMNICEF) 300 MG capsule, Take 1 capsule by mouth 2 (Two) Times a Day for 10 days., Disp: 20 capsule, Rfl: 0    cetirizine (zyrTEC) 10 MG tablet, Take 1 tablet by mouth Daily., Disp: 30 tablet, Rfl: 1    [Paused] clopidogrel (PLAVIX) 75 MG tablet, Take 1 tablet by mouth Daily., Disp: , Rfl:     cyanocobalamin (VITAMIN B-12) 1000 MCG tablet, Take 1 tablet by mouth Daily., Disp: , Rfl:     cyclobenzaprine (FLEXERIL) 10 MG tablet, Take 1 tablet by mouth 3 (Three) Times a Day As Needed. for muscle spams, Disp: , Rfl:     docusate sodium 100 MG capsule, Take 1 capsule by mouth As Needed., Disp: , Rfl:     empagliflozin (Jardiance) 10 MG tablet tablet, Take 1 tablet by mouth Daily., Disp: 30 tablet, Rfl: 11    fluticasone (FLONASE) 50 MCG/ACT nasal spray, 1 spray into the nostril(s) as directed by provider 2 (Two) Times a Day As Needed for Allergies., Disp: 1 bottle, Rfl: 3    gabapentin (NEURONTIN) 400 MG capsule, Take 2 capsules by mouth 3 times a day.    Patient is taking 2 capsules in the AM, 2 capsules in the afternoon, 2 capsule in the PM, Disp: , Rfl:     losartan (Cozaar) 25 MG tablet, Take 1 tablet by mouth Daily., Disp: 90 tablet, Rfl: 0    magnesium oxide (MAG-OX) 400 MG tablet, Take 1 tablet by mouth Daily., Disp: , Rfl:     metoprolol succinate XL (TOPROL-XL) 25 MG 24 hr tablet, Take 1 tablet by mouth Daily for 30 days., Disp: 30 tablet, Rfl: 0    Multiple Vitamins-Minerals (ZINC PO), Take 1 tablet by mouth Daily., Disp: , Rfl:     nitroglycerin (NITROSTAT) 0.4 MG SL tablet, Place 1 tablet under the  tongue Every 5 (Five) Minutes As Needed for Chest Pain (Systolic BP Greater Than 100). Take no more than 3 doses in 15 minutes., Disp: 15 tablet, Rfl: 12    omeprazole (priLOSEC) 20 MG capsule, Take 1 capsule by mouth 2 (Two) Times a Day., Disp: , Rfl:     ondansetron (ZOFRAN) 4 MG tablet, Take 1 tablet by mouth Every 8 (Eight) Hours As Needed for Nausea or Vomiting., Disp: , Rfl:     oxyCODONE-acetaminophen (PERCOCET) 7.5-325 MG per tablet, Take 2 tablets by mouth Every 6 (Six) Hours As Needed., Disp: , Rfl:     vitamin D3 125 MCG (5000 UT) capsule capsule, Take 1 capsule by mouth Daily., Disp: , Rfl:     External documents reviewed: Reviewed ER documentation and hospitalization when patient was admitted for 2 days by Dr. Lion for chest pain rating to both shoulders and episode of lightheadedness and presyncope.  Echo at that time showed decreased left ventricular systolic function with reduced EF of 41 to 45%.    My EKG interpretation: EKG atrial fibrillation with competing junctional pacemaker and rate of 100 on arrival.  No signs of acute ischemia or arrhythmia.  Repeat EKG with rate of 92 and undiscernible P waves consistent with atrial fibrillation with no signs of ischemia or arrhythmia.    My lab interpretation: See below    My imaging interpretation: Patient with streaky bilateral basilar opacities.  Patient with no cough, no shortness of breath, no increased work of breathing.  Do not suspect pneumonia with no leukocytosis, no fever, no sputum production duction, patient to follow-up with his PCM for repeat imaging as needed.  Return precautions discussed.    Discussed with: Patient, wife, daughter    Shared decision making: Regarding admission for observation given nonspecific troponin elevation with recent stress test that was nonischemic negative delta troponin and no chest pain or shortness of breath.  After risk first benefit discussion patient would like to follow-up outpatient and has never been  symptomatic or for like he was going to pass out.  Believe this is reasonable given his extensive workup within the past week.    ED Course and Re-evaluation:     ED Course as of 08/01/25 1452   Fri Aug 01, 2025   1312 Patient denies any significant chest pain, shortness of breath, dizziness today reports he is taking his blood pressure and vital signs with a glucose test with his PCM after starting Jardiance and amlodipine with his recent admission.  Patient's family was concerned due to the elevated heart rate in the 130s and blood pressure under 80 systolic whenever he would stood up for orthostatic vital signs. [JJ]   1329 Patient with nonspecific troponin elevation increased from previously.  Plan for aspirin and does have a nonischemic EKG. [JJ]   1447 Urinalysis consistent with mild UTI.  Culture pending and will treat patient with Rocephin.  Given no chest pain, no shortness of breath, return precautions discussed and patient would like to follow-up outpatient with his PCM.  Believe this is reasonable.  If patient starts to develop symptoms he will come back to the ER.  Will continue outpatient antibiotics for his UTI that is likely causing his variations of blood pressure and tachycardia.  Patient to have a low threshold to return and will be admitted on IV antibiotics if symptoms persist or worsen. [JJ]      ED Course User Index  [JJ] Rafiq Cardoza MD        ED Critical Care time:     ED Diagnosis:  (N39.0,  A49.9) UTI (urinary tract infection), bacterial    (I95.9) Hypotension, unspecified hypotension type     Disposition: to home  Follow up plan: PCP follow up within 2 days, return to ED immediately if symptoms worsen        Signed:  Rafiq Cardoza MD  Emergency Medicine Physician    Please note that portions of this note were completed with a voice recognition program.      Rafiq Cardoza MD  08/01/25 [id number]

## 2025-08-02 ENCOUNTER — RESULTS FOLLOW-UP (OUTPATIENT)
Dept: EMERGENCY DEPT | Facility: HOSPITAL | Age: 75
End: 2025-08-02
Payer: MEDICARE

## 2025-08-04 LAB — BACTERIA SPEC AEROBE CULT: ABNORMAL

## 2025-08-05 ENCOUNTER — HOSPITAL ENCOUNTER (OUTPATIENT)
Dept: CARDIOLOGY | Facility: HOSPITAL | Age: 75
Discharge: HOME | End: 2025-08-05
Admitting: NURSE PRACTITIONER
Payer: MEDICARE

## 2025-08-05 VITALS
DIASTOLIC BLOOD PRESSURE: 64 MMHG | HEART RATE: 65 BPM | WEIGHT: 195 LBS | SYSTOLIC BLOOD PRESSURE: 148 MMHG | OXYGEN SATURATION: 95 % | BODY MASS INDEX: 27.98 KG/M2

## 2025-08-05 DIAGNOSIS — I50.22 CHRONIC SYSTOLIC HEART FAILURE: Primary | ICD-10-CM

## 2025-08-05 DIAGNOSIS — I10 ESSENTIAL HYPERTENSION: ICD-10-CM

## 2025-08-05 DIAGNOSIS — I50.20 HFREF (HEART FAILURE WITH REDUCED EJECTION FRACTION): Chronic | ICD-10-CM

## 2025-08-05 DIAGNOSIS — I10 PRIMARY HYPERTENSION: ICD-10-CM

## 2025-08-05 DIAGNOSIS — I25.10 CORONARY ARTERY DISEASE INVOLVING NATIVE CORONARY ARTERY OF NATIVE HEART WITHOUT ANGINA PECTORIS: ICD-10-CM

## 2025-08-05 LAB
ABSOLUTE LUNG FLUID CONTENT: 18 % (ref 20–35)
ANION GAP SERPL CALCULATED.3IONS-SCNC: 15 MMOL/L (ref 5–15)
BUN SERPL-MCNC: 22.1 MG/DL (ref 8–23)
BUN/CREAT SERPL: 21.7 (ref 7–25)
CALCIUM SPEC-SCNC: 9.1 MG/DL (ref 8.6–10.5)
CHLORIDE SERPL-SCNC: 103 MMOL/L (ref 98–107)
CO2 SERPL-SCNC: 21 MMOL/L (ref 22–29)
CREAT SERPL-MCNC: 1.02 MG/DL (ref 0.76–1.27)
EGFRCR SERPLBLD CKD-EPI 2021: 76.2 ML/MIN/1.73
GLUCOSE SERPL-MCNC: 107 MG/DL (ref 65–99)
NT-PROBNP SERPL-MCNC: 2333 PG/ML (ref 0–1800)
POTASSIUM SERPL-SCNC: 4.5 MMOL/L (ref 3.5–5.2)
SODIUM SERPL-SCNC: 139 MMOL/L (ref 136–145)

## 2025-08-05 PROCEDURE — 94726 PLETHYSMOGRAPHY LUNG VOLUMES: CPT | Performed by: NURSE PRACTITIONER

## 2025-08-05 PROCEDURE — 83880 ASSAY OF NATRIURETIC PEPTIDE: CPT | Performed by: NURSE PRACTITIONER

## 2025-08-05 PROCEDURE — 80048 BASIC METABOLIC PNL TOTAL CA: CPT | Performed by: NURSE PRACTITIONER

## 2025-08-05 RX ORDER — LOSARTAN POTASSIUM 25 MG/1
50 TABLET ORAL DAILY
Start: 2025-08-05 | End: 2025-08-05

## 2025-08-05 RX ORDER — LEVOFLOXACIN 500 MG/1
500 TABLET, FILM COATED ORAL DAILY
COMMUNITY
Start: 2025-08-05 | End: 2025-08-14

## 2025-08-05 RX ORDER — B-COMPLEX WITH VITAMIN C
100 TABLET ORAL DAILY
COMMUNITY

## 2025-08-05 RX ORDER — LOSARTAN POTASSIUM 50 MG/1
50 TABLET ORAL DAILY
Start: 2025-08-05

## 2025-08-09 LAB
QT INTERVAL: 376 MS
QT INTERVAL: 400 MS
QTC INTERVAL: 464 MS
QTC INTERVAL: 516 MS

## 2025-08-18 ENCOUNTER — READMISSION MANAGEMENT (OUTPATIENT)
Dept: CALL CENTER | Facility: HOSPITAL | Age: 75
End: 2025-08-18
Payer: MEDICARE

## 2025-08-19 ENCOUNTER — OFFICE VISIT (OUTPATIENT)
Dept: PULMONOLOGY | Facility: CLINIC | Age: 75
End: 2025-08-19
Payer: MEDICARE

## 2025-08-19 VITALS
BODY MASS INDEX: 28.06 KG/M2 | SYSTOLIC BLOOD PRESSURE: 134 MMHG | HEART RATE: 50 BPM | OXYGEN SATURATION: 99 % | WEIGHT: 196 LBS | DIASTOLIC BLOOD PRESSURE: 80 MMHG | HEIGHT: 70 IN

## 2025-08-19 DIAGNOSIS — R91.1 PULMONARY NODULE: Primary | ICD-10-CM

## 2025-08-19 DIAGNOSIS — Z87.891 FORMER SMOKER: ICD-10-CM

## 2025-08-19 DIAGNOSIS — G47.33 OSA (OBSTRUCTIVE SLEEP APNEA): ICD-10-CM

## 2025-08-19 DIAGNOSIS — J43.9 PULMONARY EMPHYSEMA, UNSPECIFIED EMPHYSEMA TYPE: ICD-10-CM

## 2025-08-19 PROCEDURE — 99204 OFFICE O/P NEW MOD 45 MIN: CPT | Performed by: INTERNAL MEDICINE

## 2025-08-19 PROCEDURE — 3075F SYST BP GE 130 - 139MM HG: CPT | Performed by: INTERNAL MEDICINE

## 2025-08-19 PROCEDURE — 36415 COLL VENOUS BLD VENIPUNCTURE: CPT | Performed by: INTERNAL MEDICINE

## 2025-08-19 PROCEDURE — 3079F DIAST BP 80-89 MM HG: CPT | Performed by: INTERNAL MEDICINE

## 2025-08-19 RX ORDER — METOPROLOL SUCCINATE 25 MG/1
25 TABLET, EXTENDED RELEASE ORAL DAILY
COMMUNITY

## 2025-08-20 ENCOUNTER — OFFICE VISIT (OUTPATIENT)
Age: 75
End: 2025-08-20

## 2025-08-20 VITALS — WEIGHT: 196 LBS | BODY MASS INDEX: 28.06 KG/M2 | HEIGHT: 70 IN

## 2025-08-20 DIAGNOSIS — Z96.652 PRESENCE OF ARTIFICIAL KNEE JOINT, LEFT: Primary | ICD-10-CM

## 2025-08-20 PROCEDURE — 99024 POSTOP FOLLOW-UP VISIT: CPT | Performed by: ORTHOPAEDIC SURGERY

## 2025-08-20 RX ORDER — ATORVASTATIN CALCIUM 40 MG/1
40 TABLET, FILM COATED ORAL DAILY
COMMUNITY

## 2025-08-20 RX ORDER — METOPROLOL SUCCINATE 25 MG/1
25 TABLET, EXTENDED RELEASE ORAL DAILY
COMMUNITY

## 2025-08-20 RX ORDER — ASPIRIN 81 MG/1
81 TABLET ORAL DAILY
COMMUNITY

## 2025-08-20 RX ORDER — CLOPIDOGREL BISULFATE 75 MG/1
75 TABLET ORAL DAILY
COMMUNITY

## 2025-08-20 RX ORDER — FLUTICASONE PROPIONATE 50 MCG
1 SPRAY, SUSPENSION (ML) NASAL DAILY PRN
COMMUNITY

## 2025-08-20 RX ORDER — B-COMPLEX WITH VITAMIN C
TABLET ORAL
COMMUNITY

## 2025-08-20 RX ORDER — GLIPIZIDE 5 MG/1
5 TABLET ORAL
COMMUNITY

## 2025-08-20 RX ORDER — LANOLIN ALCOHOL/MO/W.PET/CERES
1000 CREAM (GRAM) TOPICAL DAILY
COMMUNITY

## 2025-08-20 RX ORDER — CETIRIZINE HYDROCHLORIDE 10 MG/1
10 TABLET ORAL DAILY
COMMUNITY

## 2025-08-20 ASSESSMENT — ENCOUNTER SYMPTOMS
RESPIRATORY NEGATIVE: 1
GASTROINTESTINAL NEGATIVE: 1
ALLERGIC/IMMUNOLOGIC NEGATIVE: 1
EYES NEGATIVE: 1

## 2025-08-27 ENCOUNTER — HOSPITAL ENCOUNTER (OUTPATIENT)
Dept: CARDIOLOGY | Facility: HOSPITAL | Age: 75
Discharge: HOME | End: 2025-08-27
Admitting: NURSE PRACTITIONER
Payer: MEDICARE

## 2025-08-27 VITALS
OXYGEN SATURATION: 97 % | BODY MASS INDEX: 28.18 KG/M2 | WEIGHT: 196.4 LBS | SYSTOLIC BLOOD PRESSURE: 118 MMHG | DIASTOLIC BLOOD PRESSURE: 68 MMHG | HEART RATE: 98 BPM

## 2025-08-27 DIAGNOSIS — I10 PRIMARY HYPERTENSION: ICD-10-CM

## 2025-08-27 DIAGNOSIS — I48.91 ATRIAL FIBRILLATION, UNSPECIFIED TYPE: ICD-10-CM

## 2025-08-27 DIAGNOSIS — I50.20 HFREF (HEART FAILURE WITH REDUCED EJECTION FRACTION): Primary | Chronic | ICD-10-CM

## 2025-08-27 LAB
ABSOLUTE LUNG FLUID CONTENT: 24 % (ref 20–35)
ANION GAP SERPL CALCULATED.3IONS-SCNC: 14 MMOL/L (ref 5–15)
BUN SERPL-MCNC: 20.6 MG/DL (ref 8–23)
BUN/CREAT SERPL: 18.7 (ref 7–25)
CALCIUM SPEC-SCNC: 9.1 MG/DL (ref 8.6–10.5)
CHLORIDE SERPL-SCNC: 104 MMOL/L (ref 98–107)
CO2 SERPL-SCNC: 22 MMOL/L (ref 22–29)
CREAT SERPL-MCNC: 1.1 MG/DL (ref 0.76–1.27)
EGFRCR SERPLBLD CKD-EPI 2021: 69.1 ML/MIN/1.73
GLUCOSE SERPL-MCNC: 84 MG/DL (ref 65–99)
POTASSIUM SERPL-SCNC: 4.5 MMOL/L (ref 3.5–5.2)
SODIUM SERPL-SCNC: 140 MMOL/L (ref 136–145)

## 2025-08-27 PROCEDURE — 80048 BASIC METABOLIC PNL TOTAL CA: CPT | Performed by: NURSE PRACTITIONER

## 2025-08-27 PROCEDURE — 94726 PLETHYSMOGRAPHY LUNG VOLUMES: CPT | Performed by: NURSE PRACTITIONER

## 2025-08-27 RX ORDER — GLIPIZIDE 2.5 MG/1
2.5 TABLET, EXTENDED RELEASE ORAL DAILY
COMMUNITY

## 2025-08-27 RX ORDER — METOPROLOL SUCCINATE 50 MG/1
50 TABLET, EXTENDED RELEASE ORAL DAILY
Qty: 30 TABLET | Refills: 11 | Status: SHIPPED | OUTPATIENT
Start: 2025-08-27

## 2025-08-27 RX ORDER — LOSARTAN POTASSIUM 25 MG/1
25 TABLET ORAL DAILY
Start: 2025-08-27

## 2025-08-28 ENCOUNTER — HOSPITAL ENCOUNTER (OUTPATIENT)
Dept: CARDIOLOGY | Facility: HOSPITAL | Age: 75
Discharge: HOME | End: 2025-08-28
Admitting: NURSE PRACTITIONER
Payer: MEDICARE

## 2025-08-28 DIAGNOSIS — I50.20 HFREF (HEART FAILURE WITH REDUCED EJECTION FRACTION): Chronic | ICD-10-CM

## 2025-08-28 PROCEDURE — 93246 EXT ECG>7D<15D RECORDING: CPT

## (undated) DEVICE — WIPE THERAWASH SLV SPEC CARE 2PK

## (undated) DEVICE — PK CYSTO 30

## (undated) DEVICE — AGENT HEMSTAT 3GM OXIDIZED REGENERATED CELOS ABSRB FOR CONT (ORDER MULTIPLES OF 5EA)

## (undated) DEVICE — SUTURE VICRYL + 3-0 L27IN ABSRB UD PS-2 L19MM 3/8 CIR PRIM VCP427H

## (undated) DEVICE — ENDO KIT,LOURDES HOSPITAL: Brand: MEDLINE INDUSTRIES, INC.

## (undated) DEVICE — PAD GRND REM POLYHESIVE A/ DISP

## (undated) DEVICE — KT VLV HEMO MAP ACC PLS LG/BORE MTL/INTRO

## (undated) DEVICE — GLV SURG BIOGEL M LTX PF 8

## (undated) DEVICE — ELECTRD LP CUT 24/26F YEL

## (undated) DEVICE — BANDAGE COMPR W6INXL10YD ST M E WHITE/BEIGE

## (undated) DEVICE — NEPTUNE E-SEP SMOKE EVACUATION PENCIL, COATED, 70MM BLADE, ROCKER SWITCH: Brand: NEPTUNE E-SEP

## (undated) DEVICE — BAG,DRAINAGE,4L,A/R TOWER,LL,SLIDE TAP: Brand: MEDLINE

## (undated) DEVICE — PK TURNOVER CYSTO RM

## (undated) DEVICE — DRESSING FOAM W4XL12IN SIL RECT ADH WTRPRF FLM BK W/ BORD

## (undated) DEVICE — INF TL MULIPACK FR6: Brand: INFINITI

## (undated) DEVICE — SOLIDIFIER LIQ LIQUILOC/PLUS W/TREAT 2000CC

## (undated) DEVICE — SUTURE VICRYL + SZ 2-0 L36IN ABSRB UD L36MM CT-1 1/2 CIR VCP945H

## (undated) DEVICE — CVR BRD ARM 13X30

## (undated) DEVICE — STRIP,CLOSURE,WOUND,MEDI-STRIP,1/2X4: Brand: MEDLINE

## (undated) DEVICE — SOLUTION IRRIG 3000ML 0.9% SOD CHL USP UROMATIC PLAS CONT

## (undated) DEVICE — TR BAND RADIAL ARTERY COMPRESSION DEVICE: Brand: TR BAND

## (undated) DEVICE — TUBE ET 7.5MM NSL ORAL BASIC CUF INTMED MURPHY EYE RADPQ

## (undated) DEVICE — PINNACLE INTRODUCER SHEATH: Brand: PINNACLE

## (undated) DEVICE — TRAP FLD MINIVAC MEGADYNE 100ML

## (undated) DEVICE — BAPTIST TURNOVER KIT: Brand: MEDLINE INDUSTRIES, INC.

## (undated) DEVICE — GLV SURG SENSICARE PI ORTHO SZ8.5 LF STRL

## (undated) DEVICE — SYR CATH/TIP 50ML 2OZ STRL 1P/U

## (undated) DEVICE — Device

## (undated) DEVICE — 6F .070 XB 3.5 100CM: Brand: VISTA BRITE TIP

## (undated) DEVICE — FORCEPS BX L240CM DIA2.4MM L NDL RAD JAW 4 133340

## (undated) DEVICE — DRSNG SURESITE WNDW 4X4.5

## (undated) DEVICE — CLTH CLENS READYCLEANSE PERI CARE PK/5

## (undated) DEVICE — SHOULDER STABILIZATION KIT,                                    DISPOSABLE 12 PER BOX

## (undated) DEVICE — GW STARTER FXD/CORE PTFE/COAT J/TP/3MM .035IN 10X150CM

## (undated) DEVICE — TUBING, SUCTION, 1/4" X 12', STRAIGHT: Brand: MEDLINE

## (undated) DEVICE — CANN PASSPORT BUTN 8MM 4CM

## (undated) DEVICE — SUT MONOCRYL PLS ANTIB UND 3/0  PS1 27IN

## (undated) DEVICE — BUR BRL FORMLA 6FLUT 4MM

## (undated) DEVICE — GLOVE SURG SZ 85 L12IN FNGR ORTHO 126MIL CRM LTX FREE

## (undated) DEVICE — COL TISS GRAFTNET AUTOLOGOUS

## (undated) DEVICE — GOWN,NON-REINFORCED,SIRUS,SET IN SLV,XXL: Brand: MEDLINE

## (undated) DEVICE — CEMENT MIXING SYSTEM WITH FEMORAL BREAKWAY NOZZLE: Brand: REVOLUTION

## (undated) DEVICE — PK CATH CARD 30 CA/4

## (undated) DEVICE — SUP ARMBRD ART/LINE BLU

## (undated) DEVICE — FORCEPS BX 240CM 2.4MM L NDL RAD JAW 4 M00513334

## (undated) DEVICE — ADHS LIQ MASTISOL 2/3ML

## (undated) DEVICE — TBG ARTHRO FLOWSTEADY/ST DISP

## (undated) DEVICE — ADAPT CYSTO FOR SYR TO SHEATH

## (undated) DEVICE — LIMB HOLDER, WRIST/ANKLE: Brand: DEROYAL

## (undated) DEVICE — SHEET,DRAPE,53X77,STERILE: Brand: MEDLINE

## (undated) DEVICE — GLV SURG DERMASSURE GRN LF PF 8.5

## (undated) DEVICE — SUTURE VICRYL + SZ 0 L27IN ABSRB UD CT-1 L36MM 1/2 CIR TAPR VCP260H

## (undated) DEVICE — PK SHLDR 30

## (undated) DEVICE — PERCLOSE™ PROSTYLE™ SUTURE-MEDIATED CLOSURE AND REPAIR SYSTEM: Brand: PERCLOSE™ PROSTYLE™

## (undated) DEVICE — PAD, DEFIB, ADULT, RADIOTRANS, PHYSIO: Brand: MEDLINE

## (undated) DEVICE — KT MIX/DEL BIOCARTILAGE LG/JNT

## (undated) DEVICE — DEV TORQ GW HOT/PINK

## (undated) DEVICE — CANN NASL ETCO2 LO/FLO A/

## (undated) DEVICE — 60 ML SYRINGE,TOOMEY TYPE: Brand: MONOJECT

## (undated) DEVICE — PAD,EYE,1-5/8X2 5/8,STERILE,LF,1/PK: Brand: MEDLINE

## (undated) DEVICE — SYSTEM SKIN CLSR 22CM DERMBND PRINEO

## (undated) DEVICE — SUCTION MAT (LOW PROFILE), 50X34: Brand: NEPTUNE

## (undated) DEVICE — PROB ABLAT SERFAS ENERGY 90S 3.5MM

## (undated) DEVICE — GLOVE SURG SZ 85 L12IN FNGR THK79MIL GRN LTX FREE

## (undated) DEVICE — EVAC BLDR UROVAC W ADAPT

## (undated) DEVICE — DRAPE,ANGIO,BRACH,STERILE,38X44: Brand: MEDLINE

## (undated) DEVICE — GLIDESHEATH SLENDER STAINLESS STEEL KIT: Brand: GLIDESHEATH SLENDER

## (undated) DEVICE — 4-PORT MANIFOLD: Brand: NEPTUNE 2

## (undated) DEVICE — SOL IRR NACL 0.9PCT BO 1000ML

## (undated) DEVICE — DUAL CUT SAGITTAL BLADE

## (undated) DEVICE — HI-TORQUE POWERTURN FLEX GUIDE WIRE W/HYDROPHILIC COATING .014" STRAIGHT TIP 190 CM: Brand: HI-TORQUE POWERTURN

## (undated) DEVICE — T-MAX DISPOSABLE FACE MASK 8 PER BOX

## (undated) DEVICE — CURAVIEW LED LARYNGOSCOPE BLADE & HANDLE,DISPOSABLE,MILLER 2: Brand: CURAPLEX

## (undated) DEVICE — SHORT LENS-STERILE

## (undated) DEVICE — TOOL INSRT GW MTL OR PLSTC

## (undated) DEVICE — KT INSRT PERC TRNS/TNDN F/3.9MM/CSCRW/2.6MM/KNOTLSS/FIBERTAK

## (undated) DEVICE — STERILE PATIENT PROTECTIVE PAD FOR IMP® KNEE POSITIONERS & COHESIVE WRAP (10 / CASE): Brand: DE MAYO KNEE POSITIONER®

## (undated) DEVICE — HYDROGEL COATED LATEX FOLEY CATHETER, 5 CC, 3-WAY, 24 FR (8.0 MM): Brand: DOVER

## (undated) DEVICE — DRP SURG U/DRP INVISISHIELD PA 48X52IN

## (undated) DEVICE — [TOMCAT CUTTER, ARTHROSCOPIC SHAVER BLADE,  DO NOT RESTERILIZE,  DO NOT USE IF PACKAGE IS DAMAGED,  KEEP DRY,  KEEP AWAY FROM SUNLIGHT]: Brand: FORMULA